# Patient Record
Sex: MALE | Race: WHITE | NOT HISPANIC OR LATINO | Employment: FULL TIME | RURAL
[De-identification: names, ages, dates, MRNs, and addresses within clinical notes are randomized per-mention and may not be internally consistent; named-entity substitution may affect disease eponyms.]

---

## 2018-07-06 ENCOUNTER — HISTORICAL (OUTPATIENT)
Dept: ADMINISTRATIVE | Facility: HOSPITAL | Age: 38
End: 2018-07-06

## 2018-07-09 LAB
LAB AP CLINICAL INFORMATION: NORMAL
LAB AP DIAGNOSIS - HISTORICAL: NORMAL
LAB AP GROSS PATHOLOGY - HISTORICAL: NORMAL
LAB AP SPECIMEN SUBMITTED - HISTORICAL: NORMAL

## 2020-03-05 ENCOUNTER — HISTORICAL (OUTPATIENT)
Dept: ADMINISTRATIVE | Facility: HOSPITAL | Age: 40
End: 2020-03-05

## 2020-05-20 ENCOUNTER — HISTORICAL (OUTPATIENT)
Dept: ADMINISTRATIVE | Facility: HOSPITAL | Age: 40
End: 2020-05-20

## 2020-05-20 LAB
GLUCOSE SERPL-MCNC: 361 MG/DL (ref 70–105)
GLUCOSE SERPL-MCNC: 391 MG/DL (ref 70–105)

## 2020-05-27 ENCOUNTER — HISTORICAL (OUTPATIENT)
Dept: ADMINISTRATIVE | Facility: HOSPITAL | Age: 40
End: 2020-05-27

## 2020-05-27 LAB
BASOPHILS # BLD AUTO: 0.05 X10E3/UL (ref 0–0.2)
BASOPHILS NFR BLD AUTO: 0.5 % (ref 0–1)
EOSINOPHIL # BLD AUTO: 0.07 X10E3/UL (ref 0–0.5)
EOSINOPHIL NFR BLD AUTO: 0.7 % (ref 1–4)
ERYTHROCYTE [DISTWIDTH] IN BLOOD BY AUTOMATED COUNT: 12.4 % (ref 11.5–14.5)
GLUCOSE SERPL-MCNC: 100 MG/DL (ref 70–105)
GLUCOSE SERPL-MCNC: 136 MG/DL (ref 70–105)
HCT VFR BLD AUTO: 45.1 % (ref 40–54)
HGB BLD-MCNC: 15.1 G/DL (ref 13.5–18)
IMM GRANULOCYTES # BLD AUTO: 0.03 X10E3/UL (ref 0–0.04)
IMM GRANULOCYTES NFR BLD: 0.3 % (ref 0–0.4)
LYMPHOCYTES # BLD AUTO: 3.02 X10E3/UL (ref 1–4.8)
LYMPHOCYTES NFR BLD AUTO: 30.5 % (ref 27–41)
MCH RBC QN AUTO: 27.7 PG (ref 27–31)
MCHC RBC AUTO-ENTMCNC: 33.5 G/DL (ref 32–36)
MCV RBC AUTO: 82.8 FL (ref 80–96)
MONOCYTES # BLD AUTO: 0.41 X10E3/UL (ref 0–0.8)
MONOCYTES NFR BLD AUTO: 4.1 % (ref 2–6)
MPC BLD CALC-MCNC: 9.4 FL (ref 9.4–12.4)
NEUTROPHILS # BLD AUTO: 6.31 X10E3/UL (ref 1.8–7.7)
NEUTROPHILS NFR BLD AUTO: 63.9 % (ref 53–65)
NRBC # BLD AUTO: 0 X10E3/UL (ref 0–0)
NRBC, AUTO (.00): 0 /100 (ref 0–0)
PLATELET # BLD AUTO: 224 X10E3/UL (ref 150–400)
RBC # BLD AUTO: 5.45 X10E6/UL (ref 4.6–6.2)
WBC # BLD AUTO: 9.89 X10E3/UL (ref 4.5–11)

## 2020-06-01 ENCOUNTER — HISTORICAL (OUTPATIENT)
Dept: ADMINISTRATIVE | Facility: HOSPITAL | Age: 40
End: 2020-06-01

## 2020-06-01 LAB — GLUCOSE SERPL-MCNC: 129 MG/DL (ref 70–105)

## 2020-08-19 ENCOUNTER — HISTORICAL (OUTPATIENT)
Dept: ADMINISTRATIVE | Facility: HOSPITAL | Age: 40
End: 2020-08-19

## 2020-08-19 LAB — GLUCOSE SERPL-MCNC: 219 MG/DL (ref 70–105)

## 2020-09-23 ENCOUNTER — HISTORICAL (OUTPATIENT)
Dept: ADMINISTRATIVE | Facility: HOSPITAL | Age: 40
End: 2020-09-23

## 2020-09-23 LAB
GLUCOSE SERPL-MCNC: 243 MG/DL (ref 70–105)
GLUCOSE SERPL-MCNC: 270 MG/DL (ref 70–105)

## 2021-03-24 RX ORDER — HYDROCODONE BITARTRATE AND ACETAMINOPHEN 10; 325 MG/1; MG/1
1 TABLET ORAL EVERY 12 HOURS
COMMUNITY
End: 2021-06-09 | Stop reason: SDUPTHER

## 2021-03-24 RX ORDER — BUDESONIDE AND FORMOTEROL FUMARATE DIHYDRATE 160; 4.5 UG/1; UG/1
2 AEROSOL RESPIRATORY (INHALATION) EVERY 12 HOURS
COMMUNITY
End: 2021-06-17 | Stop reason: SDUPTHER

## 2021-03-24 RX ORDER — LISINOPRIL 5 MG/1
5 TABLET ORAL DAILY
COMMUNITY
End: 2021-06-17 | Stop reason: SDUPTHER

## 2021-03-24 RX ORDER — DULOXETIN HYDROCHLORIDE 60 MG/1
60 CAPSULE, DELAYED RELEASE ORAL 2 TIMES DAILY
COMMUNITY
End: 2021-04-20 | Stop reason: SDUPTHER

## 2021-03-24 RX ORDER — TRAZODONE HYDROCHLORIDE 50 MG/1
50 TABLET ORAL NIGHTLY
COMMUNITY
End: 2021-04-20

## 2021-03-24 RX ORDER — ESOMEPRAZOLE MAGNESIUM 40 MG/1
40 CAPSULE, DELAYED RELEASE ORAL
COMMUNITY
End: 2021-06-17 | Stop reason: SDUPTHER

## 2021-03-24 RX ORDER — TIZANIDINE 4 MG/1
4 TABLET ORAL 3 TIMES DAILY
COMMUNITY
End: 2021-06-17 | Stop reason: SDUPTHER

## 2021-03-24 RX ORDER — GABAPENTIN 600 MG/1
600 TABLET ORAL 3 TIMES DAILY
COMMUNITY
End: 2021-06-17 | Stop reason: SDUPTHER

## 2021-03-24 RX ORDER — PRAVASTATIN SODIUM 80 MG/1
80 TABLET ORAL NIGHTLY
COMMUNITY
End: 2021-06-17 | Stop reason: SDUPTHER

## 2021-03-31 VITALS
BODY MASS INDEX: 42.66 KG/M2 | HEIGHT: 72 IN | BODY MASS INDEX: 42.66 KG/M2 | HEIGHT: 72 IN | WEIGHT: 315 LBS | WEIGHT: 315 LBS

## 2021-03-31 RX ORDER — ALBUTEROL SULFATE 90 UG/1
AEROSOL, METERED RESPIRATORY (INHALATION) EVERY 4 HOURS PRN
COMMUNITY
End: 2021-06-17 | Stop reason: SDUPTHER

## 2021-04-01 ENCOUNTER — OFFICE VISIT (OUTPATIENT)
Dept: DIABETES SERVICES | Facility: CLINIC | Age: 41
End: 2021-04-01
Payer: COMMERCIAL

## 2021-04-01 ENCOUNTER — OFFICE VISIT (OUTPATIENT)
Dept: PAIN MEDICINE | Facility: HOSPITAL | Age: 41
End: 2021-04-01
Payer: COMMERCIAL

## 2021-04-01 VITALS
HEART RATE: 92 BPM | OXYGEN SATURATION: 96 % | BODY MASS INDEX: 42.66 KG/M2 | RESPIRATION RATE: 16 BRPM | SYSTOLIC BLOOD PRESSURE: 126 MMHG | DIASTOLIC BLOOD PRESSURE: 90 MMHG | HEIGHT: 72 IN | WEIGHT: 315 LBS

## 2021-04-01 VITALS
SYSTOLIC BLOOD PRESSURE: 165 MMHG | WEIGHT: 315 LBS | DIASTOLIC BLOOD PRESSURE: 96 MMHG | BODY MASS INDEX: 42.66 KG/M2 | HEART RATE: 100 BPM | HEIGHT: 72 IN

## 2021-04-01 DIAGNOSIS — M47.817 LUMBOSACRAL SPONDYLOSIS WITHOUT MYELOPATHY: Chronic | ICD-10-CM

## 2021-04-01 DIAGNOSIS — I10 HYPERTENSION, UNSPECIFIED TYPE: ICD-10-CM

## 2021-04-01 DIAGNOSIS — E78.5 HYPERLIPIDEMIA, UNSPECIFIED HYPERLIPIDEMIA TYPE: ICD-10-CM

## 2021-04-01 DIAGNOSIS — G89.4 CHRONIC PAIN SYNDROME: Chronic | ICD-10-CM

## 2021-04-01 DIAGNOSIS — F32.A DEPRESSION, UNSPECIFIED DEPRESSION TYPE: ICD-10-CM

## 2021-04-01 DIAGNOSIS — Z79.899 ENCOUNTER FOR LONG-TERM (CURRENT) USE OF OTHER MEDICATIONS: ICD-10-CM

## 2021-04-01 DIAGNOSIS — G47.30 SLEEP APNEA, UNSPECIFIED TYPE: ICD-10-CM

## 2021-04-01 DIAGNOSIS — E11.9 TYPE 2 DIABETES MELLITUS WITHOUT COMPLICATION, WITHOUT LONG-TERM CURRENT USE OF INSULIN: ICD-10-CM

## 2021-04-01 DIAGNOSIS — E11.42 DIABETIC PERIPHERAL NEUROPATHY: ICD-10-CM

## 2021-04-01 DIAGNOSIS — Z79.4 TYPE 2 DIABETES MELLITUS WITH HYPERGLYCEMIA, WITH LONG-TERM CURRENT USE OF INSULIN: Primary | ICD-10-CM

## 2021-04-01 DIAGNOSIS — J44.9 CHRONIC OBSTRUCTIVE PULMONARY DISEASE, UNSPECIFIED COPD TYPE: ICD-10-CM

## 2021-04-01 DIAGNOSIS — E11.65 TYPE 2 DIABETES MELLITUS WITH HYPERGLYCEMIA, WITH LONG-TERM CURRENT USE OF INSULIN: Primary | ICD-10-CM

## 2021-04-01 DIAGNOSIS — G90.511 COMPLEX REGIONAL PAIN SYNDROME TYPE 1 OF RIGHT UPPER EXTREMITY: Primary | Chronic | ICD-10-CM

## 2021-04-01 LAB
CTP QC/QA: YES
GLUCOSE SERPL-MCNC: 403 MG/DL (ref 70–110)
HBA1C MFR BLD: 12.8 %
POC (AMP) AMPHETAMINE: NEGATIVE
POC (BAR) BARBITURATES: NEGATIVE
POC (BUP) BUPRENORPHINE: NEGATIVE
POC (BZO) BENZODIAZEPINES: NEGATIVE
POC (COC) COCAINE: NEGATIVE
POC (MDMA) METHYLENEDIOXYMETHAMPHETAMINE 3,4: NEGATIVE
POC (MET) METHAMPHETAMINE: NEGATIVE
POC (MOP) OPIATES: ABNORMAL
POC (MTD) METHADONE: NEGATIVE
POC (OXY) OXYCODONE: NEGATIVE
POC (PCP) PHENCYCLIDINE: NEGATIVE
POC (TCA) TRICYCLIC ANTIDEPRESSANTS: NEGATIVE
POC TEMPERATURE (URINE): 92
POC THC: NEGATIVE

## 2021-04-01 PROCEDURE — 99214 OFFICE O/P EST MOD 30 MIN: CPT | Mod: S$PBB,,, | Performed by: PHYSICIAN ASSISTANT

## 2021-04-01 PROCEDURE — 99214 PR OFFICE/OUTPT VISIT, EST, LEVL IV, 30-39 MIN: ICD-10-PCS | Mod: S$PBB,,, | Performed by: PHYSICIAN ASSISTANT

## 2021-04-01 PROCEDURE — 83036 HEMOGLOBIN GLYCOSYLATED A1C: CPT | Mod: PBBFAC | Performed by: NURSE PRACTITIONER

## 2021-04-01 PROCEDURE — 99999 PR PBB SHADOW E&M-EST. PATIENT-LVL IV: CPT | Mod: PBBFAC,,, | Performed by: PHYSICIAN ASSISTANT

## 2021-04-01 PROCEDURE — 82962 GLUCOSE BLOOD TEST: CPT | Mod: PBBFAC | Performed by: NURSE PRACTITIONER

## 2021-04-01 PROCEDURE — 99214 OFFICE O/P EST MOD 30 MIN: CPT | Mod: PBBFAC | Performed by: PHYSICIAN ASSISTANT

## 2021-04-01 PROCEDURE — 99215 OFFICE O/P EST HI 40 MIN: CPT | Mod: PBBFAC | Performed by: NURSE PRACTITIONER

## 2021-04-01 PROCEDURE — 99999 PR PBB SHADOW E&M-EST. PATIENT-LVL V: CPT | Mod: PBBFAC,,, | Performed by: NURSE PRACTITIONER

## 2021-04-01 PROCEDURE — 99999 PR PBB SHADOW E&M-EST. PATIENT-LVL IV: ICD-10-PCS | Mod: PBBFAC,,, | Performed by: PHYSICIAN ASSISTANT

## 2021-04-01 PROCEDURE — 99204 PR OFFICE/OUTPT VISIT, NEW, LEVL IV, 45-59 MIN: ICD-10-PCS | Mod: S$PBB,,, | Performed by: NURSE PRACTITIONER

## 2021-04-01 PROCEDURE — 99999 PR PBB SHADOW E&M-EST. PATIENT-LVL V: ICD-10-PCS | Mod: PBBFAC,,, | Performed by: NURSE PRACTITIONER

## 2021-04-01 PROCEDURE — 99204 OFFICE O/P NEW MOD 45 MIN: CPT | Mod: S$PBB,,, | Performed by: NURSE PRACTITIONER

## 2021-04-01 RX ORDER — SEMAGLUTIDE 1.34 MG/ML
0.75 INJECTION, SOLUTION SUBCUTANEOUS
Qty: 9 ML | Refills: 1 | Status: SHIPPED | OUTPATIENT
Start: 2021-04-01 | End: 2021-07-26 | Stop reason: SDUPTHER

## 2021-04-01 RX ORDER — EMPAGLIFLOZIN 25 MG/1
25 TABLET, FILM COATED ORAL DAILY
Qty: 90 TABLET | Refills: 1 | Status: SHIPPED | OUTPATIENT
Start: 2021-04-01 | End: 2021-07-26 | Stop reason: SDUPTHER

## 2021-04-01 RX ORDER — INSULIN GLARGINE 100 [IU]/ML
INJECTION, SOLUTION SUBCUTANEOUS
Qty: 45 ML | Refills: 1 | Status: SHIPPED | OUTPATIENT
Start: 2021-04-01 | End: 2021-07-26

## 2021-04-01 RX ORDER — HYDROCODONE BITARTRATE AND ACETAMINOPHEN 10; 325 MG/1; MG/1
1 TABLET ORAL EVERY 12 HOURS
Qty: 60 TABLET | Refills: 0 | Status: SHIPPED | OUTPATIENT
Start: 2021-04-06 | End: 2021-04-29 | Stop reason: SDUPTHER

## 2021-04-15 ENCOUNTER — ANESTHESIA (OUTPATIENT)
Dept: PAIN MEDICINE | Facility: HOSPITAL | Age: 41
End: 2021-04-15
Payer: COMMERCIAL

## 2021-04-15 ENCOUNTER — ANESTHESIA EVENT (OUTPATIENT)
Dept: PAIN MEDICINE | Facility: HOSPITAL | Age: 41
End: 2021-04-15
Payer: COMMERCIAL

## 2021-04-15 ENCOUNTER — HOSPITAL ENCOUNTER (OUTPATIENT)
Facility: HOSPITAL | Age: 41
Discharge: HOME | End: 2021-04-15
Attending: PAIN MEDICINE | Admitting: PAIN MEDICINE
Payer: COMMERCIAL

## 2021-04-15 VITALS
TEMPERATURE: 97 F | HEART RATE: 73 BPM | HEIGHT: 72 IN | DIASTOLIC BLOOD PRESSURE: 84 MMHG | RESPIRATION RATE: 15 BRPM | WEIGHT: 315 LBS | SYSTOLIC BLOOD PRESSURE: 133 MMHG | OXYGEN SATURATION: 98 % | BODY MASS INDEX: 42.66 KG/M2

## 2021-04-15 DIAGNOSIS — M47.817 LUMBOSACRAL SPONDYLOSIS WITHOUT MYELOPATHY: Primary | ICD-10-CM

## 2021-04-15 DIAGNOSIS — M47.817 SPONDYLOSIS OF LUMBOSACRAL REGION WITHOUT MYELOPATHY OR RADICULOPATHY: ICD-10-CM

## 2021-04-15 LAB
GLUCOSE SERPL-MCNC: 153 MG/DL (ref 70–105)
GLUCOSE SERPL-MCNC: 153 MG/DL (ref 70–110)

## 2021-04-15 PROCEDURE — 25000003 PHARM REV CODE 250: Performed by: PAIN MEDICINE

## 2021-04-15 PROCEDURE — 64494 INJ PARAVERT F JNT L/S 2 LEV: CPT | Mod: 50 | Performed by: PAIN MEDICINE

## 2021-04-15 PROCEDURE — 64493 INJ PARAVERT F JNT L/S 1 LEV: CPT | Mod: 50 | Performed by: PAIN MEDICINE

## 2021-04-15 PROCEDURE — 64494 INJ PARAVERT F JNT L/S 2 LEV: CPT | Mod: RT,,, | Performed by: PAIN MEDICINE

## 2021-04-15 PROCEDURE — 64493 PR INJ DX/THER AGNT PARAVERT FACET JOINT,IMG GUIDE,LUMBAR/SAC,1ST LVL: ICD-10-PCS | Mod: 50,,, | Performed by: PAIN MEDICINE

## 2021-04-15 PROCEDURE — 37000008 HC ANESTHESIA 1ST 15 MINUTES: Performed by: PAIN MEDICINE

## 2021-04-15 PROCEDURE — 27201423 OPTIME MED/SURG SUP & DEVICES STERILE SUPPLY: Performed by: PAIN MEDICINE

## 2021-04-15 PROCEDURE — 64495 INJ PARAVERT F JNT L/S 3 LEV: CPT | Mod: RT,,, | Performed by: PAIN MEDICINE

## 2021-04-15 PROCEDURE — D9220A PRA ANESTHESIA: ICD-10-PCS | Mod: ,,, | Performed by: NURSE ANESTHETIST, CERTIFIED REGISTERED

## 2021-04-15 PROCEDURE — 82962 GLUCOSE BLOOD TEST: CPT

## 2021-04-15 PROCEDURE — D9220A PRA ANESTHESIA: Mod: ,,, | Performed by: NURSE ANESTHETIST, CERTIFIED REGISTERED

## 2021-04-15 PROCEDURE — 64495 INJ PARAVERT F JNT L/S 3 LEV: CPT | Mod: 50 | Performed by: PAIN MEDICINE

## 2021-04-15 PROCEDURE — 64494 PR INJ DX/THER AGNT PARAVERT FACET JOINT,IMG GUIDE,LUMBAR/SAC, 2ND LEVEL: ICD-10-PCS | Mod: LT,,, | Performed by: PAIN MEDICINE

## 2021-04-15 PROCEDURE — 64493 INJ PARAVERT F JNT L/S 1 LEV: CPT | Mod: 50,,, | Performed by: PAIN MEDICINE

## 2021-04-15 PROCEDURE — 64495 PR INJ DX/THER AGNT PARAVERT FACET JOINT,IMG GUIDE,LUMBAR/SAC, ADD LEVEL: ICD-10-PCS | Mod: LT,,, | Performed by: PAIN MEDICINE

## 2021-04-15 PROCEDURE — 25000003 PHARM REV CODE 250: Performed by: NURSE ANESTHETIST, CERTIFIED REGISTERED

## 2021-04-15 PROCEDURE — 37000009 HC ANESTHESIA EA ADD 15 MINS: Performed by: PAIN MEDICINE

## 2021-04-15 RX ORDER — SODIUM CHLORIDE 9 MG/ML
INJECTION, SOLUTION INTRAVENOUS CONTINUOUS
Status: DISCONTINUED | OUTPATIENT
Start: 2021-04-15 | End: 2021-04-15 | Stop reason: HOSPADM

## 2021-04-15 RX ORDER — LIDOCAINE HYDROCHLORIDE 20 MG/ML
INJECTION INTRAVENOUS
Status: DISCONTINUED | OUTPATIENT
Start: 2021-04-15 | End: 2021-04-15

## 2021-04-15 RX ORDER — BUPIVACAINE HYDROCHLORIDE 2.5 MG/ML
INJECTION, SOLUTION INFILTRATION; PERINEURAL
Status: DISCONTINUED | OUTPATIENT
Start: 2021-04-15 | End: 2021-04-15 | Stop reason: HOSPADM

## 2021-04-15 RX ORDER — PROPOFOL 10 MG/ML
VIAL (ML) INTRAVENOUS
Status: DISCONTINUED | OUTPATIENT
Start: 2021-04-15 | End: 2021-04-15

## 2021-04-15 RX ORDER — SODIUM CHLORIDE 450 MG/100ML
INJECTION, SOLUTION INTRAVENOUS CONTINUOUS PRN
Status: COMPLETED | OUTPATIENT
Start: 2021-04-15 | End: 2021-04-15

## 2021-04-15 RX ADMIN — Medication 150 MG: at 09:04

## 2021-04-15 RX ADMIN — SODIUM CHLORIDE: 9 INJECTION, SOLUTION INTRAVENOUS at 08:04

## 2021-04-15 RX ADMIN — Medication 50 MG: at 08:04

## 2021-04-15 RX ADMIN — LIDOCAINE HYDROCHLORIDE 50 MG: 20 INJECTION, SOLUTION INTRAVENOUS at 08:04

## 2021-04-20 ENCOUNTER — OFFICE VISIT (OUTPATIENT)
Dept: NEUROLOGY | Facility: CLINIC | Age: 41
End: 2021-04-20
Payer: COMMERCIAL

## 2021-04-20 VITALS
BODY MASS INDEX: 42.66 KG/M2 | WEIGHT: 315 LBS | SYSTOLIC BLOOD PRESSURE: 132 MMHG | DIASTOLIC BLOOD PRESSURE: 80 MMHG | OXYGEN SATURATION: 96 % | HEIGHT: 72 IN | HEART RATE: 106 BPM

## 2021-04-20 DIAGNOSIS — E11.42 DIABETIC POLYNEUROPATHY ASSOCIATED WITH TYPE 2 DIABETES MELLITUS: Primary | ICD-10-CM

## 2021-04-20 DIAGNOSIS — G47.30 SLEEP APNEA, UNSPECIFIED TYPE: ICD-10-CM

## 2021-04-20 DIAGNOSIS — E66.9 OBESITY, UNSPECIFIED CLASSIFICATION, UNSPECIFIED OBESITY TYPE, UNSPECIFIED WHETHER SERIOUS COMORBIDITY PRESENT: ICD-10-CM

## 2021-04-20 DIAGNOSIS — F32.A DEPRESSIVE DISORDER: ICD-10-CM

## 2021-04-20 PROCEDURE — 99215 OFFICE O/P EST HI 40 MIN: CPT | Mod: PBBFAC | Performed by: NURSE PRACTITIONER

## 2021-04-20 PROCEDURE — 99214 PR OFFICE/OUTPT VISIT, EST, LEVL IV, 30-39 MIN: ICD-10-PCS | Mod: S$PBB,,, | Performed by: NURSE PRACTITIONER

## 2021-04-20 PROCEDURE — 99214 OFFICE O/P EST MOD 30 MIN: CPT | Mod: S$PBB,,, | Performed by: NURSE PRACTITIONER

## 2021-04-20 PROCEDURE — 99999 PR PBB SHADOW E&M-EST. PATIENT-LVL V: ICD-10-PCS | Mod: PBBFAC,,, | Performed by: NURSE PRACTITIONER

## 2021-04-20 PROCEDURE — 99999 PR PBB SHADOW E&M-EST. PATIENT-LVL V: CPT | Mod: PBBFAC,,, | Performed by: NURSE PRACTITIONER

## 2021-04-20 RX ORDER — BLOOD SUGAR DIAGNOSTIC
STRIP MISCELLANEOUS
COMMUNITY
Start: 2021-04-03

## 2021-04-20 RX ORDER — PEN NEEDLE, DIABETIC 32GX 5/32"
NEEDLE, DISPOSABLE MISCELLANEOUS
COMMUNITY
Start: 2021-04-03 | End: 2021-12-17 | Stop reason: SDUPTHER

## 2021-04-20 RX ORDER — DULOXETIN HYDROCHLORIDE 60 MG/1
60 CAPSULE, DELAYED RELEASE ORAL 2 TIMES DAILY
Qty: 60 CAPSULE | Refills: 6 | Status: SHIPPED | OUTPATIENT
Start: 2021-04-20 | End: 2021-07-19 | Stop reason: SDUPTHER

## 2021-04-20 RX ORDER — AMITRIPTYLINE HYDROCHLORIDE 10 MG/1
TABLET, FILM COATED ORAL
Qty: 30 TABLET | Refills: 3 | Status: SHIPPED | OUTPATIENT
Start: 2021-04-20 | End: 2021-07-19 | Stop reason: SDUPTHER

## 2021-04-20 RX ORDER — LANCETS
EACH MISCELLANEOUS
COMMUNITY
Start: 2021-04-03

## 2021-04-29 ENCOUNTER — OFFICE VISIT (OUTPATIENT)
Dept: PAIN MEDICINE | Facility: CLINIC | Age: 41
End: 2021-04-29
Payer: COMMERCIAL

## 2021-04-29 VITALS
HEIGHT: 72 IN | BODY MASS INDEX: 42.66 KG/M2 | RESPIRATION RATE: 20 BRPM | WEIGHT: 315 LBS | SYSTOLIC BLOOD PRESSURE: 157 MMHG | HEART RATE: 129 BPM | DIASTOLIC BLOOD PRESSURE: 107 MMHG

## 2021-04-29 DIAGNOSIS — M47.817 LUMBOSACRAL SPONDYLOSIS WITHOUT MYELOPATHY: Primary | Chronic | ICD-10-CM

## 2021-04-29 DIAGNOSIS — G89.4 CHRONIC PAIN SYNDROME: Chronic | ICD-10-CM

## 2021-04-29 DIAGNOSIS — G90.511 COMPLEX REGIONAL PAIN SYNDROME TYPE 1 OF RIGHT UPPER EXTREMITY: Chronic | ICD-10-CM

## 2021-04-29 DIAGNOSIS — G90.511 COMPLEX REGIONAL PAIN SYNDROME TYPE I OF RIGHT UPPER EXTREMITY: Chronic | ICD-10-CM

## 2021-04-29 DIAGNOSIS — M47.817 LUMBOSACRAL SPONDYLOSIS WITHOUT MYELOPATHY: Chronic | ICD-10-CM

## 2021-04-29 PROCEDURE — 99215 OFFICE O/P EST HI 40 MIN: CPT | Mod: PBBFAC | Performed by: PHYSICIAN ASSISTANT

## 2021-04-29 PROCEDURE — 99214 OFFICE O/P EST MOD 30 MIN: CPT | Mod: S$PBB,,, | Performed by: PHYSICIAN ASSISTANT

## 2021-04-29 PROCEDURE — 99999 PR PBB SHADOW E&M-EST. PATIENT-LVL V: ICD-10-PCS | Mod: PBBFAC,,, | Performed by: PHYSICIAN ASSISTANT

## 2021-04-29 PROCEDURE — 99214 PR OFFICE/OUTPT VISIT, EST, LEVL IV, 30-39 MIN: ICD-10-PCS | Mod: S$PBB,,, | Performed by: PHYSICIAN ASSISTANT

## 2021-04-29 PROCEDURE — 99999 PR PBB SHADOW E&M-EST. PATIENT-LVL V: CPT | Mod: PBBFAC,,, | Performed by: PHYSICIAN ASSISTANT

## 2021-04-29 RX ORDER — HYDROCODONE BITARTRATE AND ACETAMINOPHEN 10; 325 MG/1; MG/1
1 TABLET ORAL EVERY 12 HOURS
Qty: 60 TABLET | Refills: 0 | Status: SHIPPED | OUTPATIENT
Start: 2021-05-14 | End: 2021-06-13

## 2021-05-18 ENCOUNTER — TELEPHONE (OUTPATIENT)
Dept: PAIN MEDICINE | Facility: CLINIC | Age: 41
End: 2021-05-18

## 2021-06-09 ENCOUNTER — OFFICE VISIT (OUTPATIENT)
Dept: PAIN MEDICINE | Facility: CLINIC | Age: 41
End: 2021-06-09
Payer: COMMERCIAL

## 2021-06-09 VITALS
SYSTOLIC BLOOD PRESSURE: 161 MMHG | HEART RATE: 95 BPM | BODY MASS INDEX: 42.66 KG/M2 | RESPIRATION RATE: 17 BRPM | HEIGHT: 72 IN | WEIGHT: 315 LBS | DIASTOLIC BLOOD PRESSURE: 84 MMHG

## 2021-06-09 DIAGNOSIS — E11.42 DIABETIC PERIPHERAL NEUROPATHY: Primary | ICD-10-CM

## 2021-06-09 DIAGNOSIS — G90.511 COMPLEX REGIONAL PAIN SYNDROME TYPE 1 OF RIGHT UPPER EXTREMITY: Chronic | ICD-10-CM

## 2021-06-09 DIAGNOSIS — M47.817 LUMBOSACRAL SPONDYLOSIS WITHOUT MYELOPATHY: Chronic | ICD-10-CM

## 2021-06-09 PROCEDURE — 99214 OFFICE O/P EST MOD 30 MIN: CPT | Mod: S$PBB,,, | Performed by: PHYSICIAN ASSISTANT

## 2021-06-09 PROCEDURE — 99215 OFFICE O/P EST HI 40 MIN: CPT | Mod: PBBFAC | Performed by: PHYSICIAN ASSISTANT

## 2021-06-09 PROCEDURE — 99214 PR OFFICE/OUTPT VISIT, EST, LEVL IV, 30-39 MIN: ICD-10-PCS | Mod: S$PBB,,, | Performed by: PHYSICIAN ASSISTANT

## 2021-06-09 RX ORDER — HYDROCODONE BITARTRATE AND ACETAMINOPHEN 10; 325 MG/1; MG/1
1 TABLET ORAL EVERY 12 HOURS
Qty: 60 TABLET | Refills: 0 | Status: SHIPPED | OUTPATIENT
Start: 2021-06-14 | End: 2021-07-08 | Stop reason: SDUPTHER

## 2021-06-17 ENCOUNTER — OFFICE VISIT (OUTPATIENT)
Dept: FAMILY MEDICINE | Facility: CLINIC | Age: 41
End: 2021-06-17
Payer: COMMERCIAL

## 2021-06-17 VITALS
OXYGEN SATURATION: 97 % | TEMPERATURE: 98 F | WEIGHT: 315 LBS | DIASTOLIC BLOOD PRESSURE: 97 MMHG | HEART RATE: 109 BPM | RESPIRATION RATE: 16 BRPM | SYSTOLIC BLOOD PRESSURE: 152 MMHG | HEIGHT: 72 IN | BODY MASS INDEX: 42.66 KG/M2

## 2021-06-17 DIAGNOSIS — E78.5 HYPERLIPIDEMIA, UNSPECIFIED HYPERLIPIDEMIA TYPE: ICD-10-CM

## 2021-06-17 DIAGNOSIS — I10 HYPERTENSION, UNSPECIFIED TYPE: Primary | ICD-10-CM

## 2021-06-17 DIAGNOSIS — J44.9 CHRONIC OBSTRUCTIVE PULMONARY DISEASE, UNSPECIFIED COPD TYPE: ICD-10-CM

## 2021-06-17 DIAGNOSIS — F32.A DEPRESSIVE DISORDER: ICD-10-CM

## 2021-06-17 DIAGNOSIS — K21.9 GASTROESOPHAGEAL REFLUX DISEASE, UNSPECIFIED WHETHER ESOPHAGITIS PRESENT: ICD-10-CM

## 2021-06-17 LAB
ALBUMIN SERPL BCP-MCNC: 3.5 G/DL (ref 3.5–5)
ALBUMIN/GLOB SERPL: 0.9 {RATIO}
ALP SERPL-CCNC: 163 U/L (ref 45–115)
ALT SERPL W P-5'-P-CCNC: 21 U/L (ref 16–61)
ANION GAP SERPL CALCULATED.3IONS-SCNC: 12 MMOL/L (ref 7–16)
AST SERPL W P-5'-P-CCNC: 14 U/L (ref 15–37)
BASOPHILS # BLD AUTO: 0.05 K/UL (ref 0–0.2)
BASOPHILS NFR BLD AUTO: 0.4 % (ref 0–1)
BILIRUB SERPL-MCNC: 0.4 MG/DL (ref 0–1.2)
BUN SERPL-MCNC: 14 MG/DL (ref 7–18)
BUN/CREAT SERPL: 19 (ref 6–20)
CALCIUM SERPL-MCNC: 8.7 MG/DL (ref 8.5–10.1)
CHLORIDE SERPL-SCNC: 108 MMOL/L (ref 98–107)
CHOLEST SERPL-MCNC: 184 MG/DL (ref 0–200)
CHOLEST/HDLC SERPL: 5.6 {RATIO}
CO2 SERPL-SCNC: 24 MMOL/L (ref 21–32)
CREAT SERPL-MCNC: 0.73 MG/DL (ref 0.7–1.3)
DIFFERENTIAL METHOD BLD: ABNORMAL
EOSINOPHIL # BLD AUTO: 0.08 K/UL (ref 0–0.5)
EOSINOPHIL NFR BLD AUTO: 0.7 % (ref 1–4)
ERYTHROCYTE [DISTWIDTH] IN BLOOD BY AUTOMATED COUNT: 13.1 % (ref 11.5–14.5)
GLOBULIN SER-MCNC: 3.8 G/DL (ref 2–4)
GLUCOSE SERPL-MCNC: 256 MG/DL (ref 74–106)
HCT VFR BLD AUTO: 49.3 % (ref 40–54)
HDLC SERPL-MCNC: 33 MG/DL (ref 40–60)
HGB BLD-MCNC: 16.3 G/DL (ref 13.5–18)
IMM GRANULOCYTES # BLD AUTO: 0.06 K/UL (ref 0–0.04)
IMM GRANULOCYTES NFR BLD: 0.5 % (ref 0–0.4)
LDLC SERPL CALC-MCNC: 84 MG/DL
LDLC/HDLC SERPL: 2.5 {RATIO}
LYMPHOCYTES # BLD AUTO: 2.49 K/UL (ref 1–4.8)
LYMPHOCYTES NFR BLD AUTO: 21.1 % (ref 27–41)
MCH RBC QN AUTO: 28.4 PG (ref 27–31)
MCHC RBC AUTO-ENTMCNC: 33.1 G/DL (ref 32–36)
MCV RBC AUTO: 85.9 FL (ref 80–96)
MONOCYTES # BLD AUTO: 0.47 K/UL (ref 0–0.8)
MONOCYTES NFR BLD AUTO: 4 % (ref 2–6)
MPC BLD CALC-MCNC: 9.7 FL (ref 9.4–12.4)
NEUTROPHILS # BLD AUTO: 8.67 K/UL (ref 1.8–7.7)
NEUTROPHILS NFR BLD AUTO: 73.3 % (ref 53–65)
NONHDLC SERPL-MCNC: 151 MG/DL
NRBC # BLD AUTO: 0 X10E3/UL
NRBC, AUTO (.00): 0 %
PLATELET # BLD AUTO: 288 K/UL (ref 150–400)
POTASSIUM SERPL-SCNC: 4 MMOL/L (ref 3.5–5.1)
PROT SERPL-MCNC: 7.3 G/DL (ref 6.4–8.2)
RBC # BLD AUTO: 5.74 M/UL (ref 4.6–6.2)
SODIUM SERPL-SCNC: 140 MMOL/L (ref 136–145)
TRIGL SERPL-MCNC: 334 MG/DL (ref 35–150)
VLDLC SERPL-MCNC: 67 MG/DL
WBC # BLD AUTO: 11.82 K/UL (ref 4.5–11)

## 2021-06-17 PROCEDURE — 80061 LIPID PANEL: ICD-10-PCS | Mod: ,,, | Performed by: CLINICAL MEDICAL LABORATORY

## 2021-06-17 PROCEDURE — 85025 COMPLETE CBC W/AUTO DIFF WBC: CPT | Mod: ,,, | Performed by: CLINICAL MEDICAL LABORATORY

## 2021-06-17 PROCEDURE — 80061 LIPID PANEL: CPT | Mod: ,,, | Performed by: CLINICAL MEDICAL LABORATORY

## 2021-06-17 PROCEDURE — 80053 COMPREHENSIVE METABOLIC PANEL: ICD-10-PCS | Mod: ,,, | Performed by: CLINICAL MEDICAL LABORATORY

## 2021-06-17 PROCEDURE — 85025 CBC WITH DIFFERENTIAL: ICD-10-PCS | Mod: ,,, | Performed by: CLINICAL MEDICAL LABORATORY

## 2021-06-17 PROCEDURE — 99214 PR OFFICE/OUTPT VISIT, EST, LEVL IV, 30-39 MIN: ICD-10-PCS | Mod: ,,, | Performed by: FAMILY MEDICINE

## 2021-06-17 PROCEDURE — 99214 OFFICE O/P EST MOD 30 MIN: CPT | Mod: ,,, | Performed by: FAMILY MEDICINE

## 2021-06-17 PROCEDURE — 80053 COMPREHEN METABOLIC PANEL: CPT | Mod: ,,, | Performed by: CLINICAL MEDICAL LABORATORY

## 2021-06-17 RX ORDER — GABAPENTIN 600 MG/1
600 TABLET ORAL 3 TIMES DAILY
Qty: 270 TABLET | Refills: 1 | Status: SHIPPED | OUTPATIENT
Start: 2021-06-17 | End: 2021-12-17 | Stop reason: SDUPTHER

## 2021-06-17 RX ORDER — PRAVASTATIN SODIUM 80 MG/1
80 TABLET ORAL NIGHTLY
Qty: 180 TABLET | Refills: 1 | Status: ON HOLD | OUTPATIENT
Start: 2021-06-17 | End: 2021-08-25 | Stop reason: HOSPADM

## 2021-06-17 RX ORDER — LISINOPRIL 5 MG/1
5 TABLET ORAL DAILY
Qty: 90 TABLET | Refills: 1 | Status: ON HOLD | OUTPATIENT
Start: 2021-06-17 | End: 2021-08-25 | Stop reason: HOSPADM

## 2021-06-17 RX ORDER — ESOMEPRAZOLE MAGNESIUM 40 MG/1
40 CAPSULE, DELAYED RELEASE ORAL
Qty: 90 CAPSULE | Refills: 1 | Status: SHIPPED | OUTPATIENT
Start: 2021-06-17 | End: 2021-12-17 | Stop reason: SDUPTHER

## 2021-06-17 RX ORDER — TIZANIDINE 4 MG/1
4 TABLET ORAL 3 TIMES DAILY
Qty: 270 TABLET | Refills: 1 | Status: SHIPPED | OUTPATIENT
Start: 2021-06-17 | End: 2022-01-04 | Stop reason: SDUPTHER

## 2021-06-17 RX ORDER — ALBUTEROL SULFATE 90 UG/1
2 AEROSOL, METERED RESPIRATORY (INHALATION) EVERY 4 HOURS PRN
Qty: 18 G | Refills: 5 | Status: SHIPPED | OUTPATIENT
Start: 2021-06-17

## 2021-06-17 RX ORDER — BUDESONIDE AND FORMOTEROL FUMARATE DIHYDRATE 160; 4.5 UG/1; UG/1
2 AEROSOL RESPIRATORY (INHALATION) EVERY 12 HOURS
Qty: 10.2 G | Refills: 5 | Status: SHIPPED | OUTPATIENT
Start: 2021-06-17 | End: 2022-06-22

## 2021-07-08 ENCOUNTER — OFFICE VISIT (OUTPATIENT)
Dept: PAIN MEDICINE | Facility: CLINIC | Age: 41
End: 2021-07-08
Payer: COMMERCIAL

## 2021-07-08 VITALS
HEIGHT: 72 IN | RESPIRATION RATE: 18 BRPM | BODY MASS INDEX: 42.66 KG/M2 | HEART RATE: 108 BPM | SYSTOLIC BLOOD PRESSURE: 136 MMHG | DIASTOLIC BLOOD PRESSURE: 92 MMHG | WEIGHT: 315 LBS

## 2021-07-08 DIAGNOSIS — Z79.899 ENCOUNTER FOR LONG-TERM (CURRENT) USE OF OTHER MEDICATIONS: ICD-10-CM

## 2021-07-08 DIAGNOSIS — G90.511 COMPLEX REGIONAL PAIN SYNDROME TYPE 1 OF RIGHT UPPER EXTREMITY: Chronic | ICD-10-CM

## 2021-07-08 DIAGNOSIS — G90.511 COMPLEX REGIONAL PAIN SYNDROME TYPE I OF RIGHT UPPER EXTREMITY: Chronic | ICD-10-CM

## 2021-07-08 DIAGNOSIS — E11.42 DIABETIC PERIPHERAL NEUROPATHY: ICD-10-CM

## 2021-07-08 DIAGNOSIS — M47.817 LUMBOSACRAL SPONDYLOSIS WITHOUT MYELOPATHY: Primary | Chronic | ICD-10-CM

## 2021-07-08 LAB
CTP QC/QA: YES
POC (AMP) AMPHETAMINE: NEGATIVE
POC (BAR) BARBITURATES: NEGATIVE
POC (BUP) BUPRENORPHINE: NEGATIVE
POC (BZO) BENZODIAZEPINES: NEGATIVE
POC (COC) COCAINE: NEGATIVE
POC (MDMA) METHYLENEDIOXYMETHAMPHETAMINE 3,4: NEGATIVE
POC (MET) METHAMPHETAMINE: NEGATIVE
POC (MOP) OPIATES: ABNORMAL
POC (MTD) METHADONE: NEGATIVE
POC (OXY) OXYCODONE: NEGATIVE
POC (PCP) PHENCYCLIDINE: NEGATIVE
POC (TCA) TRICYCLIC ANTIDEPRESSANTS: NEGATIVE
POC TEMPERATURE (URINE): 94
POC THC: NEGATIVE

## 2021-07-08 PROCEDURE — 99214 PR OFFICE/OUTPT VISIT, EST, LEVL IV, 30-39 MIN: ICD-10-PCS | Mod: S$PBB,,, | Performed by: PHYSICIAN ASSISTANT

## 2021-07-08 PROCEDURE — 99215 OFFICE O/P EST HI 40 MIN: CPT | Mod: PBBFAC | Performed by: PHYSICIAN ASSISTANT

## 2021-07-08 PROCEDURE — 80305 DRUG TEST PRSMV DIR OPT OBS: CPT | Mod: PBBFAC | Performed by: PHYSICIAN ASSISTANT

## 2021-07-08 PROCEDURE — 99214 OFFICE O/P EST MOD 30 MIN: CPT | Mod: S$PBB,,, | Performed by: PHYSICIAN ASSISTANT

## 2021-07-08 RX ORDER — HYDROCODONE BITARTRATE AND ACETAMINOPHEN 10; 325 MG/1; MG/1
1 TABLET ORAL EVERY 12 HOURS
Qty: 60 TABLET | Refills: 0 | Status: SHIPPED | OUTPATIENT
Start: 2021-08-13 | End: 2021-09-03 | Stop reason: SDUPTHER

## 2021-07-08 RX ORDER — HYDROCODONE BITARTRATE AND ACETAMINOPHEN 10; 325 MG/1; MG/1
1 TABLET ORAL EVERY 12 HOURS
Qty: 60 TABLET | Refills: 0 | Status: ON HOLD | OUTPATIENT
Start: 2021-07-14 | End: 2021-07-22 | Stop reason: HOSPADM

## 2021-07-19 ENCOUNTER — HOSPITAL ENCOUNTER (OUTPATIENT)
Facility: HOSPITAL | Age: 41
Discharge: HOME | End: 2021-07-22
Attending: EMERGENCY MEDICINE | Admitting: FAMILY MEDICINE
Payer: COMMERCIAL

## 2021-07-19 ENCOUNTER — OFFICE VISIT (OUTPATIENT)
Dept: NEUROLOGY | Facility: CLINIC | Age: 41
End: 2021-07-19
Payer: COMMERCIAL

## 2021-07-19 VITALS
HEART RATE: 105 BPM | BODY MASS INDEX: 42.66 KG/M2 | DIASTOLIC BLOOD PRESSURE: 74 MMHG | OXYGEN SATURATION: 96 % | WEIGHT: 315 LBS | HEIGHT: 72 IN | SYSTOLIC BLOOD PRESSURE: 132 MMHG

## 2021-07-19 DIAGNOSIS — E78.5 HYPERLIPIDEMIA: ICD-10-CM

## 2021-07-19 DIAGNOSIS — E11.9 DIABETES MELLITUS, TYPE 2: ICD-10-CM

## 2021-07-19 DIAGNOSIS — I10 HYPERTENSION: ICD-10-CM

## 2021-07-19 DIAGNOSIS — R29.898 WEAKNESS OF EXTREMITY: ICD-10-CM

## 2021-07-19 DIAGNOSIS — F17.200 NICOTINE DEPENDENCE: ICD-10-CM

## 2021-07-19 DIAGNOSIS — G47.30 SLEEP APNEA, UNSPECIFIED TYPE: ICD-10-CM

## 2021-07-19 DIAGNOSIS — E11.9 TYPE 2 DIABETES MELLITUS WITHOUT COMPLICATION, WITHOUT LONG-TERM CURRENT USE OF INSULIN: Primary | ICD-10-CM

## 2021-07-19 DIAGNOSIS — R07.9 CHEST PAIN, UNSPECIFIED TYPE: ICD-10-CM

## 2021-07-19 DIAGNOSIS — G89.4 CHRONIC PAIN SYNDROME: Chronic | ICD-10-CM

## 2021-07-19 DIAGNOSIS — M47.817 SPONDYLOSIS OF LUMBOSACRAL REGION WITHOUT MYELOPATHY OR RADICULOPATHY: ICD-10-CM

## 2021-07-19 DIAGNOSIS — E66.9 OBESITY, UNSPECIFIED CLASSIFICATION, UNSPECIFIED OBESITY TYPE, UNSPECIFIED WHETHER SERIOUS COMORBIDITY PRESENT: ICD-10-CM

## 2021-07-19 DIAGNOSIS — E11.42 DIABETIC POLYNEUROPATHY ASSOCIATED WITH TYPE 2 DIABETES MELLITUS: Primary | ICD-10-CM

## 2021-07-19 DIAGNOSIS — F32.A DEPRESSIVE DISORDER: ICD-10-CM

## 2021-07-19 DIAGNOSIS — G45.9 TRANSIENT CEREBRAL ISCHEMIA, UNSPECIFIED TYPE: ICD-10-CM

## 2021-07-19 DIAGNOSIS — E11.42 DIABETIC PERIPHERAL NEUROPATHY: ICD-10-CM

## 2021-07-19 DIAGNOSIS — G47.30 SLEEP APNEA: ICD-10-CM

## 2021-07-19 DIAGNOSIS — R07.9 CHEST PAIN: ICD-10-CM

## 2021-07-19 DIAGNOSIS — G90.511 COMPLEX REGIONAL PAIN SYNDROME OF RIGHT UPPER EXTREMITY: Chronic | ICD-10-CM

## 2021-07-19 DIAGNOSIS — G90.511 COMPLEX REGIONAL PAIN SYNDROME TYPE I OF RIGHT UPPER EXTREMITY: Chronic | ICD-10-CM

## 2021-07-19 DIAGNOSIS — J44.9 COPD (CHRONIC OBSTRUCTIVE PULMONARY DISEASE): ICD-10-CM

## 2021-07-19 DIAGNOSIS — R19.7 DIARRHEA: ICD-10-CM

## 2021-07-19 DIAGNOSIS — M47.817 LUMBOSACRAL SPONDYLOSIS WITHOUT MYELOPATHY: Chronic | ICD-10-CM

## 2021-07-19 LAB
ALBUMIN SERPL BCP-MCNC: 2.9 G/DL (ref 3.5–5)
ALBUMIN/GLOB SERPL: 0.9 {RATIO}
ALP SERPL-CCNC: 133 U/L (ref 45–115)
ALT SERPL W P-5'-P-CCNC: 21 U/L (ref 16–61)
ANION GAP SERPL CALCULATED.3IONS-SCNC: 13 MMOL/L (ref 7–16)
AST SERPL W P-5'-P-CCNC: 13 U/L (ref 15–37)
BASOPHILS # BLD AUTO: 0.06 K/UL (ref 0–0.2)
BASOPHILS NFR BLD AUTO: 0.5 % (ref 0–1)
BILIRUB SERPL-MCNC: 0.3 MG/DL (ref 0–1.2)
BUN SERPL-MCNC: 9 MG/DL (ref 7–18)
BUN/CREAT SERPL: 11 (ref 6–20)
CALCIUM SERPL-MCNC: 8.3 MG/DL (ref 8.5–10.1)
CHLORIDE SERPL-SCNC: 104 MMOL/L (ref 98–107)
CHOLEST SERPL-MCNC: 158 MG/DL (ref 0–200)
CHOLEST/HDLC SERPL: 6.3 {RATIO}
CO2 SERPL-SCNC: 26 MMOL/L (ref 21–32)
CREAT SERPL-MCNC: 0.79 MG/DL (ref 0.7–1.3)
D DIMER PPP FEU-MCNC: <0.27 ΜG/ML (ref 0–0.47)
DIFFERENTIAL METHOD BLD: ABNORMAL
EOSINOPHIL # BLD AUTO: 0.24 K/UL (ref 0–0.5)
EOSINOPHIL NFR BLD AUTO: 2 % (ref 1–4)
ERYTHROCYTE [DISTWIDTH] IN BLOOD BY AUTOMATED COUNT: 12.2 % (ref 11.5–14.5)
EST. AVERAGE GLUCOSE BLD GHB EST-MCNC: 160 MG/DL
FLUAV AG UPPER RESP QL IA.RAPID: NEGATIVE
FLUBV AG UPPER RESP QL IA.RAPID: NEGATIVE
GLOBULIN SER-MCNC: 3.4 G/DL (ref 2–4)
GLUCOSE SERPL-MCNC: 168 MG/DL (ref 70–105)
GLUCOSE SERPL-MCNC: 222 MG/DL (ref 74–106)
HBA1C MFR BLD HPLC: 7.4 % (ref 4.5–6.6)
HCT VFR BLD AUTO: 46.4 % (ref 40–54)
HDLC SERPL-MCNC: 25 MG/DL (ref 40–60)
HGB BLD-MCNC: 16 G/DL (ref 13.5–18)
IMM GRANULOCYTES # BLD AUTO: 0.05 K/UL (ref 0–0.04)
IMM GRANULOCYTES NFR BLD: 0.4 % (ref 0–0.4)
INR BLD: 0.91 (ref 0.9–1.1)
LDLC SERPL CALC-MCNC: ABNORMAL MG/DL
LDLC/HDLC SERPL: ABNORMAL {RATIO}
LYMPHOCYTES # BLD AUTO: 3.44 K/UL (ref 1–4.8)
LYMPHOCYTES NFR BLD AUTO: 29.1 % (ref 27–41)
MCH RBC QN AUTO: 28.8 PG (ref 27–31)
MCHC RBC AUTO-ENTMCNC: 34.5 G/DL (ref 32–36)
MCV RBC AUTO: 83.5 FL (ref 80–96)
MONOCYTES # BLD AUTO: 0.45 K/UL (ref 0–0.8)
MONOCYTES NFR BLD AUTO: 3.8 % (ref 2–6)
MPC BLD CALC-MCNC: 9.2 FL (ref 9.4–12.4)
NEUTROPHILS # BLD AUTO: 7.58 K/UL (ref 1.8–7.7)
NEUTROPHILS NFR BLD AUTO: 64.2 % (ref 53–65)
NONHDLC SERPL-MCNC: 133 MG/DL
NRBC # BLD AUTO: 0 X10E3/UL
NRBC, AUTO (.00): 0 %
PLATELET # BLD AUTO: 247 K/UL (ref 150–400)
POTASSIUM SERPL-SCNC: 3.8 MMOL/L (ref 3.5–5.1)
PROT SERPL-MCNC: 6.3 G/DL (ref 6.4–8.2)
PROTHROMBIN TIME: 12.3 SECONDS (ref 11.7–14.7)
RBC # BLD AUTO: 5.56 M/UL (ref 4.6–6.2)
SARS-COV+SARS-COV-2 AG RESP QL IA.RAPID: NEGATIVE
SODIUM SERPL-SCNC: 139 MMOL/L (ref 136–145)
TRIGL SERPL-MCNC: 432 MG/DL (ref 35–150)
TROPONIN I SERPL-MCNC: <0.017 NG/ML
TROPONIN I SERPL-MCNC: <0.017 NG/ML
TSH SERPL DL<=0.005 MIU/L-ACNC: 1.25 UIU/ML (ref 0.36–3.74)
VLDLC SERPL-MCNC: ABNORMAL MG/DL
WBC # BLD AUTO: 11.82 K/UL (ref 4.5–11)

## 2021-07-19 PROCEDURE — 94761 N-INVAS EAR/PLS OXIMETRY MLT: CPT

## 2021-07-19 PROCEDURE — 93005 ELECTROCARDIOGRAM TRACING: CPT

## 2021-07-19 PROCEDURE — 84484 ASSAY OF TROPONIN QUANT: CPT | Mod: 91 | Performed by: EMERGENCY MEDICINE

## 2021-07-19 PROCEDURE — 99220 PR INITIAL OBSERVATION CARE,LEVL III: CPT | Mod: GC,,, | Performed by: FAMILY MEDICINE

## 2021-07-19 PROCEDURE — 85610 PROTHROMBIN TIME: CPT | Performed by: EMERGENCY MEDICINE

## 2021-07-19 PROCEDURE — 82962 GLUCOSE BLOOD TEST: CPT

## 2021-07-19 PROCEDURE — 36415 COLL VENOUS BLD VENIPUNCTURE: CPT | Performed by: EMERGENCY MEDICINE

## 2021-07-19 PROCEDURE — 99214 OFFICE O/P EST MOD 30 MIN: CPT | Mod: PBBFAC | Performed by: NURSE PRACTITIONER

## 2021-07-19 PROCEDURE — 99220 PR INITIAL OBSERVATION CARE,LEVL III: ICD-10-PCS | Mod: GC,,, | Performed by: FAMILY MEDICINE

## 2021-07-19 PROCEDURE — S4991 NICOTINE PATCH NONLEGEND: HCPCS | Performed by: EMERGENCY MEDICINE

## 2021-07-19 PROCEDURE — 85025 COMPLETE CBC W/AUTO DIFF WBC: CPT | Performed by: EMERGENCY MEDICINE

## 2021-07-19 PROCEDURE — 25000003 PHARM REV CODE 250: Performed by: EMERGENCY MEDICINE

## 2021-07-19 PROCEDURE — 99284 EMERGENCY DEPT VISIT MOD MDM: CPT | Mod: ,,, | Performed by: EMERGENCY MEDICINE

## 2021-07-19 PROCEDURE — G0378 HOSPITAL OBSERVATION PER HR: HCPCS

## 2021-07-19 PROCEDURE — 99284 PR EMERGENCY DEPT VISIT,LEVEL IV: ICD-10-PCS | Mod: ,,, | Performed by: EMERGENCY MEDICINE

## 2021-07-19 PROCEDURE — 87428 SARSCOV & INF VIR A&B AG IA: CPT | Performed by: EMERGENCY MEDICINE

## 2021-07-19 PROCEDURE — 27000221 HC OXYGEN, UP TO 24 HOURS

## 2021-07-19 PROCEDURE — 84460 ALANINE AMINO (ALT) (SGPT): CPT | Performed by: EMERGENCY MEDICINE

## 2021-07-19 PROCEDURE — 63600175 PHARM REV CODE 636 W HCPCS: Performed by: EMERGENCY MEDICINE

## 2021-07-19 PROCEDURE — 84443 ASSAY THYROID STIM HORMONE: CPT | Performed by: EMERGENCY MEDICINE

## 2021-07-19 PROCEDURE — 93010 ELECTROCARDIOGRAM REPORT: CPT | Mod: ,,, | Performed by: STUDENT IN AN ORGANIZED HEALTH CARE EDUCATION/TRAINING PROGRAM

## 2021-07-19 PROCEDURE — 84075 ASSAY ALKALINE PHOSPHATASE: CPT | Performed by: EMERGENCY MEDICINE

## 2021-07-19 PROCEDURE — 85378 FIBRIN DEGRADE SEMIQUANT: CPT | Performed by: EMERGENCY MEDICINE

## 2021-07-19 PROCEDURE — 99214 PR OFFICE/OUTPT VISIT, EST, LEVL IV, 30-39 MIN: ICD-10-PCS | Mod: S$PBB,,, | Performed by: NURSE PRACTITIONER

## 2021-07-19 PROCEDURE — 99214 OFFICE O/P EST MOD 30 MIN: CPT | Mod: S$PBB,,, | Performed by: NURSE PRACTITIONER

## 2021-07-19 PROCEDURE — 99285 EMERGENCY DEPT VISIT HI MDM: CPT | Performed by: EMERGENCY MEDICINE

## 2021-07-19 PROCEDURE — 83036 HEMOGLOBIN GLYCOSYLATED A1C: CPT | Performed by: EMERGENCY MEDICINE

## 2021-07-19 PROCEDURE — 96372 THER/PROPH/DIAG INJ SC/IM: CPT

## 2021-07-19 PROCEDURE — 84484 ASSAY OF TROPONIN QUANT: CPT | Performed by: EMERGENCY MEDICINE

## 2021-07-19 PROCEDURE — 93010 EKG 12-LEAD: ICD-10-PCS | Mod: ,,, | Performed by: STUDENT IN AN ORGANIZED HEALTH CARE EDUCATION/TRAINING PROGRAM

## 2021-07-19 PROCEDURE — 80061 LIPID PANEL: CPT | Performed by: EMERGENCY MEDICINE

## 2021-07-19 RX ORDER — IBUPROFEN 200 MG
16 TABLET ORAL
Status: DISCONTINUED | OUTPATIENT
Start: 2021-07-19 | End: 2021-07-22 | Stop reason: HOSPADM

## 2021-07-19 RX ORDER — INSULIN ASPART 100 [IU]/ML
1-10 INJECTION, SOLUTION INTRAVENOUS; SUBCUTANEOUS
Status: DISCONTINUED | OUTPATIENT
Start: 2021-07-19 | End: 2021-07-22 | Stop reason: HOSPADM

## 2021-07-19 RX ORDER — IBUPROFEN 200 MG
24 TABLET ORAL
Status: DISCONTINUED | OUTPATIENT
Start: 2021-07-19 | End: 2021-07-22 | Stop reason: HOSPADM

## 2021-07-19 RX ORDER — AMITRIPTYLINE HYDROCHLORIDE 10 MG/1
TABLET, FILM COATED ORAL
Qty: 60 TABLET | Refills: 3 | Status: SHIPPED | OUTPATIENT
Start: 2021-07-19 | End: 2021-10-19 | Stop reason: DRUGHIGH

## 2021-07-19 RX ORDER — IBUPROFEN 200 MG
1 TABLET ORAL DAILY
Status: DISCONTINUED | OUTPATIENT
Start: 2021-07-19 | End: 2021-07-22 | Stop reason: HOSPADM

## 2021-07-19 RX ORDER — PRAVASTATIN SODIUM 40 MG/1
80 TABLET ORAL NIGHTLY
Status: DISCONTINUED | OUTPATIENT
Start: 2021-07-19 | End: 2021-07-22 | Stop reason: HOSPADM

## 2021-07-19 RX ORDER — ASPIRIN 325 MG
325 TABLET ORAL
Status: COMPLETED | OUTPATIENT
Start: 2021-07-19 | End: 2021-07-19

## 2021-07-19 RX ORDER — DULOXETIN HYDROCHLORIDE 60 MG/1
60 CAPSULE, DELAYED RELEASE ORAL 2 TIMES DAILY
Qty: 60 CAPSULE | Refills: 6 | Status: SHIPPED | OUTPATIENT
Start: 2021-07-19 | End: 2021-10-19 | Stop reason: SDUPTHER

## 2021-07-19 RX ORDER — GLUCAGON 1 MG
1 KIT INJECTION
Status: DISCONTINUED | OUTPATIENT
Start: 2021-07-19 | End: 2021-07-22 | Stop reason: HOSPADM

## 2021-07-19 RX ORDER — GABAPENTIN 300 MG/1
600 CAPSULE ORAL 3 TIMES DAILY
Status: DISCONTINUED | OUTPATIENT
Start: 2021-07-19 | End: 2021-07-22 | Stop reason: HOSPADM

## 2021-07-19 RX ORDER — AMITRIPTYLINE HYDROCHLORIDE 10 MG/1
10 TABLET, FILM COATED ORAL NIGHTLY
Status: DISCONTINUED | OUTPATIENT
Start: 2021-07-19 | End: 2021-07-22 | Stop reason: HOSPADM

## 2021-07-19 RX ORDER — DULOXETIN HYDROCHLORIDE 30 MG/1
60 CAPSULE, DELAYED RELEASE ORAL 2 TIMES DAILY
Status: DISCONTINUED | OUTPATIENT
Start: 2021-07-19 | End: 2021-07-22 | Stop reason: HOSPADM

## 2021-07-19 RX ORDER — LISINOPRIL 5 MG/1
5 TABLET ORAL DAILY
Status: DISCONTINUED | OUTPATIENT
Start: 2021-07-20 | End: 2021-07-22 | Stop reason: HOSPADM

## 2021-07-19 RX ORDER — SODIUM CHLORIDE 0.9 % (FLUSH) 0.9 %
10 SYRINGE (ML) INJECTION
Status: DISCONTINUED | OUTPATIENT
Start: 2021-07-19 | End: 2021-07-22 | Stop reason: HOSPADM

## 2021-07-19 RX ORDER — MORPHINE SULFATE 2 MG/ML
2 INJECTION, SOLUTION INTRAMUSCULAR; INTRAVENOUS ONCE
Status: DISCONTINUED | OUTPATIENT
Start: 2021-07-19 | End: 2021-07-22 | Stop reason: HOSPADM

## 2021-07-19 RX ORDER — HEPARIN SODIUM 5000 [USP'U]/ML
5000 INJECTION, SOLUTION INTRAVENOUS; SUBCUTANEOUS EVERY 8 HOURS
Status: DISCONTINUED | OUTPATIENT
Start: 2021-07-19 | End: 2021-07-22 | Stop reason: HOSPADM

## 2021-07-19 RX ORDER — ASPIRIN 81 MG/1
81 TABLET ORAL DAILY
Status: DISCONTINUED | OUTPATIENT
Start: 2021-07-20 | End: 2021-07-22 | Stop reason: HOSPADM

## 2021-07-19 RX ADMIN — NICOTINE 1 PATCH: 14 PATCH, EXTENDED RELEASE TRANSDERMAL at 10:07

## 2021-07-19 RX ADMIN — PRAVASTATIN SODIUM 80 MG: 40 TABLET ORAL at 10:07

## 2021-07-19 RX ADMIN — ASPIRIN 325 MG ORAL TABLET 325 MG: 325 PILL ORAL at 05:07

## 2021-07-19 RX ADMIN — HEPARIN SODIUM 5000 UNITS: 5000 INJECTION INTRAVENOUS; SUBCUTANEOUS at 09:07

## 2021-07-19 RX ADMIN — DULOXETINE 60 MG: 30 CAPSULE, DELAYED RELEASE ORAL at 10:07

## 2021-07-19 RX ADMIN — GABAPENTIN 600 MG: 300 CAPSULE ORAL at 10:07

## 2021-07-19 RX ADMIN — NITROGLYCERIN 0.5 INCH: 20 OINTMENT TOPICAL at 05:07

## 2021-07-19 RX ADMIN — AMITRIPTYLINE HYDROCHLORIDE 10 MG: 10 TABLET, FILM COATED ORAL at 10:07

## 2021-07-20 LAB
ANION GAP SERPL CALCULATED.3IONS-SCNC: 13 MMOL/L (ref 7–16)
ANION GAP SERPL CALCULATED.3IONS-SCNC: 15 MMOL/L (ref 7–16)
AORTIC ROOT ANNULUS: 2.9 CM
AORTIC VALVE CUSP SEPERATION: 2.68 CM
AV INDEX (PROSTH): 0.79
AV MEAN GRADIENT: 1 MMHG
AV PEAK GRADIENT: 2 MMHG
AV VALVE AREA: 3.55 CM2
AV VELOCITY RATIO: 0.86
BASOPHILS # BLD AUTO: 0.06 K/UL (ref 0–0.2)
BASOPHILS NFR BLD AUTO: 0.6 % (ref 0–1)
BSA FOR ECHO PROCEDURE: 2.76 M2
BUN SERPL-MCNC: 10 MG/DL (ref 7–18)
BUN SERPL-MCNC: 11 MG/DL (ref 7–18)
BUN/CREAT SERPL: 14 (ref 6–20)
BUN/CREAT SERPL: 16 (ref 6–20)
CALCIUM SERPL-MCNC: 8.5 MG/DL (ref 8.5–10.1)
CALCIUM SERPL-MCNC: 8.7 MG/DL (ref 8.5–10.1)
CHLORIDE SERPL-SCNC: 104 MMOL/L (ref 98–107)
CHLORIDE SERPL-SCNC: 105 MMOL/L (ref 98–107)
CO2 SERPL-SCNC: 26 MMOL/L (ref 21–32)
CO2 SERPL-SCNC: 26 MMOL/L (ref 21–32)
CREAT SERPL-MCNC: 0.68 MG/DL (ref 0.7–1.3)
CREAT SERPL-MCNC: 0.69 MG/DL (ref 0.7–1.3)
CV ECHO LV RWT: 0.51 CM
DIFFERENTIAL METHOD BLD: NORMAL
DOP CALC AO PEAK VEL: 0.7 M/S
DOP CALC AO VTI: 14 CM
DOP CALC LVOT AREA: 4.5 CM2
DOP CALC LVOT DIAMETER: 2.4 CM
DOP CALC LVOT PEAK VEL: 0.6 M/S
DOP CALC LVOT STROKE VOLUME: 49.74 CM3
DOP CALCLVOT PEAK VEL VTI: 11 CM
E WAVE DECELERATION TIME: 142 MSEC
ECHO EF ESTIMATED: 60 %
ECHO LV POSTERIOR WALL: 1.29 CM (ref 0.6–1.1)
EJECTION FRACTION: 60 %
EOSINOPHIL # BLD AUTO: 0.27 K/UL (ref 0–0.5)
EOSINOPHIL NFR BLD AUTO: 2.8 % (ref 1–4)
ERYTHROCYTE [DISTWIDTH] IN BLOOD BY AUTOMATED COUNT: 12.3 % (ref 11.5–14.5)
FRACTIONAL SHORTENING: 42 % (ref 28–44)
GLUCOSE SERPL-MCNC: 149 MG/DL (ref 70–105)
GLUCOSE SERPL-MCNC: 157 MG/DL (ref 70–105)
GLUCOSE SERPL-MCNC: 161 MG/DL (ref 70–105)
GLUCOSE SERPL-MCNC: 167 MG/DL (ref 74–106)
GLUCOSE SERPL-MCNC: 185 MG/DL (ref 70–105)
GLUCOSE SERPL-MCNC: 217 MG/DL (ref 74–106)
HCT VFR BLD AUTO: 45.1 % (ref 40–54)
HGB BLD-MCNC: 14.9 G/DL (ref 13.5–18)
IMM GRANULOCYTES # BLD AUTO: 0.04 K/UL (ref 0–0.04)
IMM GRANULOCYTES NFR BLD: 0.4 % (ref 0–0.4)
INTERVENTRICULAR SEPTUM: 1.24 CM (ref 0.6–1.1)
IVC OSTIUM: 1.7 CM
LEFT ATRIUM SIZE: 3 CM
LEFT INTERNAL DIMENSION IN SYSTOLE: 2.91 CM (ref 2.1–4)
LEFT VENTRICLE MASS INDEX: 96 G/M2
LEFT VENTRICULAR INTERNAL DIMENSION IN DIASTOLE: 5.01 CM (ref 3.5–6)
LEFT VENTRICULAR MASS: 252.62 G
LVOT MG: 0.7 MMHG
LYMPHOCYTES # BLD AUTO: 3.59 K/UL (ref 1–4.8)
LYMPHOCYTES NFR BLD AUTO: 37.3 % (ref 27–41)
MAGNESIUM SERPL-MCNC: 1.9 MG/DL (ref 1.7–2.3)
MCH RBC QN AUTO: 28.2 PG (ref 27–31)
MCHC RBC AUTO-ENTMCNC: 33 G/DL (ref 32–36)
MCV RBC AUTO: 85.4 FL (ref 80–96)
MONOCYTES # BLD AUTO: 0.42 K/UL (ref 0–0.8)
MONOCYTES NFR BLD AUTO: 4.4 % (ref 2–6)
MPC BLD CALC-MCNC: 9.7 FL (ref 9.4–12.4)
MV PEAK E VEL: 0.71 M/S
NEUTROPHILS # BLD AUTO: 5.25 K/UL (ref 1.8–7.7)
NEUTROPHILS NFR BLD AUTO: 54.5 % (ref 53–65)
NRBC # BLD AUTO: 0 X10E3/UL
NRBC, AUTO (.00): 0 %
PLATELET # BLD AUTO: 222 K/UL (ref 150–400)
POTASSIUM SERPL-SCNC: 3.2 MMOL/L (ref 3.5–5.1)
POTASSIUM SERPL-SCNC: 3.6 MMOL/L (ref 3.5–5.1)
RA MAJOR: 4.3 CM
RA PRESSURE: 3 MMHG
RBC # BLD AUTO: 5.28 M/UL (ref 4.6–6.2)
RIGHT VENTRICULAR END-DIASTOLIC DIMENSION: 4 CM
SODIUM SERPL-SCNC: 140 MMOL/L (ref 136–145)
SODIUM SERPL-SCNC: 142 MMOL/L (ref 136–145)
TRICUSPID ANNULAR PLANE SYSTOLIC EXCURSION: 2.4 CM
TROPONIN I SERPL-MCNC: <0.017 NG/ML
WBC # BLD AUTO: 9.63 K/UL (ref 4.5–11)

## 2021-07-20 PROCEDURE — 25000003 PHARM REV CODE 250: Performed by: EMERGENCY MEDICINE

## 2021-07-20 PROCEDURE — 94761 N-INVAS EAR/PLS OXIMETRY MLT: CPT

## 2021-07-20 PROCEDURE — 80048 BASIC METABOLIC PNL TOTAL CA: CPT | Mod: 91

## 2021-07-20 PROCEDURE — G0378 HOSPITAL OBSERVATION PER HR: HCPCS

## 2021-07-20 PROCEDURE — 93010 EKG 12-LEAD: ICD-10-PCS | Mod: ,,, | Performed by: STUDENT IN AN ORGANIZED HEALTH CARE EDUCATION/TRAINING PROGRAM

## 2021-07-20 PROCEDURE — 63600175 PHARM REV CODE 636 W HCPCS

## 2021-07-20 PROCEDURE — 25000003 PHARM REV CODE 250: Performed by: FAMILY MEDICINE

## 2021-07-20 PROCEDURE — 93005 ELECTROCARDIOGRAM TRACING: CPT

## 2021-07-20 PROCEDURE — 96372 THER/PROPH/DIAG INJ SC/IM: CPT

## 2021-07-20 PROCEDURE — C9399 UNCLASSIFIED DRUGS OR BIOLOG: HCPCS | Performed by: EMERGENCY MEDICINE

## 2021-07-20 PROCEDURE — 85025 COMPLETE CBC W/AUTO DIFF WBC: CPT | Performed by: EMERGENCY MEDICINE

## 2021-07-20 PROCEDURE — 82962 GLUCOSE BLOOD TEST: CPT

## 2021-07-20 PROCEDURE — 93010 ELECTROCARDIOGRAM REPORT: CPT | Mod: ,,, | Performed by: STUDENT IN AN ORGANIZED HEALTH CARE EDUCATION/TRAINING PROGRAM

## 2021-07-20 PROCEDURE — 83735 ASSAY OF MAGNESIUM: CPT

## 2021-07-20 PROCEDURE — S4991 NICOTINE PATCH NONLEGEND: HCPCS | Performed by: EMERGENCY MEDICINE

## 2021-07-20 PROCEDURE — 36415 COLL VENOUS BLD VENIPUNCTURE: CPT

## 2021-07-20 PROCEDURE — 36415 COLL VENOUS BLD VENIPUNCTURE: CPT | Performed by: EMERGENCY MEDICINE

## 2021-07-20 PROCEDURE — 25000003 PHARM REV CODE 250

## 2021-07-20 PROCEDURE — 80048 BASIC METABOLIC PNL TOTAL CA: CPT | Performed by: EMERGENCY MEDICINE

## 2021-07-20 PROCEDURE — 84484 ASSAY OF TROPONIN QUANT: CPT | Performed by: EMERGENCY MEDICINE

## 2021-07-20 PROCEDURE — 63600175 PHARM REV CODE 636 W HCPCS: Performed by: EMERGENCY MEDICINE

## 2021-07-20 RX ORDER — INSULIN ASPART 100 [IU]/ML
5 INJECTION, SOLUTION INTRAVENOUS; SUBCUTANEOUS
Status: DISCONTINUED | OUTPATIENT
Start: 2021-07-20 | End: 2021-07-22 | Stop reason: HOSPADM

## 2021-07-20 RX ORDER — ACETAMINOPHEN 325 MG/1
650 TABLET ORAL EVERY 6 HOURS PRN
Status: DISCONTINUED | OUTPATIENT
Start: 2021-07-20 | End: 2021-07-22 | Stop reason: HOSPADM

## 2021-07-20 RX ORDER — EZETIMIBE 10 MG/1
10 TABLET ORAL NIGHTLY
Status: DISCONTINUED | OUTPATIENT
Start: 2021-07-20 | End: 2021-07-22 | Stop reason: HOSPADM

## 2021-07-20 RX ORDER — FAMOTIDINE 20 MG/1
20 TABLET, FILM COATED ORAL 2 TIMES DAILY PRN
Status: DISCONTINUED | OUTPATIENT
Start: 2021-07-20 | End: 2021-07-22 | Stop reason: HOSPADM

## 2021-07-20 RX ADMIN — ACETAMINOPHEN 650 MG: 325 TABLET ORAL at 02:07

## 2021-07-20 RX ADMIN — PRAVASTATIN SODIUM 80 MG: 40 TABLET ORAL at 09:07

## 2021-07-20 RX ADMIN — AMITRIPTYLINE HYDROCHLORIDE 10 MG: 10 TABLET, FILM COATED ORAL at 09:07

## 2021-07-20 RX ADMIN — POTASSIUM BICARBONATE 40 MEQ: 391 TABLET, EFFERVESCENT ORAL at 09:07

## 2021-07-20 RX ADMIN — HEPARIN SODIUM 5000 UNITS: 5000 INJECTION INTRAVENOUS; SUBCUTANEOUS at 05:07

## 2021-07-20 RX ADMIN — ASPIRIN 81 MG: 81 TABLET, COATED ORAL at 09:07

## 2021-07-20 RX ADMIN — FAMOTIDINE 20 MG: 20 TABLET ORAL at 02:07

## 2021-07-20 RX ADMIN — HEPARIN SODIUM 5000 UNITS: 5000 INJECTION INTRAVENOUS; SUBCUTANEOUS at 04:07

## 2021-07-20 RX ADMIN — NICOTINE 1 PATCH: 14 PATCH, EXTENDED RELEASE TRANSDERMAL at 09:07

## 2021-07-20 RX ADMIN — GABAPENTIN 600 MG: 300 CAPSULE ORAL at 04:07

## 2021-07-20 RX ADMIN — NITROGLYCERIN 0.5 INCH: 20 OINTMENT TOPICAL at 05:07

## 2021-07-20 RX ADMIN — GABAPENTIN 600 MG: 300 CAPSULE ORAL at 09:07

## 2021-07-20 RX ADMIN — ACETAMINOPHEN 650 MG: 325 TABLET ORAL at 10:07

## 2021-07-20 RX ADMIN — NITROGLYCERIN 0.5 INCH: 20 OINTMENT TOPICAL at 01:07

## 2021-07-20 RX ADMIN — ACETAMINOPHEN 650 MG: 325 TABLET ORAL at 09:07

## 2021-07-20 RX ADMIN — DULOXETINE 60 MG: 30 CAPSULE, DELAYED RELEASE ORAL at 09:07

## 2021-07-20 RX ADMIN — INSULIN DETEMIR 10 UNITS: 100 INJECTION, SOLUTION SUBCUTANEOUS at 09:07

## 2021-07-20 RX ADMIN — INSULIN ASPART 5 UNITS: 100 INJECTION, SOLUTION INTRAVENOUS; SUBCUTANEOUS at 04:07

## 2021-07-20 RX ADMIN — LISINOPRIL 5 MG: 5 TABLET ORAL at 09:07

## 2021-07-20 RX ADMIN — EZETIMIBE 10 MG: 10 TABLET ORAL at 09:07

## 2021-07-20 RX ADMIN — HEPARIN SODIUM 5000 UNITS: 5000 INJECTION INTRAVENOUS; SUBCUTANEOUS at 09:07

## 2021-07-21 LAB
ANION GAP SERPL CALCULATED.3IONS-SCNC: 13 MMOL/L (ref 7–16)
ATYPICAL LYMPHOCYTES: ABNORMAL
BASOPHILS # BLD AUTO: 0.06 K/UL (ref 0–0.2)
BASOPHILS NFR BLD AUTO: 0.7 % (ref 0–1)
BUN SERPL-MCNC: 12 MG/DL (ref 7–18)
BUN/CREAT SERPL: 24 (ref 6–20)
CALCIUM SERPL-MCNC: 9 MG/DL (ref 8.5–10.1)
CATH EF QUANTITATIVE: 65 %
CHLORIDE SERPL-SCNC: 106 MMOL/L (ref 98–107)
CO2 SERPL-SCNC: 25 MMOL/L (ref 21–32)
CREAT SERPL-MCNC: 0.49 MG/DL (ref 0.7–1.3)
DIFFERENTIAL METHOD BLD: ABNORMAL
EOSINOPHIL # BLD AUTO: 0.31 K/UL (ref 0–0.5)
EOSINOPHIL NFR BLD AUTO: 3.6 % (ref 1–4)
EOSINOPHIL NFR BLD MANUAL: 2 % (ref 1–4)
ERYTHROCYTE [DISTWIDTH] IN BLOOD BY AUTOMATED COUNT: 12.1 % (ref 11.5–14.5)
GLUCOSE SERPL-MCNC: 139 MG/DL (ref 70–105)
GLUCOSE SERPL-MCNC: 141 MG/DL (ref 70–105)
GLUCOSE SERPL-MCNC: 145 MG/DL (ref 70–105)
GLUCOSE SERPL-MCNC: 155 MG/DL (ref 74–106)
GLUCOSE SERPL-MCNC: 222 MG/DL (ref 70–105)
HCT VFR BLD AUTO: 47.8 % (ref 40–54)
HGB BLD-MCNC: 15.6 G/DL (ref 13.5–18)
IMM GRANULOCYTES # BLD AUTO: 0.03 K/UL (ref 0–0.04)
IMM GRANULOCYTES NFR BLD: 0.3 % (ref 0–0.4)
LYMPHOCYTES # BLD AUTO: 3.79 K/UL (ref 1–4.8)
LYMPHOCYTES NFR BLD AUTO: 43.6 % (ref 27–41)
LYMPHOCYTES NFR BLD MANUAL: 45 % (ref 27–41)
MCH RBC QN AUTO: 27.9 PG (ref 27–31)
MCHC RBC AUTO-ENTMCNC: 32.6 G/DL (ref 32–36)
MCV RBC AUTO: 85.5 FL (ref 80–96)
MONOCYTES # BLD AUTO: 0.36 K/UL (ref 0–0.8)
MONOCYTES NFR BLD AUTO: 4.1 % (ref 2–6)
MONOCYTES NFR BLD MANUAL: 3 % (ref 2–6)
MPC BLD CALC-MCNC: 9.3 FL (ref 9.4–12.4)
NEUTROPHILS # BLD AUTO: 4.14 K/UL (ref 1.8–7.7)
NEUTROPHILS NFR BLD AUTO: 47.7 % (ref 53–65)
NEUTS SEG NFR BLD MANUAL: 50 % (ref 50–62)
NRBC # BLD AUTO: 0 X10E3/UL
NRBC, AUTO (.00): 0 %
PLATELET # BLD AUTO: 230 K/UL (ref 150–400)
PLATELET MORPHOLOGY: ABNORMAL
POTASSIUM SERPL-SCNC: 3.9 MMOL/L (ref 3.5–5.1)
RBC # BLD AUTO: 5.59 M/UL (ref 4.6–6.2)
SODIUM SERPL-SCNC: 140 MMOL/L (ref 136–145)
WBC # BLD AUTO: 8.69 K/UL (ref 4.5–11)

## 2021-07-21 PROCEDURE — 99153 MOD SED SAME PHYS/QHP EA: CPT | Performed by: INTERNAL MEDICINE

## 2021-07-21 PROCEDURE — 99152 MOD SED SAME PHYS/QHP 5/>YRS: CPT | Performed by: INTERNAL MEDICINE

## 2021-07-21 PROCEDURE — 93458 L HRT ARTERY/VENTRICLE ANGIO: CPT | Performed by: INTERNAL MEDICINE

## 2021-07-21 PROCEDURE — 63600175 PHARM REV CODE 636 W HCPCS: Performed by: EMERGENCY MEDICINE

## 2021-07-21 PROCEDURE — C1887 CATHETER, GUIDING: HCPCS | Performed by: INTERNAL MEDICINE

## 2021-07-21 PROCEDURE — 96361 HYDRATE IV INFUSION ADD-ON: CPT

## 2021-07-21 PROCEDURE — 27100168 OPTIME MED/SURG SUP & DEVICES NON-STERILE SUPPLY: Performed by: INTERNAL MEDICINE

## 2021-07-21 PROCEDURE — 63600175 PHARM REV CODE 636 W HCPCS: Performed by: INTERNAL MEDICINE

## 2021-07-21 PROCEDURE — 27201423 OPTIME MED/SURG SUP & DEVICES STERILE SUPPLY: Performed by: INTERNAL MEDICINE

## 2021-07-21 PROCEDURE — 85025 COMPLETE CBC W/AUTO DIFF WBC: CPT

## 2021-07-21 PROCEDURE — 25500020 PHARM REV CODE 255: Performed by: INTERNAL MEDICINE

## 2021-07-21 PROCEDURE — 99225 PR SUBSEQUENT OBSERVATION CARE,LEVEL II: CPT | Mod: GC,,, | Performed by: FAMILY MEDICINE

## 2021-07-21 PROCEDURE — 36415 COLL VENOUS BLD VENIPUNCTURE: CPT

## 2021-07-21 PROCEDURE — 93571 IV DOP VEL&/PRESS C FLO 1ST: CPT | Mod: 26,52,, | Performed by: INTERNAL MEDICINE

## 2021-07-21 PROCEDURE — 63600175 PHARM REV CODE 636 W HCPCS

## 2021-07-21 PROCEDURE — S4991 NICOTINE PATCH NONLEGEND: HCPCS | Performed by: EMERGENCY MEDICINE

## 2021-07-21 PROCEDURE — 25000003 PHARM REV CODE 250: Performed by: INTERNAL MEDICINE

## 2021-07-21 PROCEDURE — 27800903 OPTIME MED/SURG SUP & DEVICES OTHER IMPLANTS: Performed by: INTERNAL MEDICINE

## 2021-07-21 PROCEDURE — 96360 HYDRATION IV INFUSION INIT: CPT

## 2021-07-21 PROCEDURE — 80048 BASIC METABOLIC PNL TOTAL CA: CPT

## 2021-07-21 PROCEDURE — 27000080 OPTIME MED/SURG SUP & DEVICES GENERAL CLASSIFICATION: Performed by: INTERNAL MEDICINE

## 2021-07-21 PROCEDURE — 99225 PR SUBSEQUENT OBSERVATION CARE,LEVEL II: ICD-10-PCS | Mod: GC,,, | Performed by: FAMILY MEDICINE

## 2021-07-21 PROCEDURE — 25000003 PHARM REV CODE 250

## 2021-07-21 PROCEDURE — 93571 IV DOP VEL&/PRESS C FLO 1ST: CPT | Mod: 52 | Performed by: INTERNAL MEDICINE

## 2021-07-21 PROCEDURE — C1760 CLOSURE DEV, VASC: HCPCS | Performed by: INTERNAL MEDICINE

## 2021-07-21 PROCEDURE — 25000003 PHARM REV CODE 250: Performed by: EMERGENCY MEDICINE

## 2021-07-21 PROCEDURE — C1769 GUIDE WIRE: HCPCS | Performed by: INTERNAL MEDICINE

## 2021-07-21 PROCEDURE — 96372 THER/PROPH/DIAG INJ SC/IM: CPT | Mod: 59

## 2021-07-21 PROCEDURE — 93458 PR CATH PLACE/CORON ANGIO, IMG SUPER/INTERP,W LEFT HEART VENTRICULOGRAPHY: ICD-10-PCS | Mod: 26,,, | Performed by: INTERNAL MEDICINE

## 2021-07-21 PROCEDURE — G0378 HOSPITAL OBSERVATION PER HR: HCPCS

## 2021-07-21 PROCEDURE — C1894 INTRO/SHEATH, NON-LASER: HCPCS | Performed by: INTERNAL MEDICINE

## 2021-07-21 PROCEDURE — 82962 GLUCOSE BLOOD TEST: CPT

## 2021-07-21 PROCEDURE — 93458 L HRT ARTERY/VENTRICLE ANGIO: CPT | Mod: 26,,, | Performed by: INTERNAL MEDICINE

## 2021-07-21 PROCEDURE — C9399 UNCLASSIFIED DRUGS OR BIOLOG: HCPCS | Performed by: EMERGENCY MEDICINE

## 2021-07-21 PROCEDURE — 93571 PR HEART FLOW RESERV MEASURE,INIT VESSL: ICD-10-PCS | Mod: 26,52,, | Performed by: INTERNAL MEDICINE

## 2021-07-21 PROCEDURE — 94761 N-INVAS EAR/PLS OXIMETRY MLT: CPT

## 2021-07-21 RX ORDER — MIDAZOLAM HYDROCHLORIDE 1 MG/ML
INJECTION INTRAMUSCULAR; INTRAVENOUS
Status: DISCONTINUED | OUTPATIENT
Start: 2021-07-21 | End: 2021-07-22 | Stop reason: HOSPADM

## 2021-07-21 RX ORDER — LIDOCAINE HYDROCHLORIDE 10 MG/ML
INJECTION INFILTRATION; PERINEURAL
Status: DISCONTINUED | OUTPATIENT
Start: 2021-07-21 | End: 2021-07-22 | Stop reason: HOSPADM

## 2021-07-21 RX ORDER — ACETAMINOPHEN 325 MG/1
650 TABLET ORAL EVERY 4 HOURS PRN
Status: DISCONTINUED | OUTPATIENT
Start: 2021-07-21 | End: 2021-07-22 | Stop reason: HOSPADM

## 2021-07-21 RX ORDER — FENTANYL CITRATE 50 UG/ML
INJECTION, SOLUTION INTRAMUSCULAR; INTRAVENOUS
Status: DISCONTINUED | OUTPATIENT
Start: 2021-07-21 | End: 2021-07-22 | Stop reason: HOSPADM

## 2021-07-21 RX ORDER — HEPARIN SODIUM 1000 [USP'U]/ML
INJECTION, SOLUTION INTRAVENOUS; SUBCUTANEOUS
Status: DISCONTINUED | OUTPATIENT
Start: 2021-07-21 | End: 2021-07-22 | Stop reason: HOSPADM

## 2021-07-21 RX ORDER — SODIUM CHLORIDE 450 MG/100ML
125 INJECTION, SOLUTION INTRAVENOUS CONTINUOUS
Status: DISPENSED | OUTPATIENT
Start: 2021-07-21 | End: 2021-07-21

## 2021-07-21 RX ORDER — HEPARIN SOD,PORCINE/0.9 % NACL 1000/500ML
INTRAVENOUS SOLUTION INTRAVENOUS
Status: DISCONTINUED | OUTPATIENT
Start: 2021-07-21 | End: 2021-07-22 | Stop reason: HOSPADM

## 2021-07-21 RX ORDER — SODIUM CHLORIDE 450 MG/100ML
INJECTION, SOLUTION INTRAVENOUS
Status: DISCONTINUED | OUTPATIENT
Start: 2021-07-21 | End: 2021-07-22 | Stop reason: HOSPADM

## 2021-07-21 RX ADMIN — DULOXETINE 60 MG: 30 CAPSULE, DELAYED RELEASE ORAL at 09:07

## 2021-07-21 RX ADMIN — INSULIN ASPART 5 UNITS: 100 INJECTION, SOLUTION INTRAVENOUS; SUBCUTANEOUS at 04:07

## 2021-07-21 RX ADMIN — AMITRIPTYLINE HYDROCHLORIDE 10 MG: 10 TABLET, FILM COATED ORAL at 09:07

## 2021-07-21 RX ADMIN — ASPIRIN 81 MG: 81 TABLET, COATED ORAL at 09:07

## 2021-07-21 RX ADMIN — SODIUM CHLORIDE 125 ML/HR: 4.5 INJECTION, SOLUTION INTRAVENOUS at 02:07

## 2021-07-21 RX ADMIN — GABAPENTIN 600 MG: 300 CAPSULE ORAL at 09:07

## 2021-07-21 RX ADMIN — EZETIMIBE 10 MG: 10 TABLET ORAL at 09:07

## 2021-07-21 RX ADMIN — INSULIN DETEMIR 10 UNITS: 100 INJECTION, SOLUTION SUBCUTANEOUS at 09:07

## 2021-07-21 RX ADMIN — HEPARIN SODIUM 5000 UNITS: 5000 INJECTION INTRAVENOUS; SUBCUTANEOUS at 09:07

## 2021-07-21 RX ADMIN — PRAVASTATIN SODIUM 80 MG: 40 TABLET ORAL at 09:07

## 2021-07-21 RX ADMIN — GABAPENTIN 600 MG: 300 CAPSULE ORAL at 02:07

## 2021-07-21 RX ADMIN — NICOTINE 1 PATCH: 14 PATCH, EXTENDED RELEASE TRANSDERMAL at 09:07

## 2021-07-21 RX ADMIN — INSULIN ASPART 4 UNITS: 100 INJECTION, SOLUTION INTRAVENOUS; SUBCUTANEOUS at 04:07

## 2021-07-21 RX ADMIN — LISINOPRIL 5 MG: 5 TABLET ORAL at 09:07

## 2021-07-22 VITALS
SYSTOLIC BLOOD PRESSURE: 114 MMHG | WEIGHT: 315 LBS | RESPIRATION RATE: 16 BRPM | HEART RATE: 78 BPM | BODY MASS INDEX: 42.66 KG/M2 | OXYGEN SATURATION: 99 % | TEMPERATURE: 97 F | DIASTOLIC BLOOD PRESSURE: 72 MMHG | HEIGHT: 72 IN

## 2021-07-22 PROBLEM — R29.898 WEAKNESS OF EXTREMITY: Status: RESOLVED | Noted: 2021-07-19 | Resolved: 2021-07-22

## 2021-07-22 PROBLEM — R07.9 CHEST PAIN: Status: RESOLVED | Noted: 2021-07-19 | Resolved: 2021-07-22

## 2021-07-22 PROBLEM — R19.7 DIARRHEA: Status: RESOLVED | Noted: 2021-07-19 | Resolved: 2021-07-22

## 2021-07-22 LAB
ANION GAP SERPL CALCULATED.3IONS-SCNC: 14 MMOL/L (ref 7–16)
BASOPHILS # BLD AUTO: 0.08 K/UL (ref 0–0.2)
BASOPHILS NFR BLD AUTO: 0.8 % (ref 0–1)
BUN SERPL-MCNC: 13 MG/DL (ref 7–18)
BUN/CREAT SERPL: 21 (ref 6–20)
CALCIUM SERPL-MCNC: 8.9 MG/DL (ref 8.5–10.1)
CHLORIDE SERPL-SCNC: 104 MMOL/L (ref 98–107)
CO2 SERPL-SCNC: 25 MMOL/L (ref 21–32)
CREAT SERPL-MCNC: 0.63 MG/DL (ref 0.7–1.3)
DIFFERENTIAL METHOD BLD: NORMAL
EOSINOPHIL # BLD AUTO: 0.23 K/UL (ref 0–0.5)
EOSINOPHIL NFR BLD AUTO: 2.4 % (ref 1–4)
ERYTHROCYTE [DISTWIDTH] IN BLOOD BY AUTOMATED COUNT: 12.1 % (ref 11.5–14.5)
GLUCOSE SERPL-MCNC: 141 MG/DL (ref 74–106)
GLUCOSE SERPL-MCNC: 157 MG/DL (ref 70–105)
GLUCOSE SERPL-MCNC: 208 MG/DL (ref 70–105)
HCT VFR BLD AUTO: 47.3 % (ref 40–54)
HGB BLD-MCNC: 15.8 G/DL (ref 13.5–18)
IMM GRANULOCYTES # BLD AUTO: 0.03 K/UL (ref 0–0.04)
IMM GRANULOCYTES NFR BLD: 0.3 % (ref 0–0.4)
LYMPHOCYTES # BLD AUTO: 3.33 K/UL (ref 1–4.8)
LYMPHOCYTES NFR BLD AUTO: 35.3 % (ref 27–41)
MCH RBC QN AUTO: 28.2 PG (ref 27–31)
MCHC RBC AUTO-ENTMCNC: 33.4 G/DL (ref 32–36)
MCV RBC AUTO: 84.5 FL (ref 80–96)
MONOCYTES # BLD AUTO: 0.44 K/UL (ref 0–0.8)
MONOCYTES NFR BLD AUTO: 4.7 % (ref 2–6)
MPC BLD CALC-MCNC: 9.7 FL (ref 9.4–12.4)
NEUTROPHILS # BLD AUTO: 5.33 K/UL (ref 1.8–7.7)
NEUTROPHILS NFR BLD AUTO: 56.5 % (ref 53–65)
NRBC # BLD AUTO: 0 X10E3/UL
NRBC, AUTO (.00): 0 %
PLATELET # BLD AUTO: 232 K/UL (ref 150–400)
POTASSIUM SERPL-SCNC: 3.5 MMOL/L (ref 3.5–5.1)
RBC # BLD AUTO: 5.6 M/UL (ref 4.6–6.2)
SODIUM SERPL-SCNC: 139 MMOL/L (ref 136–145)
WBC # BLD AUTO: 9.44 K/UL (ref 4.5–11)

## 2021-07-22 PROCEDURE — 96372 THER/PROPH/DIAG INJ SC/IM: CPT

## 2021-07-22 PROCEDURE — 99217 PR OBSERVATION CARE DISCHARGE: ICD-10-PCS | Mod: GC,,, | Performed by: FAMILY MEDICINE

## 2021-07-22 PROCEDURE — G0378 HOSPITAL OBSERVATION PER HR: HCPCS

## 2021-07-22 PROCEDURE — 63600175 PHARM REV CODE 636 W HCPCS

## 2021-07-22 PROCEDURE — 25000003 PHARM REV CODE 250: Performed by: EMERGENCY MEDICINE

## 2021-07-22 PROCEDURE — S4991 NICOTINE PATCH NONLEGEND: HCPCS | Performed by: EMERGENCY MEDICINE

## 2021-07-22 PROCEDURE — 82962 GLUCOSE BLOOD TEST: CPT

## 2021-07-22 PROCEDURE — 63600175 PHARM REV CODE 636 W HCPCS: Performed by: EMERGENCY MEDICINE

## 2021-07-22 PROCEDURE — 36415 COLL VENOUS BLD VENIPUNCTURE: CPT

## 2021-07-22 PROCEDURE — 80048 BASIC METABOLIC PNL TOTAL CA: CPT

## 2021-07-22 PROCEDURE — 99217 PR OBSERVATION CARE DISCHARGE: CPT | Mod: GC,,, | Performed by: FAMILY MEDICINE

## 2021-07-22 PROCEDURE — 85025 COMPLETE CBC W/AUTO DIFF WBC: CPT

## 2021-07-22 RX ORDER — ICOSAPENT ETHYL 1000 MG/1
2 CAPSULE ORAL 2 TIMES DAILY
Qty: 56 CAPSULE | Refills: 0 | Status: SHIPPED | OUTPATIENT
Start: 2021-07-22 | End: 2021-08-05

## 2021-07-22 RX ORDER — ASPIRIN 81 MG/1
81 TABLET ORAL DAILY
Qty: 14 TABLET | Refills: 0 | Status: ON HOLD | OUTPATIENT
Start: 2021-07-22 | End: 2021-08-25

## 2021-07-22 RX ORDER — NITROGLYCERIN 0.3 MG/1
0.3 TABLET SUBLINGUAL EVERY 5 MIN PRN
Qty: 30 TABLET | Refills: 0 | Status: SHIPPED | OUTPATIENT
Start: 2021-07-22 | End: 2023-10-17 | Stop reason: SDUPTHER

## 2021-07-22 RX ORDER — IBUPROFEN 200 MG
1 TABLET ORAL DAILY
Qty: 7 PATCH | Refills: 0 | Status: SHIPPED | OUTPATIENT
Start: 2021-07-23 | End: 2021-07-30

## 2021-07-22 RX ADMIN — LISINOPRIL 5 MG: 5 TABLET ORAL at 10:07

## 2021-07-22 RX ADMIN — NICOTINE 1 PATCH: 14 PATCH, EXTENDED RELEASE TRANSDERMAL at 10:07

## 2021-07-22 RX ADMIN — NITROGLYCERIN 0.5 INCH: 20 OINTMENT TOPICAL at 12:07

## 2021-07-22 RX ADMIN — DULOXETINE 60 MG: 30 CAPSULE, DELAYED RELEASE ORAL at 10:07

## 2021-07-22 RX ADMIN — INSULIN ASPART 5 UNITS: 100 INJECTION, SOLUTION INTRAVENOUS; SUBCUTANEOUS at 06:07

## 2021-07-22 RX ADMIN — INSULIN ASPART 2 UNITS: 100 INJECTION, SOLUTION INTRAVENOUS; SUBCUTANEOUS at 06:07

## 2021-07-22 RX ADMIN — HEPARIN SODIUM 5000 UNITS: 5000 INJECTION INTRAVENOUS; SUBCUTANEOUS at 06:07

## 2021-07-22 RX ADMIN — GABAPENTIN 600 MG: 300 CAPSULE ORAL at 10:07

## 2021-07-22 RX ADMIN — ASPIRIN 81 MG: 81 TABLET, COATED ORAL at 10:07

## 2021-07-26 ENCOUNTER — OFFICE VISIT (OUTPATIENT)
Dept: DIABETES SERVICES | Facility: CLINIC | Age: 41
End: 2021-07-26
Payer: COMMERCIAL

## 2021-07-26 VITALS
WEIGHT: 315 LBS | BODY MASS INDEX: 42.66 KG/M2 | HEIGHT: 72 IN | RESPIRATION RATE: 16 BRPM | HEART RATE: 72 BPM | SYSTOLIC BLOOD PRESSURE: 110 MMHG | OXYGEN SATURATION: 97 % | DIASTOLIC BLOOD PRESSURE: 82 MMHG

## 2021-07-26 DIAGNOSIS — E11.9 TYPE 2 DIABETES MELLITUS WITHOUT COMPLICATION, WITHOUT LONG-TERM CURRENT USE OF INSULIN: Primary | ICD-10-CM

## 2021-07-26 DIAGNOSIS — M47.817 LUMBOSACRAL SPONDYLOSIS WITHOUT MYELOPATHY: Chronic | ICD-10-CM

## 2021-07-26 DIAGNOSIS — E78.5 HYPERLIPIDEMIA, UNSPECIFIED HYPERLIPIDEMIA TYPE: ICD-10-CM

## 2021-07-26 DIAGNOSIS — J44.9 CHRONIC OBSTRUCTIVE PULMONARY DISEASE, UNSPECIFIED COPD TYPE: ICD-10-CM

## 2021-07-26 DIAGNOSIS — E11.65 TYPE 2 DIABETES MELLITUS WITH HYPERGLYCEMIA, WITH LONG-TERM CURRENT USE OF INSULIN: ICD-10-CM

## 2021-07-26 DIAGNOSIS — F17.210 CIGARETTE NICOTINE DEPENDENCE WITHOUT COMPLICATION: ICD-10-CM

## 2021-07-26 DIAGNOSIS — G89.4 CHRONIC PAIN SYNDROME: Chronic | ICD-10-CM

## 2021-07-26 DIAGNOSIS — Z79.4 TYPE 2 DIABETES MELLITUS WITH HYPERGLYCEMIA, WITH LONG-TERM CURRENT USE OF INSULIN: ICD-10-CM

## 2021-07-26 DIAGNOSIS — F32.A DEPRESSIVE DISORDER: ICD-10-CM

## 2021-07-26 DIAGNOSIS — E11.42 DIABETIC PERIPHERAL NEUROPATHY: ICD-10-CM

## 2021-07-26 DIAGNOSIS — G47.30 SLEEP APNEA, UNSPECIFIED TYPE: ICD-10-CM

## 2021-07-26 DIAGNOSIS — I10 HYPERTENSION, UNSPECIFIED TYPE: ICD-10-CM

## 2021-07-26 LAB — GLUCOSE SERPL-MCNC: 377 MG/DL (ref 70–110)

## 2021-07-26 PROCEDURE — 99214 PR OFFICE/OUTPT VISIT, EST, LEVL IV, 30-39 MIN: ICD-10-PCS | Mod: S$PBB,,, | Performed by: NURSE PRACTITIONER

## 2021-07-26 PROCEDURE — 99214 OFFICE O/P EST MOD 30 MIN: CPT | Mod: S$PBB,,, | Performed by: NURSE PRACTITIONER

## 2021-07-26 PROCEDURE — 99214 OFFICE O/P EST MOD 30 MIN: CPT | Mod: PBBFAC | Performed by: NURSE PRACTITIONER

## 2021-07-26 RX ORDER — EMPAGLIFLOZIN 25 MG/1
25 TABLET, FILM COATED ORAL DAILY
Qty: 90 TABLET | Refills: 1 | Status: SHIPPED | OUTPATIENT
Start: 2021-07-26 | End: 2022-06-22 | Stop reason: SDUPTHER

## 2021-07-26 RX ORDER — INSULIN GLARGINE 100 [IU]/ML
INJECTION, SOLUTION SUBCUTANEOUS
Qty: 45 ML | Refills: 1 | Status: SHIPPED | OUTPATIENT
Start: 2021-07-26 | End: 2022-06-22 | Stop reason: SDUPTHER

## 2021-07-26 RX ORDER — SEMAGLUTIDE 1.34 MG/ML
1 INJECTION, SOLUTION SUBCUTANEOUS
Qty: 6 PEN | Refills: 1 | Status: SHIPPED | OUTPATIENT
Start: 2021-07-26 | End: 2022-02-22

## 2021-07-30 ENCOUNTER — OFFICE VISIT (OUTPATIENT)
Dept: FAMILY MEDICINE | Facility: CLINIC | Age: 41
End: 2021-07-30
Payer: COMMERCIAL

## 2021-07-30 VITALS
BODY MASS INDEX: 42.66 KG/M2 | OXYGEN SATURATION: 95 % | HEART RATE: 103 BPM | SYSTOLIC BLOOD PRESSURE: 120 MMHG | TEMPERATURE: 98 F | DIASTOLIC BLOOD PRESSURE: 78 MMHG | RESPIRATION RATE: 20 BRPM | HEIGHT: 72 IN | WEIGHT: 315 LBS

## 2021-07-30 DIAGNOSIS — F17.210 CIGARETTE NICOTINE DEPENDENCE WITHOUT COMPLICATION: ICD-10-CM

## 2021-07-30 DIAGNOSIS — R07.9 CHEST PAIN, UNSPECIFIED TYPE: Primary | ICD-10-CM

## 2021-07-30 PROCEDURE — 99495 TCM SERVICES (MODERATE COMPLEXITY): ICD-10-PCS | Mod: ,,, | Performed by: FAMILY MEDICINE

## 2021-07-30 PROCEDURE — 99495 TRANSJ CARE MGMT MOD F2F 14D: CPT | Mod: ,,, | Performed by: FAMILY MEDICINE

## 2021-08-04 RX ORDER — METOPROLOL SUCCINATE 25 MG/1
25 TABLET, EXTENDED RELEASE ORAL DAILY
Qty: 30 TABLET | Refills: 11 | Status: SHIPPED | OUTPATIENT
Start: 2021-08-04 | End: 2021-08-23 | Stop reason: DRUGHIGH

## 2021-08-13 ENCOUNTER — HOSPITAL ENCOUNTER (OUTPATIENT)
Dept: RADIOLOGY | Facility: HOSPITAL | Age: 41
Discharge: HOME | End: 2021-08-13
Attending: FAMILY MEDICINE
Payer: COMMERCIAL

## 2021-08-13 ENCOUNTER — OFFICE VISIT (OUTPATIENT)
Dept: FAMILY MEDICINE | Facility: CLINIC | Age: 41
End: 2021-08-13
Payer: COMMERCIAL

## 2021-08-13 VITALS
OXYGEN SATURATION: 97 % | HEART RATE: 99 BPM | SYSTOLIC BLOOD PRESSURE: 130 MMHG | WEIGHT: 315 LBS | BODY MASS INDEX: 42.66 KG/M2 | TEMPERATURE: 98 F | RESPIRATION RATE: 18 BRPM | DIASTOLIC BLOOD PRESSURE: 82 MMHG | HEIGHT: 72 IN

## 2021-08-13 DIAGNOSIS — Z79.4 TYPE 2 DIABETES MELLITUS WITHOUT COMPLICATION, WITH LONG-TERM CURRENT USE OF INSULIN: ICD-10-CM

## 2021-08-13 DIAGNOSIS — R05.9 COUGH: ICD-10-CM

## 2021-08-13 DIAGNOSIS — R50.9 FEVER, UNSPECIFIED FEVER CAUSE: ICD-10-CM

## 2021-08-13 DIAGNOSIS — Z20.828 EXPOSURE TO SARS-ASSOCIATED CORONAVIRUS: Primary | ICD-10-CM

## 2021-08-13 DIAGNOSIS — R07.0 PAIN IN THROAT: ICD-10-CM

## 2021-08-13 DIAGNOSIS — E11.9 TYPE 2 DIABETES MELLITUS WITHOUT COMPLICATION, WITH LONG-TERM CURRENT USE OF INSULIN: ICD-10-CM

## 2021-08-13 DIAGNOSIS — I25.2 HISTORY OF HEART ATTACK: ICD-10-CM

## 2021-08-13 LAB
CTP QC/QA: YES
CTP QC/QA: YES
FLUAV AG NPH QL: NEGATIVE
FLUBV AG NPH QL: NEGATIVE
S PYO RRNA THROAT QL PROBE: NEGATIVE
SARS-COV-2 AG RESP QL IA.RAPID: NEGATIVE

## 2021-08-13 PROCEDURE — 99214 PR OFFICE/OUTPT VISIT, EST, LEVL IV, 30-39 MIN: ICD-10-PCS | Mod: ,,, | Performed by: FAMILY MEDICINE

## 2021-08-13 PROCEDURE — 87635 SARS-COV-2 (COVID-19) QUALITATIVE PCR: ICD-10-PCS | Mod: ,,, | Performed by: CLINICAL MEDICAL LABORATORY

## 2021-08-13 PROCEDURE — 87428 POCT SARS-COV2 (COVID) WITH FLU ANTIGEN: ICD-10-PCS | Mod: QW,,, | Performed by: FAMILY MEDICINE

## 2021-08-13 PROCEDURE — 87880 STREP A ASSAY W/OPTIC: CPT | Mod: QW,,, | Performed by: FAMILY MEDICINE

## 2021-08-13 PROCEDURE — 99214 OFFICE O/P EST MOD 30 MIN: CPT | Mod: ,,, | Performed by: FAMILY MEDICINE

## 2021-08-13 PROCEDURE — 87428 SARSCOV & INF VIR A&B AG IA: CPT | Mod: QW,,, | Performed by: FAMILY MEDICINE

## 2021-08-13 PROCEDURE — 87880 POCT RAPID STREP A: ICD-10-PCS | Mod: QW,,, | Performed by: FAMILY MEDICINE

## 2021-08-13 PROCEDURE — 87635 SARS-COV-2 COVID-19 AMP PRB: CPT | Mod: ,,, | Performed by: CLINICAL MEDICAL LABORATORY

## 2021-08-13 PROCEDURE — 71046 X-RAY EXAM CHEST 2 VIEWS: CPT | Mod: TC

## 2021-08-13 RX ORDER — CEFUROXIME AXETIL 500 MG/1
500 TABLET ORAL EVERY 12 HOURS
Qty: 20 TABLET | Refills: 0 | Status: ON HOLD | OUTPATIENT
Start: 2021-08-13 | End: 2021-08-25 | Stop reason: HOSPADM

## 2021-08-14 ENCOUNTER — INFUSION (OUTPATIENT)
Dept: INFECTIOUS DISEASES | Facility: HOSPITAL | Age: 41
End: 2021-08-14
Attending: FAMILY MEDICINE
Payer: COMMERCIAL

## 2021-08-14 VITALS
OXYGEN SATURATION: 97 % | SYSTOLIC BLOOD PRESSURE: 119 MMHG | RESPIRATION RATE: 17 BRPM | HEART RATE: 69 BPM | DIASTOLIC BLOOD PRESSURE: 74 MMHG

## 2021-08-14 DIAGNOSIS — Z79.4 TYPE 2 DIABETES MELLITUS WITHOUT COMPLICATION, WITH LONG-TERM CURRENT USE OF INSULIN: ICD-10-CM

## 2021-08-14 DIAGNOSIS — E11.9 TYPE 2 DIABETES MELLITUS WITHOUT COMPLICATION, WITH LONG-TERM CURRENT USE OF INSULIN: ICD-10-CM

## 2021-08-14 DIAGNOSIS — Z20.828 EXPOSURE TO SARS-ASSOCIATED CORONAVIRUS: ICD-10-CM

## 2021-08-14 DIAGNOSIS — I25.2 HISTORY OF HEART ATTACK: ICD-10-CM

## 2021-08-14 DIAGNOSIS — U07.1 COVID-19: Primary | ICD-10-CM

## 2021-08-14 PROCEDURE — 25000003 PHARM REV CODE 250: Performed by: FAMILY MEDICINE

## 2021-08-14 PROCEDURE — M0243 CASIRIVI AND IMDEVI INFUSION: HCPCS | Performed by: FAMILY MEDICINE

## 2021-08-14 PROCEDURE — 63600175 PHARM REV CODE 636 W HCPCS: Performed by: FAMILY MEDICINE

## 2021-08-14 RX ORDER — SODIUM CHLORIDE 0.9 % (FLUSH) 0.9 %
10 SYRINGE (ML) INJECTION
Status: DISCONTINUED | OUTPATIENT
Start: 2021-08-14 | End: 2022-06-22

## 2021-08-14 RX ORDER — ACETAMINOPHEN 325 MG/1
650 TABLET ORAL ONCE AS NEEDED
Status: DISCONTINUED | OUTPATIENT
Start: 2021-08-14 | End: 2022-06-22

## 2021-08-14 RX ORDER — EPINEPHRINE 0.3 MG/.3ML
0.3 INJECTION SUBCUTANEOUS
Status: DISCONTINUED | OUTPATIENT
Start: 2021-08-14 | End: 2023-02-27

## 2021-08-14 RX ORDER — ONDANSETRON 4 MG/1
4 TABLET, ORALLY DISINTEGRATING ORAL ONCE AS NEEDED
Status: DISCONTINUED | OUTPATIENT
Start: 2021-08-14 | End: 2022-06-22

## 2021-08-14 RX ORDER — DIPHENHYDRAMINE HYDROCHLORIDE 50 MG/ML
25 INJECTION INTRAMUSCULAR; INTRAVENOUS ONCE AS NEEDED
Status: DISCONTINUED | OUTPATIENT
Start: 2021-08-14 | End: 2022-06-22

## 2021-08-14 RX ORDER — ALBUTEROL SULFATE 90 UG/1
2 AEROSOL, METERED RESPIRATORY (INHALATION)
Status: DISCONTINUED | OUTPATIENT
Start: 2021-08-14 | End: 2022-06-22

## 2021-08-14 RX ADMIN — CASIRIVIMAB AND IMDEVIMAB 600 MG: 600; 600 INJECTION, SOLUTION, CONCENTRATE INTRAVENOUS at 10:08

## 2021-08-15 PROBLEM — I25.2 HISTORY OF HEART ATTACK: Status: ACTIVE | Noted: 2021-08-15

## 2021-08-16 LAB — SARS-COV-2 RNA RESP QL NAA+PROBE: NEGATIVE

## 2021-08-20 ENCOUNTER — TELEPHONE (OUTPATIENT)
Dept: FAMILY MEDICINE | Facility: CLINIC | Age: 41
End: 2021-08-20

## 2021-08-23 RX ORDER — METOPROLOL SUCCINATE 50 MG/1
50 TABLET, EXTENDED RELEASE ORAL DAILY
Qty: 30 TABLET | Refills: 5 | Status: SHIPPED | OUTPATIENT
Start: 2021-08-23 | End: 2021-12-17 | Stop reason: SDUPTHER

## 2021-08-24 ENCOUNTER — HOSPITAL ENCOUNTER (OUTPATIENT)
Facility: HOSPITAL | Age: 41
LOS: 1 days | Discharge: HOME | End: 2021-08-25
Attending: HOSPITALIST | Admitting: HOSPITALIST
Payer: COMMERCIAL

## 2021-08-24 DIAGNOSIS — R07.9 CHEST PAIN: ICD-10-CM

## 2021-08-24 DIAGNOSIS — I10 HYPERTENSION, UNSPECIFIED TYPE: ICD-10-CM

## 2021-08-24 DIAGNOSIS — E11.9 TYPE 2 DIABETES MELLITUS WITHOUT COMPLICATION, UNSPECIFIED WHETHER LONG TERM INSULIN USE: ICD-10-CM

## 2021-08-24 DIAGNOSIS — I20.0 UNSTABLE ANGINA: ICD-10-CM

## 2021-08-24 DIAGNOSIS — E78.5 HYPERLIPIDEMIA, UNSPECIFIED HYPERLIPIDEMIA TYPE: Primary | ICD-10-CM

## 2021-08-24 DIAGNOSIS — R07.9 CHEST PAIN, UNSPECIFIED TYPE: ICD-10-CM

## 2021-08-24 PROBLEM — E66.01 SEVERE OBESITY (BMI >= 40): Status: ACTIVE | Noted: 2021-08-24

## 2021-08-24 LAB
ALBUMIN SERPL BCP-MCNC: 3.3 G/DL (ref 3.5–5)
ALBUMIN/GLOB SERPL: 0.9 {RATIO}
ALP SERPL-CCNC: 155 U/L (ref 45–115)
ALT SERPL W P-5'-P-CCNC: 35 U/L (ref 16–61)
ANION GAP SERPL CALCULATED.3IONS-SCNC: 12 MMOL/L (ref 7–16)
APTT PPP: 157.7 SECONDS (ref 25.2–37.3)
AST SERPL W P-5'-P-CCNC: 15 U/L (ref 15–37)
BASOPHILS # BLD AUTO: 0.1 K/UL (ref 0–0.2)
BASOPHILS NFR BLD AUTO: 0.7 % (ref 0–1)
BILIRUB SERPL-MCNC: 0.6 MG/DL (ref 0–1.2)
BUN SERPL-MCNC: 14 MG/DL (ref 7–18)
BUN/CREAT SERPL: 22 (ref 6–20)
CALCIUM SERPL-MCNC: 8.9 MG/DL (ref 8.5–10.1)
CHLORIDE SERPL-SCNC: 105 MMOL/L (ref 98–107)
CO2 SERPL-SCNC: 27 MMOL/L (ref 21–32)
CREAT SERPL-MCNC: 0.65 MG/DL (ref 0.7–1.3)
DIFFERENTIAL METHOD BLD: ABNORMAL
EOSINOPHIL # BLD AUTO: 0.12 K/UL (ref 0–0.5)
EOSINOPHIL NFR BLD AUTO: 0.8 % (ref 1–4)
ERYTHROCYTE [DISTWIDTH] IN BLOOD BY AUTOMATED COUNT: 12.6 % (ref 11.5–14.5)
GLOBULIN SER-MCNC: 3.7 G/DL (ref 2–4)
GLUCOSE SERPL-MCNC: 137 MG/DL (ref 74–106)
GLUCOSE SERPL-MCNC: 163 MG/DL (ref 70–105)
HCT VFR BLD AUTO: 46.1 % (ref 40–54)
HGB BLD-MCNC: 15.7 G/DL (ref 13.5–18)
IMM GRANULOCYTES # BLD AUTO: 0.09 K/UL (ref 0–0.04)
IMM GRANULOCYTES NFR BLD: 0.6 % (ref 0–0.4)
INR BLD: 1.02 (ref 0.9–1.1)
LYMPHOCYTES # BLD AUTO: 4.54 K/UL (ref 1–4.8)
LYMPHOCYTES NFR BLD AUTO: 30.4 % (ref 27–41)
MCH RBC QN AUTO: 28.3 PG (ref 27–31)
MCHC RBC AUTO-ENTMCNC: 34.1 G/DL (ref 32–36)
MCV RBC AUTO: 83.1 FL (ref 80–96)
MONOCYTES # BLD AUTO: 0.6 K/UL (ref 0–0.8)
MONOCYTES NFR BLD AUTO: 4 % (ref 2–6)
MPC BLD CALC-MCNC: 9.5 FL (ref 9.4–12.4)
NEUTROPHILS # BLD AUTO: 9.48 K/UL (ref 1.8–7.7)
NEUTROPHILS NFR BLD AUTO: 63.5 % (ref 53–65)
NRBC # BLD AUTO: 0 X10E3/UL
NRBC, AUTO (.00): 0 %
PLATELET # BLD AUTO: 253 K/UL (ref 150–400)
POTASSIUM SERPL-SCNC: 3.7 MMOL/L (ref 3.5–5.1)
PROT SERPL-MCNC: 7 G/DL (ref 6.4–8.2)
PROTHROMBIN TIME: 13.4 SECONDS (ref 11.7–14.7)
RBC # BLD AUTO: 5.55 M/UL (ref 4.6–6.2)
SODIUM SERPL-SCNC: 140 MMOL/L (ref 136–145)
TROPONIN I SERPL-MCNC: <0.017 NG/ML
WBC # BLD AUTO: 14.93 K/UL (ref 4.5–11)

## 2021-08-24 PROCEDURE — 93458 L HRT ARTERY/VENTRICLE ANGIO: CPT | Performed by: INTERNAL MEDICINE

## 2021-08-24 PROCEDURE — 99215 PR OFFICE/OUTPT VISIT, EST, LEVL V, 40-54 MIN: ICD-10-PCS | Mod: 25,,, | Performed by: STUDENT IN AN ORGANIZED HEALTH CARE EDUCATION/TRAINING PROGRAM

## 2021-08-24 PROCEDURE — 25500020 PHARM REV CODE 255: Performed by: INTERNAL MEDICINE

## 2021-08-24 PROCEDURE — 36415 COLL VENOUS BLD VENIPUNCTURE: CPT | Performed by: REGISTERED NURSE

## 2021-08-24 PROCEDURE — 27201423 OPTIME MED/SURG SUP & DEVICES STERILE SUPPLY: Performed by: INTERNAL MEDICINE

## 2021-08-24 PROCEDURE — 93571 IV DOP VEL&/PRESS C FLO 1ST: CPT | Performed by: INTERNAL MEDICINE

## 2021-08-24 PROCEDURE — 99152 MOD SED SAME PHYS/QHP 5/>YRS: CPT | Performed by: INTERNAL MEDICINE

## 2021-08-24 PROCEDURE — 93010 EKG 12-LEAD: ICD-10-PCS | Mod: ,,, | Performed by: INTERNAL MEDICINE

## 2021-08-24 PROCEDURE — 25000003 PHARM REV CODE 250: Performed by: INTERNAL MEDICINE

## 2021-08-24 PROCEDURE — 85730 THROMBOPLASTIN TIME PARTIAL: CPT | Performed by: REGISTERED NURSE

## 2021-08-24 PROCEDURE — 84484 ASSAY OF TROPONIN QUANT: CPT | Performed by: REGISTERED NURSE

## 2021-08-24 PROCEDURE — 27100168 OPTIME MED/SURG SUP & DEVICES NON-STERILE SUPPLY: Performed by: INTERNAL MEDICINE

## 2021-08-24 PROCEDURE — C1769 GUIDE WIRE: HCPCS | Performed by: INTERNAL MEDICINE

## 2021-08-24 PROCEDURE — G0378 HOSPITAL OBSERVATION PER HR: HCPCS

## 2021-08-24 PROCEDURE — 94761 N-INVAS EAR/PLS OXIMETRY MLT: CPT

## 2021-08-24 PROCEDURE — 93458 PR CATH PLACE/CORON ANGIO, IMG SUPER/INTERP,W LEFT HEART VENTRICULOGRAPHY: ICD-10-PCS | Mod: 26,ICN,, | Performed by: INTERNAL MEDICINE

## 2021-08-24 PROCEDURE — C1887 CATHETER, GUIDING: HCPCS | Performed by: INTERNAL MEDICINE

## 2021-08-24 PROCEDURE — 63600175 PHARM REV CODE 636 W HCPCS: Performed by: INTERNAL MEDICINE

## 2021-08-24 PROCEDURE — 94640 AIRWAY INHALATION TREATMENT: CPT

## 2021-08-24 PROCEDURE — 93005 ELECTROCARDIOGRAM TRACING: CPT

## 2021-08-24 PROCEDURE — 99153 MOD SED SAME PHYS/QHP EA: CPT | Performed by: INTERNAL MEDICINE

## 2021-08-24 PROCEDURE — 93571 PR HEART FLOW RESERV MEASURE,INIT VESSL: ICD-10-PCS | Mod: 26,RC,, | Performed by: INTERNAL MEDICINE

## 2021-08-24 PROCEDURE — C1894 INTRO/SHEATH, NON-LASER: HCPCS | Performed by: INTERNAL MEDICINE

## 2021-08-24 PROCEDURE — 99220 PR INITIAL OBSERVATION CARE,LEVL III: CPT | Mod: ,,, | Performed by: HOSPITALIST

## 2021-08-24 PROCEDURE — 25000003 PHARM REV CODE 250: Performed by: REGISTERED NURSE

## 2021-08-24 PROCEDURE — G0379 DIRECT REFER HOSPITAL OBSERV: HCPCS

## 2021-08-24 PROCEDURE — 27000080 OPTIME MED/SURG SUP & DEVICES GENERAL CLASSIFICATION: Performed by: INTERNAL MEDICINE

## 2021-08-24 PROCEDURE — 99215 OFFICE O/P EST HI 40 MIN: CPT | Mod: 25,,, | Performed by: STUDENT IN AN ORGANIZED HEALTH CARE EDUCATION/TRAINING PROGRAM

## 2021-08-24 PROCEDURE — 85610 PROTHROMBIN TIME: CPT | Performed by: REGISTERED NURSE

## 2021-08-24 PROCEDURE — 99220 PR INITIAL OBSERVATION CARE,LEVL III: ICD-10-PCS | Mod: ,,, | Performed by: HOSPITALIST

## 2021-08-24 PROCEDURE — 93010 ELECTROCARDIOGRAM REPORT: CPT | Mod: ,,, | Performed by: INTERNAL MEDICINE

## 2021-08-24 PROCEDURE — 80053 COMPREHEN METABOLIC PANEL: CPT | Performed by: REGISTERED NURSE

## 2021-08-24 PROCEDURE — 85025 COMPLETE CBC W/AUTO DIFF WBC: CPT | Performed by: REGISTERED NURSE

## 2021-08-24 PROCEDURE — 93571 IV DOP VEL&/PRESS C FLO 1ST: CPT | Mod: 26,RC,, | Performed by: INTERNAL MEDICINE

## 2021-08-24 PROCEDURE — 25000242 PHARM REV CODE 250 ALT 637 W/ HCPCS: Performed by: HOSPITALIST

## 2021-08-24 PROCEDURE — 93458 L HRT ARTERY/VENTRICLE ANGIO: CPT | Mod: 26,ICN,, | Performed by: INTERNAL MEDICINE

## 2021-08-24 PROCEDURE — 82962 GLUCOSE BLOOD TEST: CPT

## 2021-08-24 RX ORDER — ALBUTEROL SULFATE 0.83 MG/ML
2.5 SOLUTION RESPIRATORY (INHALATION) EVERY 6 HOURS
Status: DISCONTINUED | OUTPATIENT
Start: 2021-08-25 | End: 2021-08-25 | Stop reason: HOSPADM

## 2021-08-24 RX ORDER — MIDAZOLAM HYDROCHLORIDE 1 MG/ML
INJECTION INTRAMUSCULAR; INTRAVENOUS
Status: DISCONTINUED | OUTPATIENT
Start: 2021-08-24 | End: 2021-08-24 | Stop reason: HOSPADM

## 2021-08-24 RX ORDER — LIDOCAINE HYDROCHLORIDE 10 MG/ML
INJECTION, SOLUTION EPIDURAL; INFILTRATION; INTRACAUDAL; PERINEURAL
Status: DISCONTINUED | OUTPATIENT
Start: 2021-08-24 | End: 2021-08-24 | Stop reason: HOSPADM

## 2021-08-24 RX ORDER — FLUTICASONE FUROATE AND VILANTEROL 100; 25 UG/1; UG/1
1 POWDER RESPIRATORY (INHALATION) EVERY 12 HOURS
Status: DISCONTINUED | OUTPATIENT
Start: 2021-08-24 | End: 2021-08-24 | Stop reason: CLARIF

## 2021-08-24 RX ORDER — ACETAMINOPHEN 325 MG/1
650 TABLET ORAL EVERY 4 HOURS PRN
Status: DISCONTINUED | OUTPATIENT
Start: 2021-08-24 | End: 2021-08-25 | Stop reason: HOSPADM

## 2021-08-24 RX ORDER — FENTANYL CITRATE 50 UG/ML
INJECTION, SOLUTION INTRAMUSCULAR; INTRAVENOUS
Status: DISCONTINUED | OUTPATIENT
Start: 2021-08-24 | End: 2021-08-24 | Stop reason: HOSPADM

## 2021-08-24 RX ORDER — NITROGLYCERIN 0.4 MG/1
0.4 TABLET SUBLINGUAL EVERY 5 MIN PRN
Status: DISCONTINUED | OUTPATIENT
Start: 2021-08-24 | End: 2021-08-25 | Stop reason: HOSPADM

## 2021-08-24 RX ORDER — DULOXETIN HYDROCHLORIDE 30 MG/1
60 CAPSULE, DELAYED RELEASE ORAL 2 TIMES DAILY
Status: DISCONTINUED | OUTPATIENT
Start: 2021-08-24 | End: 2021-08-25 | Stop reason: HOSPADM

## 2021-08-24 RX ORDER — BUDESONIDE 0.5 MG/2ML
0.5 INHALANT ORAL EVERY 12 HOURS
Status: DISCONTINUED | OUTPATIENT
Start: 2021-08-24 | End: 2021-08-25 | Stop reason: HOSPADM

## 2021-08-24 RX ORDER — PRAVASTATIN SODIUM 40 MG/1
80 TABLET ORAL NIGHTLY
Status: DISCONTINUED | OUTPATIENT
Start: 2021-08-24 | End: 2021-08-25

## 2021-08-24 RX ORDER — ADENOSINE 3 MG/ML
140 INJECTION, SOLUTION INTRAVENOUS ONCE
Status: DISCONTINUED | OUTPATIENT
Start: 2021-08-24 | End: 2021-08-25 | Stop reason: HOSPADM

## 2021-08-24 RX ORDER — ONDANSETRON 2 MG/ML
4 INJECTION INTRAMUSCULAR; INTRAVENOUS EVERY 8 HOURS PRN
Status: DISCONTINUED | OUTPATIENT
Start: 2021-08-24 | End: 2021-08-25 | Stop reason: HOSPADM

## 2021-08-24 RX ORDER — ONDANSETRON 4 MG/1
8 TABLET, ORALLY DISINTEGRATING ORAL EVERY 8 HOURS PRN
Status: DISCONTINUED | OUTPATIENT
Start: 2021-08-24 | End: 2021-08-25 | Stop reason: HOSPADM

## 2021-08-24 RX ORDER — GLUCAGON 1 MG
1 KIT INJECTION
Status: DISCONTINUED | OUTPATIENT
Start: 2021-08-24 | End: 2021-08-25 | Stop reason: HOSPADM

## 2021-08-24 RX ORDER — ALBUTEROL SULFATE 2.5 MG/.5ML
2.5 SOLUTION RESPIRATORY (INHALATION) EVERY 6 HOURS
Status: DISCONTINUED | OUTPATIENT
Start: 2021-08-24 | End: 2021-08-24

## 2021-08-24 RX ORDER — ASPIRIN 325 MG
TABLET ORAL
Status: DISCONTINUED | OUTPATIENT
Start: 2021-08-24 | End: 2021-08-24 | Stop reason: HOSPADM

## 2021-08-24 RX ORDER — HEPARIN SOD,PORCINE/0.9 % NACL 1000/500ML
INTRAVENOUS SOLUTION INTRAVENOUS
Status: DISCONTINUED | OUTPATIENT
Start: 2021-08-24 | End: 2021-08-24 | Stop reason: HOSPADM

## 2021-08-24 RX ORDER — AMITRIPTYLINE HYDROCHLORIDE 10 MG/1
10 TABLET, FILM COATED ORAL NIGHTLY
Status: DISCONTINUED | OUTPATIENT
Start: 2021-08-24 | End: 2021-08-25 | Stop reason: HOSPADM

## 2021-08-24 RX ORDER — ADENOSINE 3 MG/ML
INJECTION, SOLUTION INTRAVENOUS
Status: DISCONTINUED | OUTPATIENT
Start: 2021-08-24 | End: 2021-08-25 | Stop reason: HOSPADM

## 2021-08-24 RX ORDER — METOPROLOL SUCCINATE 50 MG/1
50 TABLET, EXTENDED RELEASE ORAL DAILY
Status: DISCONTINUED | OUTPATIENT
Start: 2021-08-25 | End: 2021-08-25 | Stop reason: HOSPADM

## 2021-08-24 RX ORDER — MORPHINE SULFATE 10 MG/ML
4 INJECTION INTRAMUSCULAR; INTRAVENOUS; SUBCUTANEOUS EVERY 4 HOURS PRN
Status: DISCONTINUED | OUTPATIENT
Start: 2021-08-24 | End: 2021-08-25 | Stop reason: HOSPADM

## 2021-08-24 RX ORDER — INSULIN ASPART 100 [IU]/ML
1-10 INJECTION, SOLUTION INTRAVENOUS; SUBCUTANEOUS
Status: DISCONTINUED | OUTPATIENT
Start: 2021-08-24 | End: 2021-08-25 | Stop reason: HOSPADM

## 2021-08-24 RX ORDER — LISINOPRIL 5 MG/1
5 TABLET ORAL DAILY
Status: DISCONTINUED | OUTPATIENT
Start: 2021-08-25 | End: 2021-08-25

## 2021-08-24 RX ORDER — GABAPENTIN 300 MG/1
600 CAPSULE ORAL 2 TIMES DAILY
Status: DISCONTINUED | OUTPATIENT
Start: 2021-08-24 | End: 2021-08-25 | Stop reason: HOSPADM

## 2021-08-24 RX ORDER — SODIUM CHLORIDE 9 MG/ML
INJECTION, SOLUTION INTRAVENOUS
Status: DISCONTINUED | OUTPATIENT
Start: 2021-08-24 | End: 2021-08-25 | Stop reason: HOSPADM

## 2021-08-24 RX ORDER — HEPARIN SODIUM 1000 [USP'U]/ML
INJECTION, SOLUTION INTRAVENOUS; SUBCUTANEOUS
Status: DISCONTINUED | OUTPATIENT
Start: 2021-08-24 | End: 2021-08-24 | Stop reason: HOSPADM

## 2021-08-24 RX ORDER — SODIUM CHLORIDE 0.9 % (FLUSH) 0.9 %
10 SYRINGE (ML) INJECTION
Status: DISCONTINUED | OUTPATIENT
Start: 2021-08-24 | End: 2021-08-25 | Stop reason: HOSPADM

## 2021-08-24 RX ADMIN — ALBUTEROL SULFATE 2.5 MG: 2.5 SOLUTION RESPIRATORY (INHALATION) at 07:08

## 2021-08-24 RX ADMIN — GABAPENTIN 600 MG: 300 CAPSULE ORAL at 09:08

## 2021-08-24 RX ADMIN — PRAVASTATIN SODIUM 80 MG: 40 TABLET ORAL at 09:08

## 2021-08-24 RX ADMIN — DULOXETINE HYDROCHLORIDE 60 MG: 30 CAPSULE, DELAYED RELEASE ORAL at 09:08

## 2021-08-24 RX ADMIN — BUDESONIDE 0.5 MG: 0.5 INHALANT RESPIRATORY (INHALATION) at 07:08

## 2021-08-24 RX ADMIN — AMITRIPTYLINE HYDROCHLORIDE 10 MG: 10 TABLET, FILM COATED ORAL at 09:08

## 2021-08-25 VITALS
SYSTOLIC BLOOD PRESSURE: 125 MMHG | OXYGEN SATURATION: 98 % | BODY MASS INDEX: 42.66 KG/M2 | RESPIRATION RATE: 20 BRPM | DIASTOLIC BLOOD PRESSURE: 76 MMHG | WEIGHT: 315 LBS | TEMPERATURE: 98 F | HEART RATE: 98 BPM | HEIGHT: 72 IN

## 2021-08-25 PROBLEM — R07.9 CHEST PAIN: Status: RESOLVED | Noted: 2021-07-19 | Resolved: 2021-08-25

## 2021-08-25 LAB
ANION GAP SERPL CALCULATED.3IONS-SCNC: 12 MMOL/L (ref 7–16)
BASOPHILS # BLD AUTO: 0.04 K/UL (ref 0–0.2)
BASOPHILS NFR BLD AUTO: 0.5 % (ref 0–1)
BUN SERPL-MCNC: 13 MG/DL (ref 7–18)
BUN/CREAT SERPL: 24 (ref 6–20)
CALCIUM SERPL-MCNC: 9.2 MG/DL (ref 8.5–10.1)
CHLORIDE SERPL-SCNC: 106 MMOL/L (ref 98–107)
CO2 SERPL-SCNC: 27 MMOL/L (ref 21–32)
CREAT SERPL-MCNC: 0.54 MG/DL (ref 0.7–1.3)
DIFFERENTIAL METHOD BLD: NORMAL
EOSINOPHIL # BLD AUTO: 0.09 K/UL (ref 0–0.5)
EOSINOPHIL NFR BLD AUTO: 1.1 % (ref 1–4)
ERYTHROCYTE [DISTWIDTH] IN BLOOD BY AUTOMATED COUNT: 12.4 % (ref 11.5–14.5)
GLUCOSE SERPL-MCNC: 137 MG/DL (ref 70–105)
GLUCOSE SERPL-MCNC: 155 MG/DL (ref 74–106)
GLUCOSE SERPL-MCNC: 265 MG/DL (ref 70–105)
HCT VFR BLD AUTO: 44.4 % (ref 40–54)
HGB BLD-MCNC: 14.6 G/DL (ref 13.5–18)
IMM GRANULOCYTES # BLD AUTO: 0.02 K/UL (ref 0–0.04)
IMM GRANULOCYTES NFR BLD: 0.2 % (ref 0–0.4)
LYMPHOCYTES # BLD AUTO: 2.98 K/UL (ref 1–4.8)
LYMPHOCYTES NFR BLD AUTO: 36.9 % (ref 27–41)
MCH RBC QN AUTO: 28.4 PG (ref 27–31)
MCHC RBC AUTO-ENTMCNC: 32.9 G/DL (ref 32–36)
MCV RBC AUTO: 86.4 FL (ref 80–96)
MONOCYTES # BLD AUTO: 0.37 K/UL (ref 0–0.8)
MONOCYTES NFR BLD AUTO: 4.6 % (ref 2–6)
MPC BLD CALC-MCNC: 9.6 FL (ref 9.4–12.4)
NEUTROPHILS # BLD AUTO: 4.58 K/UL (ref 1.8–7.7)
NEUTROPHILS NFR BLD AUTO: 56.7 % (ref 53–65)
NRBC # BLD AUTO: 0 X10E3/UL
NRBC, AUTO (.00): 0 %
PLATELET # BLD AUTO: 221 K/UL (ref 150–400)
POTASSIUM SERPL-SCNC: 3.7 MMOL/L (ref 3.5–5.1)
RBC # BLD AUTO: 5.14 M/UL (ref 4.6–6.2)
SODIUM SERPL-SCNC: 141 MMOL/L (ref 136–145)
WBC # BLD AUTO: 8.08 K/UL (ref 4.5–11)

## 2021-08-25 PROCEDURE — 99217 PR OBSERVATION CARE DISCHARGE: ICD-10-PCS | Mod: ,,, | Performed by: HOSPITALIST

## 2021-08-25 PROCEDURE — 99213 OFFICE O/P EST LOW 20 MIN: CPT | Mod: ,,, | Performed by: STUDENT IN AN ORGANIZED HEALTH CARE EDUCATION/TRAINING PROGRAM

## 2021-08-25 PROCEDURE — 96372 THER/PROPH/DIAG INJ SC/IM: CPT

## 2021-08-25 PROCEDURE — 25000242 PHARM REV CODE 250 ALT 637 W/ HCPCS: Performed by: HOSPITALIST

## 2021-08-25 PROCEDURE — 94761 N-INVAS EAR/PLS OXIMETRY MLT: CPT

## 2021-08-25 PROCEDURE — 25000003 PHARM REV CODE 250: Performed by: STUDENT IN AN ORGANIZED HEALTH CARE EDUCATION/TRAINING PROGRAM

## 2021-08-25 PROCEDURE — 36415 COLL VENOUS BLD VENIPUNCTURE: CPT | Performed by: REGISTERED NURSE

## 2021-08-25 PROCEDURE — 99213 PR OFFICE/OUTPT VISIT, EST, LEVL III, 20-29 MIN: ICD-10-PCS | Mod: ,,, | Performed by: STUDENT IN AN ORGANIZED HEALTH CARE EDUCATION/TRAINING PROGRAM

## 2021-08-25 PROCEDURE — 80048 BASIC METABOLIC PNL TOTAL CA: CPT | Performed by: REGISTERED NURSE

## 2021-08-25 PROCEDURE — 25000003 PHARM REV CODE 250: Performed by: NURSE PRACTITIONER

## 2021-08-25 PROCEDURE — 99217 PR OBSERVATION CARE DISCHARGE: CPT | Mod: ,,, | Performed by: HOSPITALIST

## 2021-08-25 PROCEDURE — 85025 COMPLETE CBC W/AUTO DIFF WBC: CPT | Performed by: REGISTERED NURSE

## 2021-08-25 PROCEDURE — 63600175 PHARM REV CODE 636 W HCPCS: Performed by: REGISTERED NURSE

## 2021-08-25 PROCEDURE — 94640 AIRWAY INHALATION TREATMENT: CPT

## 2021-08-25 PROCEDURE — 25000003 PHARM REV CODE 250: Performed by: REGISTERED NURSE

## 2021-08-25 PROCEDURE — 82962 GLUCOSE BLOOD TEST: CPT

## 2021-08-25 PROCEDURE — G0378 HOSPITAL OBSERVATION PER HR: HCPCS

## 2021-08-25 RX ORDER — ATORVASTATIN CALCIUM 80 MG/1
80 TABLET, FILM COATED ORAL NIGHTLY
Status: DISCONTINUED | OUTPATIENT
Start: 2021-08-25 | End: 2021-08-25 | Stop reason: HOSPADM

## 2021-08-25 RX ORDER — FENOFIBRATE 48 MG/1
48 TABLET, FILM COATED ORAL DAILY
Status: DISCONTINUED | OUTPATIENT
Start: 2021-08-25 | End: 2021-08-25 | Stop reason: HOSPADM

## 2021-08-25 RX ORDER — ASPIRIN 81 MG/1
81 TABLET ORAL DAILY
Qty: 30 TABLET | Refills: 2 | Status: SHIPPED | OUTPATIENT
Start: 2021-08-25 | End: 2023-10-05

## 2021-08-25 RX ORDER — ISOSORBIDE MONONITRATE 30 MG/1
30 TABLET, EXTENDED RELEASE ORAL DAILY
Qty: 30 TABLET | Refills: 2 | Status: SHIPPED | OUTPATIENT
Start: 2021-08-26 | End: 2021-12-17 | Stop reason: SDUPTHER

## 2021-08-25 RX ORDER — FENOFIBRATE 48 MG/1
48 TABLET, FILM COATED ORAL DAILY
Qty: 30 TABLET | Refills: 2 | Status: SHIPPED | OUTPATIENT
Start: 2021-08-25 | End: 2021-12-17 | Stop reason: SDUPTHER

## 2021-08-25 RX ORDER — NAPROXEN SODIUM 220 MG/1
81 TABLET, FILM COATED ORAL DAILY
Status: DISCONTINUED | OUTPATIENT
Start: 2021-08-25 | End: 2021-08-25 | Stop reason: HOSPADM

## 2021-08-25 RX ORDER — ISOSORBIDE MONONITRATE 30 MG/1
30 TABLET, EXTENDED RELEASE ORAL DAILY
Status: DISCONTINUED | OUTPATIENT
Start: 2021-08-25 | End: 2021-08-25 | Stop reason: HOSPADM

## 2021-08-25 RX ORDER — ATORVASTATIN CALCIUM 80 MG/1
80 TABLET, FILM COATED ORAL NIGHTLY
Qty: 30 TABLET | Refills: 2 | Status: SHIPPED | OUTPATIENT
Start: 2021-08-25 | End: 2021-12-17 | Stop reason: SDUPTHER

## 2021-08-25 RX ADMIN — DULOXETINE HYDROCHLORIDE 60 MG: 30 CAPSULE, DELAYED RELEASE ORAL at 09:08

## 2021-08-25 RX ADMIN — ALBUTEROL SULFATE 2.5 MG: 2.5 SOLUTION RESPIRATORY (INHALATION) at 12:08

## 2021-08-25 RX ADMIN — BUDESONIDE 0.5 MG: 0.5 INHALANT RESPIRATORY (INHALATION) at 07:08

## 2021-08-25 RX ADMIN — INSULIN ASPART 6 UNITS: 100 INJECTION, SOLUTION INTRAVENOUS; SUBCUTANEOUS at 12:08

## 2021-08-25 RX ADMIN — ASPIRIN 81 MG 81 MG: 81 TABLET ORAL at 09:08

## 2021-08-25 RX ADMIN — ALBUTEROL SULFATE 2.5 MG: 2.5 SOLUTION RESPIRATORY (INHALATION) at 07:08

## 2021-08-25 RX ADMIN — GABAPENTIN 600 MG: 300 CAPSULE ORAL at 09:08

## 2021-08-25 RX ADMIN — FENOFIBRATE 48 MG: 48 TABLET ORAL at 12:08

## 2021-08-25 RX ADMIN — METOPROLOL SUCCINATE 50 MG: 50 TABLET, FILM COATED, EXTENDED RELEASE ORAL at 09:08

## 2021-08-25 RX ADMIN — ISOSORBIDE MONONITRATE 30 MG: 30 TABLET, EXTENDED RELEASE ORAL at 09:08

## 2021-09-01 ENCOUNTER — OFFICE VISIT (OUTPATIENT)
Dept: CARDIOLOGY | Facility: CLINIC | Age: 41
End: 2021-09-01
Payer: COMMERCIAL

## 2021-09-01 VITALS
SYSTOLIC BLOOD PRESSURE: 106 MMHG | DIASTOLIC BLOOD PRESSURE: 82 MMHG | RESPIRATION RATE: 14 BRPM | HEIGHT: 72 IN | HEART RATE: 102 BPM | WEIGHT: 315 LBS | BODY MASS INDEX: 42.66 KG/M2

## 2021-09-01 DIAGNOSIS — G47.33 OBSTRUCTIVE SLEEP APNEA SYNDROME: ICD-10-CM

## 2021-09-01 DIAGNOSIS — E66.01 MORBID OBESITY: ICD-10-CM

## 2021-09-01 DIAGNOSIS — I10 ESSENTIAL HYPERTENSION: ICD-10-CM

## 2021-09-01 DIAGNOSIS — E78.5 HYPERLIPIDEMIA, UNSPECIFIED HYPERLIPIDEMIA TYPE: ICD-10-CM

## 2021-09-01 DIAGNOSIS — E11.9 TYPE 2 DIABETES MELLITUS WITHOUT COMPLICATION, WITHOUT LONG-TERM CURRENT USE OF INSULIN: Primary | ICD-10-CM

## 2021-09-01 DIAGNOSIS — I25.10 CORONARY ARTERY DISEASE INVOLVING NATIVE HEART, ANGINA PRESENCE UNSPECIFIED, UNSPECIFIED VESSEL OR LESION TYPE: ICD-10-CM

## 2021-09-01 DIAGNOSIS — R00.2 PALPITATIONS: ICD-10-CM

## 2021-09-01 PROCEDURE — 99214 OFFICE O/P EST MOD 30 MIN: CPT | Mod: S$PBB,,, | Performed by: INTERNAL MEDICINE

## 2021-09-01 PROCEDURE — 99214 PR OFFICE/OUTPT VISIT, EST, LEVL IV, 30-39 MIN: ICD-10-PCS | Mod: S$PBB,,, | Performed by: INTERNAL MEDICINE

## 2021-09-01 PROCEDURE — 99213 OFFICE O/P EST LOW 20 MIN: CPT | Mod: PBBFAC | Performed by: INTERNAL MEDICINE

## 2021-09-07 ENCOUNTER — HOSPITAL ENCOUNTER (OUTPATIENT)
Dept: RADIOLOGY | Facility: HOSPITAL | Age: 41
Discharge: HOME | End: 2021-09-07
Attending: NURSE PRACTITIONER
Payer: COMMERCIAL

## 2021-09-07 ENCOUNTER — OFFICE VISIT (OUTPATIENT)
Dept: FAMILY MEDICINE | Facility: CLINIC | Age: 41
End: 2021-09-07
Payer: COMMERCIAL

## 2021-09-07 VITALS
OXYGEN SATURATION: 96 % | HEIGHT: 72 IN | DIASTOLIC BLOOD PRESSURE: 83 MMHG | SYSTOLIC BLOOD PRESSURE: 124 MMHG | WEIGHT: 315 LBS | HEART RATE: 86 BPM | RESPIRATION RATE: 18 BRPM | BODY MASS INDEX: 42.66 KG/M2 | TEMPERATURE: 98 F

## 2021-09-07 DIAGNOSIS — M79.671 RIGHT FOOT PAIN: ICD-10-CM

## 2021-09-07 DIAGNOSIS — M79.671 RIGHT FOOT PAIN: Primary | ICD-10-CM

## 2021-09-07 PROCEDURE — 99213 OFFICE O/P EST LOW 20 MIN: CPT | Mod: ,,, | Performed by: NURSE PRACTITIONER

## 2021-09-07 PROCEDURE — 73630 X-RAY EXAM OF FOOT: CPT | Mod: TC,RT

## 2021-09-07 PROCEDURE — 99213 PR OFFICE/OUTPT VISIT, EST, LEVL III, 20-29 MIN: ICD-10-PCS | Mod: ,,, | Performed by: NURSE PRACTITIONER

## 2021-09-07 RX ORDER — MUPIROCIN 20 MG/G
OINTMENT TOPICAL 3 TIMES DAILY
Qty: 15 G | Refills: 0 | Status: SHIPPED | OUTPATIENT
Start: 2021-09-07 | End: 2021-12-17 | Stop reason: SDUPTHER

## 2021-09-08 ENCOUNTER — OFFICE VISIT (OUTPATIENT)
Dept: PAIN MEDICINE | Facility: CLINIC | Age: 41
End: 2021-09-08
Payer: COMMERCIAL

## 2021-09-08 VITALS
SYSTOLIC BLOOD PRESSURE: 112 MMHG | DIASTOLIC BLOOD PRESSURE: 67 MMHG | BODY MASS INDEX: 42.66 KG/M2 | WEIGHT: 315 LBS | HEIGHT: 72 IN | HEART RATE: 89 BPM | RESPIRATION RATE: 18 BRPM

## 2021-09-08 DIAGNOSIS — G90.511 COMPLEX REGIONAL PAIN SYNDROME TYPE 1 OF RIGHT UPPER EXTREMITY: Primary | Chronic | ICD-10-CM

## 2021-09-08 DIAGNOSIS — E11.42 DIABETIC PERIPHERAL NEUROPATHY: ICD-10-CM

## 2021-09-08 DIAGNOSIS — M47.817 LUMBOSACRAL SPONDYLOSIS WITHOUT MYELOPATHY: Chronic | ICD-10-CM

## 2021-09-08 PROCEDURE — 99214 OFFICE O/P EST MOD 30 MIN: CPT | Mod: S$PBB,,, | Performed by: PHYSICIAN ASSISTANT

## 2021-09-08 PROCEDURE — 99214 PR OFFICE/OUTPT VISIT, EST, LEVL IV, 30-39 MIN: ICD-10-PCS | Mod: S$PBB,,, | Performed by: PHYSICIAN ASSISTANT

## 2021-09-08 PROCEDURE — 99213 OFFICE O/P EST LOW 20 MIN: CPT | Mod: PBBFAC | Performed by: PHYSICIAN ASSISTANT

## 2021-09-08 RX ORDER — HYDROCODONE BITARTRATE AND ACETAMINOPHEN 10; 325 MG/1; MG/1
1 TABLET ORAL EVERY 12 HOURS
Qty: 60 TABLET | Refills: 0 | Status: SHIPPED | OUTPATIENT
Start: 2021-09-11 | End: 2021-10-05 | Stop reason: SDUPTHER

## 2021-09-10 PROBLEM — I25.10 CORONARY ARTERY DISEASE INVOLVING NATIVE HEART: Status: ACTIVE | Noted: 2021-09-10

## 2021-09-10 PROBLEM — R00.2 PALPITATIONS: Status: ACTIVE | Noted: 2021-09-10

## 2021-09-13 ENCOUNTER — OFFICE VISIT (OUTPATIENT)
Dept: FAMILY MEDICINE | Facility: CLINIC | Age: 41
End: 2021-09-13
Payer: COMMERCIAL

## 2021-09-13 VITALS
HEART RATE: 92 BPM | TEMPERATURE: 96 F | OXYGEN SATURATION: 98 % | WEIGHT: 315 LBS | SYSTOLIC BLOOD PRESSURE: 117 MMHG | HEIGHT: 72 IN | DIASTOLIC BLOOD PRESSURE: 67 MMHG | BODY MASS INDEX: 42.66 KG/M2 | RESPIRATION RATE: 16 BRPM

## 2021-09-13 DIAGNOSIS — Z20.822 CONTACT WITH AND (SUSPECTED) EXPOSURE TO COVID-19: Primary | ICD-10-CM

## 2021-09-13 DIAGNOSIS — R05.9 COUGH: ICD-10-CM

## 2021-09-13 LAB
CTP QC/QA: YES
FLUAV AG NPH QL: NEGATIVE
FLUBV AG NPH QL: NEGATIVE
SARS-COV-2 AG RESP QL IA.RAPID: NEGATIVE

## 2021-09-13 PROCEDURE — 99212 OFFICE O/P EST SF 10 MIN: CPT | Mod: ,,, | Performed by: FAMILY MEDICINE

## 2021-09-13 PROCEDURE — 87428 SARSCOV & INF VIR A&B AG IA: CPT | Mod: QW,,, | Performed by: FAMILY MEDICINE

## 2021-09-13 PROCEDURE — 99212 PR OFFICE/OUTPT VISIT, EST, LEVL II, 10-19 MIN: ICD-10-PCS | Mod: ,,, | Performed by: FAMILY MEDICINE

## 2021-09-13 PROCEDURE — 87428 POCT SARS-COV2 (COVID) WITH FLU ANTIGEN: ICD-10-PCS | Mod: QW,,, | Performed by: FAMILY MEDICINE

## 2021-09-29 ENCOUNTER — OFFICE VISIT (OUTPATIENT)
Dept: FAMILY MEDICINE | Facility: CLINIC | Age: 41
End: 2021-09-29
Payer: COMMERCIAL

## 2021-09-29 VITALS
TEMPERATURE: 98 F | DIASTOLIC BLOOD PRESSURE: 85 MMHG | WEIGHT: 315 LBS | HEART RATE: 88 BPM | OXYGEN SATURATION: 98 % | RESPIRATION RATE: 20 BRPM | HEIGHT: 72 IN | SYSTOLIC BLOOD PRESSURE: 128 MMHG | BODY MASS INDEX: 42.66 KG/M2
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
## 2021-09-29 DIAGNOSIS — M79.671 RIGHT FOOT PAIN: ICD-10-CM

## 2021-09-29 PROCEDURE — 99212 OFFICE O/P EST SF 10 MIN: CPT | Mod: ,,, | Performed by: NURSE PRACTITIONER

## 2021-09-29 PROCEDURE — 99212 PR OFFICE/OUTPT VISIT, EST, LEVL II, 10-19 MIN: ICD-10-PCS | Mod: ,,, | Performed by: NURSE PRACTITIONER

## 2021-10-05 ENCOUNTER — HOSPITAL ENCOUNTER (OUTPATIENT)
Dept: RADIOLOGY | Facility: HOSPITAL | Age: 41
Discharge: HOME | End: 2021-10-05
Attending: NURSE PRACTITIONER
Payer: COMMERCIAL
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
## 2021-10-05 DIAGNOSIS — M79.671 RIGHT FOOT PAIN: ICD-10-CM

## 2021-10-05 PROCEDURE — 73700 CT LOWER EXTREMITY W/O DYE: CPT | Mod: TC,RT

## 2021-10-06 ENCOUNTER — OFFICE VISIT (OUTPATIENT)
Dept: FAMILY MEDICINE | Facility: CLINIC | Age: 41
End: 2021-10-06
Payer: COMMERCIAL

## 2021-10-06 ENCOUNTER — OFFICE VISIT (OUTPATIENT)
Dept: PAIN MEDICINE | Facility: CLINIC | Age: 41
End: 2021-10-06
Payer: COMMERCIAL

## 2021-10-06 VITALS
RESPIRATION RATE: 18 BRPM | HEART RATE: 99 BPM | HEIGHT: 72 IN | DIASTOLIC BLOOD PRESSURE: 89 MMHG | WEIGHT: 315 LBS | BODY MASS INDEX: 42.66 KG/M2 | SYSTOLIC BLOOD PRESSURE: 121 MMHG

## 2021-10-06 VITALS
OXYGEN SATURATION: 97 % | RESPIRATION RATE: 18 BRPM | HEART RATE: 92 BPM | WEIGHT: 315 LBS | DIASTOLIC BLOOD PRESSURE: 80 MMHG | BODY MASS INDEX: 44.1 KG/M2 | TEMPERATURE: 99 F | SYSTOLIC BLOOD PRESSURE: 121 MMHG | HEIGHT: 71 IN

## 2021-10-06 DIAGNOSIS — E11.42 DIABETIC PERIPHERAL NEUROPATHY: ICD-10-CM

## 2021-10-06 DIAGNOSIS — E08.621 DIABETIC ULCER OF RIGHT FOOT ASSOCIATED WITH DIABETES MELLITUS DUE TO UNDERLYING CONDITION, LIMITED TO BREAKDOWN OF SKIN, UNSPECIFIED PART OF FOOT: Primary | ICD-10-CM

## 2021-10-06 DIAGNOSIS — Z79.899 ENCOUNTER FOR LONG-TERM (CURRENT) USE OF OTHER MEDICATIONS: ICD-10-CM

## 2021-10-06 DIAGNOSIS — G90.511 COMPLEX REGIONAL PAIN SYNDROME TYPE 1 OF RIGHT UPPER EXTREMITY: Primary | Chronic | ICD-10-CM

## 2021-10-06 DIAGNOSIS — M47.817 LUMBOSACRAL SPONDYLOSIS WITHOUT MYELOPATHY: Chronic | ICD-10-CM

## 2021-10-06 DIAGNOSIS — L97.511 DIABETIC ULCER OF RIGHT FOOT ASSOCIATED WITH DIABETES MELLITUS DUE TO UNDERLYING CONDITION, LIMITED TO BREAKDOWN OF SKIN, UNSPECIFIED PART OF FOOT: Primary | ICD-10-CM

## 2021-10-06 DIAGNOSIS — E11.9 TYPE 2 DIABETES MELLITUS WITHOUT COMPLICATION, UNSPECIFIED WHETHER LONG TERM INSULIN USE: ICD-10-CM

## 2021-10-06 PROCEDURE — 99213 OFFICE O/P EST LOW 20 MIN: CPT | Mod: PBBFAC | Performed by: PHYSICIAN ASSISTANT

## 2021-10-06 PROCEDURE — 99213 OFFICE O/P EST LOW 20 MIN: CPT | Mod: ,,, | Performed by: FAMILY MEDICINE

## 2021-10-06 PROCEDURE — 99214 OFFICE O/P EST MOD 30 MIN: CPT | Mod: S$PBB,,, | Performed by: PHYSICIAN ASSISTANT

## 2021-10-06 PROCEDURE — 80305 DRUG TEST PRSMV DIR OPT OBS: CPT | Mod: PBBFAC | Performed by: PHYSICIAN ASSISTANT

## 2021-10-06 PROCEDURE — 99213 PR OFFICE/OUTPT VISIT, EST, LEVL III, 20-29 MIN: ICD-10-PCS | Mod: ,,, | Performed by: FAMILY MEDICINE

## 2021-10-06 PROCEDURE — 99214 PR OFFICE/OUTPT VISIT, EST, LEVL IV, 30-39 MIN: ICD-10-PCS | Mod: S$PBB,,, | Performed by: PHYSICIAN ASSISTANT

## 2021-10-06 RX ORDER — HYDROCODONE BITARTRATE AND ACETAMINOPHEN 10; 325 MG/1; MG/1
1 TABLET ORAL EVERY 12 HOURS
Qty: 60 TABLET | Refills: 0 | Status: SHIPPED | OUTPATIENT
Start: 2021-10-13 | End: 2021-10-14

## 2021-10-06 RX ORDER — CLINDAMYCIN HYDROCHLORIDE 300 MG/1
300 CAPSULE ORAL EVERY 6 HOURS
Qty: 40 CAPSULE | Refills: 0 | Status: SHIPPED | OUTPATIENT
Start: 2021-10-06 | End: 2022-01-05

## 2021-10-06 RX ORDER — HYDROCODONE BITARTRATE AND ACETAMINOPHEN 10; 325 MG/1; MG/1
1 TABLET ORAL EVERY 12 HOURS
Qty: 60 TABLET | Refills: 0 | Status: SHIPPED | OUTPATIENT
Start: 2021-11-12 | End: 2021-11-15

## 2021-10-13 ENCOUNTER — OFFICE VISIT (OUTPATIENT)
Dept: CARDIOLOGY | Facility: CLINIC | Age: 41
End: 2021-10-13
Payer: COMMERCIAL

## 2021-10-13 VITALS
SYSTOLIC BLOOD PRESSURE: 108 MMHG | HEART RATE: 106 BPM | DIASTOLIC BLOOD PRESSURE: 62 MMHG | RESPIRATION RATE: 16 BRPM | BODY MASS INDEX: 42.66 KG/M2 | WEIGHT: 315 LBS | HEIGHT: 72 IN

## 2021-10-13 DIAGNOSIS — E78.5 HYPERLIPIDEMIA, UNSPECIFIED HYPERLIPIDEMIA TYPE: Primary | ICD-10-CM

## 2021-10-13 DIAGNOSIS — K21.9 GASTROESOPHAGEAL REFLUX DISEASE WITHOUT ESOPHAGITIS: ICD-10-CM

## 2021-10-13 DIAGNOSIS — Z79.899 ENCOUNTER FOR LONG-TERM (CURRENT) USE OF OTHER MEDICATIONS: ICD-10-CM

## 2021-10-13 DIAGNOSIS — G47.33 OBSTRUCTIVE SLEEP APNEA SYNDROME: ICD-10-CM

## 2021-10-13 DIAGNOSIS — R00.2 PALPITATIONS: ICD-10-CM

## 2021-10-13 DIAGNOSIS — I10 HYPERTENSION, ESSENTIAL: ICD-10-CM

## 2021-10-13 DIAGNOSIS — E11.9 TYPE 2 DIABETES MELLITUS WITHOUT COMPLICATION, WITHOUT LONG-TERM CURRENT USE OF INSULIN: ICD-10-CM

## 2021-10-13 DIAGNOSIS — J44.9 CHRONIC OBSTRUCTIVE PULMONARY DISEASE, UNSPECIFIED COPD TYPE: ICD-10-CM

## 2021-10-13 PROCEDURE — 99214 OFFICE O/P EST MOD 30 MIN: CPT | Mod: S$PBB,,, | Performed by: INTERNAL MEDICINE

## 2021-10-13 PROCEDURE — 99215 OFFICE O/P EST HI 40 MIN: CPT | Mod: PBBFAC | Performed by: INTERNAL MEDICINE

## 2021-10-13 PROCEDURE — 99214 PR OFFICE/OUTPT VISIT, EST, LEVL IV, 30-39 MIN: ICD-10-PCS | Mod: S$PBB,,, | Performed by: INTERNAL MEDICINE

## 2021-10-14 ENCOUNTER — OFFICE VISIT (OUTPATIENT)
Dept: FAMILY MEDICINE | Facility: CLINIC | Age: 41
End: 2021-10-14
Payer: COMMERCIAL

## 2021-10-14 VITALS
WEIGHT: 315 LBS | TEMPERATURE: 98 F | HEART RATE: 105 BPM | RESPIRATION RATE: 20 BRPM | BODY MASS INDEX: 42.66 KG/M2 | HEIGHT: 72 IN | SYSTOLIC BLOOD PRESSURE: 124 MMHG | OXYGEN SATURATION: 98 % | DIASTOLIC BLOOD PRESSURE: 81 MMHG

## 2021-10-14 DIAGNOSIS — E08.621 DIABETIC ULCER OF RIGHT MIDFOOT ASSOCIATED WITH DIABETES MELLITUS DUE TO UNDERLYING CONDITION, LIMITED TO BREAKDOWN OF SKIN: Primary | ICD-10-CM

## 2021-10-14 DIAGNOSIS — E11.42 DIABETIC PERIPHERAL NEUROPATHY: ICD-10-CM

## 2021-10-14 DIAGNOSIS — L97.411 DIABETIC ULCER OF RIGHT MIDFOOT ASSOCIATED WITH DIABETES MELLITUS DUE TO UNDERLYING CONDITION, LIMITED TO BREAKDOWN OF SKIN: Primary | ICD-10-CM

## 2021-10-14 PROCEDURE — 96372 PR INJECTION,THERAP/PROPH/DIAG2ST, IM OR SUBCUT: ICD-10-PCS | Mod: ,,, | Performed by: FAMILY MEDICINE

## 2021-10-14 PROCEDURE — 99213 PR OFFICE/OUTPT VISIT, EST, LEVL III, 20-29 MIN: ICD-10-PCS | Mod: 25,,, | Performed by: FAMILY MEDICINE

## 2021-10-14 PROCEDURE — 99213 OFFICE O/P EST LOW 20 MIN: CPT | Mod: 25,,, | Performed by: FAMILY MEDICINE

## 2021-10-14 PROCEDURE — 96372 THER/PROPH/DIAG INJ SC/IM: CPT | Mod: ,,, | Performed by: FAMILY MEDICINE

## 2021-10-14 RX ORDER — HYDROCODONE BITARTRATE AND ACETAMINOPHEN 10; 325 MG/1; MG/1
1 TABLET ORAL EVERY 12 HOURS
Qty: 60 TABLET | Refills: 0 | Status: SHIPPED | OUTPATIENT
Start: 2021-10-14 | End: 2021-11-17

## 2021-10-14 RX ORDER — KETOROLAC TROMETHAMINE 30 MG/ML
30 INJECTION, SOLUTION INTRAMUSCULAR; INTRAVENOUS
Status: COMPLETED | OUTPATIENT
Start: 2021-10-14 | End: 2021-10-14

## 2021-10-14 RX ADMIN — KETOROLAC TROMETHAMINE 30 MG: 30 INJECTION, SOLUTION INTRAMUSCULAR; INTRAVENOUS at 01:10

## 2021-10-18 PROBLEM — K21.9 GASTROESOPHAGEAL REFLUX DISEASE WITHOUT ESOPHAGITIS: Status: ACTIVE | Noted: 2021-10-18

## 2021-10-19 ENCOUNTER — OFFICE VISIT (OUTPATIENT)
Dept: NEUROLOGY | Facility: CLINIC | Age: 41
End: 2021-10-19
Payer: COMMERCIAL

## 2021-10-19 VITALS
SYSTOLIC BLOOD PRESSURE: 122 MMHG | WEIGHT: 315 LBS | OXYGEN SATURATION: 97 % | HEART RATE: 98 BPM | HEIGHT: 72 IN | DIASTOLIC BLOOD PRESSURE: 80 MMHG | BODY MASS INDEX: 42.66 KG/M2

## 2021-10-19 DIAGNOSIS — E66.01 MORBID OBESITY: Chronic | ICD-10-CM

## 2021-10-19 DIAGNOSIS — I73.9 CLAUDICATION: ICD-10-CM

## 2021-10-19 DIAGNOSIS — E11.42 DIABETIC POLYNEUROPATHY ASSOCIATED WITH TYPE 2 DIABETES MELLITUS: Primary | ICD-10-CM

## 2021-10-19 DIAGNOSIS — M79.605 PAIN IN BOTH LOWER EXTREMITIES: ICD-10-CM

## 2021-10-19 DIAGNOSIS — G47.33 OBSTRUCTIVE SLEEP APNEA SYNDROME: ICD-10-CM

## 2021-10-19 DIAGNOSIS — M79.604 PAIN IN BOTH LOWER EXTREMITIES: ICD-10-CM

## 2021-10-19 DIAGNOSIS — F32.A DEPRESSIVE DISORDER: ICD-10-CM

## 2021-10-19 PROCEDURE — 99214 PR OFFICE/OUTPT VISIT, EST, LEVL IV, 30-39 MIN: ICD-10-PCS | Mod: S$PBB,,, | Performed by: NURSE PRACTITIONER

## 2021-10-19 PROCEDURE — 99214 OFFICE O/P EST MOD 30 MIN: CPT | Mod: S$PBB,,, | Performed by: NURSE PRACTITIONER

## 2021-10-19 PROCEDURE — 99214 OFFICE O/P EST MOD 30 MIN: CPT | Mod: PBBFAC | Performed by: NURSE PRACTITIONER

## 2021-10-19 RX ORDER — AMITRIPTYLINE HYDROCHLORIDE 25 MG/1
TABLET, FILM COATED ORAL
Qty: 30 TABLET | Refills: 6 | Status: SHIPPED | OUTPATIENT
Start: 2021-10-19 | End: 2022-02-16 | Stop reason: SDUPTHER

## 2021-10-19 RX ORDER — DULOXETIN HYDROCHLORIDE 60 MG/1
60 CAPSULE, DELAYED RELEASE ORAL 2 TIMES DAILY
Qty: 60 CAPSULE | Refills: 6 | Status: SHIPPED | OUTPATIENT
Start: 2021-10-19 | End: 2022-02-16 | Stop reason: ALTCHOICE

## 2021-10-25 ENCOUNTER — CLINICAL SUPPORT (OUTPATIENT)
Dept: WOUND CARE | Facility: CLINIC | Age: 41
End: 2021-10-25
Attending: FAMILY MEDICINE
Payer: COMMERCIAL

## 2021-10-25 VITALS
SYSTOLIC BLOOD PRESSURE: 133 MMHG | RESPIRATION RATE: 20 BRPM | TEMPERATURE: 98 F | DIASTOLIC BLOOD PRESSURE: 83 MMHG | HEART RATE: 102 BPM

## 2021-10-25 DIAGNOSIS — L97.511 DIABETIC ULCER OF RIGHT FOOT ASSOCIATED WITH DIABETES MELLITUS DUE TO UNDERLYING CONDITION, LIMITED TO BREAKDOWN OF SKIN, UNSPECIFIED PART OF FOOT: ICD-10-CM

## 2021-10-25 DIAGNOSIS — L97.509 TYPE 2 DIABETES MELLITUS WITH FOOT ULCER, WITH LONG-TERM CURRENT USE OF INSULIN: Primary | ICD-10-CM

## 2021-10-25 DIAGNOSIS — Z79.4 TYPE 2 DIABETES MELLITUS WITH FOOT ULCER, WITH LONG-TERM CURRENT USE OF INSULIN: Primary | ICD-10-CM

## 2021-10-25 DIAGNOSIS — E08.621 DIABETIC ULCER OF RIGHT FOOT ASSOCIATED WITH DIABETES MELLITUS DUE TO UNDERLYING CONDITION, LIMITED TO BREAKDOWN OF SKIN, UNSPECIFIED PART OF FOOT: ICD-10-CM

## 2021-10-25 DIAGNOSIS — E11.621 TYPE 2 DIABETES MELLITUS WITH FOOT ULCER, WITH LONG-TERM CURRENT USE OF INSULIN: Primary | ICD-10-CM

## 2021-10-25 PROCEDURE — 99215 OFFICE O/P EST HI 40 MIN: CPT | Mod: PBBFAC | Performed by: FAMILY MEDICINE

## 2021-10-25 PROCEDURE — 99203 OFFICE O/P NEW LOW 30 MIN: CPT | Mod: S$PBB,,, | Performed by: FAMILY MEDICINE

## 2021-10-25 PROCEDURE — 99203 PR OFFICE/OUTPT VISIT, NEW, LEVL III, 30-44 MIN: ICD-10-PCS | Mod: S$PBB,,, | Performed by: FAMILY MEDICINE

## 2021-11-15 RX ORDER — HYDROCODONE BITARTRATE AND ACETAMINOPHEN 10; 325 MG/1; MG/1
1 TABLET ORAL EVERY 12 HOURS
Qty: 60 TABLET | Refills: 0 | Status: SHIPPED | OUTPATIENT
Start: 2021-11-15 | End: 2021-11-17

## 2021-11-17 RX ORDER — HYDROCODONE BITARTRATE AND ACETAMINOPHEN 10; 325 MG/1; MG/1
1 TABLET ORAL EVERY 12 HOURS
Qty: 60 TABLET | Refills: 0 | Status: SHIPPED | OUTPATIENT
Start: 2021-11-17 | End: 2021-12-07 | Stop reason: SDUPTHER

## 2021-11-17 RX ORDER — HYDROCODONE BITARTRATE AND ACETAMINOPHEN 10; 325 MG/1; MG/1
1 TABLET ORAL EVERY 12 HOURS
Qty: 60 TABLET | Refills: 0 | Status: SHIPPED | OUTPATIENT
Start: 2021-12-17 | End: 2021-12-07 | Stop reason: SDUPTHER

## 2021-12-08 ENCOUNTER — OFFICE VISIT (OUTPATIENT)
Dept: PAIN MEDICINE | Facility: CLINIC | Age: 41
End: 2021-12-08
Payer: COMMERCIAL

## 2021-12-08 VITALS
HEART RATE: 130 BPM | WEIGHT: 315 LBS | SYSTOLIC BLOOD PRESSURE: 186 MMHG | HEIGHT: 72 IN | BODY MASS INDEX: 42.66 KG/M2 | DIASTOLIC BLOOD PRESSURE: 75 MMHG | RESPIRATION RATE: 17 BRPM

## 2021-12-08 DIAGNOSIS — M47.817 LUMBOSACRAL SPONDYLOSIS WITHOUT MYELOPATHY: Primary | Chronic | ICD-10-CM

## 2021-12-08 DIAGNOSIS — G90.511 COMPLEX REGIONAL PAIN SYNDROME TYPE 1 OF RIGHT UPPER EXTREMITY: Chronic | ICD-10-CM

## 2021-12-08 DIAGNOSIS — E11.42 DIABETIC PERIPHERAL NEUROPATHY: ICD-10-CM

## 2021-12-08 PROCEDURE — 99215 OFFICE O/P EST HI 40 MIN: CPT | Mod: PBBFAC | Performed by: PHYSICIAN ASSISTANT

## 2021-12-08 PROCEDURE — 99214 OFFICE O/P EST MOD 30 MIN: CPT | Mod: S$PBB,,, | Performed by: PHYSICIAN ASSISTANT

## 2021-12-08 PROCEDURE — 99214 PR OFFICE/OUTPT VISIT, EST, LEVL IV, 30-39 MIN: ICD-10-PCS | Mod: S$PBB,,, | Performed by: PHYSICIAN ASSISTANT

## 2021-12-08 PROCEDURE — 4010F ACE/ARB THERAPY RXD/TAKEN: CPT | Mod: CPTII,,, | Performed by: PHYSICIAN ASSISTANT

## 2021-12-08 PROCEDURE — 4010F PR ACE/ARB THEARPY RXD/TAKEN: ICD-10-PCS | Mod: CPTII,,, | Performed by: PHYSICIAN ASSISTANT

## 2021-12-08 RX ORDER — HYDROCODONE BITARTRATE AND ACETAMINOPHEN 10; 325 MG/1; MG/1
1 TABLET ORAL EVERY 12 HOURS
Qty: 60 TABLET | Refills: 0 | Status: SHIPPED | OUTPATIENT
Start: 2022-01-14 | End: 2022-01-13 | Stop reason: SDUPTHER

## 2021-12-08 RX ORDER — HYDROCODONE BITARTRATE AND ACETAMINOPHEN 10; 325 MG/1; MG/1
1 TABLET ORAL EVERY 12 HOURS
Qty: 60 TABLET | Refills: 0 | Status: SHIPPED | OUTPATIENT
Start: 2021-12-15 | End: 2021-12-16

## 2021-12-16 RX ORDER — HYDROCODONE BITARTRATE AND ACETAMINOPHEN 10; 325 MG/1; MG/1
1 TABLET ORAL EVERY 12 HOURS
Qty: 60 TABLET | Refills: 0 | Status: SHIPPED | OUTPATIENT
Start: 2021-12-16 | End: 2022-01-05

## 2021-12-17 ENCOUNTER — OFFICE VISIT (OUTPATIENT)
Dept: FAMILY MEDICINE | Facility: CLINIC | Age: 41
End: 2021-12-17
Payer: COMMERCIAL

## 2021-12-17 VITALS
BODY MASS INDEX: 42.66 KG/M2 | HEIGHT: 72 IN | DIASTOLIC BLOOD PRESSURE: 85 MMHG | WEIGHT: 315 LBS | HEART RATE: 87 BPM | OXYGEN SATURATION: 97 % | TEMPERATURE: 99 F | RESPIRATION RATE: 18 BRPM | SYSTOLIC BLOOD PRESSURE: 124 MMHG

## 2021-12-17 DIAGNOSIS — E78.5 HYPERLIPIDEMIA, UNSPECIFIED HYPERLIPIDEMIA TYPE: ICD-10-CM

## 2021-12-17 DIAGNOSIS — I10 HYPERTENSION, ESSENTIAL: Primary | ICD-10-CM
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
## 2021-12-17 DIAGNOSIS — K21.9 GASTROESOPHAGEAL REFLUX DISEASE WITHOUT ESOPHAGITIS: ICD-10-CM

## 2021-12-17 DIAGNOSIS — K21.9 GASTROESOPHAGEAL REFLUX DISEASE, UNSPECIFIED WHETHER ESOPHAGITIS PRESENT: ICD-10-CM

## 2021-12-17 DIAGNOSIS — E11.9 TYPE 2 DIABETES MELLITUS WITHOUT COMPLICATION, WITHOUT LONG-TERM CURRENT USE OF INSULIN: ICD-10-CM

## 2021-12-17 LAB
CREAT UR-MCNC: 32 MG/DL (ref 39–259)
MICROALBUMIN UR-MCNC: 2.6 MG/DL (ref 0–2.8)
MICROALBUMIN/CREAT RATIO PNL UR: 81.3 MG/G (ref 0–30)

## 2021-12-17 PROCEDURE — 4010F ACE/ARB THERAPY RXD/TAKEN: CPT | Mod: CPTII,,, | Performed by: FAMILY MEDICINE

## 2021-12-17 PROCEDURE — 99214 PR OFFICE/OUTPT VISIT, EST, LEVL IV, 30-39 MIN: ICD-10-PCS | Mod: ,,, | Performed by: FAMILY MEDICINE

## 2021-12-17 PROCEDURE — 82043 MICROALBUMIN / CREATININE RATIO URINE: ICD-10-PCS | Mod: ,,, | Performed by: CLINICAL MEDICAL LABORATORY

## 2021-12-17 PROCEDURE — 82570 MICROALBUMIN / CREATININE RATIO URINE: ICD-10-PCS | Mod: ,,, | Performed by: CLINICAL MEDICAL LABORATORY

## 2021-12-17 PROCEDURE — 82570 ASSAY OF URINE CREATININE: CPT | Mod: ,,, | Performed by: CLINICAL MEDICAL LABORATORY

## 2021-12-17 PROCEDURE — 82043 UR ALBUMIN QUANTITATIVE: CPT | Mod: ,,, | Performed by: CLINICAL MEDICAL LABORATORY

## 2021-12-17 PROCEDURE — 4010F PR ACE/ARB THEARPY RXD/TAKEN: ICD-10-PCS | Mod: CPTII,,, | Performed by: FAMILY MEDICINE

## 2021-12-17 PROCEDURE — 99214 OFFICE O/P EST MOD 30 MIN: CPT | Mod: ,,, | Performed by: FAMILY MEDICINE

## 2021-12-17 RX ORDER — ATORVASTATIN CALCIUM 80 MG/1
80 TABLET, FILM COATED ORAL NIGHTLY
Qty: 90 TABLET | Refills: 1 | Status: SHIPPED | OUTPATIENT
Start: 2021-12-17 | End: 2022-06-04

## 2021-12-17 RX ORDER — FENOFIBRATE 48 MG/1
48 TABLET, FILM COATED ORAL DAILY
Qty: 90 TABLET | Refills: 1 | Status: SHIPPED | OUTPATIENT
Start: 2021-12-17 | End: 2022-06-04

## 2021-12-17 RX ORDER — MUPIROCIN 20 MG/G
OINTMENT TOPICAL 3 TIMES DAILY
Qty: 15 G | Refills: 0 | Status: SHIPPED | OUTPATIENT
Start: 2021-12-17 | End: 2022-06-22 | Stop reason: SDUPTHER

## 2021-12-17 RX ORDER — ESOMEPRAZOLE MAGNESIUM 40 MG/1
40 CAPSULE, DELAYED RELEASE ORAL
Qty: 90 CAPSULE | Refills: 1 | Status: SHIPPED | OUTPATIENT
Start: 2021-12-17 | End: 2022-05-20

## 2021-12-17 RX ORDER — PEN NEEDLE, DIABETIC 32GX 5/32"
NEEDLE, DISPOSABLE MISCELLANEOUS
Qty: 100 EACH | Refills: 11 | Status: SHIPPED | OUTPATIENT
Start: 2021-12-17 | End: 2023-10-05 | Stop reason: SDUPTHER

## 2021-12-17 RX ORDER — METOPROLOL SUCCINATE 50 MG/1
50 TABLET, EXTENDED RELEASE ORAL DAILY
Qty: 90 TABLET | Refills: 1 | Status: SHIPPED | OUTPATIENT
Start: 2021-12-17 | End: 2022-06-22 | Stop reason: SDUPTHER

## 2021-12-17 RX ORDER — GABAPENTIN 600 MG/1
600 TABLET ORAL 3 TIMES DAILY
Qty: 270 TABLET | Refills: 1 | Status: SHIPPED | OUTPATIENT
Start: 2021-12-17 | End: 2022-05-18 | Stop reason: SDUPTHER

## 2021-12-17 RX ORDER — ISOSORBIDE MONONITRATE 30 MG/1
30 TABLET, EXTENDED RELEASE ORAL DAILY
Qty: 90 TABLET | Refills: 1 | Status: SHIPPED | OUTPATIENT
Start: 2021-12-17 | End: 2022-06-04

## 2021-12-23 ENCOUNTER — ANESTHESIA (OUTPATIENT)
Dept: PAIN MEDICINE | Facility: HOSPITAL | Age: 41
End: 2021-12-23
Payer: COMMERCIAL

## 2021-12-23 ENCOUNTER — ANESTHESIA EVENT (OUTPATIENT)
Dept: PAIN MEDICINE | Facility: HOSPITAL | Age: 41
End: 2021-12-23
Payer: COMMERCIAL

## 2021-12-23 ENCOUNTER — HOSPITAL ENCOUNTER (OUTPATIENT)
Facility: HOSPITAL | Age: 41
Discharge: HOME | End: 2021-12-23
Attending: PAIN MEDICINE | Admitting: PAIN MEDICINE
Payer: COMMERCIAL

## 2021-12-23 VITALS
WEIGHT: 315 LBS | BODY MASS INDEX: 42.66 KG/M2 | RESPIRATION RATE: 18 BRPM | HEIGHT: 72 IN | TEMPERATURE: 98 F | DIASTOLIC BLOOD PRESSURE: 70 MMHG | SYSTOLIC BLOOD PRESSURE: 115 MMHG | OXYGEN SATURATION: 96 % | HEART RATE: 88 BPM

## 2021-12-23 DIAGNOSIS — M47.817 SPONDYLOSIS OF LUMBOSACRAL REGION WITHOUT MYELOPATHY OR RADICULOPATHY: ICD-10-CM

## 2021-12-23 LAB
GLUCOSE SERPL-MCNC: 157 MG/DL (ref 70–105)
GLUCOSE SERPL-MCNC: 157 MG/DL (ref 70–110)

## 2021-12-23 PROCEDURE — 64494 INJ PARAVERT F JNT L/S 2 LEV: CPT | Mod: 50 | Performed by: PAIN MEDICINE

## 2021-12-23 PROCEDURE — 64493 INJ PARAVERT F JNT L/S 1 LEV: CPT | Mod: 50 | Performed by: PAIN MEDICINE

## 2021-12-23 PROCEDURE — 27201423 OPTIME MED/SURG SUP & DEVICES STERILE SUPPLY: Performed by: PAIN MEDICINE

## 2021-12-23 PROCEDURE — 25000003 PHARM REV CODE 250: Performed by: PAIN MEDICINE

## 2021-12-23 PROCEDURE — 25000003 PHARM REV CODE 250: Performed by: NURSE ANESTHETIST, CERTIFIED REGISTERED

## 2021-12-23 PROCEDURE — 37000008 HC ANESTHESIA 1ST 15 MINUTES: Performed by: PAIN MEDICINE

## 2021-12-23 PROCEDURE — 37000009 HC ANESTHESIA EA ADD 15 MINS: Performed by: PAIN MEDICINE

## 2021-12-23 PROCEDURE — 82962 GLUCOSE BLOOD TEST: CPT

## 2021-12-23 PROCEDURE — 63600175 PHARM REV CODE 636 W HCPCS: Performed by: PAIN MEDICINE

## 2021-12-23 PROCEDURE — 64493 INJ PARAVERT F JNT L/S 1 LEV: CPT | Mod: 50,,, | Performed by: PAIN MEDICINE

## 2021-12-23 PROCEDURE — 64493 PR INJ DX/THER AGNT PARAVERT FACET JOINT,IMG GUIDE,LUMBAR/SAC,1ST LVL: ICD-10-PCS | Mod: 50,,, | Performed by: PAIN MEDICINE

## 2021-12-23 PROCEDURE — 64494 INJ PARAVERT F JNT L/S 2 LEV: CPT | Mod: LT,,, | Performed by: PAIN MEDICINE

## 2021-12-23 PROCEDURE — D9220A PRA ANESTHESIA: ICD-10-PCS | Mod: ,,, | Performed by: NURSE ANESTHETIST, CERTIFIED REGISTERED

## 2021-12-23 PROCEDURE — 63600175 PHARM REV CODE 636 W HCPCS: Performed by: NURSE ANESTHETIST, CERTIFIED REGISTERED

## 2021-12-23 PROCEDURE — 64494 PR INJ DX/THER AGNT PARAVERT FACET JOINT,IMG GUIDE,LUMBAR/SAC, 2ND LEVEL: ICD-10-PCS | Mod: RT,,, | Performed by: PAIN MEDICINE

## 2021-12-23 PROCEDURE — D9220A PRA ANESTHESIA: Mod: ,,, | Performed by: NURSE ANESTHETIST, CERTIFIED REGISTERED

## 2021-12-23 PROCEDURE — 27000284 HC CANNULA NASAL: Performed by: NURSE ANESTHETIST, CERTIFIED REGISTERED

## 2021-12-23 RX ORDER — PROPOFOL 10 MG/ML
VIAL (ML) INTRAVENOUS
Status: DISCONTINUED | OUTPATIENT
Start: 2021-12-23 | End: 2021-12-23

## 2021-12-23 RX ORDER — BUPIVACAINE HYDROCHLORIDE 2.5 MG/ML
INJECTION, SOLUTION INFILTRATION; PERINEURAL
Status: DISCONTINUED | OUTPATIENT
Start: 2021-12-23 | End: 2021-12-23 | Stop reason: HOSPADM

## 2021-12-23 RX ORDER — TRIAMCINOLONE ACETONIDE 40 MG/ML
INJECTION, SUSPENSION INTRA-ARTICULAR; INTRAMUSCULAR
Status: DISCONTINUED | OUTPATIENT
Start: 2021-12-23 | End: 2021-12-23 | Stop reason: HOSPADM

## 2021-12-23 RX ORDER — LIDOCAINE HYDROCHLORIDE 20 MG/ML
INJECTION, SOLUTION EPIDURAL; INFILTRATION; INTRACAUDAL; PERINEURAL
Status: DISCONTINUED | OUTPATIENT
Start: 2021-12-23 | End: 2021-12-23

## 2021-12-23 RX ORDER — FENTANYL CITRATE 50 UG/ML
INJECTION, SOLUTION INTRAMUSCULAR; INTRAVENOUS
Status: DISCONTINUED | OUTPATIENT
Start: 2021-12-23 | End: 2021-12-23

## 2021-12-23 RX ORDER — SODIUM CHLORIDE 9 MG/ML
INJECTION, SOLUTION INTRAVENOUS CONTINUOUS
Status: DISCONTINUED | OUTPATIENT
Start: 2021-12-23 | End: 2021-12-23 | Stop reason: HOSPADM

## 2021-12-23 RX ADMIN — PROPOFOL 50 MG: 10 INJECTION, EMULSION INTRAVENOUS at 11:12

## 2021-12-23 RX ADMIN — LIDOCAINE HYDROCHLORIDE 50 MG: 20 INJECTION, SOLUTION EPIDURAL; INFILTRATION; INTRACAUDAL; PERINEURAL at 11:12

## 2021-12-23 RX ADMIN — SODIUM CHLORIDE: 9 INJECTION, SOLUTION INTRAVENOUS at 11:12

## 2021-12-23 RX ADMIN — FENTANYL CITRATE 100 MCG: 50 INJECTION INTRAMUSCULAR; INTRAVENOUS at 11:12

## 2022-01-05 ENCOUNTER — HOSPITAL ENCOUNTER (OUTPATIENT)
Dept: RADIOLOGY | Facility: HOSPITAL | Age: 42
Discharge: HOME | End: 2022-01-05
Attending: NURSE PRACTITIONER
Payer: COMMERCIAL

## 2022-01-05 ENCOUNTER — OFFICE VISIT (OUTPATIENT)
Dept: DIABETES SERVICES | Facility: CLINIC | Age: 42
End: 2022-01-05
Payer: COMMERCIAL

## 2022-01-05 VITALS
RESPIRATION RATE: 20 BRPM | WEIGHT: 315 LBS | SYSTOLIC BLOOD PRESSURE: 120 MMHG | DIASTOLIC BLOOD PRESSURE: 100 MMHG | HEART RATE: 103 BPM | BODY MASS INDEX: 42.66 KG/M2 | HEIGHT: 72 IN | OXYGEN SATURATION: 96 %

## 2022-01-05 DIAGNOSIS — Z79.4 TYPE 2 DIABETES MELLITUS WITH FOOT ULCER, WITH LONG-TERM CURRENT USE OF INSULIN: Primary | ICD-10-CM

## 2022-01-05 DIAGNOSIS — M79.671 FOOT PAIN, RIGHT: ICD-10-CM

## 2022-01-05 DIAGNOSIS — K21.9 GASTROESOPHAGEAL REFLUX DISEASE WITHOUT ESOPHAGITIS: ICD-10-CM

## 2022-01-05 DIAGNOSIS — E11.42 DIABETIC POLYNEUROPATHY ASSOCIATED WITH TYPE 2 DIABETES MELLITUS: Chronic | ICD-10-CM

## 2022-01-05 DIAGNOSIS — L97.511 NON-PRESSURE CHRONIC ULCER OF OTHER PART OF RIGHT FOOT LIMITED TO BREAKDOWN OF SKIN: ICD-10-CM

## 2022-01-05 DIAGNOSIS — L97.509 TYPE 2 DIABETES MELLITUS WITH FOOT ULCER, WITH LONG-TERM CURRENT USE OF INSULIN: Primary | ICD-10-CM

## 2022-01-05 DIAGNOSIS — G89.4 CHRONIC PAIN SYNDROME: Chronic | ICD-10-CM

## 2022-01-05 DIAGNOSIS — E11.621 TYPE 2 DIABETES MELLITUS WITH FOOT ULCER, WITH LONG-TERM CURRENT USE OF INSULIN: Primary | ICD-10-CM

## 2022-01-05 DIAGNOSIS — F17.210 CIGARETTE NICOTINE DEPENDENCE WITHOUT COMPLICATION: ICD-10-CM

## 2022-01-05 DIAGNOSIS — I10 HYPERTENSION, ESSENTIAL: ICD-10-CM

## 2022-01-05 DIAGNOSIS — E78.5 HYPERLIPIDEMIA, UNSPECIFIED HYPERLIPIDEMIA TYPE: ICD-10-CM

## 2022-01-05 LAB
GLUCOSE SERPL-MCNC: 415 MG/DL (ref 70–110)
HBA1C MFR BLD: 10.6 % (ref 4.5–6.6)

## 2022-01-05 PROCEDURE — 1160F PR REVIEW ALL MEDS BY PRESCRIBER/CLIN PHARMACIST DOCUMENTED: ICD-10-PCS | Mod: CPTII,,, | Performed by: NURSE PRACTITIONER

## 2022-01-05 PROCEDURE — 3008F PR BODY MASS INDEX (BMI) DOCUMENTED: ICD-10-PCS | Mod: CPTII,,, | Performed by: NURSE PRACTITIONER

## 2022-01-05 PROCEDURE — 3074F SYST BP LT 130 MM HG: CPT | Mod: CPTII,,, | Performed by: NURSE PRACTITIONER

## 2022-01-05 PROCEDURE — 1159F MED LIST DOCD IN RCRD: CPT | Mod: CPTII,,, | Performed by: NURSE PRACTITIONER

## 2022-01-05 PROCEDURE — 83036 HEMOGLOBIN GLYCOSYLATED A1C: CPT | Mod: PBBFAC | Performed by: NURSE PRACTITIONER

## 2022-01-05 PROCEDURE — 73620 X-RAY EXAM OF FOOT: CPT | Mod: TC,RT

## 2022-01-05 PROCEDURE — 99214 OFFICE O/P EST MOD 30 MIN: CPT | Mod: PBBFAC | Performed by: NURSE PRACTITIONER

## 2022-01-05 PROCEDURE — 3008F BODY MASS INDEX DOCD: CPT | Mod: CPTII,,, | Performed by: NURSE PRACTITIONER

## 2022-01-05 PROCEDURE — 99214 PR OFFICE/OUTPT VISIT, EST, LEVL IV, 30-39 MIN: ICD-10-PCS | Mod: S$PBB,,, | Performed by: NURSE PRACTITIONER

## 2022-01-05 PROCEDURE — 82962 GLUCOSE BLOOD TEST: CPT | Mod: PBBFAC | Performed by: NURSE PRACTITIONER

## 2022-01-05 PROCEDURE — 3074F PR MOST RECENT SYSTOLIC BLOOD PRESSURE < 130 MM HG: ICD-10-PCS | Mod: CPTII,,, | Performed by: NURSE PRACTITIONER

## 2022-01-05 PROCEDURE — 1160F RVW MEDS BY RX/DR IN RCRD: CPT | Mod: CPTII,,, | Performed by: NURSE PRACTITIONER

## 2022-01-05 PROCEDURE — 99214 OFFICE O/P EST MOD 30 MIN: CPT | Mod: S$PBB,,, | Performed by: NURSE PRACTITIONER

## 2022-01-05 PROCEDURE — 1159F PR MEDICATION LIST DOCUMENTED IN MEDICAL RECORD: ICD-10-PCS | Mod: CPTII,,, | Performed by: NURSE PRACTITIONER

## 2022-01-05 PROCEDURE — 3080F PR MOST RECENT DIASTOLIC BLOOD PRESSURE >= 90 MM HG: ICD-10-PCS | Mod: CPTII,,, | Performed by: NURSE PRACTITIONER

## 2022-01-05 PROCEDURE — 3080F DIAST BP >= 90 MM HG: CPT | Mod: CPTII,,, | Performed by: NURSE PRACTITIONER

## 2022-01-05 RX ORDER — SULFAMETHOXAZOLE AND TRIMETHOPRIM 800; 160 MG/1; MG/1
1 TABLET ORAL 2 TIMES DAILY
Qty: 20 TABLET | Refills: 0 | Status: SHIPPED | OUTPATIENT
Start: 2022-01-05 | End: 2022-02-16

## 2022-01-05 RX ORDER — TIZANIDINE 4 MG/1
4 TABLET ORAL 3 TIMES DAILY
Qty: 270 TABLET | Refills: 1 | Status: SHIPPED | OUTPATIENT
Start: 2022-01-05 | End: 2022-06-22 | Stop reason: SDUPTHER

## 2022-01-05 NOTE — PATIENT INSTRUCTIONS
Sliding scale to be taken 5-10 min before each meal. Take fiasp   100-150=2 units  151-200=4 units  201-250=6 units  251-300=8 units  301-350=10 units

## 2022-01-05 NOTE — PROGRESS NOTES
Subjective:       Patient ID: Kennedy West is a 41 y.o. male.    Chief Complaint: Diabetes Mellitus (Pt here for follow up visit and A1c, reports he has would on bottom of R foot, notable pain with ambulating.  Pain described as burning and extreme pain, rated at 9.  Also reports he ran out of insulin approx 2 wk ago, and his meter quit working about the same time.  Checks sugar BID when meter works.)    Pt was scheduled today for follow up visit, he was seen last in July of last year and advised to follow up in 3 months.  A1c is not well controlled.     Has appt in 1 week with podiatrist.  Complains of wound to right foot, dorsal aspect 5th distal MTP aspect.  Has seen pcp and was on abx, had xray and CT and was referred to wound care.  They saw him in October and referred to podiatry for callus, no wound.  Pt is complaining with erythema to foot, swelling, drainage and severe pain.  He has drainage distal to callus in linear formation.  3 cm callus with probable wound beneath.     Review of Systems   Constitutional: Positive for activity change. Negative for appetite change, diaphoresis and fatigue.        Chronic pain pt   HENT: Negative for nasal congestion, facial swelling and sinus pressure/congestion.    Eyes: Negative for visual disturbance.   Respiratory: Negative for shortness of breath and wheezing.    Cardiovascular: Negative for chest pain and leg swelling.   Gastrointestinal: Negative for constipation, diarrhea, nausea and vomiting.   Endocrine: Negative for polydipsia, polyphagia and polyuria.   Genitourinary: Negative for dysuria, frequency and urgency.   Musculoskeletal: Positive for gait problem. Negative for myalgias.        Right foot pain   Integumentary:  Negative for color change, rash and wound.   Neurological: Positive for numbness. Negative for dizziness, syncope, weakness, headaches, disturbances in coordination and coordination difficulties.   Hematological: Does not  bruise/bleed easily.   Psychiatric/Behavioral: Negative for self-injury, sleep disturbance and suicidal ideas. The patient is not nervous/anxious.          Objective:      Physical Exam  Vitals and nursing note reviewed.   Constitutional:       Appearance: Normal appearance.   HENT:      Head: Normocephalic.   Cardiovascular:      Rate and Rhythm: Normal rate.      Pulses:           Dorsalis pedis pulses are 1+ on the right side and 2+ on the left side.        Posterior tibial pulses are 1+ on the right side and 2+ on the left side.   Pulmonary:      Effort: Pulmonary effort is normal.   Musculoskeletal:         General: Normal range of motion.        Feet:    Feet:      Right foot:      Protective Sensation: 5 sites tested. 2 sites sensed.      Skin integrity: Ulcer, erythema, warmth, callus and dry skin present.      Left foot:      Protective Sensation: 5 sites tested. 3 sites sensed.      Skin integrity: Callus and dry skin present.      Comments:  wound to right foot, dorsal aspect 5th distal MTP aspect. Approximately 3 cm callus with linear opening distally with drainage.  Erythema to foot and edema  Skin:     General: Skin is warm and dry.   Neurological:      General: No focal deficit present.      Mental Status: He is alert and oriented to person, place, and time.   Psychiatric:         Mood and Affect: Mood normal.         Behavior: Behavior normal.         Thought Content: Thought content normal.         Judgment: Judgment normal.         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  Start fiasp, use samples as provided and follow 2 unit sliding scale before all meals.   Do better with diet as discussed. 15-20 carbs per meal, no more.

## 2022-01-06 PROBLEM — M79.671 FOOT PAIN, RIGHT: Status: ACTIVE | Noted: 2022-01-06

## 2022-01-13 NOTE — PROGRESS NOTES
Disclaimer:  This note has been generated using voice recognition software.  There may be type of graft focal areas that have been missed during a proof reading      Subjective:      Patient ID: Kennedy West is a 41 y.o. male.    Chief Complaint: Back Pain, Neck Pain, and Arm Pain      Pain  This is a chronic problem. The current episode started more than 1 year ago. The problem occurs daily. The problem has been unchanged. Associated symptoms include neck pain. Pertinent negatives include no change in bowel habit, chest pain, chills, coughing, diaphoresis, fatigue, rash, sore throat, vertigo or vomiting.     Review of Systems   Constitutional: Negative for activity change, appetite change, chills, diaphoresis, fatigue and unexpected weight change.   HENT: Negative for drooling, ear discharge, ear pain, facial swelling, nosebleeds, sore throat, trouble swallowing, voice change and goiter.    Eyes: Negative for photophobia, pain, discharge, redness and visual disturbance.   Respiratory: Negative for apnea, cough, choking, chest tightness, shortness of breath, wheezing and stridor.    Cardiovascular: Negative for chest pain, palpitations and leg swelling.   Gastrointestinal: Negative for abdominal distention, change in bowel habit, diarrhea, rectal pain, vomiting, fecal incontinence and change in bowel habit.   Endocrine: Negative for cold intolerance, heat intolerance, polydipsia, polyphagia and polyuria.   Genitourinary: Negative for bladder incontinence, dysuria, flank pain and frequency.   Musculoskeletal: Positive for back pain, leg pain, neck pain and neck stiffness.   Integumentary:  Negative for color change, pallor and rash.   Neurological: Negative for dizziness, vertigo, seizures, syncope, facial asymmetry, speech difficulty, light-headedness, disturbances in coordination, memory loss and coordination difficulties.   Hematological: Negative for adenopathy. Does not bruise/bleed easily.    Psychiatric/Behavioral: Negative for agitation, behavioral problems, confusion, decreased concentration, dysphoric mood, hallucinations, self-injury and suicidal ideas. The patient is not nervous/anxious and is not hyperactive.             Objective:  Vitals:    01/18/22 0803 01/18/22 0804   BP: 120/86    Pulse: 109    Resp: 18    SpO2: 98%    Weight: (!) 158.3 kg (349 lb)    Height: 6' (1.829 m)    PainSc:   4   4         Physical Exam  Vitals and nursing note reviewed. Exam conducted with a chaperone present.   Constitutional:       General: He is awake.      Appearance: Normal appearance. He is not ill-appearing.   HENT:      Head: Normocephalic and atraumatic.      Nose: Nose normal.      Mouth/Throat:      Mouth: Mucous membranes are moist.      Pharynx: Oropharynx is clear.   Eyes:      Conjunctiva/sclera: Conjunctivae normal.      Pupils: Pupils are equal, round, and reactive to light.   Cardiovascular:      Rate and Rhythm: Normal rate.   Pulmonary:      Effort: Pulmonary effort is normal. No respiratory distress.   Abdominal:      Palpations: Abdomen is soft.      Tenderness: There is no guarding.   Musculoskeletal:         General: No signs of injury. Normal range of motion.      Cervical back: Normal range of motion and neck supple. No rigidity.   Skin:     General: Skin is warm and dry.      Coloration: Skin is not jaundiced or pale.   Neurological:      General: No focal deficit present.      Mental Status: He is alert and oriented to person, place, and time. Mental status is at baseline.      Cranial Nerves: Cranial nerves are intact. No cranial nerve deficit (II-XII).   Psychiatric:         Mood and Affect: Mood normal.         Behavior: Behavior normal. Behavior is cooperative.         Thought Content: Thought content normal.           Orders Placed This Encounter   Procedures    POCT Urine Drug Screen Presump     Interpretive Information:     Negative:  No drug detected at the cut off level.    Positive:  This result represents presumptive positive for the   tested drug, other substances may yield a positive response other   than the analyte of interest. This result should be utilized for   diagnostic purpose only. Confirmation testing will be performed upon physician request only.           X-Ray Foot 2 View Right  Narrative: EXAMINATION:  XR FOOT 2 VIEW RIGHT    CLINICAL HISTORY:  rule out osteomyelitis, painful near distal 5th MTP dorsal aspect.; Pain in right foot    FINDINGS:  Visualization limited by lack of the 3rd view.  No acute fracture or aggressive destructive bony changes are seen.  Impression: No acute pathology seen    Electronically signed by: Elsie Boone  Date:    01/05/2022  Time:    17:03       Office Visit on 01/05/2022   Component Date Value Ref Range Status    Hemoglobin A1C 01/05/2022 10.6* 4.5 - 6.6 % Final    POC Glucose 01/05/2022 415* 70 - 110 MG/DL Final   Admission on 12/23/2021, Discharged on 12/23/2021   Component Date Value Ref Range Status    POC Glucose 12/23/2021 157* 70 - 110 MG/DL Final    POC Glucose 12/23/2021 157* 70 - 105 mg/dL Final   Office Visit on 12/17/2021   Component Date Value Ref Range Status    Creatinine, Urine 12/17/2021 32* 39 - 259 mg/dL Final    Microalbumin 12/17/2021 2.6  0.0 - 2.8 mg/dL Final    Microalbumin/Creatinine Ratio 12/17/2021 81.3* 0.0 - 30.0 mg/g Final   Office Visit on 10/06/2021   Component Date Value Ref Range Status    POC Amphetamines 10/06/2021 Negative  Negative, Inconclusive Final    POC Barbiturates 10/06/2021 Negative  Negative, Inconclusive Final    POC Benzodiazepines 10/06/2021 Negative  Negative, Inconclusive Final    POC Cocaine 10/06/2021 Negative  Negative, Inconclusive Final    POC THC 10/06/2021 Negative  Negative, Inconclusive Final    POC Methadone 10/06/2021 Negative  Negative, Inconclusive Final    POC Methamphetamine 10/06/2021 Negative  Negative, Inconclusive Final    POC Opiates 10/06/2021  Presumptive Positive* Negative, Inconclusive Final    POC Oxycodone 10/06/2021 Negative  Negative, Inconclusive Final    POC Phencyclidine 10/06/2021 Negative  Negative, Inconclusive Final    POC Methylenedioxymethamphetamine * 10/06/2021 Negative  Negative, Inconclusive Final    POC Tricyclic Antidepressants 10/06/2021 Negative  Negative, Inconclusive Final    POC Buprenorphine 10/06/2021 Negative   Final     Acceptable 10/06/2021 Yes   Final    POC Temperature (Urine) 10/06/2021 94   Final   Office Visit on 09/13/2021   Component Date Value Ref Range Status    SARS Coronavirus 2 Antigen 09/13/2021 Negative  Negative Final    Rapid Influenza A Ag 09/13/2021 Negative  Negative Final    Rapid Influenza B Ag 09/13/2021 Negative  Negative Final     Acceptable 09/13/2021 Yes   Final   Admission on 08/24/2021, Discharged on 08/25/2021   Component Date Value Ref Range Status    POC Glucose 08/24/2021 163* 70 - 105 mg/dL Final    Troponin-I 08/24/2021 <0.017  <=0.056 ng/mL Final    Sodium 08/24/2021 140  136 - 145 mmol/L Final    Potassium 08/24/2021 3.7  3.5 - 5.1 mmol/L Final    Chloride 08/24/2021 105  98 - 107 mmol/L Final    CO2 08/24/2021 27  21 - 32 mmol/L Final    Anion Gap 08/24/2021 12  7 - 16 mmol/L Final    Glucose 08/24/2021 137* 74 - 106 mg/dL Final    BUN 08/24/2021 14  7 - 18 mg/dL Final    Creatinine 08/24/2021 0.65* 0.70 - 1.30 mg/dL Final    BUN/Creatinine Ratio 08/24/2021 22* 6 - 20 Final    Calcium 08/24/2021 8.9  8.5 - 10.1 mg/dL Final    Total Protein 08/24/2021 7.0  6.4 - 8.2 g/dL Final    Albumin 08/24/2021 3.3* 3.5 - 5.0 g/dL Final    Globulin 08/24/2021 3.7  2.0 - 4.0 g/dL Final    A/G Ratio 08/24/2021 0.9   Final    Bilirubin, Total 08/24/2021 0.6  >0.0 - 1.2 mg/dL Final    Alk Phos 08/24/2021 155* 45 - 115 U/L Final    ALT 08/24/2021 35  16 - 61 U/L Final    AST 08/24/2021 15  15 - 37 U/L Final    eGFR 08/24/2021 145  >=60  mL/min/1.73m² Final    PT 08/24/2021 13.4  11.7 - 14.7 seconds Final    INR 08/24/2021 1.02  0.90 - 1.10 Final    PTT 08/24/2021 157.7* 25.2 - 37.3 seconds Final    WBC 08/24/2021 14.93* 4.50 - 11.00 K/uL Final    RBC 08/24/2021 5.55  4.60 - 6.20 M/uL Final    Hemoglobin 08/24/2021 15.7  13.5 - 18.0 g/dL Final    Hematocrit 08/24/2021 46.1  40.0 - 54.0 % Final    MCV 08/24/2021 83.1  80.0 - 96.0 fL Final    MCH 08/24/2021 28.3  27.0 - 31.0 pg Final    MCHC 08/24/2021 34.1  32.0 - 36.0 g/dL Final    RDW 08/24/2021 12.6  11.5 - 14.5 % Final    Platelet Count 08/24/2021 253  150 - 400 K/uL Final    MPV 08/24/2021 9.5  9.4 - 12.4 fL Final    Neutrophils % 08/24/2021 63.5  53.0 - 65.0 % Final    Lymphocytes % 08/24/2021 30.4  27.0 - 41.0 % Final    Monocytes % 08/24/2021 4.0  2.0 - 6.0 % Final    Eosinophils % 08/24/2021 0.8* 1.0 - 4.0 % Final    Basophils % 08/24/2021 0.7  0.0 - 1.0 % Final    Immature Granulocytes % 08/24/2021 0.6* 0.0 - 0.4 % Final    nRBC, Auto 08/24/2021 0.0  <=0.0 % Final    Neutrophils, Abs 08/24/2021 9.48* 1.80 - 7.70 K/uL Final    Lymphocytes, Absolute 08/24/2021 4.54  1.00 - 4.80 K/uL Final    Monocytes, Absolute 08/24/2021 0.60  0.00 - 0.80 K/uL Final    Eosinophils, Absolute 08/24/2021 0.12  0.00 - 0.50 K/uL Final    Basophils, Absolute 08/24/2021 0.10  0.00 - 0.20 K/uL Final    Immature Granulocytes, Absolute 08/24/2021 0.09* 0.00 - 0.04 K/uL Final    nRBC, Absolute 08/24/2021 0.00  <=0.00 x10e3/uL Final    Diff Type 08/24/2021 Auto   Final    Sodium 08/25/2021 141  136 - 145 mmol/L Final    Potassium 08/25/2021 3.7  3.5 - 5.1 mmol/L Final    Chloride 08/25/2021 106  98 - 107 mmol/L Final    CO2 08/25/2021 27  21 - 32 mmol/L Final    Anion Gap 08/25/2021 12  7 - 16 mmol/L Final    Glucose 08/25/2021 155* 74 - 106 mg/dL Final    BUN 08/25/2021 13  7 - 18 mg/dL Final    Creatinine 08/25/2021 0.54* 0.70 - 1.30 mg/dL Final    BUN/Creatinine Ratio 08/25/2021  24* 6 - 20 Final    Calcium 08/25/2021 9.2  8.5 - 10.1 mg/dL Final    eGFR 08/25/2021 180  >=60 mL/min/1.73m² Final    WBC 08/25/2021 8.08  4.50 - 11.00 K/uL Final    RBC 08/25/2021 5.14  4.60 - 6.20 M/uL Final    Hemoglobin 08/25/2021 14.6  13.5 - 18.0 g/dL Final    Hematocrit 08/25/2021 44.4  40.0 - 54.0 % Final    MCV 08/25/2021 86.4  80.0 - 96.0 fL Final    MCH 08/25/2021 28.4  27.0 - 31.0 pg Final    MCHC 08/25/2021 32.9  32.0 - 36.0 g/dL Final    RDW 08/25/2021 12.4  11.5 - 14.5 % Final    Platelet Count 08/25/2021 221  150 - 400 K/uL Final    MPV 08/25/2021 9.6  9.4 - 12.4 fL Final    Neutrophils % 08/25/2021 56.7  53.0 - 65.0 % Final    Lymphocytes % 08/25/2021 36.9  27.0 - 41.0 % Final    Monocytes % 08/25/2021 4.6  2.0 - 6.0 % Final    Eosinophils % 08/25/2021 1.1  1.0 - 4.0 % Final    Basophils % 08/25/2021 0.5  0.0 - 1.0 % Final    Immature Granulocytes % 08/25/2021 0.2  0.0 - 0.4 % Final    nRBC, Auto 08/25/2021 0.0  <=0.0 % Final    Neutrophils, Abs 08/25/2021 4.58  1.80 - 7.70 K/uL Final    Lymphocytes, Absolute 08/25/2021 2.98  1.00 - 4.80 K/uL Final    Monocytes, Absolute 08/25/2021 0.37  0.00 - 0.80 K/uL Final    Eosinophils, Absolute 08/25/2021 0.09  0.00 - 0.50 K/uL Final    Basophils, Absolute 08/25/2021 0.04  0.00 - 0.20 K/uL Final    Immature Granulocytes, Absolute 08/25/2021 0.02  0.00 - 0.04 K/uL Final    nRBC, Absolute 08/25/2021 0.00  <=0.00 x10e3/uL Final    Diff Type 08/25/2021 Auto   Final    POC Glucose 08/25/2021 137* 70 - 105 mg/dL Final    POC Glucose 08/25/2021 265* 70 - 105 mg/dL Final   Office Visit on 08/13/2021   Component Date Value Ref Range Status    SARS Coronavirus 2 Antigen 08/13/2021 Negative  Negative Final    Rapid Influenza A Ag 08/13/2021 Negative  Negative Final    Rapid Influenza B Ag 08/13/2021 Negative  Negative Final     Acceptable 08/13/2021 Yes   Final    Rapid Strep A Screen 08/13/2021 Negative  Negative Final      Acceptable 08/13/2021 Yes   Final    SARS CoV-2 PCR 08/13/2021 Negative  Negative, Invalid Final   Office Visit on 07/26/2021   Component Date Value Ref Range Status    POC Glucose 07/26/2021 377* 70 - 110 MG/DL Final   No results displayed because visit has over 200 results.              Assessment:      1. Lumbosacral spondylosis without myelopathy    2. Diabetic peripheral neuropathy    3. Complex regional pain syndrome type 1 of right upper extremity    4. Encounter for long-term (current) use of other medications            A's of Opioid Risk Assessment  Activity:Patient can perform ADL.   Analgesia:Patients pain is partially controlled by current medication. Patient has tried OTC medications such as Tylenol and Ibuprofen with out relief.   Adverse Effects: Patient denies constipation or sedation.  Aberrant Behavior:  reviewed with no aberrant drug seeking/taking behavior.  Overdose reversal drug naloxone discussed    Drug screen reviewed      Requested Prescriptions     Pending Prescriptions Disp Refills    HYDROcodone-acetaminophen (NORCO)  mg per tablet 60 tablet 0     Sig: Take 1 tablet by mouth every 12 (twelve) hours.    HYDROcodone-acetaminophen (NORCO)  mg per tablet 60 tablet 0     Sig: Take 1 tablet by mouth every 12 (twelve) hours.         Plan:    He follows diabetic educator    Follow-up after lumbar L4 through S1 bilateral medial branch block # 2, he states he had 80% relief initially maintain 70% relief with time    Follows Neurology [facility] right hand arm    He is recovering after surgery on his right foot for diabetic ulcer currently on antibiotics    He is considering lumbar RFTC    He understands need to complete his antibiotics    Continue following wound management    Continue current medication    Continue exercise program as directed    Follow-up 1 month    Dr. Johnson December 2022    Bring original prescription medication bottles/container/box with  labels to each visit

## 2022-01-18 ENCOUNTER — OFFICE VISIT (OUTPATIENT)
Dept: PAIN MEDICINE | Facility: CLINIC | Age: 42
End: 2022-01-18
Payer: COMMERCIAL

## 2022-01-18 VITALS
DIASTOLIC BLOOD PRESSURE: 86 MMHG | RESPIRATION RATE: 18 BRPM | HEART RATE: 109 BPM | WEIGHT: 315 LBS | HEIGHT: 72 IN | SYSTOLIC BLOOD PRESSURE: 120 MMHG | OXYGEN SATURATION: 98 % | BODY MASS INDEX: 42.66 KG/M2

## 2022-01-18 DIAGNOSIS — G90.511 COMPLEX REGIONAL PAIN SYNDROME TYPE 1 OF RIGHT UPPER EXTREMITY: Chronic | ICD-10-CM

## 2022-01-18 DIAGNOSIS — M47.817 LUMBOSACRAL SPONDYLOSIS WITHOUT MYELOPATHY: Primary | Chronic | ICD-10-CM

## 2022-01-18 DIAGNOSIS — E11.42 DIABETIC PERIPHERAL NEUROPATHY: ICD-10-CM

## 2022-01-18 DIAGNOSIS — Z79.899 ENCOUNTER FOR LONG-TERM (CURRENT) USE OF OTHER MEDICATIONS: ICD-10-CM

## 2022-01-18 PROCEDURE — 3008F PR BODY MASS INDEX (BMI) DOCUMENTED: ICD-10-PCS | Mod: CPTII,,, | Performed by: PHYSICIAN ASSISTANT

## 2022-01-18 PROCEDURE — 3074F PR MOST RECENT SYSTOLIC BLOOD PRESSURE < 130 MM HG: ICD-10-PCS | Mod: CPTII,,, | Performed by: PHYSICIAN ASSISTANT

## 2022-01-18 PROCEDURE — 99214 OFFICE O/P EST MOD 30 MIN: CPT | Mod: PBBFAC | Performed by: PHYSICIAN ASSISTANT

## 2022-01-18 PROCEDURE — 3079F PR MOST RECENT DIASTOLIC BLOOD PRESSURE 80-89 MM HG: ICD-10-PCS | Mod: CPTII,,, | Performed by: PHYSICIAN ASSISTANT

## 2022-01-18 PROCEDURE — 3079F DIAST BP 80-89 MM HG: CPT | Mod: CPTII,,, | Performed by: PHYSICIAN ASSISTANT

## 2022-01-18 PROCEDURE — 3008F BODY MASS INDEX DOCD: CPT | Mod: CPTII,,, | Performed by: PHYSICIAN ASSISTANT

## 2022-01-18 PROCEDURE — 3074F SYST BP LT 130 MM HG: CPT | Mod: CPTII,,, | Performed by: PHYSICIAN ASSISTANT

## 2022-01-18 PROCEDURE — 99214 OFFICE O/P EST MOD 30 MIN: CPT | Mod: S$PBB,,, | Performed by: PHYSICIAN ASSISTANT

## 2022-01-18 PROCEDURE — 99214 PR OFFICE/OUTPT VISIT, EST, LEVL IV, 30-39 MIN: ICD-10-PCS | Mod: S$PBB,,, | Performed by: PHYSICIAN ASSISTANT

## 2022-01-18 PROCEDURE — 80305 DRUG TEST PRSMV DIR OPT OBS: CPT | Mod: PBBFAC | Performed by: PHYSICIAN ASSISTANT

## 2022-01-18 RX ORDER — HYDROCODONE BITARTRATE AND ACETAMINOPHEN 10; 325 MG/1; MG/1
1 TABLET ORAL EVERY 12 HOURS
Qty: 60 TABLET | Refills: 0 | Status: SHIPPED | OUTPATIENT
Start: 2022-02-14 | End: 2022-01-18

## 2022-01-18 RX ORDER — HYDROCODONE BITARTRATE AND ACETAMINOPHEN 10; 325 MG/1; MG/1
1 TABLET ORAL EVERY 12 HOURS
Qty: 60 TABLET | Refills: 0 | Status: SHIPPED | OUTPATIENT
Start: 2022-02-14 | End: 2022-02-11 | Stop reason: SDUPTHER

## 2022-01-18 RX ORDER — HYDROCODONE BITARTRATE AND ACETAMINOPHEN 10; 325 MG/1; MG/1
1 TABLET ORAL EVERY 12 HOURS
Qty: 60 TABLET | Refills: 0 | Status: CANCELLED | OUTPATIENT
Start: 2022-01-18 | End: 2022-02-17

## 2022-01-18 NOTE — PATIENT INSTRUCTIONS
Patient Education       Chest Pain, Adult ED   General Information   You came to the Emergency Department (ED) for chest pain. The doctor feels that the risk of a serious cause for your chest pain is low. Many things can cause chest pain. Some are serious things like heart disease or lung disease. Less serious things like stomach problems can also cause chest pain.  The doctors may not be able to find all serious causes of chest pain the first time they see you. It is important that you follow up with your doctor. You may be waiting on some test results. The staff will notify you if there are concerning results.  What care is needed at home?   · Call your regular doctor to let them know you were in the ED. Make a follow-up appointment if you were told to.  · Follow your regular doctors orders to keep your blood pressure, cholesterol, and high blood sugar (diabetes) under control.  · If you smoke, try to quit. Your doctor or nurse can help.  · Keep a healthy weight. If you are too heavy, lose weight.  · Eat a healthy diet, as discussed with your doctor or nurse.  When do I need to get emergency help?   · Call for an ambulance if:   ? You have signs of a heart attack, which may include:  § Severe chest pain, pressure, or discomfort with:  § Breathing trouble; sweating; upset stomach; or cold, clammy skin.  § Pain in your arms, back, or jaw.  § Worse pain with activity like walking up stairs.  § Fast or irregular heartbeat.  § Feeling dizzy, faint, or weak.  When do I need to call the doctor?   · You have a fever of 100.4°F (38°C) or higher or chills.  · You cough up yellow or green mucus.  · You are more tired than normal or more trouble breathing with activity.  · You have new or worsening symptoms.  Last Reviewed Date   2020-06-28  Consumer Information Use and Disclaimer   This information is not specific medical advice and does not replace information you receive from your health care provider. This is only a  brief summary of general information. It does NOT include all information about conditions, illnesses, injuries, tests, procedures, treatments, therapies, discharge instructions or life-style choices that may apply to you. You must talk with your health care provider for complete information about your health and treatment options. This information should not be used to decide whether or not to accept your health care providers advice, instructions or recommendations. Only your health care provider has the knowledge and training to provide advice that is right for you.  Copyright   Copyright © 2021 [employer] and its affiliates and/or licensors. All rights reserved.

## 2022-02-09 LAB
LEFT EYE DM RETINOPATHY: NORMAL
RIGHT EYE DM RETINOPATHY: NORMAL

## 2022-02-11 NOTE — PROGRESS NOTES
Disclaimer:  This note has been generated using voice recognition software.  There may be type of graft focal areas that have been missed during a proof reading      Subjective:      Patient ID: Kennedy West is a 41 y.o. male.    Chief Complaint: Low-back Pain and Foot Pain      Pain  This is a chronic problem. The current episode started more than 1 year ago. The problem occurs daily. The problem has been waxing and waning. Associated symptoms include arthralgias. Pertinent negatives include no change in bowel habit, chest pain, chills, coughing, fatigue, rash, sore throat, vertigo or vomiting.     Review of Systems   Constitutional: Negative for activity change, appetite change, chills, fatigue and unexpected weight change.   HENT: Negative for drooling, ear discharge, ear pain, facial swelling, nosebleeds, sore throat, trouble swallowing, voice change and goiter.    Eyes: Negative for photophobia, pain, discharge, redness and visual disturbance.   Respiratory: Negative for apnea, cough, choking, chest tightness, shortness of breath, wheezing and stridor.    Cardiovascular: Negative for chest pain, palpitations and leg swelling.   Gastrointestinal: Negative for abdominal distention, change in bowel habit, diarrhea, rectal pain, vomiting, fecal incontinence and change in bowel habit.   Endocrine: Negative for cold intolerance, heat intolerance, polydipsia, polyphagia and polyuria.   Genitourinary: Negative for bladder incontinence, dysuria, flank pain and frequency.   Musculoskeletal: Positive for arthralgias, back pain, leg pain and neck stiffness.   Integumentary:  Negative for color change, pallor and rash.   Neurological: Negative for dizziness, vertigo, seizures, syncope, facial asymmetry, speech difficulty, light-headedness, disturbances in coordination, memory loss and coordination difficulties.   Hematological: Negative for adenopathy. Does not bruise/bleed easily.   Psychiatric/Behavioral:  Negative for agitation, behavioral problems, confusion, decreased concentration, dysphoric mood, hallucinations, self-injury and suicidal ideas. The patient is not nervous/anxious and is not hyperactive.             Objective:  Vitals:    02/16/22 0816   BP: 116/82   Pulse: (!) 114   Resp: 19   Weight: (!) 164.7 kg (363 lb)   Height: 6' (1.829 m)   PainSc:   7         Physical Exam  Vitals and nursing note reviewed. Exam conducted with a chaperone present.   Constitutional:       General: He is awake.      Appearance: Normal appearance. He is not toxic-appearing.   HENT:      Head: Normocephalic and atraumatic.      Nose: Nose normal.      Mouth/Throat:      Mouth: Mucous membranes are moist.      Pharynx: Oropharynx is clear.   Eyes:      Conjunctiva/sclera: Conjunctivae normal.      Pupils: Pupils are equal, round, and reactive to light.   Cardiovascular:      Rate and Rhythm: Normal rate.   Pulmonary:      Effort: Pulmonary effort is normal. No respiratory distress.   Abdominal:      Palpations: Abdomen is soft.      Tenderness: There is no guarding.   Musculoskeletal:         General: Normal range of motion.      Cervical back: Normal range of motion and neck supple. No rigidity.   Skin:     General: Skin is warm and dry.      Coloration: Skin is not jaundiced or pale.   Neurological:      General: No focal deficit present.      Mental Status: He is alert and oriented to person, place, and time. Mental status is at baseline.      Cranial Nerves: Cranial nerves are intact. No cranial nerve deficit (II-XII).   Psychiatric:         Mood and Affect: Mood normal.         Behavior: Behavior normal. Behavior is cooperative.         Thought Content: Thought content normal.           No orders of the defined types were placed in this encounter.       X-Ray Foot 2 View Right  Narrative: EXAMINATION:  XR FOOT 2 VIEW RIGHT    CLINICAL HISTORY:  rule out osteomyelitis, painful near distal 5th MTP dorsal aspect.; Pain in  right foot    FINDINGS:  Visualization limited by lack of the 3rd view.  No acute fracture or aggressive destructive bony changes are seen.  Impression: No acute pathology seen    Electronically signed by: Elsie Boone  Date:    01/05/2022  Time:    17:03       Office Visit on 01/18/2022   Component Date Value Ref Range Status    POC Amphetamines 01/18/2022 Negative  Negative, Inconclusive Final    POC Barbiturates 01/18/2022 Negative  Negative, Inconclusive Final    POC Benzodiazepines 01/18/2022 Negative  Negative, Inconclusive Final    POC Cocaine 01/18/2022 Negative  Negative, Inconclusive Final    POC THC 01/18/2022 Negative  Negative, Inconclusive Final    POC Methadone 01/18/2022 Negative  Negative, Inconclusive Final    POC Methamphetamine 01/18/2022 Negative  Negative, Inconclusive Final    POC Opiates 01/18/2022 Presumptive Positive* Negative, Inconclusive Final    POC Oxycodone 01/18/2022 Negative  Negative, Inconclusive Final    POC Phencyclidine 01/18/2022 Negative  Negative, Inconclusive Final    POC Methylenedioxymethamphetamine * 01/18/2022 Negative  Negative, Inconclusive Final    POC Tricyclic Antidepressants 01/18/2022 Negative  Negative, Inconclusive Final    POC Buprenorphine 01/18/2022 Negative   Final     Acceptable 01/18/2022 Yes   Final    POC Temperature (Urine) 01/18/2022 94   Final   Office Visit on 01/05/2022   Component Date Value Ref Range Status    Hemoglobin A1C 01/05/2022 10.6* 4.5 - 6.6 % Final    POC Glucose 01/05/2022 415* 70 - 110 MG/DL Final   Admission on 12/23/2021, Discharged on 12/23/2021   Component Date Value Ref Range Status    POC Glucose 12/23/2021 157* 70 - 110 MG/DL Final    POC Glucose 12/23/2021 157* 70 - 105 mg/dL Final   Office Visit on 12/17/2021   Component Date Value Ref Range Status    Creatinine, Urine 12/17/2021 32* 39 - 259 mg/dL Final    Microalbumin 12/17/2021 2.6  0.0 - 2.8 mg/dL Final    Microalbumin/Creatinine Ratio  12/17/2021 81.3* 0.0 - 30.0 mg/g Final   Office Visit on 10/06/2021   Component Date Value Ref Range Status    POC Amphetamines 10/06/2021 Negative  Negative, Inconclusive Final    POC Barbiturates 10/06/2021 Negative  Negative, Inconclusive Final    POC Benzodiazepines 10/06/2021 Negative  Negative, Inconclusive Final    POC Cocaine 10/06/2021 Negative  Negative, Inconclusive Final    POC THC 10/06/2021 Negative  Negative, Inconclusive Final    POC Methadone 10/06/2021 Negative  Negative, Inconclusive Final    POC Methamphetamine 10/06/2021 Negative  Negative, Inconclusive Final    POC Opiates 10/06/2021 Presumptive Positive* Negative, Inconclusive Final    POC Oxycodone 10/06/2021 Negative  Negative, Inconclusive Final    POC Phencyclidine 10/06/2021 Negative  Negative, Inconclusive Final    POC Methylenedioxymethamphetamine * 10/06/2021 Negative  Negative, Inconclusive Final    POC Tricyclic Antidepressants 10/06/2021 Negative  Negative, Inconclusive Final    POC Buprenorphine 10/06/2021 Negative   Final     Acceptable 10/06/2021 Yes   Final    POC Temperature (Urine) 10/06/2021 94   Final   Office Visit on 09/13/2021   Component Date Value Ref Range Status    SARS Coronavirus 2 Antigen 09/13/2021 Negative  Negative Final    Rapid Influenza A Ag 09/13/2021 Negative  Negative Final    Rapid Influenza B Ag 09/13/2021 Negative  Negative Final     Acceptable 09/13/2021 Yes   Final   Admission on 08/24/2021, Discharged on 08/25/2021   Component Date Value Ref Range Status    POC Glucose 08/24/2021 163* 70 - 105 mg/dL Final    Troponin-I 08/24/2021 <0.017  <=0.056 ng/mL Final    Sodium 08/24/2021 140  136 - 145 mmol/L Final    Potassium 08/24/2021 3.7  3.5 - 5.1 mmol/L Final    Chloride 08/24/2021 105  98 - 107 mmol/L Final    CO2 08/24/2021 27  21 - 32 mmol/L Final    Anion Gap 08/24/2021 12  7 - 16 mmol/L Final    Glucose 08/24/2021 137* 74 - 106 mg/dL Final     BUN 08/24/2021 14  7 - 18 mg/dL Final    Creatinine 08/24/2021 0.65* 0.70 - 1.30 mg/dL Final    BUN/Creatinine Ratio 08/24/2021 22* 6 - 20 Final    Calcium 08/24/2021 8.9  8.5 - 10.1 mg/dL Final    Total Protein 08/24/2021 7.0  6.4 - 8.2 g/dL Final    Albumin 08/24/2021 3.3* 3.5 - 5.0 g/dL Final    Globulin 08/24/2021 3.7  2.0 - 4.0 g/dL Final    A/G Ratio 08/24/2021 0.9   Final    Bilirubin, Total 08/24/2021 0.6  >0.0 - 1.2 mg/dL Final    Alk Phos 08/24/2021 155* 45 - 115 U/L Final    ALT 08/24/2021 35  16 - 61 U/L Final    AST 08/24/2021 15  15 - 37 U/L Final    eGFR 08/24/2021 145  >=60 mL/min/1.73m² Final    PT 08/24/2021 13.4  11.7 - 14.7 seconds Final    INR 08/24/2021 1.02  0.90 - 1.10 Final    PTT 08/24/2021 157.7* 25.2 - 37.3 seconds Final    WBC 08/24/2021 14.93* 4.50 - 11.00 K/uL Final    RBC 08/24/2021 5.55  4.60 - 6.20 M/uL Final    Hemoglobin 08/24/2021 15.7  13.5 - 18.0 g/dL Final    Hematocrit 08/24/2021 46.1  40.0 - 54.0 % Final    MCV 08/24/2021 83.1  80.0 - 96.0 fL Final    MCH 08/24/2021 28.3  27.0 - 31.0 pg Final    MCHC 08/24/2021 34.1  32.0 - 36.0 g/dL Final    RDW 08/24/2021 12.6  11.5 - 14.5 % Final    Platelet Count 08/24/2021 253  150 - 400 K/uL Final    MPV 08/24/2021 9.5  9.4 - 12.4 fL Final    Neutrophils % 08/24/2021 63.5  53.0 - 65.0 % Final    Lymphocytes % 08/24/2021 30.4  27.0 - 41.0 % Final    Monocytes % 08/24/2021 4.0  2.0 - 6.0 % Final    Eosinophils % 08/24/2021 0.8* 1.0 - 4.0 % Final    Basophils % 08/24/2021 0.7  0.0 - 1.0 % Final    Immature Granulocytes % 08/24/2021 0.6* 0.0 - 0.4 % Final    nRBC, Auto 08/24/2021 0.0  <=0.0 % Final    Neutrophils, Abs 08/24/2021 9.48* 1.80 - 7.70 K/uL Final    Lymphocytes, Absolute 08/24/2021 4.54  1.00 - 4.80 K/uL Final    Monocytes, Absolute 08/24/2021 0.60  0.00 - 0.80 K/uL Final    Eosinophils, Absolute 08/24/2021 0.12  0.00 - 0.50 K/uL Final    Basophils, Absolute 08/24/2021 0.10  0.00 - 0.20  K/uL Final    Immature Granulocytes, Absolute 08/24/2021 0.09* 0.00 - 0.04 K/uL Final    nRBC, Absolute 08/24/2021 0.00  <=0.00 x10e3/uL Final    Diff Type 08/24/2021 Auto   Final    Sodium 08/25/2021 141  136 - 145 mmol/L Final    Potassium 08/25/2021 3.7  3.5 - 5.1 mmol/L Final    Chloride 08/25/2021 106  98 - 107 mmol/L Final    CO2 08/25/2021 27  21 - 32 mmol/L Final    Anion Gap 08/25/2021 12  7 - 16 mmol/L Final    Glucose 08/25/2021 155* 74 - 106 mg/dL Final    BUN 08/25/2021 13  7 - 18 mg/dL Final    Creatinine 08/25/2021 0.54* 0.70 - 1.30 mg/dL Final    BUN/Creatinine Ratio 08/25/2021 24* 6 - 20 Final    Calcium 08/25/2021 9.2  8.5 - 10.1 mg/dL Final    eGFR 08/25/2021 180  >=60 mL/min/1.73m² Final    WBC 08/25/2021 8.08  4.50 - 11.00 K/uL Final    RBC 08/25/2021 5.14  4.60 - 6.20 M/uL Final    Hemoglobin 08/25/2021 14.6  13.5 - 18.0 g/dL Final    Hematocrit 08/25/2021 44.4  40.0 - 54.0 % Final    MCV 08/25/2021 86.4  80.0 - 96.0 fL Final    MCH 08/25/2021 28.4  27.0 - 31.0 pg Final    MCHC 08/25/2021 32.9  32.0 - 36.0 g/dL Final    RDW 08/25/2021 12.4  11.5 - 14.5 % Final    Platelet Count 08/25/2021 221  150 - 400 K/uL Final    MPV 08/25/2021 9.6  9.4 - 12.4 fL Final    Neutrophils % 08/25/2021 56.7  53.0 - 65.0 % Final    Lymphocytes % 08/25/2021 36.9  27.0 - 41.0 % Final    Monocytes % 08/25/2021 4.6  2.0 - 6.0 % Final    Eosinophils % 08/25/2021 1.1  1.0 - 4.0 % Final    Basophils % 08/25/2021 0.5  0.0 - 1.0 % Final    Immature Granulocytes % 08/25/2021 0.2  0.0 - 0.4 % Final    nRBC, Auto 08/25/2021 0.0  <=0.0 % Final    Neutrophils, Abs 08/25/2021 4.58  1.80 - 7.70 K/uL Final    Lymphocytes, Absolute 08/25/2021 2.98  1.00 - 4.80 K/uL Final    Monocytes, Absolute 08/25/2021 0.37  0.00 - 0.80 K/uL Final    Eosinophils, Absolute 08/25/2021 0.09  0.00 - 0.50 K/uL Final    Basophils, Absolute 08/25/2021 0.04  0.00 - 0.20 K/uL Final    Immature Granulocytes, Absolute  08/25/2021 0.02  0.00 - 0.04 K/uL Final    nRBC, Absolute 08/25/2021 0.00  <=0.00 x10e3/uL Final    Diff Type 08/25/2021 Auto   Final    POC Glucose 08/25/2021 137* 70 - 105 mg/dL Final    POC Glucose 08/25/2021 265* 70 - 105 mg/dL Final           Assessment:      1. Lumbosacral spondylosis without myelopathy    2. Diabetic peripheral neuropathy    3. Complex regional pain syndrome type 1 of right upper extremity    4. Diabetic ulcer of right midfoot associated with diabetes mellitus due to underlying condition, limited to breakdown of skin            A's of Opioid Risk Assessment  Activity:Patient can perform ADL.   Analgesia:Patients pain is partially controlled by current medication. Patient has tried OTC medications such as Tylenol and Ibuprofen with out relief.   Adverse Effects: Patient denies constipation or sedation.  Aberrant Behavior:  reviewed with no aberrant drug seeking/taking behavior.  Overdose reversal drug naloxone discussed    Drug screen reviewed      Requested Prescriptions     Signed Prescriptions Disp Refills    HYDROcodone-acetaminophen (NORCO)  mg per tablet 60 tablet 0     Sig: Take 1 tablet by mouth every 12 (twelve) hours.    HYDROcodone-acetaminophen (NORCO)  mg per tablet 60 tablet 0     Sig: Take 1 tablet by mouth every 12 (twelve) hours.         Plan:    He follows diabetic educator    Had lumbar L4 through S1 bilateral medial branch block # 2, he states he had 80% relief December 2021 pain is slowly returning    Follows Neurology CRPS right hand arm    Has had further surgery on his right foot for diabetic ulcer , continues antibiotics    He is considering lumbar RFTC    He understands cannot have lumbar procedure while on antibiotics    Continue following wound management Bhanu    Continue current medication    Continue exercise program as directed    Follow-up 2 months    Dr. Johnson December 2022    Bring original prescription medication bottles/container/box  with labels to each visit

## 2022-02-16 ENCOUNTER — OFFICE VISIT (OUTPATIENT)
Dept: NEUROLOGY | Facility: CLINIC | Age: 42
End: 2022-02-16
Payer: COMMERCIAL

## 2022-02-16 ENCOUNTER — OFFICE VISIT (OUTPATIENT)
Dept: PAIN MEDICINE | Facility: CLINIC | Age: 42
End: 2022-02-16
Payer: COMMERCIAL

## 2022-02-16 VITALS
HEART RATE: 107 BPM | WEIGHT: 315 LBS | BODY MASS INDEX: 42.66 KG/M2 | HEIGHT: 72 IN | DIASTOLIC BLOOD PRESSURE: 74 MMHG | SYSTOLIC BLOOD PRESSURE: 128 MMHG | OXYGEN SATURATION: 98 %

## 2022-02-16 VITALS
SYSTOLIC BLOOD PRESSURE: 116 MMHG | WEIGHT: 315 LBS | RESPIRATION RATE: 19 BRPM | HEART RATE: 114 BPM | HEIGHT: 72 IN | DIASTOLIC BLOOD PRESSURE: 82 MMHG | BODY MASS INDEX: 42.66 KG/M2

## 2022-02-16 DIAGNOSIS — L97.411 DIABETIC ULCER OF RIGHT MIDFOOT ASSOCIATED WITH DIABETES MELLITUS DUE TO UNDERLYING CONDITION, LIMITED TO BREAKDOWN OF SKIN: Chronic | ICD-10-CM

## 2022-02-16 DIAGNOSIS — E08.621 DIABETIC ULCER OF RIGHT MIDFOOT ASSOCIATED WITH DIABETES MELLITUS DUE TO UNDERLYING CONDITION, LIMITED TO BREAKDOWN OF SKIN: Chronic | ICD-10-CM

## 2022-02-16 DIAGNOSIS — G90.511 COMPLEX REGIONAL PAIN SYNDROME TYPE 1 OF RIGHT UPPER EXTREMITY: Chronic | ICD-10-CM

## 2022-02-16 DIAGNOSIS — I73.9 CLAUDICATION: ICD-10-CM

## 2022-02-16 DIAGNOSIS — F32.A DEPRESSIVE DISORDER: ICD-10-CM

## 2022-02-16 DIAGNOSIS — E66.01 MORBID OBESITY: Chronic | ICD-10-CM

## 2022-02-16 DIAGNOSIS — E11.42 DIABETIC POLYNEUROPATHY ASSOCIATED WITH TYPE 2 DIABETES MELLITUS: Primary | Chronic | ICD-10-CM

## 2022-02-16 DIAGNOSIS — E11.42 DIABETIC PERIPHERAL NEUROPATHY: ICD-10-CM

## 2022-02-16 DIAGNOSIS — G47.33 OBSTRUCTIVE SLEEP APNEA SYNDROME: ICD-10-CM

## 2022-02-16 DIAGNOSIS — M47.817 LUMBOSACRAL SPONDYLOSIS WITHOUT MYELOPATHY: Primary | Chronic | ICD-10-CM

## 2022-02-16 PROCEDURE — 3074F PR MOST RECENT SYSTOLIC BLOOD PRESSURE < 130 MM HG: ICD-10-PCS | Mod: CPTII,,, | Performed by: NURSE PRACTITIONER

## 2022-02-16 PROCEDURE — 3074F PR MOST RECENT SYSTOLIC BLOOD PRESSURE < 130 MM HG: ICD-10-PCS | Mod: CPTII,,, | Performed by: PHYSICIAN ASSISTANT

## 2022-02-16 PROCEDURE — 99213 OFFICE O/P EST LOW 20 MIN: CPT | Mod: PBBFAC | Performed by: PHYSICIAN ASSISTANT

## 2022-02-16 PROCEDURE — 3008F PR BODY MASS INDEX (BMI) DOCUMENTED: ICD-10-PCS | Mod: CPTII,,, | Performed by: PHYSICIAN ASSISTANT

## 2022-02-16 PROCEDURE — 3079F DIAST BP 80-89 MM HG: CPT | Mod: CPTII,,, | Performed by: PHYSICIAN ASSISTANT

## 2022-02-16 PROCEDURE — 1160F PR REVIEW ALL MEDS BY PRESCRIBER/CLIN PHARMACIST DOCUMENTED: ICD-10-PCS | Mod: CPTII,,, | Performed by: NURSE PRACTITIONER

## 2022-02-16 PROCEDURE — 3079F PR MOST RECENT DIASTOLIC BLOOD PRESSURE 80-89 MM HG: ICD-10-PCS | Mod: CPTII,,, | Performed by: PHYSICIAN ASSISTANT

## 2022-02-16 PROCEDURE — 99214 OFFICE O/P EST MOD 30 MIN: CPT | Mod: PBBFAC | Performed by: NURSE PRACTITIONER

## 2022-02-16 PROCEDURE — 99214 PR OFFICE/OUTPT VISIT, EST, LEVL IV, 30-39 MIN: ICD-10-PCS | Mod: S$PBB,,, | Performed by: NURSE PRACTITIONER

## 2022-02-16 PROCEDURE — 3008F PR BODY MASS INDEX (BMI) DOCUMENTED: ICD-10-PCS | Mod: CPTII,,, | Performed by: NURSE PRACTITIONER

## 2022-02-16 PROCEDURE — 99214 PR OFFICE/OUTPT VISIT, EST, LEVL IV, 30-39 MIN: ICD-10-PCS | Mod: S$PBB,,, | Performed by: PHYSICIAN ASSISTANT

## 2022-02-16 PROCEDURE — 1160F RVW MEDS BY RX/DR IN RCRD: CPT | Mod: CPTII,,, | Performed by: NURSE PRACTITIONER

## 2022-02-16 PROCEDURE — 3074F SYST BP LT 130 MM HG: CPT | Mod: CPTII,,, | Performed by: PHYSICIAN ASSISTANT

## 2022-02-16 PROCEDURE — 3078F PR MOST RECENT DIASTOLIC BLOOD PRESSURE < 80 MM HG: ICD-10-PCS | Mod: CPTII,,, | Performed by: NURSE PRACTITIONER

## 2022-02-16 PROCEDURE — 99214 OFFICE O/P EST MOD 30 MIN: CPT | Mod: S$PBB,,, | Performed by: NURSE PRACTITIONER

## 2022-02-16 PROCEDURE — 3008F BODY MASS INDEX DOCD: CPT | Mod: CPTII,,, | Performed by: PHYSICIAN ASSISTANT

## 2022-02-16 PROCEDURE — 1159F MED LIST DOCD IN RCRD: CPT | Mod: CPTII,,, | Performed by: PHYSICIAN ASSISTANT

## 2022-02-16 PROCEDURE — 3008F BODY MASS INDEX DOCD: CPT | Mod: CPTII,,, | Performed by: NURSE PRACTITIONER

## 2022-02-16 PROCEDURE — 1159F MED LIST DOCD IN RCRD: CPT | Mod: CPTII,,, | Performed by: NURSE PRACTITIONER

## 2022-02-16 PROCEDURE — 1159F PR MEDICATION LIST DOCUMENTED IN MEDICAL RECORD: ICD-10-PCS | Mod: CPTII,,, | Performed by: NURSE PRACTITIONER

## 2022-02-16 PROCEDURE — 3074F SYST BP LT 130 MM HG: CPT | Mod: CPTII,,, | Performed by: NURSE PRACTITIONER

## 2022-02-16 PROCEDURE — 3078F DIAST BP <80 MM HG: CPT | Mod: CPTII,,, | Performed by: NURSE PRACTITIONER

## 2022-02-16 PROCEDURE — 99214 OFFICE O/P EST MOD 30 MIN: CPT | Mod: S$PBB,,, | Performed by: PHYSICIAN ASSISTANT

## 2022-02-16 PROCEDURE — 1159F PR MEDICATION LIST DOCUMENTED IN MEDICAL RECORD: ICD-10-PCS | Mod: CPTII,,, | Performed by: PHYSICIAN ASSISTANT

## 2022-02-16 RX ORDER — HYDROCODONE BITARTRATE AND ACETAMINOPHEN 10; 325 MG/1; MG/1
1 TABLET ORAL EVERY 12 HOURS
Qty: 60 TABLET | Refills: 0 | Status: SHIPPED | OUTPATIENT
Start: 2022-03-18 | End: 2022-04-13 | Stop reason: SDUPTHER

## 2022-02-16 RX ORDER — HYDROCODONE BITARTRATE AND ACETAMINOPHEN 10; 325 MG/1; MG/1
1 TABLET ORAL EVERY 12 HOURS
Qty: 60 TABLET | Refills: 0 | Status: SHIPPED | OUTPATIENT
Start: 2022-02-16 | End: 2022-03-18

## 2022-02-16 RX ORDER — CIPROFLOXACIN 500 MG/1
TABLET ORAL
COMMUNITY
Start: 2022-02-15 | End: 2022-06-22

## 2022-02-16 RX ORDER — AMITRIPTYLINE HYDROCHLORIDE 25 MG/1
TABLET, FILM COATED ORAL
Qty: 30 TABLET | Refills: 6 | Status: SHIPPED | OUTPATIENT
Start: 2022-02-16 | End: 2022-05-18 | Stop reason: DRUGHIGH

## 2022-02-16 RX ORDER — GABAPENTIN 100 MG/1
CAPSULE ORAL
Qty: 60 CAPSULE | Refills: 3 | Status: SHIPPED | OUTPATIENT
Start: 2022-02-16 | End: 2022-05-18 | Stop reason: SDUPTHER

## 2022-02-16 NOTE — PATIENT INSTRUCTIONS
Patient Education       Complex Regional Pain Syndrome   The Basics   Written by the doctors and editors at [facility]   What is complex regional pain syndrome? -- Complex regional pain syndrome (CRPS) is a condition that causes pain, swelling, and other symptoms. It usually happens in just one part of the body, such as an arm or leg.  Both adults and children can get CRPS. In adults, it often starts after a bone fracture, an injury (such as a sprain), surgery, or a stroke. Doctors are not sure how or why it starts. People who have CRPS have worse pain than doctors would expect from the injury, surgery, or other medical problem. In some cases, especially in children, CRPS can start without any injury or surgery happening first.  What are the symptoms of complex regional pain syndrome? -- In adults, CRPS usually affects the arms. In children, CRPS most often affects the legs.  Symptoms of CRPS can include:  · Pain - The pain can be burning, tingling, throbbing, or aching. It can also be severe.  · Being sensitive to touch or cold, or not feeling touch or pain normally.  · Swelling in the body part with CRPS  · Trouble moving the body part with CRPS due to pain, swelling, or stiffness  · Tremor (shaking) or muscle spasm in the body part with CRPS  · Skin changes - These might include changes in skin thickness, temperature, or color, or sweating more or less than usual.  · Changes in the way the hair and nails grow  In most cases, the first symptoms of CRPS include pain, redness, and swelling of the affected body part. Usually the part with CRPS feels warm at first, but might later change from warm to cold. In other people, it feels cold at first.  Is there a test for complex regional pain syndrome? -- No. There is no one test that is used to find CRPS.  A doctor will do an exam and ask questions. They can usually tell if a person has CRPS from the symptoms, medical history, and exam.  If a doctor is not sure if a person  has CRPS, they might order imaging tests such as a bone scan, X-ray, or MRI. These tests create pictures of the inside of the body. They can show changes to the bones, joints, or skin that might be caused by CRPS.  Doctors can also do tests that measure skin temperature, sweating, and nerve sensitivity. These tests are not usually needed. The doctor might do them if they need extra information.  For children with CRPS, doctors might do blood tests and imaging tests to make sure a different condition is not causing the symptoms. Once they knows a child has CRPS, other tests are not usually needed.  How is complex regional pain syndrome treated? -- Treatment for CRPS is different for each person. Doctors can try different treatments to see what works best.  For adults, treatments for CRPS include:  · Learning about CRPS - For example, it is important to know that even though CRPS hurts, it does not damage the body part. Joining a support group can help a person with CRPS deal with symptoms.  · Talking with a counselor - This can help reduce stress. Stress at work or home can be part of CRPS or make it worse.  · Physical therapy to learn exercises and stretches, and keep the body part with CRPS working.  · Taking medicines to relieve pain - These can be prescription or over-the-counter medicines, depending on what the doctor or nurse thinks might work best.  If these treatments do not help, people with severe CRPS should see a doctor who specializes in treating pain. These specialists might try other treatments, such as:  · Injections (shots) of numbing or pain-relieving medicines  · Pain-relieving medicine given in the spine  · Devices to help stop nerve signals of pain  The main treatments for children with CRPS include:  · Physical and occupational therapy - This involves learning exercises, movements, and ways of doing everyday tasks.  · Counseling to reduce stress and anxiety - Working with a counselor is also  important to treat mental or emotional problems that could be part of CRPS.  Can complex regional pain syndrome be prevented? -- Sometimes. Taking vitamin C after breaking a bone or having surgery might help prevent CRPS.  In adults who have a bone fracture, injury, or stroke, keeping a body part moving can help prevent CRPS. For example, if someone with a stroke is partly paralyzed, a therapist can gently move the paralyzed parts of their body. This can be done even if the person cannot get out of bed.  All topics are updated as new evidence becomes available and our peer review process is complete.  This topic retrieved from [employer] on: Sep 21, 2021.  Topic 81308 Version 10.0  Release: 29.4.2 - C29.263  © 2021 UpToDate, Inc. and/or its affiliates. All rights reserved.  Consumer Information Use and Disclaimer   This information is not specific medical advice and does not replace information you receive from your health care provider. This is only a brief summary of general information. It does NOT include all information about conditions, illnesses, injuries, tests, procedures, treatments, therapies, discharge instructions or life-style choices that may apply to you. You must talk with your health care provider for complete information about your health and treatment options. This information should not be used to decide whether or not to accept your health care provider's advice, instructions or recommendations. Only your health care provider has the knowledge and training to provide advice that is right for you. The use of this information is governed by the [employer] End User License Agreement, available at https://www.[employer].Myagi/en/solutions/[employer]/about/zurod.The use of [employer] content is governed by the [employer] Terms of Use. ©2021 Up[employer]Date, Inc. All rights reserved.  Copyright   © 2021 Up[employer]Date, Inc. and/or its affiliates. All rights reserved.

## 2022-02-16 NOTE — PATIENT INSTRUCTIONS
Patient Education       Diabetic Neuropathy Discharge Instructions   About this topic   Diabetes is an illness that makes your blood sugar too high. If your blood sugar is not controlled, you may have problems with how well your nerves work. High blood sugars can damage the small blood vessels that carry food and oxygen to these nerves. Nerve damage in patients with diabetes is called diabetic neuropathy.  Your nerves carry information to the brain. This gives your body information about sensation, movement, and your environment. Damage to these nerves can cause problems with your legs, feet, arms, and hands. Your nerves in your hands and feet carry information to the brain about pain and the sense of touch, such as something being too hot or too cold. Specific nerves in the face or eyes may also be affected.  There is no cure for this illness. Diabetic neuropathy can be prevented by managing your blood sugar levels. Doctors treat this illness by managing the signs.  What care is needed at home?   · Ask your doctor what you need to do when you go home. Make sure you ask questions if you do not understand what the doctor says. This way you will know what you need to do.  · Take care of your blood sugar.  ? Check your blood sugar. Keep a record of your results.  ? Note how you feel when your blood sugar is high versus when your blood sugar is within normal limits.  ? Control your blood sugar by following your diet. Work with a nurse or dietician if needed.  ? Take your antidiabetic drugs each day as ordered.  · Take care of your skin.  ? Bathe each day. Pat yourself dry. Dry skin between toes and any skin folds.  ? Check your skin daily for any signs of sores, wounds, or infection.  ? Use lotion or moisturizer to avoid skin dryness.  · Take care of your feet.  ? Keep feet moisturized. Do not put lotion in between toes.  ? Do not walk bare foot. Do not wear shoes without socks.  ? Wear shoes that fit the right way.  Wear socks made of cotton.  ? Regular walking helps blood flow. You can wear a support stocking to treat blood pressure problems. Make sure the stocking fits properly to avoid pressure on one area of the foot or leg.  ? Check your feet using a mirror if you cannot see the bottoms.  · Use lubricating creams to prevent vaginal dryness.  · Your doctor may suggest you use drugs or devices to help erections.     What follow-up care is needed?   Your doctor may ask you to make visits to the office to check on your progress. Be sure to keep these visits.  What drugs may be needed?   The doctor may order drugs to:  · Control blood sugar  · Help with pain  · Fight an infection  · Help lower blood pressure and cholesterol levels  Will physical activity be limited?   · Your activity may be limited due to signs from diabetic neuropathy.  · You may need a cane or walker to help with balance.  · Ask your doctor when you can return to your normal activities.  · Ask your doctor about the right amount and type of exercise for you.  What changes to diet are needed?   · Limit your intake of beer, wine, and mixed drinks (alcohol).  · Stop smoking.  What problems could happen?   · Damage to the feet because of loss of feeling  · Muscle injury  · Skin and soft tissue injury or infection that may lead to amputation  When do I need to call the doctor?   · Signs of infection. These include a fever of 100.4°F (38°C) or higher, chills, or a wound that will not heal.  · New sores or signs of wound infection. These include swelling, redness, warmth around the wound; too much pain when touched; yellowish, greenish, or bloody discharge; foul smell coming from the wound.  · Numbness on the foot or legs  · Blood sugar is lower or higher than normal  · Chest pain  · Loose or hard stools  · Upset stomach and throwing up  · Sexual dysfunction  · You are not feeling better in 2 to 3 days or you are feeling worse  Teach Back: Helping You Understand    The Teach Back Method helps you understand the information we are giving you. After you talk with the staff, tell them in your own words what you learned. This helps to make sure the staff has described each thing clearly. It also helps to explain things that may have been confusing. Before going home, make sure you can do these:  · I can tell you about my condition.  · I can tell you how I will take care of my feet and skin.  · I can tell you what I will do if I have a new sore or signs of a wound infection.  Where can I learn more?   American Academy of Family Physicians  https://[email or web address]/condition/diabetic-neuropathy/   National [place] of Diabetes and Digestive and Kidney Diseases  https://www.niddk.nih.gov/health-information/diabetes/overview/preventing-problems/nerve-damage-diabetic-neuropathies/what-is-diabetic-neuropathy   Last Reviewed Date   2020-10-30  Consumer Information Use and Disclaimer   This information is not specific medical advice and does not replace information you receive from your health care provider. This is only a brief summary of general information. It does NOT include all information about conditions, illnesses, injuries, tests, procedures, treatments, therapies, discharge instructions or life-style choices that may apply to you. You must talk with your health care provider for complete information about your health and treatment options. This information should not be used to decide whether or not to accept your health care providers advice, instructions or recommendations. Only your health care provider has the knowledge and training to provide advice that is right for you.  Copyright   Copyright © 2021 UpToDate, Inc. and its affiliates and/or licensors. All rights reserved.  Patient Education       Diet and Health   The Basics   Written by the doctors and editors at [employer]   Why is it important to eat a healthy diet? -- It's important to eat a healthy diet because  "eating the right foods can keep you healthy now and later on in life.  Which foods are especially healthy? -- Foods that are especially healthy include:  · Fruits and vegetables - Eating a diet with lots of fruits and vegetables can help prevent heart disease and strokes. It might also help prevent certain types of cancers. Try to eat fruits and vegetables at each meal and also for snacks. If you don't have fresh fruits and vegetables available, you can eat frozen or canned ones instead. Doctors recommend eating at least 2 1/2 servings of vegetables and 2 servings of fruits each day.  · Foods with fiber - Eating foods with a lot of fiber can help prevent heart disease and strokes. If you have type 2 diabetes, it can also help control your blood sugar. Foods that have a lot of fiber include vegetables, fruits, beans, nuts, oatmeal, and whole grain breads and cereals. You can tell how much fiber is in a food by reading the nutrition label (figure 1). Doctors recommend eating 25 to 36 grams of fiber each day.  · Foods with folate (also called folic acid) - Folate is a vitamin that is important for pregnant people, since it helps prevent certain birth defects. Anyone who could get pregnant should get at least 400 micrograms of folic acid daily, whether or not they are actively trying to get pregnant. Folate is found in many breakfast cereals, oranges, orange juice, and green leafy vegetables.  · Foods with calcium and vitamin D - Babies, children, and adults need calcium and vitamin D to help keep their bones strong. Adults also need calcium and vitamin D to help prevent osteoporosis. Osteoporosis is a condition that causes bones to get "thin" and break more easily than usual. Different foods and drinks have calcium and vitamin D in them (figure 2). People who don't get enough calcium and vitamin D in their diet might need to take a supplement.  · Foods with protein - Protein helps your muscles stay strong. Healthy " "foods with a lot of protein include chicken, fish, eggs, beans, nuts, and soy products. Red meat also has a lot of protein, but it also contains fats, which can be unhealthy.  Some experts recommend a "Mediterranean diet." This involves eating a lot of fruits, vegetables, nuts, whole grains, and olive oil. It also includes some fish, poultry, and dairy products, but not a lot of red meat. Eating this way can help your overall health, and might even lower your risk of having a stroke.  What foods should I avoid or limit? -- To eat a healthy diet, there are some things you should avoid or limit. They include:   · Fats - There are different types of fats. Some types of fats are better for your body than others.  Trans fats are especially unhealthy. They are found in margarines, many fast foods, and some store-bought baked goods. Trans fats can raise your cholesterol level and your increase your chance of getting heart disease. You should avoid eating foods with these types of fats.  The type of "polyunsaturated" fats found in fish seems to be healthy and can reduce your chance of getting heart disease. Other polyunsaturated fats might also be good for your health. When you cook, it's best to use oils with healthier fats, such as olive oil and canola oil.  · Sugar - To have a healthy diet, it's important to limit or avoid sugar, sweets, and refined grains. Refined grains are found in white bread, white rice, most forms of pasta, and most packaged "snack" foods. Whole grains, such as whole-wheat bread and brown rice, have more fiber and are better for your health.  Avoiding sugar-sweetened beverages, like soda and sports drinks, can also help improve your health.  · Red meat - Studies have shown that eating a lot of red meat can increase your risk of certain health problems, including heart disease and cancer. You should limit the amount of red meat that you eat.  Can I drink alcohol as part of a healthy diet? -- People " "who drink a small amount of alcohol each day might have a lower chance of getting heart disease. But drinking alcohol can lead to problems. For example, it can raise a person's chances of getting liver disease and certain types of cancers. In women, even 1 drink a day can increase the risk of getting breast cancer.  Most doctors recommend that adult women not have more than 1 drink a day and that adult men not have more than 2 drinks a day. The limits are different because most women's bodies take longer to break down alcohol.  How many calories do I need each day? -- The number of calories you need each day depends on your weight, height, age, sex, and how active you are.  Your doctor or nurse can tell you how many calories you should eat each day. If you are trying to lose weight, you should eat fewer calories each day.  What if I have questions? -- If you have questions about which foods you should or should not eat, ask your doctor or nurse. The right diet for you will depend, in part, on your health and any medical conditions you have.  All topics are updated as new evidence becomes available and our peer review process is complete.  This topic retrieved from [employer] on: Sep 21, 2021.  Topic 10076 Version 20.0  Release: 29.4.2 - C29.263  © 2021 UpToDate, Inc. and/or its affiliates. All rights reserved.  figure 1: Nutrition label - fiber     This is an example of a nutrition label. To figure out how much fiber is in a food, look for the line that says "Dietary Fiber." It's also important to look at the serving size. This food has 7 grams of fiber in each serving, and each serving is 1 cup.  Graphic 70141 Version 7.0    figure 2: Foods and drinks with calcium and vitamin D     Foods rich in calcium include ice cream, soy milk, breads, kale, broccoli, milk, cheese, cottage cheese, almonds, yogurt, ready-to-eat cereals, beans, and tofu. Foods rich in vitamin D include milk, fortified plant-based "milks" (soy, " almond), canned tuna fish, cod liver oil, yogurt, ready-to-eat-cereals, cooked salmon, canned sardines, mackerel, and eggs. Some of these foods are rich in both.  Graphic 19492 Version 4.0    Consumer Information Use and Disclaimer   This information is not specific medical advice and does not replace information you receive from your health care provider. This is only a brief summary of general information. It does NOT include all information about conditions, illnesses, injuries, tests, procedures, treatments, therapies, discharge instructions or life-style choices that may apply to you. You must talk with your health care provider for complete information about your health and treatment options. This information should not be used to decide whether or not to accept your health care provider's advice, instructions or recommendations. Only your health care provider has the knowledge and training to provide advice that is right for you. The use of this information is governed by the [employer] End User License Agreement, available at [email or web address]://[email or web address].[employer]/en/solutions/[employer]/about/zurdo.The use of [employer] content is governed by the [employer] Terms of Use. ©2021 UpToDate, Inc. All rights reserved.  Copyright   © 2021 UpToDate, Inc. and/or its affiliates. All rights reserved.  Patient Education       Depression   The Basics   Written by the doctors and editors at [facility]   What is depression? -- Depression is a disorder that makes you sad, but it is different than normal sadness (figure 1). Depression can make it hard for you to work, study, or do everyday tasks.  How do I know if I am depressed? -- Depressed people feel down most of the time for at least 2 weeks. They also have at least 1 of these 2 symptoms:  · They no longer enjoy or care about doing the things they used to like to do.  · They feel sad, down, hopeless, or cranky most of the day, almost every day.  Depression can also make you:  · Lose or  "gain weight  · Sleep too much or too little  · Feel tired or like you have no energy  · Feel guilty or like you are worth nothing  · Forget things or feel confused  · Move and speak more slowly than usual  · Act restless or have trouble staying still  · Think about death or suicide  If you think you might be depressed, see your doctor or nurse. Only someone trained in mental health can tell for sure if you are depressed.  See someone right away if you want to hurt or kill yourself! -- If you ever feel like you might hurt yourself or someone else, do one of these things:  · Call your doctor or nurse and tell them it is urgent  · Call for an ambulance (in the [place] and [place], dial 9-1-1)  · Go to the emergency room at your local hospital  · Call the National Suicide Prevention Lifeline:  ? 1-494.718.1287  ? www.suicidepreventionlifeline.org  What are the treatments for depression? -- People who have depression can get 1 or more of the following treatments:  · Medicines that relieve depression  · Counseling (with a psychiatrist, psychologist, nurse, or )  · A device that passes magnetic waves or electricity into the brain  People with depression that is not too severe can get better by taking medicines or talking with a counselor. People with severe depression usually need medicines to get better, and might also need to see a counselor.  Another treatment involves placing a device against the scalp to pass magnetic waves into the brain. This is called "transcranial magnetic stimulation" or "TMS." Doctors might suggest TMS if medicines and counseling have not helped.   Some people whose depression is severe might need a treatment called "electroconvulsive therapy" or "ECT." During ECT, doctors pass an electric current through a person's brain in a safe way.  When will I feel better? -- Both treatment options take a little while to start working.  · Many people who take medicines start to feel better within 2 " "weeks, but it might be 4 to 8 weeks before the medicine has its full effect.  · Many people who see a counselor start to feel better within a few weeks, but it might take 8 to 10 weeks to get the greatest benefit.  If the first treatment you try does not help you, tell your doctor or nurse, but do not give up. Some people need to try different treatments or combinations of treatments before they find an approach that works. Your doctor, nurse, or counselor can work with you to find the treatment that is right for you. They can also help you figure out how to cope while you search for the right treatment or are waiting for your treatment to start working.  How do I decide which treatment to have? -- You and your doctor or nurse will need to work together to choose a treatment for you. Medicines might work a little faster than counseling. But medicines can also cause side effects. Plus, some people do not like the idea of taking medicine.  On the other hand, seeing a counselor involves talking about your feelings with a stranger. That is hard for some people.  Is depression the same for teenagers? -- No. The symptoms of depression are a little different for teenagers than they are for adults. Some teenagers are davis or sad a lot of the time. That makes it hard to tell when they are really depressed. Teenagers who are depressed often seem cranky. They get easily "annoyed" or "bothered." They might even pick fights with people. Also, when treating a teenager, doctors and nurses usually suggest trying counseling first, before trying medicine. That's because there is a small chance that depression medicines can cause problems for some teenagers. Even so, some depressed teenagers need medicine. And most experts agree that depression medicine is safe and appropriate to use in teenagers who really need it.  What if I take medicine for depression and I want to have a baby? -- Some depression medicines can cause problems for " an unborn baby. But having untreated depression during pregnancy can also cause problems. If you want to get pregnant, tell your doctor but do not stop taking your medicines. The two of you can plan the safest way for you to have your baby.  It's also important to talk with your doctor if you want to breastfeed after your baby is born. Breastfeeding has lots of benefits for both mother and baby. Some depression medicines are safer than others to use while breastfeeding. But having untreated depression after giving birth can also cause problems, so do not stop taking your medicines. Your doctor can work with you to plan the safest way for you to feed your baby.  All topics are updated as new evidence becomes available and our peer review process is complete.  This topic retrieved from [employer] on: Sep 21, 2021.  Topic 60023 Version 16.0  Release: 29.4.2 - C29.263  © 2021 UpToDate, Inc. and/or its affiliates. All rights reserved.  figure 1: Mood disorders caused by problems in the brain     Mood disorders, such as depression and bipolar disorder, are caused by chemical imbalances in the brain. Treatments for these conditions work by changing the chemistry of the brain.  Graphic 91517 Version 3.0    Consumer Information Use and Disclaimer   This information is not specific medical advice and does not replace information you receive from your health care provider. This is only a brief summary of general information. It does NOT include all information about conditions, illnesses, injuries, tests, procedures, treatments, therapies, discharge instructions or life-style choices that may apply to you. You must talk with your health care provider for complete information about your health and treatment options. This information should not be used to decide whether or not to accept your health care provider's advice, instructions or recommendations. Only your health care provider has the knowledge and training to provide  "advice that is right for you. The use of this information is governed by the [employer] End User License Agreement, available at [email or web address]/en/solutions/[employer]/about/zurdo.The use of [employer] content is governed by the [employer] Terms of Use. ©2021 UpToDate, Inc. All rights reserved.  Copyright   © 2021 UpToDate, Inc. and/or its affiliates. All rights reserved.  Patient Education       Sleep Apnea in Adults   The Basics   Written by the doctors and editors at [employer]   What is sleep apnea? -- Sleep apnea is a condition that makes you stop breathing for short periods while you are asleep. There are 2 types of sleep apnea. One is called "obstructive sleep apnea," and the other is called "central sleep apnea."  In obstructive sleep apnea, you stop breathing because your throat narrows or closes (figure 1). In central sleep apnea, you stop breathing because your brain does not send the right signals to your muscles to make you breathe. When people talk about sleep apnea, they are usually referring to obstructive sleep apnea, which is what this article is about.  People with sleep apnea do not know that they stop breathing when they are asleep. But they do sometimes wake up startled or gasping for breath. They also often hear from loved ones that they snore.  What are the symptoms of sleep apnea? -- The main symptoms of sleep apnea are loud snoring, tiredness, and daytime sleepiness. Other symptoms can include:  · Restless sleep  · Waking up choking or gasping  · Morning headaches, dry mouth, or sore throat  · Waking up often to urinate  · Waking up feeling unrested or groggy  · Trouble thinking clearly or remembering things  Some people with sleep apnea don't have symptoms, or they don't know they have them. They might figure that it's normal to be tired or to snore a lot.  Should I see a doctor or nurse? -- Yes. If you think you might have sleep apnea, see your doctor.  Is there a test for sleep " "apnea? -- Yes. If your doctor or nurse suspects you have sleep apnea, they might send you for a "sleep study." Sleep studies can sometimes be done at home, but they are usually done in a sleep lab. For the study, you spend the night in the lab, and you are hooked up to different machines that monitor your heart rate, breathing, and other body functions. The results of the test will tell your doctor or nurse if you have the disorder.  Is there anything I can do on my own to help my sleep apnea? -- Yes. Here are some things that might help:  · Stay off your back when sleeping. (This is not always practical, because people cannot control their position while asleep. Plus, it only helps some people.)  · Lose weight, if you are overweight  · Avoid alcohol, because it can make sleep apnea worse  How is sleep apnea treated? -- As mentioned above, weight loss can help if you are overweight or obese. But losing weight can be challenging, and it takes time to lose enough weight to help with your sleep apnea. Most people need other treatment while they work on losing weight.  The most effective treatment for sleep apnea is a device that keeps your airway open while you sleep. Treatment with this device is called "continuous positive airway pressure," or CPAP. People getting CPAP wear a face mask at night that keeps them breathing (figure 2).  If your doctor or nurse recommends a CPAP machine, try to be patient about using it. The mask might seem uncomfortable to wear at first, and the machine might seem noisy, but using the machine can really pay off. People with sleep apnea who use a CPAP machine feel more rested and generally feel better.  There is also another device that you wear in your mouth called an "oral appliance" or "mandibular advancement device." It also helps keep your airway open while you sleep. But devices do not work as well as CPAP for treating sleep apnea.  In rare cases, when nothing else helps, doctors " recommend surgery to keep the airway open. Surgery to do this is not always effective, and even when it is, the problem can come back.  Is sleep apnea dangerous? -- It can be. People with sleep apnea do not get good-quality sleep, so they are often tired and not alert. This puts them at risk for car accidents and other types of accidents. Plus, studies show that people with sleep apnea are more likely than others to have high blood pressure, heart attacks, and other serious heart problems. In people with severe sleep apnea, getting treated (for example, with a CPAP machine) can help prevent some of these problems.  All topics are updated as new evidence becomes available and our peer review process is complete.  This topic retrieved from [facility] on: Sep 21, 2021.  Topic 60871 Version 12.0  Release: 29.4.2 - C29.263  © 2021 UpToDate, Inc. and/or its affiliates. All rights reserved.  figure 1: Airway in a person with sleep apnea     Normally when a person sleeps, the airway remains open, and air can pass from the nose and mouth to the lungs. In a person with sleep apnea, parts of the throat and mouth drop into the airway and block off the flow of air. This can cause loud snoring and interrupt breathing for short periods.  Graphic 82946 Version 5.0    figure 2: Continuous positive airway pressure (CPAP) for sleep apnea     The CPAP mask gently blows air into your nose while you sleep. It puts just enough pressure on your airway to keep it from closing. The mask in this picture fits over just the nose. Other CPAP devices have masks that fit over the nose and mouth.  Graphic 62467 Version 5.0    Consumer Information Use and Disclaimer   This information is not specific medical advice and does not replace information you receive from your health care provider. This is only a brief summary of general information. It does NOT include all information about conditions, illnesses, injuries, tests, procedures, treatments,  therapies, discharge instructions or life-style choices that may apply to you. You must talk with your health care provider for complete information about your health and treatment options. This information should not be used to decide whether or not to accept your health care provider's advice, instructions or recommendations. Only your health care provider has the knowledge and training to provide advice that is right for you. The use of this information is governed by the [employer] End User License Agreement, available at https://www.SimpleRelevance.Easel/en/solutions/TriggerMail/about/zurdo.The use of [employer] content is governed by the [employer] Terms of Use. ©2021 UpToDate, Inc. All rights reserved.  Copyright   © 2021 UpToDate, Inc. and/or its affiliates. All rights reserved.     Patient Education       Preventing Falls   The Basics   Written by the doctors and editors at [facility]   Am I at risk of falling? -- Your risk of falling increases as you grow older. That's because getting older can make it harder to walk steadily and keep your balance. Also, the effects of falls are more serious in older people.  Overall, 3 to 4 out of every 10 people over the age of 65 fall each year. Up to 75 percent of people who fracture a hip never recover to the point they were before they had their fracture. If you have fallen in the past, you are at higher risk of falling again.  Several things can increase your risk of a fall, including:  · Illness  · A change in the medicines you take  · An unsafe or unfamiliar setting (for example, a room with rugs or furniture that might trip you, or an area you don't know well)  How can my doctor help me to avoid falling? -- Your doctor can talk to you about the following things:  · Past falls - It is important to tell your doctor about any times you have fallen or almost fallen. He or she can then suggest ways to prevent another fall.  · Your health conditions - Some health problems can put you  at risk of falling. These include conditions that affect eyesight, hearing, muscle strength, or balance.  · The medicines you take - Certain medicines can increase the risk of falling. These include some medicines that are used for sleeping problems, anxiety, high blood pressure, or depression. Adding new medicines, or changing doses of some medicines, can also affect your risk of falling.  The more your doctor knows about your situation, the better he or she will be able to help you. For example, if you fell because you have a condition that causes pain, your doctor might suggest treatments to deal with the pain. Or if one of your medicines is making you dizzy and more likely to fall, your doctor might switch you to a different medicine.  Is there anything I can do on my own? -- Yes. To help keep from falling, you can:  · Make your home safer - To avoid falling at home, get rid of things that might make you trip or slip. This might include furniture, electrical cords, clutter, and loose rugs (figure 1). Keep your home well-lit so that you can easily see where you are going. Avoid storing things in high places so you don't have to reach or climb.  · Wear sturdy shoes that fit well - Wearing shoes with high heels or slippery soles, or shoes that are too loose, can lead to falls. Walking around in bare feet, or only socks, can also increase your risk of falling.  · Take vitamin D pills - Taking vitamin D might lower the risk of falls in older people. This is because vitamin D helps make bones and muscles stronger. Your doctor can talk to you about whether you should take extra vitamin D, and how much.  · Stay active - Exercising on a regular basis can help lower your risk of falling. It might also help prevent you from getting hurt if you do fall. It is best to do a few different activities that help with both strength and balance. There are many kinds of exercise that can be safe for older people. These include  walking, swimming, and Lavon Chi (a Chinese martial art that involves slow, gentle movements).  · Use a cane, walker, and other safety devices - If your doctor recommends that you use a cane or walker, be sure that it's the right size and you know how to use it. There are other devices that might help you avoid falling, too. These include grab bars or a sturdy seat for the shower, non-slip bath mats, and hand rails or treads for the stairs (to prevent slipping).  If you worry that you could fall, there are also alarm buttons that let you call for help if you fall and can't get up.  What should I do if I fall? -- If you fall, see your doctor right away, even if you aren't hurt. Your doctor can try to figure out what caused you to fall, and how likely you are to fall again. He or she will do an exam and talk to you about your health problems, medicines, and activities. Then he or she can suggest things you can do to avoid falling again.  Many older people have a hard time recovering after a fall. Doing things to prevent falling can help you to protect your health and independence.  All topics are updated as new evidence becomes available and our peer review process is complete.  This topic retrieved from [employer] on: Sep 21, 2021.  Topic 82787 Version 18.0  Release: 29.4.2 - C29.263  © 2021 UpToDate, Inc. and/or its affiliates. All rights reserved.  figure 1: How to avoid falling at home     This picture shows some of the things that can cause a fall in your home. Look around and remove any loose rugs, electrical cords, clutter, or furniture that could trip you.  Graphic 70467 Version 1.0    Consumer Information Use and Disclaimer   This information is not specific medical advice and does not replace information you receive from your health care provider. This is only a brief summary of general information. It does NOT include all information about conditions, illnesses, injuries, tests, procedures, treatments,  therapies, discharge instructions or life-style choices that may apply to you. You must talk with your health care provider for complete information about your health and treatment options. This information should not be used to decide whether or not to accept your health care provider's advice, instructions or recommendations. Only your health care provider has the knowledge and training to provide advice that is right for you. The use of this information is governed by the [employer] End User License Agreement, available at https://www.Tripeese/en/solutions/Twice/about/zurdo.The use of [employer] content is governed by the [employer] Terms of Use. ©2021 UpToDate, Inc. All rights reserved.  Copyright   © 2021 UpToDate, Inc. and/or its affiliates. All rights reserved.    1. Go to ER for any chest pain and SOB with exertion  2. Fall precautions  3. Renew and continue elavil to 25 mg at bedtime  4. Smoking cessation  5. Consider Lyrica in the future  6. Keep blood sugars under control  7. Recommend weight loss  8. Venous and arterial doppler us BLE  9. Keep follow up with PCP for medical management  10. Keep follow up with SAVAGE Maravilla for diabetes management  11. Keep follow up with Cardiologist Dr. Raygoza  12. Keep follow up with Pain treatment for back pain  13. Patient declined referral for physical therapy for strength training  14. Patient declined referral to psychiatry to treat depression  15. Labs: cbc, cmp, b12, folate, vit d  16. Can try Capsaicin cream over the counter, follow directions on package  17. Rx Gabapentin 100 mg two capsules at bedtime with your regular dose of 600 mg at bedtime  18. Follow up with Neurology in 3 months or sooner if needed

## 2022-02-16 NOTE — PROGRESS NOTES
Subjective:       Patient ID: Kennedy West is a 41 y.o. male.    Chief Complaint:  No chief complaint on file.      History of Present Illness  This pleasant right-handed 41-year-old  male presents to the clinic for follow-up of neuropathy, back pain and bilateral lower extremity weakness.  Patient states he is doing OK today. Patient states neuropathy is about the same as last visit. Patient reports neuropathy started 6-7 years ago with burning, numbness, and tingling that he rates as a 3.5 on a scale of 0-5 in the bilateral legs, feet and hands. Neuropathy is constant and pain is rated as a 7 on a scale of 0-10 with prolonged standing and walking as the precipitating factor. Patient is diabetic and denies neck or back injury. He still reports weakness in the bilateral lower extremities. Discussed with patient that his diabetic neuropathy will not improve until he gets his blood sugars under control. His last Hgb A1c in the computer was done on January 5, 2022 and reported as 10.6. His prior Hgb A1c was on April 1, 2021 and reported as 12.8. He is followed by SAVAGE Maravilla who is working with the patient to bring his blood sugars under better control. He is currently on gabapentin 600 mg one three times a day and elavil 25 mg at bedtime. He is tolerating these medications without side effects. He was on Cymbalta 60 mg twice a day and states pain treatment discontinued this medication due to reported suicidal thoughts. He states he has not had any suicidal thoughts since discontinuation of the Cymbalta. He did not report any suicidal thoughts to neurology and denies any suicidal thoughts at today's visit. He reports chronic leg weakness and leg pain with ambulation. We ordered arterial and venous doppler US at the last visit but the patient did not get these done.  Discussed this with the patient and we will get the arterial and venous dopplers done to evaluate his leg pain and claudication. He is  followed by cardiology for history of chest pains. He denies any chest pain or SOB at today's visit. He reports having foot surgery since his last visit and is currently followed by wound care.       Patient states back pain started 5-6 years ago and is improved some with back injections by pain treatment. He will continue to follow pain treatment for management of his back pain. Pain is located in the thoracic and lumbar spine that radiates to the bilateral legs and feet that he rates as a 7 on a scale of 0-10. Pain is described as constant and sharp. He denies any back surgery. He states he completed physical therapy in October 2020 and is followed by TONYA Garcia at pain treatment. He does have obstructive sleep apnea and was prescribed a CPAP but is not using the CPAP. Discussed RAKESH in detail with the patient and encouraged him to get restarted on the Cpap. BDI-II depression questionnaire score today was 10 that showed mild depression. He declined again at today's visit for referral to psychiatry to further evaluate the depression and desires not to start any antidepressants. He was recommended to loses weight. He reports 6 to7 falls since his last visit and denies any head injuries. Discussed fall precautions in detail and written education given. He declined again for referral to physical therapy for gait, balance and strength training. Discussed the plan in detail with the patient and he is in agreement with the plan. All his questions were answered at today's visit.     CT of the head without contrast done on July 19, 2021 showed no acute intracranial process.     MRI of the cervical spine without contrast done on November 30, 2020 showed tiny central posterior disc protrusions at C5-C6 and C6-C7. Moderate right posterior lateral disc protrusion causing right lateral recess and neuroforaminal narrowing mild in severity at T1-T2. Findings are also suggested at T2-T3.     MRI of the lumbar spine without  contrast done on February 21, 2019 showed a desiccation with minimal facet arthrosis at L3-L4 and L4-L5.     MRI of the thoracic spine without contrast done on January 28,2021 showed no evidence of abnormality demonstrated     EMG nerve conduction study of the bilateral lower extremities done on March 14, 2019 showed a normal study. It was no electrophysiological evidence of peripheral polyneuropathy or LS radiculopathy in the muscles tested in both lower extremities.     EMG nerve conduction study of the bilateral upper extremities done on December 15, 2020 showed electrophysiologic findings suggest bilateral ulnar neuropathies that best localize to the forearm segments bilaterally without a convincing conduction block or marked slowing of conduction velocity. There is no evidence of acute or ongoing cervical radiculopathy affecting the muscles tested in the symptomatic right upper limb. Normal or borderline normal conduction values in multiple nerve segments may reflect underlying mild diabetic polyneuropathy.    EMG NCS of the BLE done on December 10, 2021 by Dr. 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 In conjunction with the clinical history of chronic diabetes, the findings suggest diabetic polyneuropathy. There is no convincing evidence of coexisting diabetic radiculoplexus neuropathy, plexopathy, femoral neuropathy, or other mononeuropathies affecting the lower limbs. IF symptoms progress, particularly in the thighs, repeat EMG NCS should be repeated to look for denervation in proximal muscles.         Review of Systems  Review of Systems   Constitutional: Negative for activity change, diaphoresis, fever and unexpected weight change.   HENT: Negative for congestion, ear pain, facial swelling, hearing loss, tinnitus, trouble swallowing and voice change.    Eyes: Negative for photophobia, pain and visual  disturbance.   Respiratory: Negative for chest tightness, shortness of breath and wheezing.    Cardiovascular: Negative for chest pain, palpitations and leg swelling.   Gastrointestinal: Negative for constipation, diarrhea, nausea and vomiting.   Genitourinary: Negative for difficulty urinating.   Musculoskeletal: Positive for back pain. Negative for gait problem, neck pain and neck stiffness.   Skin: Negative for color change, pallor, rash and wound.   Neurological: Positive for weakness and numbness. Negative for dizziness, tremors, seizures, syncope, facial asymmetry, speech difficulty, light-headedness and headaches.        Leg pain/claudication   Psychiatric/Behavioral: Negative for agitation, behavioral problems, confusion and hallucinations. The patient is not nervous/anxious and is not hyperactive.        Objective:      Neurologic Exam     Mental Status   Oriented to person, place, and time.   Oriented to person.   Oriented to place.   Oriented to time.   Attention: normal. Concentration: normal.   Speech: speech is normal   Level of consciousness: alert  Knowledge: good.     Cranial Nerves     CN II   Visual fields full to confrontation.     CN III, IV, VI   Pupils are equal, round, and reactive to light.  Extraocular motions are normal.   Right pupil: Size: 3 mm. Shape: regular. Reactivity: brisk.   Left pupil: Size: 3 mm. Shape: regular. Reactivity: brisk.     CN V   Facial sensation intact.     CN VII   Facial expression full, symmetric.     CN VIII   CN VIII normal.   Hearing: intact    CN IX, X   CN IX normal.   CN X normal.     CN XI   CN XI normal.     CN XII   CN XII normal.     Motor Exam   Muscle bulk: normal  Overall muscle tone: normal    Strength   Right deltoid: 4/5  Left deltoid: 5/5  Right biceps: 4/5  Left biceps: 5/5  Right triceps: 4/5  Left triceps: 5/5  Right quadriceps: 4/5  Left quadriceps: 4/5  Right hamstrin/5  Left hamstrin/5    Sensory Exam   Light touch normal.     Gait,  Coordination, and Reflexes     Coordination   Finger to nose coordination: normal    Tremor   Resting tremor: absent  Intention tremor: absent  Action tremor: absent    Reflexes   Right brachioradialis: 2+  Left brachioradialis: 2+  Right biceps: 2+  Left biceps: 2+  Right triceps: 2+  Left triceps: 2+  Right patellar: 2+  Left patellar: 2+  Right achilles: 2+  Left achilles: 2+  Right : 4+  Left : 4+  Ambulates with a cane, has a slow waddling gait        Physical Exam  Constitutional:       General: He is not in acute distress.  HENT:      Head: Normocephalic.      Mouth/Throat:      Mouth: Mucous membranes are moist.   Eyes:      Extraocular Movements: Extraocular movements intact and EOM normal.      Pupils: Pupils are equal, round, and reactive to light.   Cardiovascular:      Rate and Rhythm: Normal rate and regular rhythm.      Heart sounds: Normal heart sounds. No murmur heard.      Pulmonary:      Effort: Pulmonary effort is normal. No respiratory distress.      Breath sounds: Normal breath sounds. No wheezing, rhonchi or rales.   Musculoskeletal:         General: No swelling, tenderness, deformity or signs of injury. Normal range of motion.      Cervical back: Normal range of motion. No rigidity or tenderness.      Right lower leg: No edema.      Left lower leg: No edema.   Skin:     General: Skin is warm and dry.      Capillary Refill: Capillary refill takes less than 2 seconds.      Coloration: Skin is not jaundiced or pale.      Findings: No bruising, erythema, lesion or rash.   Neurological:      Mental Status: He is alert and oriented to person, place, and time.      Coordination: Finger-Nose-Finger Test normal.      Deep Tendon Reflexes:      Reflex Scores:       Tricep reflexes are 2+ on the right side and 2+ on the left side.       Bicep reflexes are 2+ on the right side and 2+ on the left side.       Brachioradialis reflexes are 2+ on the right side and 2+ on the left side.       Patellar  reflexes are 2+ on the right side and 2+ on the left side.       Achilles reflexes are 2+ on the right side and 2+ on the left side.  Psychiatric:         Mood and Affect: Mood normal.         Speech: Speech normal.         Behavior: Behavior normal.           Assessment:     Problem List Items Addressed This Visit        Neuro    Diabetic polyneuropathy associated with type 2 diabetes mellitus - Primary (Chronic)    Relevant Medications    amitriptyline (ELAVIL) 25 MG tablet    gabapentin (NEURONTIN) 100 MG capsule    Other Relevant Orders    CBC Auto Differential    Comprehensive Metabolic Panel    Vitamin B12 & Folate    Vitamin D       Psychiatric    Depressive disorder (Chronic)    Relevant Medications    amitriptyline (ELAVIL) 25 MG tablet       Endocrine    Morbid obesity (Chronic)    Relevant Orders    CBC Auto Differential    Comprehensive Metabolic Panel    Vitamin B12 & Folate    Vitamin D       Other    Sleep apnea      Other Visit Diagnoses     Claudication        Relevant Orders    US Lower Extrem Arteries Bilat with ROGELIO (xpd)    US Lower Extremity Veins Bilateral            Plan:       1. Go to ER for any chest pain and SOB with exertion  2. Fall precautions  3. Renew and continue elavil to 25 mg at bedtime  4. Smoking cessation  5. Consider Lyrica in the future  6. Keep blood sugars under control  7. Recommend weight loss  8. Venous and arterial doppler us BLE  9. Keep follow up with PCP for medical management  10. Keep follow up with SAVAGE Maravilla for diabetes management  11. Keep follow up with Cardiologist Dr. Raygoza  12. Keep follow up with Pain treatment for back pain  13. Patient declined referral for physical therapy for strength training  14. Patient declined referral to psychiatry to treat depression  15. Labs: cbc, cmp, b12, folate, vit d  16. Can try Capsaicin cream over the counter, follow directions on package  17. Rx Gabapentin 100 mg two capsules at bedtime with your regular dose of  600 mg at bedtime  18. Follow up with Neurology in 3 months or sooner if needed

## 2022-02-22 ENCOUNTER — HOSPITAL ENCOUNTER (OUTPATIENT)
Dept: RADIOLOGY | Facility: HOSPITAL | Age: 42
Discharge: HOME | End: 2022-02-22
Attending: NURSE PRACTITIONER
Payer: COMMERCIAL

## 2022-02-22 DIAGNOSIS — E55.9 VITAMIN D DEFICIENCY: Primary | ICD-10-CM

## 2022-02-22 DIAGNOSIS — I73.9 CLAUDICATION: ICD-10-CM

## 2022-02-22 DIAGNOSIS — M79.606 LEG PAIN: ICD-10-CM

## 2022-02-22 PROCEDURE — 93970 US LOWER EXTREMITY VEINS BILATERAL: ICD-10-PCS | Mod: 26,,,

## 2022-02-22 PROCEDURE — 93970 EXTREMITY STUDY: CPT | Mod: TC

## 2022-02-22 PROCEDURE — 93925 US ARTERIAL LOWER EXTREMITY BILAT WITH ABI (XPD): ICD-10-PCS | Mod: 26,,,

## 2022-02-22 PROCEDURE — 93925 LOWER EXTREMITY STUDY: CPT | Mod: 26,,,

## 2022-02-22 PROCEDURE — 93922 UPR/L XTREMITY ART 2 LEVELS: CPT | Mod: 26,,,

## 2022-02-22 PROCEDURE — 93970 EXTREMITY STUDY: CPT | Mod: 26,,,

## 2022-02-22 PROCEDURE — 93925 LOWER EXTREMITY STUDY: CPT | Mod: TC

## 2022-02-22 PROCEDURE — 93922 US ARTERIAL LOWER EXTREMITY BILAT WITH ABI (XPD): ICD-10-PCS | Mod: 26,,,

## 2022-02-22 RX ORDER — ERGOCALCIFEROL 1.25 MG/1
CAPSULE ORAL
Qty: 12 CAPSULE | Refills: 1 | Status: SHIPPED | OUTPATIENT
Start: 2022-02-22 | End: 2022-12-15 | Stop reason: SDUPTHER

## 2022-02-23 ENCOUNTER — TELEPHONE (OUTPATIENT)
Dept: NEUROLOGY | Facility: CLINIC | Age: 42
End: 2022-02-23
Payer: COMMERCIAL

## 2022-02-23 DIAGNOSIS — M79.604 PAIN IN BOTH LOWER EXTREMITIES: Primary | ICD-10-CM

## 2022-02-23 DIAGNOSIS — I73.9 CLAUDICATION: ICD-10-CM

## 2022-02-23 DIAGNOSIS — M79.605 PAIN IN BOTH LOWER EXTREMITIES: Primary | ICD-10-CM

## 2022-02-23 NOTE — TELEPHONE ENCOUNTER
Pt voiced understanding  ----- Message from Mahin Negrete NP sent at 2/22/2022  3:39 PM CST -----  Please let patient know his vit d is real low and I sent in some vit d to his pharmacy with instructions on the bottle, blood sugar was elevated, alk phos elevated, wbc is elevated, have him follow up with pcp for the liver and wbc elevation, thanks

## 2022-02-23 NOTE — PROGRESS NOTES
Please let patient know that his arterial doppler us showed Mild to moderate right calf vessel disease and Mild left calf vessel disease. The venous doppler did not show any DVT and was normal. Dr. Winters wants to send him to Dr. Luna or vascular surgeon of his choice. Let me know and I will put the referral in , thanks

## 2022-02-23 NOTE — TELEPHONE ENCOUNTER
Called and informed patient that Arterial Doppler showed mild to moderate right calf vessel disease and mild left calf vessel disease. Dr Winters recommends that he be referred to a vascular surgeon of his choice. Pt voiced understanding and states he will see Dr Luna. Told pt he will be notified of appointment.      ----- Message from Mahin Negrete NP sent at 2/22/2022  6:02 PM CST -----  Please let patient know that his arterial doppler us showed Mild to moderate right calf vessel disease and Mild left calf vessel disease. The venous doppler did not show any DVT and was normal. Dr. Winters wants to send him to Dr. Luna or vascular surgeon of his choice. Let me know and I will put the referral in , thanks

## 2022-02-23 NOTE — TELEPHONE ENCOUNTER
Called and informed patient that Venous Doppler did not show evidence of a DVT in either lower extremity. Pt voiced understanding by verbal return        ----- Message from Mahin Negrete NP sent at 2/22/2022  6:03 PM CST -----  Please let patient know venous doppler showed No evidence of DVT seen in either lower extremity

## 2022-03-18 ENCOUNTER — TELEPHONE (OUTPATIENT)
Dept: FAMILY MEDICINE | Facility: CLINIC | Age: 42
End: 2022-03-18
Payer: COMMERCIAL

## 2022-03-21 NOTE — TELEPHONE ENCOUNTER
I received a call from Debora Farris at [facility]. Patient had surgery for a BKA. [facility] Debora miranda wants to see if Dr. Liu is ok with them adding a nurse to help with stump care and dressing changes and also to do some diabetic teaching. I spoke wit Dr Liu and she is ok with adding nurse visits. Debora Farris was informe d of this.

## 2022-04-13 ENCOUNTER — OFFICE VISIT (OUTPATIENT)
Dept: PAIN MEDICINE | Facility: CLINIC | Age: 42
End: 2022-04-13
Payer: COMMERCIAL

## 2022-04-13 VITALS
BODY MASS INDEX: 49.1 KG/M2 | DIASTOLIC BLOOD PRESSURE: 93 MMHG | WEIGHT: 315 LBS | SYSTOLIC BLOOD PRESSURE: 130 MMHG | HEART RATE: 111 BPM

## 2022-04-13 DIAGNOSIS — G54.6 PHANTOM PAIN FOLLOWING AMPUTATION OF LOWER LIMB: ICD-10-CM

## 2022-04-13 DIAGNOSIS — G90.511 COMPLEX REGIONAL PAIN SYNDROME TYPE 1 OF RIGHT UPPER EXTREMITY: Chronic | ICD-10-CM

## 2022-04-13 DIAGNOSIS — M47.817 LUMBOSACRAL SPONDYLOSIS WITHOUT MYELOPATHY: Primary | Chronic | ICD-10-CM

## 2022-04-13 DIAGNOSIS — Z79.899 ENCOUNTER FOR LONG-TERM (CURRENT) USE OF OTHER MEDICATIONS: ICD-10-CM

## 2022-04-13 DIAGNOSIS — E11.42 DIABETIC PERIPHERAL NEUROPATHY: ICD-10-CM

## 2022-04-13 LAB
CTP QC/QA: YES
POC (AMP) AMPHETAMINE: NEGATIVE
POC (BAR) BARBITURATES: NEGATIVE
POC (BUP) BUPRENORPHINE: NEGATIVE
POC (BZO) BENZODIAZEPINES: NEGATIVE
POC (COC) COCAINE: NEGATIVE
POC (MDMA) METHYLENEDIOXYMETHAMPHETAMINE 3,4: NEGATIVE
POC (MET) METHAMPHETAMINE: NEGATIVE
POC (MOP) OPIATES: ABNORMAL
POC (MTD) METHADONE: NEGATIVE
POC (OXY) OXYCODONE: NEGATIVE
POC (PCP) PHENCYCLIDINE: NEGATIVE
POC (TCA) TRICYCLIC ANTIDEPRESSANTS: NEGATIVE
POC TEMPERATURE (URINE): 90
POC THC: NEGATIVE

## 2022-04-13 PROCEDURE — 3008F PR BODY MASS INDEX (BMI) DOCUMENTED: ICD-10-PCS | Mod: CPTII,,, | Performed by: PHYSICIAN ASSISTANT

## 2022-04-13 PROCEDURE — 99214 PR OFFICE/OUTPT VISIT, EST, LEVL IV, 30-39 MIN: ICD-10-PCS | Mod: S$PBB,,, | Performed by: PHYSICIAN ASSISTANT

## 2022-04-13 PROCEDURE — 99214 OFFICE O/P EST MOD 30 MIN: CPT | Mod: S$PBB,,, | Performed by: PHYSICIAN ASSISTANT

## 2022-04-13 PROCEDURE — 1159F MED LIST DOCD IN RCRD: CPT | Mod: CPTII,,, | Performed by: PHYSICIAN ASSISTANT

## 2022-04-13 PROCEDURE — 3080F DIAST BP >= 90 MM HG: CPT | Mod: CPTII,,, | Performed by: PHYSICIAN ASSISTANT

## 2022-04-13 PROCEDURE — 1159F PR MEDICATION LIST DOCUMENTED IN MEDICAL RECORD: ICD-10-PCS | Mod: CPTII,,, | Performed by: PHYSICIAN ASSISTANT

## 2022-04-13 PROCEDURE — 3080F PR MOST RECENT DIASTOLIC BLOOD PRESSURE >= 90 MM HG: ICD-10-PCS | Mod: CPTII,,, | Performed by: PHYSICIAN ASSISTANT

## 2022-04-13 PROCEDURE — 3075F SYST BP GE 130 - 139MM HG: CPT | Mod: CPTII,,, | Performed by: PHYSICIAN ASSISTANT

## 2022-04-13 PROCEDURE — 3075F PR MOST RECENT SYSTOLIC BLOOD PRESS GE 130-139MM HG: ICD-10-PCS | Mod: CPTII,,, | Performed by: PHYSICIAN ASSISTANT

## 2022-04-13 PROCEDURE — 99215 OFFICE O/P EST HI 40 MIN: CPT | Mod: PBBFAC | Performed by: PHYSICIAN ASSISTANT

## 2022-04-13 PROCEDURE — 3008F BODY MASS INDEX DOCD: CPT | Mod: CPTII,,, | Performed by: PHYSICIAN ASSISTANT

## 2022-04-13 PROCEDURE — 80305 DRUG TEST PRSMV DIR OPT OBS: CPT | Mod: PBBFAC | Performed by: PHYSICIAN ASSISTANT

## 2022-04-13 RX ORDER — HYDROCODONE BITARTRATE AND ACETAMINOPHEN 10; 325 MG/1; MG/1
1 TABLET ORAL EVERY 12 HOURS
Qty: 60 TABLET | Refills: 0 | Status: SHIPPED | OUTPATIENT
Start: 2022-05-14 | End: 2022-06-08 | Stop reason: SDUPTHER

## 2022-04-13 RX ORDER — HYDROCODONE BITARTRATE AND ACETAMINOPHEN 10; 325 MG/1; MG/1
1 TABLET ORAL EVERY 12 HOURS
Qty: 60 TABLET | Refills: 0 | Status: SHIPPED | OUTPATIENT
Start: 2022-04-14 | End: 2022-04-13

## 2022-04-13 RX ORDER — HYDROCODONE BITARTRATE AND ACETAMINOPHEN 10; 325 MG/1; MG/1
1 TABLET ORAL EVERY 12 HOURS
Qty: 60 TABLET | Refills: 0 | Status: SHIPPED | OUTPATIENT
Start: 2022-04-14 | End: 2022-05-14

## 2022-04-13 RX ORDER — HYDROCODONE BITARTRATE AND ACETAMINOPHEN 10; 325 MG/1; MG/1
1 TABLET ORAL EVERY 12 HOURS
Qty: 60 TABLET | Refills: 0 | Status: SHIPPED | OUTPATIENT
Start: 2022-05-14 | End: 2022-04-13

## 2022-04-13 NOTE — PROGRESS NOTES
Disclaimer:  This note has been generated using voice recognition software.  There may be type of graft focal areas that have been missed during a proof reading      Subjective:      Patient ID: Kennedy West is a 41 y.o. male.    Chief Complaint: Foot Pain and Leg Pain      Pain  This is a chronic problem. The current episode started more than 1 year ago. The problem occurs daily. The problem has been unchanged. Associated symptoms include arthralgias. Pertinent negatives include no change in bowel habit, chest pain, chills, coughing, fatigue, fever, rash, sore throat, vertigo or vomiting.     Review of Systems   Constitutional: Negative for activity change, appetite change, chills, fatigue and fever.   HENT: Negative for drooling, ear discharge, ear pain, facial swelling, nosebleeds, sore throat, trouble swallowing, voice change and goiter.    Eyes: Negative for photophobia, pain, discharge, redness and visual disturbance.   Respiratory: Negative for apnea, cough, choking, chest tightness, shortness of breath, wheezing and stridor.    Cardiovascular: Negative for chest pain, palpitations and leg swelling.   Gastrointestinal: Negative for abdominal distention, change in bowel habit, diarrhea, rectal pain, vomiting, fecal incontinence and change in bowel habit.   Endocrine: Negative for cold intolerance, heat intolerance, polydipsia, polyphagia and polyuria.   Genitourinary: Negative for bladder incontinence, dysuria, flank pain and frequency.   Musculoskeletal: Positive for arthralgias, back pain, leg pain and neck stiffness.   Integumentary:  Negative for color change, pallor and rash.   Neurological: Negative for dizziness, vertigo, seizures, syncope, facial asymmetry, speech difficulty, light-headedness, disturbances in coordination, memory loss and coordination difficulties.   Hematological: Negative for adenopathy. Does not bruise/bleed easily.   Psychiatric/Behavioral: Negative for agitation,  behavioral problems, confusion, decreased concentration, dysphoric mood, hallucinations, self-injury and suicidal ideas. The patient is not nervous/anxious and is not hyperactive.             Objective:  Vitals:    04/13/22 1514   BP: (!) 130/93   Pulse: (!) 111   Weight: (!) 164.2 kg (362 lb)   PainSc:   7         Physical Exam  Vitals and nursing note reviewed. Exam conducted with a chaperone present.   Constitutional:       General: He is awake. He is not in acute distress.     Appearance: Normal appearance. He is not toxic-appearing.   HENT:      Head: Normocephalic and atraumatic.      Nose: Nose normal.      Mouth/Throat:      Mouth: Mucous membranes are moist.      Pharynx: Oropharynx is clear.   Eyes:      Conjunctiva/sclera: Conjunctivae normal.      Pupils: Pupils are equal, round, and reactive to light.   Cardiovascular:      Rate and Rhythm: Normal rate.   Pulmonary:      Effort: Pulmonary effort is normal. No respiratory distress.   Abdominal:      Palpations: Abdomen is soft.      Tenderness: There is no guarding.   Musculoskeletal:         General: Normal range of motion.      Cervical back: Normal range of motion and neck supple. No rigidity.      Thoracic back: Tenderness present.      Lumbar back: Tenderness present.   Skin:     General: Skin is warm and dry.      Coloration: Skin is not jaundiced or pale.   Neurological:      General: No focal deficit present.      Mental Status: He is alert and oriented to person, place, and time. Mental status is at baseline.      Cranial Nerves: Cranial nerves are intact. No cranial nerve deficit (II-XII).   Psychiatric:         Mood and Affect: Mood normal.         Behavior: Behavior normal. Behavior is cooperative.         Thought Content: Thought content normal.           Orders Placed This Encounter   Procedures    POCT Urine Drug Screen Presump     Interpretive Information:     Negative:  No drug detected at the cut off level.   Positive:  This result  represents presumptive positive for the   tested drug, other substances may yield a positive response other   than the analyte of interest. This result should be utilized for   diagnostic purpose only. Confirmation testing will be performed upon physician request only.           US Lower Extrem Arteries Bilat with ROGELIO (xpd)  Narrative: EXAMINATION:  US ARTERIAL LOWER EXTREMITY BILAT WITH ROGELIO (XPD)    CLINICAL HISTORY:  Peripheral vascular disease, unspecified    TECHNIQUE:  Grayscale, spectral Doppler, and color flow analysis performed and interpreted    FINDINGS:  3 cuff method performed    There are atherosclerotic changes present on the 2 dimensional images    Ankle brachial indices 1.27 on the right and 1.34 on the left    There is a triphasic waveform in the right common femoral, superficial femoral, popliteal, and posterior tibial arteries    There is a biphasic waveform with moderate spectral broadening in the right dorsalis pedis    There is a triphasic waveform in the left common femoral, superficial femoral, popliteal, posterior tibial arteries and dorsalis pedis with mild spectral broadening distally    No focal areas of doubling velocities seen bilaterally  Impression: 1. Mild to moderate right calf vessel disease  2. Mild left calf vessel disease  Ultrasound images captured and stored.    Electronically signed by: Elsie Boone  Date:    02/22/2022  Time:    12:24  US Lower Extremity Veins Bilateral  Narrative: EXAMINATION:  US LOWER EXTREMITY VEINS BILATERAL    CLINICAL HISTORY:  US LOWER EXTREMITY VEINS BILATERAL Pain in leg, unspecified    TECHNIQUE:  Grayscale, spectral Doppler, and color flow analysis performed and interpreted.    COMPARISON:  None.    FINDINGS:  No evidence of echogenic, non-compressible thrombus seen in either common femoral, superficial femoral, popliteal, or saphenous veins.  Impression: No evidence of DVT seen in either lower extremity.    Electronically signed by: Elsie  Chard  Date:    02/22/2022  Time:    11:51       Lab Visit on 02/22/2022   Component Date Value Ref Range Status    Sodium 02/22/2022 138  136 - 145 mmol/L Final    Potassium 02/22/2022 3.8  3.5 - 5.1 mmol/L Final    Chloride 02/22/2022 104  98 - 107 mmol/L Final    CO2 02/22/2022 28  21 - 32 mmol/L Final    Anion Gap 02/22/2022 10  7 - 16 mmol/L Final    Glucose 02/22/2022 187 (A) 74 - 106 mg/dL Final    BUN 02/22/2022 14  7 - 18 mg/dL Final    Creatinine 02/22/2022 0.99  0.70 - 1.30 mg/dL Final    BUN/Creatinine Ratio 02/22/2022 14  6 - 20 Final    Calcium 02/22/2022 9.3  8.5 - 10.1 mg/dL Final    Total Protein 02/22/2022 7.3  6.4 - 8.2 g/dL Final    Albumin 02/22/2022 3.7  3.5 - 5.0 g/dL Final    Globulin 02/22/2022 3.6  2.0 - 4.0 g/dL Final    A/G Ratio 02/22/2022 1.0   Final    Bilirubin, Total 02/22/2022 0.6  0.0 - 1.2 mg/dL Final    Alk Phos 02/22/2022 154 (A) 45 - 115 U/L Final    ALT 02/22/2022 30  16 - 61 U/L Final    AST 02/22/2022 12 (A) 15 - 37 U/L Final    eGFR 02/22/2022 89  >=60 mL/min/1.73m² Final    Vitamin B12 02/22/2022 324  193 - 986 pg/mL Final    Folate 02/22/2022 5.6  3.1 - 17.5 ng/mL Final    Vitamin D 25-Hydroxy, Blood 02/22/2022 8.2  ng/mL Final    WBC 02/22/2022 15.53 (A) 4.50 - 11.00 K/uL Final    RBC 02/22/2022 5.87  4.60 - 6.20 M/uL Final    Hemoglobin 02/22/2022 17.0  13.5 - 18.0 g/dL Final    Hematocrit 02/22/2022 50.6  40.0 - 54.0 % Final    MCV 02/22/2022 86.2  80.0 - 96.0 fL Final    MCH 02/22/2022 29.0  27.0 - 31.0 pg Final    MCHC 02/22/2022 33.6  32.0 - 36.0 g/dL Final    RDW 02/22/2022 13.3  11.5 - 14.5 % Final    Platelet Count 02/22/2022 279  150 - 400 K/uL Final    MPV 02/22/2022 9.9  9.4 - 12.4 fL Final    Neutrophils % 02/22/2022 67.1 (A) 53.0 - 65.0 % Final    Lymphocytes % 02/22/2022 27.2  27.0 - 41.0 % Final    Monocytes % 02/22/2022 4.4  2.0 - 6.0 % Final    Eosinophils % 02/22/2022 0.5 (A) 1.0 - 4.0 % Final    Basophils %  02/22/2022 0.5  0.0 - 1.0 % Final    Immature Granulocytes % 02/22/2022 0.3  0.0 - 0.4 % Final    nRBC, Auto 02/22/2022 0.0  <=0.0 % Final    Neutrophils, Abs 02/22/2022 10.43 (A) 1.80 - 7.70 K/uL Final    Lymphocytes, Absolute 02/22/2022 4.23  1.00 - 4.80 K/uL Final    Monocytes, Absolute 02/22/2022 0.68  0.00 - 0.80 K/uL Final    Eosinophils, Absolute 02/22/2022 0.07  0.00 - 0.50 K/uL Final    Basophils, Absolute 02/22/2022 0.07  0.00 - 0.20 K/uL Final    Immature Granulocytes, Absolute 02/22/2022 0.05 (A) 0.00 - 0.04 K/uL Final    nRBC, Absolute 02/22/2022 0.00  <=0.00 x10e3/uL Final    Diff Type 02/22/2022 Auto   Final   Office Visit on 01/18/2022   Component Date Value Ref Range Status    POC Amphetamines 01/18/2022 Negative  Negative, Inconclusive Final    POC Barbiturates 01/18/2022 Negative  Negative, Inconclusive Final    POC Benzodiazepines 01/18/2022 Negative  Negative, Inconclusive Final    POC Cocaine 01/18/2022 Negative  Negative, Inconclusive Final    POC THC 01/18/2022 Negative  Negative, Inconclusive Final    POC Methadone 01/18/2022 Negative  Negative, Inconclusive Final    POC Methamphetamine 01/18/2022 Negative  Negative, Inconclusive Final    POC Opiates 01/18/2022 Presumptive Positive (A) Negative, Inconclusive Final    POC Oxycodone 01/18/2022 Negative  Negative, Inconclusive Final    POC Phencyclidine 01/18/2022 Negative  Negative, Inconclusive Final    POC Methylenedioxymethamphetamine * 01/18/2022 Negative  Negative, Inconclusive Final    POC Tricyclic Antidepressants 01/18/2022 Negative  Negative, Inconclusive Final    POC Buprenorphine 01/18/2022 Negative   Final     Acceptable 01/18/2022 Yes   Final    POC Temperature (Urine) 01/18/2022 94   Final   Office Visit on 01/05/2022   Component Date Value Ref Range Status    Hemoglobin A1C 01/05/2022 10.6 (A) 4.5 - 6.6 % Final    POC Glucose 01/05/2022 415 (A) 70 - 110 MG/DL Final   Admission on  12/23/2021, Discharged on 12/23/2021   Component Date Value Ref Range Status    POC Glucose 12/23/2021 157 (A) 70 - 110 MG/DL Final    POC Glucose 12/23/2021 157 (A) 70 - 105 mg/dL Final   Office Visit on 12/17/2021   Component Date Value Ref Range Status    Creatinine, Urine 12/17/2021 32 (A) 39 - 259 mg/dL Final    Microalbumin 12/17/2021 2.6  0.0 - 2.8 mg/dL Final    Microalbumin/Creatinine Ratio 12/17/2021 81.3 (A) 0.0 - 30.0 mg/g Final           Assessment:      1. Lumbosacral spondylosis without myelopathy    2. Diabetic peripheral neuropathy    3. Complex regional pain syndrome type 1 of right upper extremity    4. Encounter for long-term (current) use of other medications    5. 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   Analgesia:Patients pain is partially controlled by current medication. Patient has tried OTC medications such as Tylenol and Ibuprofen with out relief.   Adverse Effects: Patient denies constipation or sedation.  Aberrant Behavior:  reviewed with no aberrant drug seeking/taking behavior.  Overdose reversal drug naloxone discussed    Drug screen reviewed      Requested Prescriptions     Signed Prescriptions Disp Refills    HYDROcodone-acetaminophen (NORCO)  mg per tablet 60 tablet 0     Sig: Take 1 tablet by mouth every 12 (twelve) hours.    HYDROcodone-acetaminophen (NORCO)  mg per tablet 60 tablet 0     Sig: Take 1 tablet by mouth every 12 (twelve) hours.         Plan:    He follows diabetic educator    Follows Neurology [facility] right hand arm    Recovering after amputation below the knee right lower extremity due to gangrene from diabetic ulcer right foot    He states current medications helping control his discomfort he did receive some pain medication after his amputation    Continue current medication    Continue exercise program as directed    Follow-up 2 months    Dr. Johnson December  2022    Bring original prescription medication bottles/container/box with labels to each visit

## 2022-04-22 ENCOUNTER — OFFICE VISIT (OUTPATIENT)
Dept: FAMILY MEDICINE | Facility: CLINIC | Age: 42
End: 2022-04-22
Payer: COMMERCIAL

## 2022-04-22 VITALS
HEART RATE: 117 BPM | OXYGEN SATURATION: 98 % | BODY MASS INDEX: 44.1 KG/M2 | HEIGHT: 71 IN | TEMPERATURE: 100 F | SYSTOLIC BLOOD PRESSURE: 131 MMHG | RESPIRATION RATE: 18 BRPM | DIASTOLIC BLOOD PRESSURE: 88 MMHG | WEIGHT: 315 LBS

## 2022-04-22 DIAGNOSIS — E66.01 MORBID OBESITY: Chronic | ICD-10-CM

## 2022-04-22 DIAGNOSIS — E11.69 TYPE 2 DIABETES MELLITUS WITH OTHER SPECIFIED COMPLICATION, WITHOUT LONG-TERM CURRENT USE OF INSULIN: Chronic | ICD-10-CM

## 2022-04-22 DIAGNOSIS — Z89.511 HX OF RIGHT BKA: Primary | ICD-10-CM

## 2022-04-22 PROCEDURE — 3075F PR MOST RECENT SYSTOLIC BLOOD PRESS GE 130-139MM HG: ICD-10-PCS | Mod: CPTII,,, | Performed by: FAMILY MEDICINE

## 2022-04-22 PROCEDURE — 3008F PR BODY MASS INDEX (BMI) DOCUMENTED: ICD-10-PCS | Mod: CPTII,,, | Performed by: FAMILY MEDICINE

## 2022-04-22 PROCEDURE — 3079F PR MOST RECENT DIASTOLIC BLOOD PRESSURE 80-89 MM HG: ICD-10-PCS | Mod: CPTII,,, | Performed by: FAMILY MEDICINE

## 2022-04-22 PROCEDURE — 3008F BODY MASS INDEX DOCD: CPT | Mod: CPTII,,, | Performed by: FAMILY MEDICINE

## 2022-04-22 PROCEDURE — 99214 OFFICE O/P EST MOD 30 MIN: CPT | Mod: ,,, | Performed by: FAMILY MEDICINE

## 2022-04-22 PROCEDURE — 1160F RVW MEDS BY RX/DR IN RCRD: CPT | Mod: CPTII,,, | Performed by: FAMILY MEDICINE

## 2022-04-22 PROCEDURE — 99214 PR OFFICE/OUTPT VISIT, EST, LEVL IV, 30-39 MIN: ICD-10-PCS | Mod: ,,, | Performed by: FAMILY MEDICINE

## 2022-04-22 PROCEDURE — 1160F PR REVIEW ALL MEDS BY PRESCRIBER/CLIN PHARMACIST DOCUMENTED: ICD-10-PCS | Mod: CPTII,,, | Performed by: FAMILY MEDICINE

## 2022-04-22 PROCEDURE — 3079F DIAST BP 80-89 MM HG: CPT | Mod: CPTII,,, | Performed by: FAMILY MEDICINE

## 2022-04-22 PROCEDURE — 3075F SYST BP GE 130 - 139MM HG: CPT | Mod: CPTII,,, | Performed by: FAMILY MEDICINE

## 2022-04-22 PROCEDURE — 1159F PR MEDICATION LIST DOCUMENTED IN MEDICAL RECORD: ICD-10-PCS | Mod: CPTII,,, | Performed by: FAMILY MEDICINE

## 2022-04-22 PROCEDURE — 1159F MED LIST DOCD IN RCRD: CPT | Mod: CPTII,,, | Performed by: FAMILY MEDICINE

## 2022-04-22 NOTE — PROGRESS NOTES
New Clinic Note    Kennedy West is a 41 y.o. male     CC:   Chief Complaint   Patient presents with    Diabetes     Patient stated he is here for face to face with his PCP for evaluation for power chair. Stated he is unable to be mobile due to loss of right foot BKA since March 08,2022. Stated he has [employer] and he has a regular wheelchair and unable to physically go outside due to small wheels. Patient is alert and able to safely transfer to and from a wheelchair and mentally capable of operating a powered wheelchair. Using a powered wheelchair will significantly improve his ability to participate in ADL.    Hypertension     Patient unable to ambulate with walker or cane in home. No one able to assist him.Had to turn his parents living room into his bedroom because they cannot push him and his wheelchair through door frame.  He has elderly disabled parents that both have multiple health issues and he was actually  taking care of both parents prior to his right foot amputation. Needs to be able to participate in their care and his own care at home and his manual wheelchair does not meet this patients mobility needs at home.         Subjective  Patient is here for an evaluation for a power wheelchair. Patient had a right BKA on the March 8th. He is having difficulty completing ADLs due to lack of mobility. He is using a regular wheelchair. However due to his size and rough terrain in his yard he is unable to operate the wheelchair outside of his house. Due to his size he is unsteady on his regular walker. Patient needs power wheelchair due to BKA and morbid obesity. Patient can not safely transfer on or off of a scooter due to balance issues from BKA.     Presents to clinic for follow up on chronic health problems    Hypertension:    Takes medications as directed: yes  Checks blood pressure outside of clinic: yes  On a statin: yes  Cardiovascular exercise: no  Heart healthy diet:  "no    Diabetes, type 2:    Checks glucose outside of clinic:yes  Keeps log of glucoses: no  Eats a diabetic diet: no  Regular cardiovascular exercise: no  Monitors feet: yes  Most recent HbA1c values:   Hemoglobin A1C   Date Value Ref Range Status   01/05/2022 10.6 (A) 4.5 - 6.6 % Final   07/19/2021 7.4 (H) 4.5 - 6.6 % Final     Comment:       Normal:               <5.7%  Pre-Diabetic:       5.7% to 6.4%  Diabetic:             >6.4%  Diabetic Goal:     <7%   04/01/2021 12.8 % Final      Takes an aspirin: yes  On a statin: yes         Current Outpatient Medications:     ACCU-CHEK GUIDE TEST STRIPS Strp, USE STRIP TO CHECK GLUCOSE TWICE DAILY, Disp: , Rfl:     ACCU-CHEK SOFTCLIX LANCETS Misc, USE TO CHECK GLUCOSE TWICE DAILY, Disp: , Rfl:     albuterol (PROAIR HFA) 90 mcg/actuation inhaler, Inhale 2 puffs into the lungs every 4 (four) hours as needed for Wheezing. Rescue, Disp: 18 g, Rfl: 5    amitriptyline (ELAVIL) 25 MG tablet, Take one tablet at bedtime, Disp: 30 tablet, Rfl: 6    BD CANDIDA 2ND GEN PEN NEEDLE 32 gauge x 5/32" Ndle, USE 1 ONCE DAILY, Disp: 100 each, Rfl: 11    budesonide-formoterol 160-4.5 mcg (SYMBICORT) 160-4.5 mcg/actuation HFAA, Inhale 2 puffs into the lungs every 12 (twelve) hours. Controller, Disp: 10.2 g, Rfl: 5    ergocalciferol (ERGOCALCIFEROL) 50,000 unit Cap, Take one capsule weekly for 12 weeks then reduce to one capsule monthly, Disp: 12 capsule, Rfl: 1    esomeprazole (NEXIUM) 40 MG capsule, Take 1 capsule (40 mg total) by mouth before breakfast., Disp: 90 capsule, Rfl: 1    gabapentin (NEURONTIN) 100 MG capsule, Take two capsules at bedtime, Disp: 60 capsule, Rfl: 3    gabapentin (NEURONTIN) 600 MG tablet, Take 1 tablet (600 mg total) by mouth 3 (three) times daily., Disp: 270 tablet, Rfl: 1    HYDROcodone-acetaminophen (NORCO)  mg per tablet, Take 1 tablet by mouth every 12 (twelve) hours., Disp: 60 tablet, Rfl: 0    [START ON 5/14/2022] HYDROcodone-acetaminophen " (NORCO)  mg per tablet, Take 1 tablet by mouth every 12 (twelve) hours., Disp: 60 tablet, Rfl: 0    insulin (BASAGLAR KWIKPEN U-100 INSULIN) glargine 100 units/mL (3mL) SubQ pen, Inject 36 units sq and increase as directed.  Not to exceed 60 units a day., Disp: 45 mL, Rfl: 1    metoprolol succinate (TOPROL-XL) 50 MG 24 hr tablet, Take 1 tablet (50 mg total) by mouth once daily., Disp: 90 tablet, Rfl: 1    mupirocin (BACTROBAN) 2 % ointment, Apply topically 3 (three) times daily., Disp: 15 g, Rfl: 0    nitroGLYCERIN (NITROSTAT) 0.3 MG SL tablet, Place 1 tablet (0.3 mg total) under the tongue every 5 (five) minutes as needed for Chest pain. Up to 3 times., Disp: 30 tablet, Rfl: 0    OZEMPIC 1 mg/dose (4 mg/3 mL), INJECT 1 MG SUBCUTANEOUSLY EVERY 7 DAYS, Disp: 3 pen, Rfl: 1    tiZANidine (ZANAFLEX) 4 MG tablet, Take 1 tablet (4 mg total) by mouth 3 (three) times daily., Disp: 270 tablet, Rfl: 1    aspirin (ECOTRIN) 81 MG EC tablet, Take 1 tablet (81 mg total) by mouth once daily., Disp: 30 tablet, Rfl: 2    atorvastatin (LIPITOR) 80 MG tablet, Take 1 tablet (80 mg total) by mouth every evening., Disp: 90 tablet, Rfl: 1    ciprofloxacin HCl (CIPRO) 500 MG tablet, , Disp: , Rfl:     empagliflozin (JARDIANCE) 25 mg tablet, Take 1 tablet (25 mg total) by mouth once daily. (Patient not taking: Reported on 4/22/2022), Disp: 90 tablet, Rfl: 1    fenofibrate (TRICOR) 48 MG tablet, Take 1 tablet (48 mg total) by mouth once daily., Disp: 90 tablet, Rfl: 1    isosorbide mononitrate (IMDUR) 30 MG 24 hr tablet, Take 1 tablet (30 mg total) by mouth once daily., Disp: 90 tablet, Rfl: 1    Current Facility-Administered Medications:     acetaminophen tablet 650 mg, 650 mg, Oral, Once PRN, Karlee Liu MD    albuterol inhaler 2 puff, 2 puff, Inhalation, Q20 Min PRN, Karlee Liu MD    diphenhydrAMINE injection 25 mg, 25 mg, Intravenous, Once PRN, Karlee Liu MD    EPINEPHrine (EPIPEN) 0.3 mg/0.3  mL pen injection 0.3 mg, 0.3 mg, Intramuscular, PRN, Karlee Liu MD    ondansetron disintegrating tablet 4 mg, 4 mg, Oral, Once PRN, Karlee Liu MD    sodium chloride 0.9% 500 mL flush bag, , Intravenous, PRN, Karlee Liu MD    sodium chloride 0.9% flush 10 mL, 10 mL, Intravenous, PRN, Karlee Liu MD     Past Medical History:   Diagnosis Date    Allergy     Anxiety     Asthma     Chronic pain     Complex regional pain syndrome type I of right upper extremity     COPD (chronic obstructive pulmonary disease)     Coronary artery disease     Depression     Diabetes mellitus, type 2     Diabetic peripheral neuropathy     Gastritis     GERD (gastroesophageal reflux disease)     Hyperlipidemia     Hypertension     Lumbar radiculopathy     Lumbosacral spondylosis     Non-pressure chronic ulcer of other part of right foot limited to breakdown of skin     Obesity     Obstructive sleep apnea     Osteoarthritis, multiple sites     Polyneuropathy     Tobacco dependence         Family History   Problem Relation Age of Onset    Diabetes Mother     Hypertension Mother     Cancer Mother     Arthritis Mother     Diabetes Father     Heart disease Father     Hyperlipidemia Father     Hypertension Father     Arthritis Father     COPD Father     Rheum arthritis Sister     Arthritis Sister     Stroke Maternal Uncle     Cancer Paternal Aunt     Stroke Maternal Grandmother     Stroke Maternal Grandfather     Cancer Paternal Grandmother     Heart disease Paternal Grandfather         Past Surgical History:   Procedure Laterality Date    ANGIOGRAM, CORONARY, WITH LEFT HEART CATHETERIZATION N/A 8/24/2021    Procedure: Left heart cath w/ coronary angiograms;  Surgeon: Zaheer Real MD;  Location: [facility] CATH LAB;  Service: Cardiology;  Laterality: N/A;    CARPAL TUNNEL RELEASE      right    INJECTION OF ANESTHETIC AGENT AROUND MEDIAL BRANCH NERVES INNERVATING LUMBAR  "FACET JOINT Bilateral 4/15/2021    Procedure: BLOCK, NERVE, FACET JOINT, LUMBAR, MEDIAL BRANCH;  Surgeon: Sandra Johnson MD;  Location: [facility];  Service: Pain Management;  Laterality: Bilateral;  Bilateral L3-S1 MBB    INJECTION OF ANESTHETIC AGENT AROUND MEDIAL BRANCH NERVES INNERVATING LUMBAR FACET JOINT Bilateral 12/23/2021    Procedure: Block-nerve-medial branch-lumbar, bilateral L4 through S1;  Surgeon: Sandra Johnson MD;  Location: [facility];  Service: Pain Management;  Laterality: Bilateral;  pt will bring vaccine verification card- notified in office    INJECTION OF FACET JOINT Bilateral 06/01/2020    Bilateral L3-S1 MBB - Dr Johnson    LEFT HEART CATHETERIZATION N/A 7/21/2021    Procedure: Left heart cath with possible intervention;  Surgeon: Brock Burkett DO;  Location: [facility] CATH LAB;  Service: Cardiology;  Laterality: N/A;    thumb surgery      right    TONSILLECTOMY  1990        Review of Systems   Constitutional: Positive for activity change. Negative for unexpected weight change.   HENT: Negative for hearing loss, rhinorrhea and trouble swallowing.    Eyes: Negative for discharge and visual disturbance.   Respiratory: Negative for chest tightness and wheezing.    Cardiovascular: Negative for chest pain and palpitations.   Gastrointestinal: Negative for blood in stool, constipation, diarrhea and vomiting.   Endocrine: Negative for polydipsia and polyuria.   Genitourinary: Negative for difficulty urinating, hematuria and urgency.   Musculoskeletal: Negative for arthralgias, joint swelling and neck pain.   Neurological: Negative for weakness and headaches.   Psychiatric/Behavioral: Negative for confusion and dysphoric mood.        /88 (BP Location: Left arm, Patient Position: Sitting, BP Method: Large (Automatic))   Pulse (!) 117   Temp 99.9 °F (37.7 °C) (Oral)   Resp 18   Ht 5' 11" (1.803 m)   Wt (!) 160.6 kg (354 lb)   SpO2 98%   BMI 49.37 kg/m²  "     Physical Exam  HENT:      Head: Normocephalic and atraumatic.      Mouth/Throat:      Pharynx: Oropharynx is clear.   Cardiovascular:      Rate and Rhythm: Normal rate and regular rhythm.   Pulmonary:      Effort: Pulmonary effort is normal.      Breath sounds: Normal breath sounds.   Musculoskeletal:      Right Lower Extremity: Right leg is amputated below knee.   Neurological:      Mental Status: He is alert and oriented to person, place, and time.      Gait: Gait abnormal.      Comments: Patient is in a regular wheelchair. He has difficulty transferring due to balance issues.    Psychiatric:         Mood and Affect: Mood normal.         Behavior: Behavior normal.          Assessment and Plan      ICD-10-CM ICD-9-CM   1. Hx of right BKA  Z89.511 V49.75   2. Type 2 diabetes mellitus with other specified complication, without long-term current use of insulin  E11.69 250.80   3. Morbid obesity  E66.01 278.01        Problem List Items Addressed This Visit        Endocrine    Diabetes mellitus, type 2 (Chronic)    Morbid obesity (Chronic)       Orthopedic    Hx of right BKA - Primary (Chronic)         Continue current meds.  Will write for power wheelchair.     Follow up in about 3 months (around 7/22/2022).

## 2022-05-11 ENCOUNTER — OFFICE VISIT (OUTPATIENT)
Dept: SURGERY | Facility: CLINIC | Age: 42
End: 2022-05-11
Payer: COMMERCIAL

## 2022-05-11 VITALS — HEIGHT: 71 IN | BODY MASS INDEX: 44.1 KG/M2 | WEIGHT: 315 LBS

## 2022-05-11 DIAGNOSIS — M79.605 PAIN IN BOTH LOWER EXTREMITIES: ICD-10-CM

## 2022-05-11 DIAGNOSIS — I73.9 CLAUDICATION: ICD-10-CM

## 2022-05-11 DIAGNOSIS — M79.604 PAIN IN BOTH LOWER EXTREMITIES: ICD-10-CM

## 2022-05-11 PROBLEM — M47.817 SPONDYLOSIS OF LUMBOSACRAL REGION WITHOUT MYELOPATHY OR RADICULOPATHY: Chronic | Status: ACTIVE | Noted: 2021-04-15

## 2022-05-11 PROBLEM — I25.2 HISTORY OF HEART ATTACK: Chronic | Status: ACTIVE | Noted: 2021-08-15

## 2022-05-11 PROBLEM — I25.10 CORONARY ARTERY DISEASE INVOLVING NATIVE HEART: Chronic | Status: ACTIVE | Noted: 2021-09-10

## 2022-05-11 PROBLEM — K21.9 GASTROESOPHAGEAL REFLUX DISEASE WITHOUT ESOPHAGITIS: Chronic | Status: ACTIVE | Noted: 2021-10-18

## 2022-05-11 PROBLEM — Z89.511 HX OF RIGHT BKA: Status: ACTIVE | Noted: 2022-05-11

## 2022-05-11 PROBLEM — F17.200 NICOTINE DEPENDENCE: Chronic | Status: ACTIVE | Noted: 2021-07-19

## 2022-05-11 PROBLEM — Z89.511 HX OF RIGHT BKA: Chronic | Status: ACTIVE | Noted: 2022-05-11

## 2022-05-11 PROBLEM — G54.6 PHANTOM PAIN FOLLOWING AMPUTATION OF LOWER LIMB: Chronic | Status: ACTIVE | Noted: 2022-04-13

## 2022-05-11 PROCEDURE — 1160F RVW MEDS BY RX/DR IN RCRD: CPT | Mod: CPTII,,, | Performed by: SURGERY

## 2022-05-11 PROCEDURE — 1159F MED LIST DOCD IN RCRD: CPT | Mod: CPTII,,, | Performed by: SURGERY

## 2022-05-11 PROCEDURE — 99213 OFFICE O/P EST LOW 20 MIN: CPT | Mod: S$PBB,,, | Performed by: SURGERY

## 2022-05-11 PROCEDURE — 1159F PR MEDICATION LIST DOCUMENTED IN MEDICAL RECORD: ICD-10-PCS | Mod: CPTII,,, | Performed by: SURGERY

## 2022-05-11 PROCEDURE — 3008F BODY MASS INDEX DOCD: CPT | Mod: CPTII,,, | Performed by: SURGERY

## 2022-05-11 PROCEDURE — 1160F PR REVIEW ALL MEDS BY PRESCRIBER/CLIN PHARMACIST DOCUMENTED: ICD-10-PCS | Mod: CPTII,,, | Performed by: SURGERY

## 2022-05-11 PROCEDURE — 3008F PR BODY MASS INDEX (BMI) DOCUMENTED: ICD-10-PCS | Mod: CPTII,,, | Performed by: SURGERY

## 2022-05-11 PROCEDURE — 99213 PR OFFICE/OUTPT VISIT, EST, LEVL III, 20-29 MIN: ICD-10-PCS | Mod: S$PBB,,, | Performed by: SURGERY

## 2022-05-11 PROCEDURE — 99215 OFFICE O/P EST HI 40 MIN: CPT | Mod: PBBFAC | Performed by: SURGERY

## 2022-05-11 NOTE — PROGRESS NOTES
Subjective:       Patient ID: Kennedy West is a 41 y.o. male.    Chief Complaint: Consult (PVD)  New patient.  He is a diabetic.  He has had a right below-knee amputation due to infection.  Still some decreased sensation left foot states when he stands his leg was just give out denies significant hip pain does admit to some knee discomfort he has had no previous vascular interventions he does smoke a    Is normal triphasic waveforms throughout the left leg.  He has palpable dorsalis pedis pulse.  Has no wounds or tissue loss of his foot currently  family history includes Arthritis in his father, mother, and sister; COPD in his father; Cancer in his mother, paternal aunt, and paternal grandmother; Diabetes in his father and mother; Heart disease in his father and paternal grandfather; Hyperlipidemia in his father; Hypertension in his father and mother; Rheum arthritis in his sister; Stroke in his maternal grandfather, maternal grandmother, and maternal uncle.  Past Medical History:   Diagnosis Date    Allergy     Anxiety     Asthma     Chronic pain     Complex regional pain syndrome type I of right upper extremity     COPD (chronic obstructive pulmonary disease)     Coronary artery disease     Depression     Diabetes mellitus, type 2     Diabetic peripheral neuropathy     Gastritis     GERD (gastroesophageal reflux disease)     Hyperlipidemia     Hypertension     Lumbar radiculopathy     Lumbosacral spondylosis     Non-pressure chronic ulcer of other part of right foot limited to breakdown of skin     Obesity     Obstructive sleep apnea     Osteoarthritis, multiple sites     Polyneuropathy     Tobacco dependence       Past Surgical History:   Procedure Laterality Date    ANGIOGRAM, CORONARY, WITH LEFT HEART CATHETERIZATION N/A 8/24/2021    Procedure: Left heart cath w/ coronary angiograms;  Surgeon: Zaheer Real MD;  Location: [facility] CATH LAB;  Service: Cardiology;   "Laterality: N/A;    CARPAL TUNNEL RELEASE      right    INJECTION OF ANESTHETIC AGENT AROUND MEDIAL BRANCH NERVES INNERVATING LUMBAR FACET JOINT Bilateral 4/15/2021    Procedure: BLOCK, NERVE, FACET JOINT, LUMBAR, MEDIAL BRANCH;  Surgeon: Sandra Johnson MD;  Location: [facility];  Service: Pain Management;  Laterality: Bilateral;  Bilateral L3-S1 MBB    INJECTION OF ANESTHETIC AGENT AROUND MEDIAL BRANCH NERVES INNERVATING LUMBAR FACET JOINT Bilateral 12/23/2021    Procedure: Block-nerve-medial branch-lumbar, bilateral L4 through S1;  Surgeon: Sandra Johnson MD;  Location: [facility];  Service: Pain Management;  Laterality: Bilateral;  pt will bring vaccine verification card- notified in office    INJECTION OF FACET JOINT Bilateral 06/01/2020    Bilateral L3-S1 MBB - Dr Johnson    LEFT HEART CATHETERIZATION N/A 7/21/2021    Procedure: Left heart cath with possible intervention;  Surgeon: Brock Burkett DO;  Location: [facility] CATH LAB;  Service: Cardiology;  Laterality: N/A;    thumb surgery      right    TONSILLECTOMY  1990       reports that he has been smoking cigarettes. He started smoking about 21 years ago. He has a 5.25 pack-year smoking history. He has never used smokeless tobacco. He reports previous alcohol use. He reports that he does not use drugs.   HPI  Review of Systems      Objective:      Ht 5' 11" (1.803 m)   Wt (!) 160.6 kg (354 lb)   BMI 49.37 kg/m²    Physical Exam  Constitutional:       Appearance: Normal appearance.   Neurological:      Mental Status: He is alert.           Assessment:       1. Pain in both lower extremities    2. Claudication        Plan:       Patient has no significant vascular disease       "

## 2022-05-13 NOTE — PROGRESS NOTES
Subjective:       Patient ID: Kennedy West is a 41 y.o. male.    Chief Complaint:  No chief complaint on file.      History of Present Illness  This pleasant right-handed 41-year-old  male presents to the clinic for follow-up of neuropathy, back pain and bilateral lower extremity weakness.  Patient states he is doing OK today. He reports having a right below the knee amputation due to complications from diabetes. Patient states neuropathy is about the same as last visit. Patient reports neuropathy started 7 years ago with burning, numbness, and tingling that he rates as a 3.5 on a scale of 0-5 in the bilateral legs, foot and hands. Neuropathy is constant and pain is rated as a 7 on a scale of 0-10 with prolonged standing and walking as the precipitating factor. Patient is diabetic and denies neck or back injury. He still reports weakness in the bilateral lower extremities. Discussed with patient that his diabetic neuropathy will not improve until he gets his blood sugars under control. He reports his last Hgb A1c as 8.6 in March 2022 at [facility]. However, His last Hgb A1c in the computer was done on January 5, 2022 and reported as 10.6. His prior Hgb A1c was on April 1, 2021 and reported as 12.8. He is followed by SAVAGE Maravilla who is working with the patient to bring his blood sugars under better control. He is currently on gabapentin 600 mg one three times a day with an additional 200 mg at night and elavil 25 mg at bedtime. He is tolerating these medications without side effects and states it helps some. Discussed increasing the Elavil to 50 mg at bedtime and he is agreeable to this. He was on Cymbalta 60 mg twice a day and states pain treatment discontinued this medication due to reported suicidal thoughts. He states he has not had any suicidal thoughts since discontinuation of the Cymbalta. He did not report any suicidal thoughts to neurology and denies any suicidal thoughts at today's visit. He  previously reported chronic leg weakness and leg pain with ambulation prior to the amputation. We ordered arterial and venous doppler US at the last visit and referred him to vascular surgeon Dr. Luna. His note indicated that there was no significant vascular disease. He is followed by cardiology for history of chest pains. He denies any chest pain or SOB at today's visit. He reports having foot surgery since his last visit and is currently followed by wound care.       Patient states back pain started about 7 years ago and is improved some with back injections by pain treatment. He will continue to follow pain treatment for management of his back pain. Pain is located in the thoracic and lumbar spine that radiates to the bilateral legs and left foot that he rates as a 7 on a scale of 0-10. Pain is described as constant and sharp. He denies any back surgery. He states he completed physical therapy in October 2020 and is followed by TONYA Garcia at pain treatment. He does have obstructive sleep apnea and was prescribed a CPAP but is not using the CPAP. Discussed RAKESH in detail with the patient and encouraged him to get restarted on the Cpap. BDI-II depression questionnaire score in February 2022 was 10 that showed mild depression. He declined again at today's visit for referral to psychiatry to further evaluate the depression and desires not to start any antidepressants. He was recommended to loses weight. He reports 3 falls since his last visit and denies any head injuries. Discussed fall precautions in detail and written education given. He is currently doing home health physical therapy s/p right BKA in March 2022.  Discussed the plan in detail with Neurologist Dr. VALDEZ Winters and  the patient and he is in agreement with the plan. All his questions were answered at today's visit.     CT of the head without contrast done on July 19, 2021 showed no acute intracranial process.     MRI of the cervical spine without  contrast done on November 30, 2020 showed tiny central posterior disc protrusions at C5-C6 and C6-C7. Moderate right posterior lateral disc protrusion causing right lateral recess and neuroforaminal narrowing mild in severity at T1-T2. Findings are also suggested at T2-T3.     MRI of the lumbar spine without contrast done on February 21, 2019 showed a desiccation with minimal facet arthrosis at L3-L4 and L4-L5.     MRI of the thoracic spine without contrast done on January 28,2021 showed no evidence of abnormality demonstrated     EMG nerve conduction study of the bilateral lower extremities done on March 14, 2019 showed a normal study. It was no electrophysiological evidence of peripheral polyneuropathy or LS radiculopathy in the muscles tested in both lower extremities.     EMG nerve conduction study of the bilateral upper extremities done on December 15, 2020 showed electrophysiologic findings suggest bilateral ulnar neuropathies that best localize to the forearm segments bilaterally without a convincing conduction block or marked slowing of conduction velocity. There is no evidence of acute or ongoing cervical radiculopathy affecting the muscles tested in the symptomatic right upper limb. Normal or borderline normal conduction values in multiple nerve segments may reflect underlying mild diabetic polyneuropathy.     EMG NCS of the BLE done on December 10, 2021 by Dr. 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 In conjunction with the clinical history of chronic diabetes, the findings suggest diabetic polyneuropathy. There is no convincing evidence of coexisting diabetic radiculoplexus neuropathy, plexopathy, femoral neuropathy, or other mononeuropathies affecting the lower limbs. IF symptoms progress, particularly in the thighs, repeat EMG NCS should be repeated to look for denervation in proximal muscles.      Arterial doppler US bilaterally done on February 22, 2022 showed Mild to moderate right calf vessel disease and mild left calf vessel disease.     Venous doppler US bilaterally done on February 22, 2022 showed No evidence of DVT seen in either lower extremity.     Review of Systems  Review of Systems   Constitutional: Negative for activity change, diaphoresis, fever and unexpected weight change.   HENT: Negative for congestion, ear pain, facial swelling, hearing loss, tinnitus, trouble swallowing and voice change.    Eyes: Negative for photophobia, pain and visual disturbance.   Respiratory: Negative for chest tightness, shortness of breath and wheezing.    Cardiovascular: Negative for chest pain, palpitations and leg swelling.   Gastrointestinal: Negative for constipation, diarrhea, nausea and vomiting.   Genitourinary: Negative for difficulty urinating.   Musculoskeletal: Positive for back pain. Negative for gait problem, neck pain and neck stiffness.   Skin: Negative for color change, pallor, rash and wound.   Neurological: Positive for weakness and numbness. Negative for dizziness, tremors, seizures, syncope, facial asymmetry, speech difficulty, light-headedness and headaches.   Psychiatric/Behavioral: Negative for agitation, behavioral problems, confusion and hallucinations. The patient is not nervous/anxious and is not hyperactive.        Objective:      Neurologic Exam     Mental Status   Oriented to person, place, and time.   Oriented to person.   Oriented to place.   Oriented to time.   Attention: normal. Concentration: normal.   Speech: speech is normal   Level of consciousness: alert  Knowledge: good.     Cranial Nerves     CN II   Visual fields full to confrontation.     CN III, IV, VI   Pupils are equal, round, and reactive to light.  Extraocular motions are normal.   Right pupil: Size: 3 mm. Shape: regular. Reactivity: brisk.   Left pupil: Size: 3 mm. Shape: regular. Reactivity: brisk.     CN V    Facial sensation intact.     CN VII   Facial expression full, symmetric.     CN VIII   CN VIII normal.   Hearing: intact    CN IX, X   CN IX normal.   CN X normal.     CN XI   CN XI normal.     CN XII   CN XII normal.     Motor Exam   Muscle bulk: normal  Overall muscle tone: normal    Strength   Right deltoid: 5/5  Left deltoid: 5/5  Right biceps: 5/5  Left biceps: 5/5  Right triceps: 5/5  Left triceps: 5/5  Right quadriceps: 3/5  Left quadriceps: 5/5  Right hamstring: 3/5  Left hamstrin/5  Right below the knee amputation 2022     Sensory Exam   Light touch normal.     Gait, Coordination, and Reflexes     Coordination   Finger to nose coordination: normal    Tremor   Resting tremor: absent  Intention tremor: absent  Action tremor: absent    Reflexes   Right brachioradialis: 2+  Left brachioradialis: 2+  Right biceps: 2+  Left biceps: 2+  Right triceps: 2+  Left triceps: 2+  Left patellar: 2+  Left achilles: 2+  Right : 4+  Left : 4+  Patient in wheelchair with right BKA       Physical Exam  Constitutional:       General: He is not in acute distress.  HENT:      Head: Normocephalic.      Mouth/Throat:      Mouth: Mucous membranes are moist.   Eyes:      Extraocular Movements: Extraocular movements intact and EOM normal.      Pupils: Pupils are equal, round, and reactive to light.   Cardiovascular:      Rate and Rhythm: Normal rate and regular rhythm.      Heart sounds: Normal heart sounds. No murmur heard.  Pulmonary:      Effort: Pulmonary effort is normal. No respiratory distress.      Breath sounds: Normal breath sounds. No wheezing, rhonchi or rales.   Musculoskeletal:         General: No swelling, tenderness, deformity or signs of injury. Normal range of motion.      Cervical back: Normal range of motion. No rigidity or tenderness.      Right lower leg: No edema.      Left lower leg: No edema.   Skin:     General: Skin is warm and dry.      Capillary Refill: Capillary refill takes less than  2 seconds.      Coloration: Skin is not jaundiced or pale.      Findings: No bruising, erythema, lesion or rash.   Neurological:      Mental Status: He is alert and oriented to person, place, and time.      Coordination: Finger-Nose-Finger Test normal.      Deep Tendon Reflexes:      Reflex Scores:       Tricep reflexes are 2+ on the right side and 2+ on the left side.       Bicep reflexes are 2+ on the right side and 2+ on the left side.       Brachioradialis reflexes are 2+ on the right side and 2+ on the left side.       Patellar reflexes are 2+ on the left side.       Achilles reflexes are 2+ on the left side.  Psychiatric:         Mood and Affect: Mood normal.         Speech: Speech normal.         Behavior: Behavior normal.           Assessment:     Problem List Items Addressed This Visit        Neuro    Diabetic polyneuropathy associated with type 2 diabetes mellitus - Primary (Chronic)    Relevant Medications    HUMALOG KWIKPEN INSULIN 100 unit/mL pen    gabapentin (NEURONTIN) 600 MG tablet    gabapentin (NEURONTIN) 100 MG capsule    amitriptyline (ELAVIL) 50 MG tablet       Psychiatric    Depressive disorder (Chronic)    Relevant Medications    amitriptyline (ELAVIL) 50 MG tablet       Endocrine    Morbid obesity (Chronic)    Vitamin D deficiency       Other    Sleep apnea (Chronic)            Plan:     1. Go to ER for any chest pain and SOB with exertion  2. Fall precautions  3. Renew and increase elavil to 50 mg at bedtime  4. Smoking cessation  5. Consider Lyrica in the future  6. Keep blood sugars under control  7. Recommend weight loss  8. Continue vitamin D 50,000 IU one capsule monthly  9. Keep follow up with PCP for medical management  10. Keep follow up with SAVAGE Maravilla for diabetes management  11. Keep follow up with Cardiologist Dr. Raygoza  12. Keep follow up with Pain treatment for back pain  13. Continue home health physical therapy  14. Patient declined referral to psychiatry to treat  depression  15. Renew and continue Gabapentin 600 mg three times a day  16. Can try Capsaicin cream over the counter, follow directions on package  17. Renew and continue Gabapentin 100 mg two capsules at bedtime with your regular dose of 600 mg at bedtime  18. Follow up with Neurology in 3 months or sooner if needed

## 2022-05-18 ENCOUNTER — OFFICE VISIT (OUTPATIENT)
Dept: NEUROLOGY | Facility: CLINIC | Age: 42
End: 2022-05-18
Payer: COMMERCIAL

## 2022-05-18 VITALS
WEIGHT: 315 LBS | OXYGEN SATURATION: 97 % | DIASTOLIC BLOOD PRESSURE: 84 MMHG | BODY MASS INDEX: 44.1 KG/M2 | HEART RATE: 109 BPM | HEIGHT: 71 IN | SYSTOLIC BLOOD PRESSURE: 122 MMHG

## 2022-05-18 DIAGNOSIS — E55.9 VITAMIN D DEFICIENCY: ICD-10-CM

## 2022-05-18 DIAGNOSIS — E11.42 DIABETIC POLYNEUROPATHY ASSOCIATED WITH TYPE 2 DIABETES MELLITUS: Primary | ICD-10-CM

## 2022-05-18 DIAGNOSIS — E66.01 MORBID OBESITY: Chronic | ICD-10-CM

## 2022-05-18 DIAGNOSIS — F32.A DEPRESSIVE DISORDER: Chronic | ICD-10-CM

## 2022-05-18 DIAGNOSIS — G47.33 OBSTRUCTIVE SLEEP APNEA SYNDROME: Chronic | ICD-10-CM

## 2022-05-18 PROCEDURE — 1159F MED LIST DOCD IN RCRD: CPT | Mod: CPTII,,, | Performed by: NURSE PRACTITIONER

## 2022-05-18 PROCEDURE — 99213 OFFICE O/P EST LOW 20 MIN: CPT | Mod: PBBFAC | Performed by: NURSE PRACTITIONER

## 2022-05-18 PROCEDURE — 1159F PR MEDICATION LIST DOCUMENTED IN MEDICAL RECORD: ICD-10-PCS | Mod: CPTII,,, | Performed by: NURSE PRACTITIONER

## 2022-05-18 PROCEDURE — 3079F PR MOST RECENT DIASTOLIC BLOOD PRESSURE 80-89 MM HG: ICD-10-PCS | Mod: CPTII,,, | Performed by: NURSE PRACTITIONER

## 2022-05-18 PROCEDURE — 99214 PR OFFICE/OUTPT VISIT, EST, LEVL IV, 30-39 MIN: ICD-10-PCS | Mod: S$PBB,,, | Performed by: NURSE PRACTITIONER

## 2022-05-18 PROCEDURE — 3008F PR BODY MASS INDEX (BMI) DOCUMENTED: ICD-10-PCS | Mod: CPTII,,, | Performed by: NURSE PRACTITIONER

## 2022-05-18 PROCEDURE — 3074F SYST BP LT 130 MM HG: CPT | Mod: CPTII,,, | Performed by: NURSE PRACTITIONER

## 2022-05-18 PROCEDURE — 3074F PR MOST RECENT SYSTOLIC BLOOD PRESSURE < 130 MM HG: ICD-10-PCS | Mod: CPTII,,, | Performed by: NURSE PRACTITIONER

## 2022-05-18 PROCEDURE — 1160F RVW MEDS BY RX/DR IN RCRD: CPT | Mod: CPTII,,, | Performed by: NURSE PRACTITIONER

## 2022-05-18 PROCEDURE — 1160F PR REVIEW ALL MEDS BY PRESCRIBER/CLIN PHARMACIST DOCUMENTED: ICD-10-PCS | Mod: CPTII,,, | Performed by: NURSE PRACTITIONER

## 2022-05-18 PROCEDURE — 99214 OFFICE O/P EST MOD 30 MIN: CPT | Mod: S$PBB,,, | Performed by: NURSE PRACTITIONER

## 2022-05-18 PROCEDURE — 3008F BODY MASS INDEX DOCD: CPT | Mod: CPTII,,, | Performed by: NURSE PRACTITIONER

## 2022-05-18 PROCEDURE — 3079F DIAST BP 80-89 MM HG: CPT | Mod: CPTII,,, | Performed by: NURSE PRACTITIONER

## 2022-05-18 RX ORDER — AMITRIPTYLINE HYDROCHLORIDE 50 MG/1
TABLET, FILM COATED ORAL
Qty: 90 TABLET | Refills: 1 | Status: SHIPPED | OUTPATIENT
Start: 2022-05-18 | End: 2022-06-09 | Stop reason: SDUPTHER

## 2022-05-18 RX ORDER — GABAPENTIN 600 MG/1
600 TABLET ORAL 3 TIMES DAILY
Qty: 270 TABLET | Refills: 1 | Status: SHIPPED | OUTPATIENT
Start: 2022-05-18 | End: 2022-06-09

## 2022-05-18 RX ORDER — INSULIN LISPRO 100 [IU]/ML
INJECTION, SOLUTION INTRAVENOUS; SUBCUTANEOUS
COMMUNITY
Start: 2022-04-15 | End: 2022-06-22 | Stop reason: SDUPTHER

## 2022-05-18 RX ORDER — GABAPENTIN 100 MG/1
CAPSULE ORAL
Qty: 180 CAPSULE | Refills: 1 | Status: SHIPPED | OUTPATIENT
Start: 2022-05-18 | End: 2022-06-09

## 2022-05-18 RX ORDER — CYCLOBENZAPRINE HCL 10 MG
10 TABLET ORAL 3 TIMES DAILY PRN
COMMUNITY
Start: 2022-05-09 | End: 2022-06-22

## 2022-05-18 NOTE — PATIENT INSTRUCTIONS
1. Go to ER for any chest pain and SOB with exertion  2. Fall precautions  3. Renew and increase elavil to 50 mg at bedtime  4. Smoking cessation  5. Consider Lyrica in the future  6. Keep blood sugars under control  7. Recommend weight loss  8. Continue vitamin D 50,000 IU one capsule monthly  9. Keep follow up with PCP for medical management  10. Keep follow up with SAVAGE Maravilla for diabetes management  11. Keep follow up with Cardiologist Dr. Raygoza  12. Keep follow up with Pain treatment for back pain  13. Continue home health physical therapy  14. Patient declined referral to psychiatry to treat depression  15. Renew and continue Gabapentin 600 mg three times a day  16. Can try Capsaicin cream over the counter, follow directions on package  17. Renew and continue Gabapentin 100 mg two capsules at bedtime with your regular dose of 600 mg at bedtime  18. Follow up with Neurology in 3 months or sooner if needed

## 2022-06-08 NOTE — PROGRESS NOTES
Subjective:      Patient ID: Kennedy West is a 41 y.o. male.    Chief Complaint: Back Pain, Shoulder Pain, and Injections      Pain  This is a chronic problem. The current episode started more than 1 year ago. The problem occurs daily. The problem has been waxing and waning. Associated symptoms include arthralgias. Pertinent negatives include no change in bowel habit, chest pain, chills, coughing, fatigue, fever, rash, sore throat, vertigo or vomiting.     Review of Systems   Constitutional: Negative for activity change, appetite change, chills, fatigue and fever.   HENT: Negative for drooling, ear discharge, ear pain, facial swelling, nosebleeds, sore throat, trouble swallowing, voice change and goiter.    Eyes: Negative for photophobia, pain, discharge, redness and visual disturbance.   Respiratory: Negative for apnea, cough, choking, chest tightness, shortness of breath, wheezing and stridor.    Cardiovascular: Negative for chest pain, palpitations and leg swelling.   Gastrointestinal: Negative for abdominal distention, change in bowel habit, diarrhea, rectal pain, vomiting, fecal incontinence and change in bowel habit.   Endocrine: Negative for cold intolerance, heat intolerance, polydipsia, polyphagia and polyuria.   Genitourinary: Negative for bladder incontinence, dysuria, flank pain and frequency.   Musculoskeletal: Positive for arthralgias, back pain, leg pain and neck stiffness.   Integumentary:  Negative for color change, pallor and rash.   Neurological: Negative for dizziness, vertigo, seizures, syncope, facial asymmetry, speech difficulty, light-headedness, disturbances in coordination, memory loss and coordination difficulties.   Hematological: Negative for adenopathy. Does not bruise/bleed easily.   Psychiatric/Behavioral: Negative for agitation, behavioral problems, confusion, decreased concentration, dysphoric mood, hallucinations, self-injury and suicidal ideas. The patient is not  nervous/anxious and is not hyperactive.             Objective:  Vitals:    06/09/22 1405   BP: 118/79   Pulse: (!) 118   Resp: 18   SpO2: 98%   Weight: (!) 156.5 kg (345 lb)   Height: 6' (1.829 m)   PainSc:   6         Physical Exam  Vitals and nursing note reviewed. Exam conducted with a chaperone present.   Constitutional:       General: He is awake. He is not in acute distress.     Appearance: Normal appearance. He is not toxic-appearing.   HENT:      Head: Normocephalic and atraumatic.      Nose: Nose normal.      Mouth/Throat:      Mouth: Mucous membranes are moist.      Pharynx: Oropharynx is clear.   Eyes:      Conjunctiva/sclera: Conjunctivae normal.      Pupils: Pupils are equal, round, and reactive to light.   Cardiovascular:      Rate and Rhythm: Normal rate.   Pulmonary:      Effort: Pulmonary effort is normal. No respiratory distress.   Abdominal:      Palpations: Abdomen is soft.      Tenderness: There is no guarding.   Musculoskeletal:         General: Normal range of motion.      Cervical back: Normal range of motion and neck supple. No rigidity.      Thoracic back: Tenderness present.      Lumbar back: Tenderness present.   Skin:     General: Skin is warm and dry.      Coloration: Skin is not jaundiced or pale.   Neurological:      General: No focal deficit present.      Mental Status: He is alert and oriented to person, place, and time. Mental status is at baseline.      Cranial Nerves: Cranial nerves are intact. No cranial nerve deficit (II-XII).   Psychiatric:         Mood and Affect: Mood normal.         Behavior: Behavior normal. Behavior is cooperative.         Thought Content: Thought content normal.           No orders of the defined types were placed in this encounter.       US Lower Extrem Arteries Bilat with ROGELIO (xpd)  Narrative: EXAMINATION:  US ARTERIAL LOWER EXTREMITY BILAT WITH ROGELIO (XPD)    CLINICAL HISTORY:  Peripheral vascular disease, unspecified    TECHNIQUE:  Grayscale, spectral  Doppler, and color flow analysis performed and interpreted    FINDINGS:  3 cuff method performed    There are atherosclerotic changes present on the 2 dimensional images    Ankle brachial indices 1.27 on the right and 1.34 on the left    There is a triphasic waveform in the right common femoral, superficial femoral, popliteal, and posterior tibial arteries    There is a biphasic waveform with moderate spectral broadening in the right dorsalis pedis    There is a triphasic waveform in the left common femoral, superficial femoral, popliteal, posterior tibial arteries and dorsalis pedis with mild spectral broadening distally    No focal areas of doubling velocities seen bilaterally  Impression: 1. Mild to moderate right calf vessel disease  2. Mild left calf vessel disease  Ultrasound images captured and stored.    Electronically signed by: Elsie Boone  Date:    02/22/2022  Time:    12:24  US Lower Extremity Veins Bilateral  Narrative: EXAMINATION:  US LOWER EXTREMITY VEINS BILATERAL    CLINICAL HISTORY:  US LOWER EXTREMITY VEINS BILATERAL Pain in leg, unspecified    TECHNIQUE:  Grayscale, spectral Doppler, and color flow analysis performed and interpreted.    COMPARISON:  None.    FINDINGS:  No evidence of echogenic, non-compressible thrombus seen in either common femoral, superficial femoral, popliteal, or saphenous veins.  Impression: No evidence of DVT seen in either lower extremity.    Electronically signed by: Elsie Boone  Date:    02/22/2022  Time:    11:51       Office Visit on 04/13/2022   Component Date Value Ref Range Status    POC Amphetamines 04/13/2022 Negative  Negative, Inconclusive Final    POC Barbiturates 04/13/2022 Negative  Negative, Inconclusive Final    POC Benzodiazepines 04/13/2022 Negative  Negative, Inconclusive Final    POC Cocaine 04/13/2022 Negative  Negative, Inconclusive Final    POC THC 04/13/2022 Negative  Negative, Inconclusive Final    POC Methadone 04/13/2022 Negative  Negative,  Inconclusive Final    POC Methamphetamine 04/13/2022 Negative  Negative, Inconclusive Final    POC Opiates 04/13/2022 Presumptive Positive (A) Negative, Inconclusive Final    POC Oxycodone 04/13/2022 Negative  Negative, Inconclusive Final    POC Phencyclidine 04/13/2022 Negative  Negative, Inconclusive Final    POC Methylenedioxymethamphetamine * 04/13/2022 Negative  Negative, Inconclusive Final    POC Tricyclic Antidepressants 04/13/2022 Negative  Negative, Inconclusive Final    POC Buprenorphine 04/13/2022 Negative   Final     Acceptable 04/13/2022 Yes   Final    POC Temperature (Urine) 04/13/2022 90   Final   Lab Visit on 02/22/2022   Component Date Value Ref Range Status    Sodium 02/22/2022 138  136 - 145 mmol/L Final    Potassium 02/22/2022 3.8  3.5 - 5.1 mmol/L Final    Chloride 02/22/2022 104  98 - 107 mmol/L Final    CO2 02/22/2022 28  21 - 32 mmol/L Final    Anion Gap 02/22/2022 10  7 - 16 mmol/L Final    Glucose 02/22/2022 187 (A) 74 - 106 mg/dL Final    BUN 02/22/2022 14  7 - 18 mg/dL Final    Creatinine 02/22/2022 0.99  0.70 - 1.30 mg/dL Final    BUN/Creatinine Ratio 02/22/2022 14  6 - 20 Final    Calcium 02/22/2022 9.3  8.5 - 10.1 mg/dL Final    Total Protein 02/22/2022 7.3  6.4 - 8.2 g/dL Final    Albumin 02/22/2022 3.7  3.5 - 5.0 g/dL Final    Globulin 02/22/2022 3.6  2.0 - 4.0 g/dL Final    A/G Ratio 02/22/2022 1.0   Final    Bilirubin, Total 02/22/2022 0.6  0.0 - 1.2 mg/dL Final    Alk Phos 02/22/2022 154 (A) 45 - 115 U/L Final    ALT 02/22/2022 30  16 - 61 U/L Final    AST 02/22/2022 12 (A) 15 - 37 U/L Final    eGFR 02/22/2022 89  >=60 mL/min/1.73m² Final    Vitamin B12 02/22/2022 324  193 - 986 pg/mL Final    Folate 02/22/2022 5.6  3.1 - 17.5 ng/mL Final    Vitamin D 25-Hydroxy, Blood 02/22/2022 8.2  ng/mL Final    WBC 02/22/2022 15.53 (A) 4.50 - 11.00 K/uL Final    RBC 02/22/2022 5.87  4.60 - 6.20 M/uL Final    Hemoglobin 02/22/2022 17.0  13.5 -  18.0 g/dL Final    Hematocrit 02/22/2022 50.6  40.0 - 54.0 % Final    MCV 02/22/2022 86.2  80.0 - 96.0 fL Final    MCH 02/22/2022 29.0  27.0 - 31.0 pg Final    MCHC 02/22/2022 33.6  32.0 - 36.0 g/dL Final    RDW 02/22/2022 13.3  11.5 - 14.5 % Final    Platelet Count 02/22/2022 279  150 - 400 K/uL Final    MPV 02/22/2022 9.9  9.4 - 12.4 fL Final    Neutrophils % 02/22/2022 67.1 (A) 53.0 - 65.0 % Final    Lymphocytes % 02/22/2022 27.2  27.0 - 41.0 % Final    Monocytes % 02/22/2022 4.4  2.0 - 6.0 % Final    Eosinophils % 02/22/2022 0.5 (A) 1.0 - 4.0 % Final    Basophils % 02/22/2022 0.5  0.0 - 1.0 % Final    Immature Granulocytes % 02/22/2022 0.3  0.0 - 0.4 % Final    nRBC, Auto 02/22/2022 0.0  <=0.0 % Final    Neutrophils, Abs 02/22/2022 10.43 (A) 1.80 - 7.70 K/uL Final    Lymphocytes, Absolute 02/22/2022 4.23  1.00 - 4.80 K/uL Final    Monocytes, Absolute 02/22/2022 0.68  0.00 - 0.80 K/uL Final    Eosinophils, Absolute 02/22/2022 0.07  0.00 - 0.50 K/uL Final    Basophils, Absolute 02/22/2022 0.07  0.00 - 0.20 K/uL Final    Immature Granulocytes, Absolute 02/22/2022 0.05 (A) 0.00 - 0.04 K/uL Final    nRBC, Absolute 02/22/2022 0.00  <=0.00 x10e3/uL Final    Diff Type 02/22/2022 Auto   Final   Office Visit on 01/18/2022   Component Date Value Ref Range Status    POC Amphetamines 01/18/2022 Negative  Negative, Inconclusive Final    POC Barbiturates 01/18/2022 Negative  Negative, Inconclusive Final    POC Benzodiazepines 01/18/2022 Negative  Negative, Inconclusive Final    POC Cocaine 01/18/2022 Negative  Negative, Inconclusive Final    POC THC 01/18/2022 Negative  Negative, Inconclusive Final    POC Methadone 01/18/2022 Negative  Negative, Inconclusive Final    POC Methamphetamine 01/18/2022 Negative  Negative, Inconclusive Final    POC Opiates 01/18/2022 Presumptive Positive (A) Negative, Inconclusive Final    POC Oxycodone 01/18/2022 Negative  Negative, Inconclusive Final    POC  Phencyclidine 01/18/2022 Negative  Negative, Inconclusive Final    POC Methylenedioxymethamphetamine * 01/18/2022 Negative  Negative, Inconclusive Final    POC Tricyclic Antidepressants 01/18/2022 Negative  Negative, Inconclusive Final    POC Buprenorphine 01/18/2022 Negative   Final     Acceptable 01/18/2022 Yes   Final    POC Temperature (Urine) 01/18/2022 94   Final   Office Visit on 01/05/2022   Component Date Value Ref Range Status    Hemoglobin A1C 01/05/2022 10.6 (A) 4.5 - 6.6 % Final    POC Glucose 01/05/2022 415 (A) 70 - 110 MG/DL Final   Admission on 12/23/2021, Discharged on 12/23/2021   Component Date Value Ref Range Status    POC Glucose 12/23/2021 157 (A) 70 - 110 MG/DL Final    POC Glucose 12/23/2021 157 (A) 70 - 105 mg/dL Final   Office Visit on 12/17/2021   Component Date Value Ref Range Status    Creatinine, Urine 12/17/2021 32 (A) 39 - 259 mg/dL Final    Microalbumin 12/17/2021 2.6  0.0 - 2.8 mg/dL Final    Microalbumin/Creatinine Ratio 12/17/2021 81.3 (A) 0.0 - 30.0 mg/g Final           Assessment:      1. Complex regional pain syndrome type 1 of right upper extremity    2. Lumbosacral spondylosis without myelopathy    3. Diabetic peripheral neuropathy    4. 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 Diabetic polyneuropathy associated with type 2 diabetes mellitus    6. Depressive disorder            A's of Opioid Risk Assessment  Activity:Patient can perform ADL.   Analgesia:Patients pain is partially controlled by current medication. Patient has tried OTC medications such as Tylenol and Ibuprofen with out relief.   Adverse Effects: Patient denies constipation or sedation.  Aberrant Behavior:  reviewed with no aberrant drug seeking/taking behavior.  Overdose reversal drug naloxone discussed    Drug screen reviewed      Requested Prescriptions     Signed Prescriptions Disp Refills    HYDROcodone-acetaminophen (NORCO)  mg per tablet 60 tablet 0      Sig: Take 1 tablet by mouth every 12 (twelve) hours.    pregabalin (LYRICA) 75 MG capsule 90 capsule 1     Sig: Take 1 capsule (75 mg total) by mouth 3 (three) times daily.    HYDROcodone-acetaminophen (NORCO)  mg per tablet 60 tablet 0     Sig: Take 1 tablet by mouth every 12 (twelve) hours.    amitriptyline (ELAVIL) 75 MG tablet 30 tablet 1     Sig: Take one tablet at bedtime         Plan:    He follows diabetic educator    Follows Neurology [facility] right hand arm    Complaint increasing right lower extremity phantom limb pain jerking severe pain requesting assistance with medication    Will increase Elavil 75 mg    He will wean and discontinue Neurontin it is not helping    Lyrica 75 mg 1 p.o. Q 8 hours    Continue other medication as directed    Continue exercise program as directed    Follow-up 2 months    Dr. Johnson December 2022    Bring original prescription medication bottles/container/box with labels to each visit

## 2022-06-09 ENCOUNTER — OFFICE VISIT (OUTPATIENT)
Dept: PAIN MEDICINE | Facility: CLINIC | Age: 42
End: 2022-06-09
Payer: COMMERCIAL

## 2022-06-09 VITALS
WEIGHT: 315 LBS | DIASTOLIC BLOOD PRESSURE: 79 MMHG | OXYGEN SATURATION: 98 % | BODY MASS INDEX: 42.66 KG/M2 | HEIGHT: 72 IN | RESPIRATION RATE: 18 BRPM | HEART RATE: 118 BPM | SYSTOLIC BLOOD PRESSURE: 118 MMHG

## 2022-06-09 DIAGNOSIS — E11.42 DIABETIC POLYNEUROPATHY ASSOCIATED WITH TYPE 2 DIABETES MELLITUS: ICD-10-CM

## 2022-06-09 DIAGNOSIS — E11.42 DIABETIC PERIPHERAL NEUROPATHY: ICD-10-CM

## 2022-06-09 DIAGNOSIS — G90.511 COMPLEX REGIONAL PAIN SYNDROME TYPE 1 OF RIGHT UPPER EXTREMITY: Primary | Chronic | ICD-10-CM

## 2022-06-09 DIAGNOSIS — F32.A DEPRESSIVE DISORDER: Chronic | ICD-10-CM

## 2022-06-09 DIAGNOSIS — G54.6 PHANTOM PAIN FOLLOWING AMPUTATION OF LOWER LIMB: ICD-10-CM

## 2022-06-09 DIAGNOSIS — M47.817 LUMBOSACRAL SPONDYLOSIS WITHOUT MYELOPATHY: Chronic | ICD-10-CM

## 2022-06-09 PROCEDURE — 99213 OFFICE O/P EST LOW 20 MIN: CPT | Mod: PBBFAC | Performed by: PHYSICIAN ASSISTANT

## 2022-06-09 PROCEDURE — 99214 OFFICE O/P EST MOD 30 MIN: CPT | Mod: S$PBB,,, | Performed by: PHYSICIAN ASSISTANT

## 2022-06-09 PROCEDURE — 3008F BODY MASS INDEX DOCD: CPT | Mod: CPTII,,, | Performed by: PHYSICIAN ASSISTANT

## 2022-06-09 PROCEDURE — 1159F MED LIST DOCD IN RCRD: CPT | Mod: CPTII,,, | Performed by: PHYSICIAN ASSISTANT

## 2022-06-09 PROCEDURE — 3074F PR MOST RECENT SYSTOLIC BLOOD PRESSURE < 130 MM HG: ICD-10-PCS | Mod: CPTII,,, | Performed by: PHYSICIAN ASSISTANT

## 2022-06-09 PROCEDURE — 1159F PR MEDICATION LIST DOCUMENTED IN MEDICAL RECORD: ICD-10-PCS | Mod: CPTII,,, | Performed by: PHYSICIAN ASSISTANT

## 2022-06-09 PROCEDURE — 3074F SYST BP LT 130 MM HG: CPT | Mod: CPTII,,, | Performed by: PHYSICIAN ASSISTANT

## 2022-06-09 PROCEDURE — 3078F DIAST BP <80 MM HG: CPT | Mod: CPTII,,, | Performed by: PHYSICIAN ASSISTANT

## 2022-06-09 PROCEDURE — 3008F PR BODY MASS INDEX (BMI) DOCUMENTED: ICD-10-PCS | Mod: CPTII,,, | Performed by: PHYSICIAN ASSISTANT

## 2022-06-09 PROCEDURE — 3078F PR MOST RECENT DIASTOLIC BLOOD PRESSURE < 80 MM HG: ICD-10-PCS | Mod: CPTII,,, | Performed by: PHYSICIAN ASSISTANT

## 2022-06-09 PROCEDURE — 99214 PR OFFICE/OUTPT VISIT, EST, LEVL IV, 30-39 MIN: ICD-10-PCS | Mod: S$PBB,,, | Performed by: PHYSICIAN ASSISTANT

## 2022-06-09 RX ORDER — HYDROCODONE BITARTRATE AND ACETAMINOPHEN 10; 325 MG/1; MG/1
1 TABLET ORAL EVERY 12 HOURS
Qty: 60 TABLET | Refills: 0 | Status: SHIPPED | OUTPATIENT
Start: 2022-06-13 | End: 2022-07-13

## 2022-06-09 RX ORDER — PREGABALIN 75 MG/1
75 CAPSULE ORAL 3 TIMES DAILY
Qty: 90 CAPSULE | Refills: 1 | Status: SHIPPED | OUTPATIENT
Start: 2022-06-09 | End: 2022-08-09

## 2022-06-09 RX ORDER — HYDROCODONE BITARTRATE AND ACETAMINOPHEN 10; 325 MG/1; MG/1
1 TABLET ORAL EVERY 12 HOURS
Qty: 60 TABLET | Refills: 0 | Status: SHIPPED | OUTPATIENT
Start: 2022-07-13 | End: 2022-08-08 | Stop reason: SDUPTHER

## 2022-06-09 RX ORDER — AMITRIPTYLINE HYDROCHLORIDE 75 MG/1
TABLET ORAL
Qty: 30 TABLET | Refills: 1 | Status: SHIPPED | OUTPATIENT
Start: 2022-06-09 | End: 2022-06-22 | Stop reason: SDUPTHER

## 2022-06-22 ENCOUNTER — OFFICE VISIT (OUTPATIENT)
Dept: FAMILY MEDICINE | Facility: CLINIC | Age: 42
End: 2022-06-22
Payer: COMMERCIAL

## 2022-06-22 VITALS
TEMPERATURE: 99 F | HEIGHT: 72 IN | SYSTOLIC BLOOD PRESSURE: 125 MMHG | WEIGHT: 315 LBS | HEART RATE: 100 BPM | RESPIRATION RATE: 18 BRPM | BODY MASS INDEX: 42.66 KG/M2 | OXYGEN SATURATION: 98 % | DIASTOLIC BLOOD PRESSURE: 85 MMHG

## 2022-06-22 DIAGNOSIS — Z79.4 TYPE 2 DIABETES MELLITUS WITH HYPERGLYCEMIA, WITH LONG-TERM CURRENT USE OF INSULIN: ICD-10-CM

## 2022-06-22 DIAGNOSIS — E11.65 TYPE 2 DIABETES MELLITUS WITH HYPERGLYCEMIA, WITH LONG-TERM CURRENT USE OF INSULIN: ICD-10-CM

## 2022-06-22 DIAGNOSIS — Z13.1 SCREENING FOR DIABETES MELLITUS (DM): ICD-10-CM

## 2022-06-22 DIAGNOSIS — Z00.01 ANNUAL VISIT FOR GENERAL ADULT MEDICAL EXAMINATION WITH ABNORMAL FINDINGS: Primary | ICD-10-CM

## 2022-06-22 DIAGNOSIS — Z13.220 SCREENING FOR LIPID DISORDERS: ICD-10-CM

## 2022-06-22 LAB
CHOLEST SERPL-MCNC: 120 MG/DL (ref 0–200)
CHOLEST/HDLC SERPL: 3.2 {RATIO}
EST. AVERAGE GLUCOSE BLD GHB EST-MCNC: 207 MG/DL
GLUCOSE SERPL-MCNC: 205 MG/DL (ref 74–106)
HBA1C MFR BLD HPLC: 8.8 % (ref 4.5–6.6)
HDLC SERPL-MCNC: 38 MG/DL (ref 40–60)
LDLC SERPL CALC-MCNC: 41 MG/DL
LDLC/HDLC SERPL: 1.1 {RATIO}
NONHDLC SERPL-MCNC: 82 MG/DL
TRIGL SERPL-MCNC: 204 MG/DL (ref 35–150)
VLDLC SERPL-MCNC: 41 MG/DL

## 2022-06-22 PROCEDURE — 82947 GLUCOSE, FASTING: ICD-10-PCS | Mod: ,,, | Performed by: CLINICAL MEDICAL LABORATORY

## 2022-06-22 PROCEDURE — 83036 HEMOGLOBIN A1C: ICD-10-PCS | Mod: ,,, | Performed by: CLINICAL MEDICAL LABORATORY

## 2022-06-22 PROCEDURE — 3008F PR BODY MASS INDEX (BMI) DOCUMENTED: ICD-10-PCS | Mod: CPTII,,, | Performed by: FAMILY MEDICINE

## 2022-06-22 PROCEDURE — 3060F PR POS MICROALBUMINURIA RESULT DOCUMENTED/REVIEW: ICD-10-PCS | Mod: CPTII,,, | Performed by: FAMILY MEDICINE

## 2022-06-22 PROCEDURE — 3060F POS MICROALBUMINURIA REV: CPT | Mod: CPTII,,, | Performed by: FAMILY MEDICINE

## 2022-06-22 PROCEDURE — 82947 ASSAY GLUCOSE BLOOD QUANT: CPT | Mod: ,,, | Performed by: CLINICAL MEDICAL LABORATORY

## 2022-06-22 PROCEDURE — 1160F PR REVIEW ALL MEDS BY PRESCRIBER/CLIN PHARMACIST DOCUMENTED: ICD-10-PCS | Mod: CPTII,,, | Performed by: FAMILY MEDICINE

## 2022-06-22 PROCEDURE — 82570 ASSAY OF URINE CREATININE: CPT | Mod: ,,, | Performed by: CLINICAL MEDICAL LABORATORY

## 2022-06-22 PROCEDURE — 82570 MICROALBUMIN / CREATININE RATIO URINE: ICD-10-PCS | Mod: ,,, | Performed by: CLINICAL MEDICAL LABORATORY

## 2022-06-22 PROCEDURE — 83036 HEMOGLOBIN GLYCOSYLATED A1C: CPT | Mod: ,,, | Performed by: CLINICAL MEDICAL LABORATORY

## 2022-06-22 PROCEDURE — 3052F HG A1C>EQUAL 8.0%<EQUAL 9.0%: CPT | Mod: CPTII,,, | Performed by: FAMILY MEDICINE

## 2022-06-22 PROCEDURE — 3079F PR MOST RECENT DIASTOLIC BLOOD PRESSURE 80-89 MM HG: ICD-10-PCS | Mod: CPTII,,, | Performed by: FAMILY MEDICINE

## 2022-06-22 PROCEDURE — 1160F RVW MEDS BY RX/DR IN RCRD: CPT | Mod: CPTII,,, | Performed by: FAMILY MEDICINE

## 2022-06-22 PROCEDURE — 82043 UR ALBUMIN QUANTITATIVE: CPT | Mod: ,,, | Performed by: CLINICAL MEDICAL LABORATORY

## 2022-06-22 PROCEDURE — 1159F PR MEDICATION LIST DOCUMENTED IN MEDICAL RECORD: ICD-10-PCS | Mod: CPTII,,, | Performed by: FAMILY MEDICINE

## 2022-06-22 PROCEDURE — 82043 MICROALBUMIN / CREATININE RATIO URINE: ICD-10-PCS | Mod: ,,, | Performed by: CLINICAL MEDICAL LABORATORY

## 2022-06-22 PROCEDURE — 3074F SYST BP LT 130 MM HG: CPT | Mod: CPTII,,, | Performed by: FAMILY MEDICINE

## 2022-06-22 PROCEDURE — 3052F PR MOST RECENT HEMOGLOBIN A1C LEVEL 8.0 - < 9.0%: ICD-10-PCS | Mod: CPTII,,, | Performed by: FAMILY MEDICINE

## 2022-06-22 PROCEDURE — 3066F NEPHROPATHY DOC TX: CPT | Mod: CPTII,,, | Performed by: FAMILY MEDICINE

## 2022-06-22 PROCEDURE — 3008F BODY MASS INDEX DOCD: CPT | Mod: CPTII,,, | Performed by: FAMILY MEDICINE

## 2022-06-22 PROCEDURE — 3079F DIAST BP 80-89 MM HG: CPT | Mod: CPTII,,, | Performed by: FAMILY MEDICINE

## 2022-06-22 PROCEDURE — 3074F PR MOST RECENT SYSTOLIC BLOOD PRESSURE < 130 MM HG: ICD-10-PCS | Mod: CPTII,,, | Performed by: FAMILY MEDICINE

## 2022-06-22 PROCEDURE — 99396 PREV VISIT EST AGE 40-64: CPT | Mod: ,,, | Performed by: FAMILY MEDICINE

## 2022-06-22 PROCEDURE — 3066F PR DOCUMENTATION OF TREATMENT FOR NEPHROPATHY: ICD-10-PCS | Mod: CPTII,,, | Performed by: FAMILY MEDICINE

## 2022-06-22 PROCEDURE — 99396 PR PREVENTIVE VISIT,EST,40-64: ICD-10-PCS | Mod: ,,, | Performed by: FAMILY MEDICINE

## 2022-06-22 PROCEDURE — 1159F MED LIST DOCD IN RCRD: CPT | Mod: CPTII,,, | Performed by: FAMILY MEDICINE

## 2022-06-22 PROCEDURE — 80061 LIPID PANEL: CPT | Mod: GZ,,, | Performed by: CLINICAL MEDICAL LABORATORY

## 2022-06-22 PROCEDURE — 80061 LIPID PANEL: ICD-10-PCS | Mod: GZ,,, | Performed by: CLINICAL MEDICAL LABORATORY

## 2022-06-22 RX ORDER — INSULIN LISPRO 100 [IU]/ML
INJECTION, SOLUTION INTRAVENOUS; SUBCUTANEOUS
Qty: 15 ML | Refills: 1 | Status: SHIPPED | OUTPATIENT
Start: 2022-06-22

## 2022-06-22 RX ORDER — SEMAGLUTIDE 1.34 MG/ML
1 INJECTION, SOLUTION SUBCUTANEOUS
Qty: 1.5 PEN | Refills: 5 | Status: SHIPPED | OUTPATIENT
Start: 2022-06-22 | End: 2022-09-19 | Stop reason: SDUPTHER

## 2022-06-22 RX ORDER — FENOFIBRATE 48 MG/1
48 TABLET, FILM COATED ORAL DAILY
Qty: 90 TABLET | Refills: 1 | Status: SHIPPED | OUTPATIENT
Start: 2022-06-22 | End: 2022-09-15

## 2022-06-22 RX ORDER — SEMAGLUTIDE 1.34 MG/ML
INJECTION, SOLUTION SUBCUTANEOUS
COMMUNITY
Start: 2022-02-22 | End: 2022-06-22 | Stop reason: SDUPTHER

## 2022-06-22 RX ORDER — ATORVASTATIN CALCIUM 80 MG/1
80 TABLET, FILM COATED ORAL NIGHTLY
Qty: 90 TABLET | Refills: 1 | Status: SHIPPED | OUTPATIENT
Start: 2022-06-22 | End: 2022-09-15

## 2022-06-22 RX ORDER — EMPAGLIFLOZIN 10 MG/1
10 TABLET, FILM COATED ORAL DAILY
Qty: 90 TABLET | Refills: 1 | Status: SHIPPED | OUTPATIENT
Start: 2022-06-22 | End: 2022-09-21 | Stop reason: SDUPTHER

## 2022-06-22 RX ORDER — ISOSORBIDE MONONITRATE 30 MG/1
30 TABLET, EXTENDED RELEASE ORAL DAILY
Qty: 90 TABLET | Refills: 1 | Status: SHIPPED | OUTPATIENT
Start: 2022-06-22 | End: 2022-09-15

## 2022-06-22 RX ORDER — ESOMEPRAZOLE MAGNESIUM 40 MG/1
40 CAPSULE, DELAYED RELEASE ORAL
Qty: 90 CAPSULE | Refills: 1 | Status: SHIPPED | OUTPATIENT
Start: 2022-06-22 | End: 2022-09-15

## 2022-06-22 RX ORDER — METOPROLOL SUCCINATE 50 MG/1
50 TABLET, EXTENDED RELEASE ORAL DAILY
Qty: 90 TABLET | Refills: 1 | Status: SHIPPED | OUTPATIENT
Start: 2022-06-22 | End: 2022-09-21 | Stop reason: SDUPTHER

## 2022-06-22 RX ORDER — TIZANIDINE 4 MG/1
4 TABLET ORAL 3 TIMES DAILY
Qty: 270 TABLET | Refills: 1 | Status: SHIPPED | OUTPATIENT
Start: 2022-06-22 | End: 2022-08-25

## 2022-06-22 RX ORDER — EMPAGLIFLOZIN 10 MG/1
10 TABLET, FILM COATED ORAL DAILY
COMMUNITY
Start: 2022-06-18 | End: 2022-06-22 | Stop reason: SDUPTHER

## 2022-06-22 RX ORDER — AMITRIPTYLINE HYDROCHLORIDE 75 MG/1
TABLET ORAL
Qty: 30 TABLET | Refills: 1 | Status: SHIPPED | OUTPATIENT
Start: 2022-06-22 | End: 2022-10-06 | Stop reason: SDUPTHER

## 2022-06-22 RX ORDER — INSULIN GLARGINE 100 [IU]/ML
INJECTION, SOLUTION SUBCUTANEOUS
Qty: 45 ML | Refills: 5 | Status: SHIPPED | OUTPATIENT
Start: 2022-06-22 | End: 2022-12-14 | Stop reason: SDUPTHER

## 2022-06-22 RX ORDER — MUPIROCIN 20 MG/G
OINTMENT TOPICAL 3 TIMES DAILY
Qty: 15 G | Refills: 0 | Status: SHIPPED | OUTPATIENT
Start: 2022-06-22 | End: 2023-05-24 | Stop reason: SDUPTHER

## 2022-06-22 NOTE — PROGRESS NOTES
"New Clinic Note    Kennedy West is a 41 y.o. male     CC:   Chief Complaint   Patient presents with    Annual Exam     Patient stated he is here for a [facility] visit and refills on medications sent to [facility]  and is " fasting "for labs.     Hypertension    Diabetes    Hyperlipidemia    Medication Refill        Subjective    History of Present Illness HPI   Patient is here for an [facility] Visit. He needs refills. Patient reports that he is no longer taking lisinopril per his cardiologist Dr. Raygoza. He reports that he is compliant with all his other meds including his beta blocker and statin.     Current Outpatient Medications:     ACCU-CHEK GUIDE TEST STRIPS Strp, USE STRIP TO CHECK GLUCOSE TWICE DAILY, Disp: , Rfl:     ACCU-CHEK SOFTCLIX LANCETS Misc, USE TO CHECK GLUCOSE TWICE DAILY, Disp: , Rfl:     albuterol (PROAIR HFA) 90 mcg/actuation inhaler, Inhale 2 puffs into the lungs every 4 (four) hours as needed for Wheezing. Rescue, Disp: 18 g, Rfl: 5    aspirin (ECOTRIN) 81 MG EC tablet, Take 1 tablet (81 mg total) by mouth once daily., Disp: 30 tablet, Rfl: 2    BD CANDIDA 2ND GEN PEN NEEDLE 32 gauge x 5/32" Ndle, USE 1 ONCE DAILY, Disp: 100 each, Rfl: 11    ergocalciferol (ERGOCALCIFEROL) 50,000 unit Cap, Take one capsule weekly for 12 weeks then reduce to one capsule monthly, Disp: 12 capsule, Rfl: 1    HYDROcodone-acetaminophen (NORCO)  mg per tablet, Take 1 tablet by mouth every 12 (twelve) hours., Disp: 60 tablet, Rfl: 0    [START ON 7/13/2022] HYDROcodone-acetaminophen (NORCO)  mg per tablet, Take 1 tablet by mouth every 12 (twelve) hours., Disp: 60 tablet, Rfl: 0    nitroGLYCERIN (NITROSTAT) 0.3 MG SL tablet, Place 1 tablet (0.3 mg total) under the tongue every 5 (five) minutes as needed for Chest pain. Up to 3 times., Disp: 30 tablet, Rfl: 0    pregabalin (LYRICA) 75 MG capsule, Take 1 capsule (75 mg total) by mouth 3 (three) times daily., Disp: 90 " capsule, Rfl: 1    amitriptyline (ELAVIL) 75 MG tablet, Take one tablet at bedtime, Disp: 30 tablet, Rfl: 1    atorvastatin (LIPITOR) 80 MG tablet, Take 1 tablet (80 mg total) by mouth every evening., Disp: 90 tablet, Rfl: 1    esomeprazole (NEXIUM) 40 MG capsule, Take 1 capsule (40 mg total) by mouth before breakfast., Disp: 90 capsule, Rfl: 1    fenofibrate (TRICOR) 48 MG tablet, Take 1 tablet (48 mg total) by mouth once daily., Disp: 90 tablet, Rfl: 1    HUMALOG KWIKPEN INSULIN 100 unit/mL pen, SMARTSI Unit(s) SUB-Q 3 Times Daily, Disp: 15 mL, Rfl: 1    insulin (BASAGLAR KWIKPEN U-100 INSULIN) glargine 100 units/mL (3mL) SubQ pen, Inject 36 units sq and increase as directed.  Not to exceed 60 units a day., Disp: 45 mL, Rfl: 5    isosorbide mononitrate (IMDUR) 30 MG 24 hr tablet, Take 1 tablet (30 mg total) by mouth once daily., Disp: 90 tablet, Rfl: 1    JARDIANCE 10 mg tablet, Take 1 tablet (10 mg total) by mouth once daily., Disp: 90 tablet, Rfl: 1    metoprolol succinate (TOPROL-XL) 50 MG 24 hr tablet, Take 1 tablet (50 mg total) by mouth once daily., Disp: 90 tablet, Rfl: 1    mupirocin (BACTROBAN) 2 % ointment, Apply topically 3 (three) times daily., Disp: 15 g, Rfl: 0    OZEMPIC 1 mg/dose (2 mg/1.5 mL) PnIj, Inject 1 mg into the skin every 7 days., Disp: 1.5 pen, Rfl: 5    tiZANidine (ZANAFLEX) 4 MG tablet, Take 1 tablet (4 mg total) by mouth 3 (three) times daily., Disp: 270 tablet, Rfl: 1    Current Facility-Administered Medications:     EPINEPHrine (EPIPEN) 0.3 mg/0.3 mL pen injection 0.3 mg, 0.3 mg, Intramuscular, PRN, Karlee Liu MD     Past Medical History:   Diagnosis Date    Allergy     Anxiety     Asthma     Chronic pain     Complex regional pain syndrome type I of right upper extremity     COPD (chronic obstructive pulmonary disease)     Coronary artery disease     Depression     Diabetes mellitus, type 2     Diabetic peripheral neuropathy     Gastritis     GERD  (gastroesophageal reflux disease)     Hyperlipidemia     Hypertension     Lumbar radiculopathy     Lumbosacral spondylosis     Non-pressure chronic ulcer of other part of right foot limited to breakdown of skin     Obesity     Obstructive sleep apnea     Osteoarthritis, multiple sites     Polyneuropathy     Tobacco dependence         Family History   Problem Relation Age of Onset    Diabetes Mother     Hypertension Mother     Cancer Mother     Arthritis Mother     Diabetes Father     Heart disease Father     Hyperlipidemia Father     Hypertension Father     Arthritis Father     COPD Father     Rheum arthritis Sister     Arthritis Sister     Stroke Maternal Uncle     Cancer Paternal Aunt     Stroke Maternal Grandmother     Stroke Maternal Grandfather     Cancer Paternal Grandmother     Heart disease Paternal Grandfather         Past Surgical History:   Procedure Laterality Date    ANGIOGRAM, CORONARY, WITH LEFT HEART CATHETERIZATION N/A 08/24/2021    Procedure: Left heart cath w/ coronary angiograms;  Surgeon: Zaheer Real MD;  Location[facility] C[facility] LAB;  Service: Cardiology;  Laterality: N/A;    CARPAL TUNNEL RELEASE      right    INJECTION OF ANESTHETIC AGENT AROUND MEDIAL BRANCH NERVES INNERVATING LUMBAR FACET JOINT Bilateral 04/15/2021    Procedure: BLOCK, NERVE, FACET JOINT, LUMBAR, MEDIAL BRANCH;  Surgeon: Sandra Johnson MD;  Location: [facility];  Service: Pain Management;  Laterality: Bilateral;  Bilateral L3-S1 MBB    INJECTION OF ANESTHETIC AGENT AROUND MEDIAL BRANCH NERVES INNERVATING LUMBAR FACET JOINT Bilateral 12/23/2021    Procedure: Block-nerve-medial branch-lumbar, bilateral L4 through S1;  Surgeon: Sandra Johnson MD;  Location: [facility];  Service: Pain Management;  Laterality: Bilateral;  pt will bring vaccine verification card- notified in office    INJECTION OF FACET JOINT Bilateral 06/01/2020    Bilateral L3-S1 MBB - Dr Johnson     LEFT HEART CATHETERIZATION N/A 07/21/2021    Procedure: Left heart cath with possible intervention;  Surgeon: Brock Burkett DO;  [facility] C[facility] LAB;  Service: Cardiology;  Laterality: N/A;    RBKA Right 03/16/2022    thumb surgery      right    TONSILLECTOMY  1990        Review of Systems   Constitutional: Negative for fatigue and fever.   HENT: Negative for ear pain, postnasal drip, rhinorrhea and sinus pressure/congestion.    Respiratory: Negative for cough and shortness of breath.    Cardiovascular: Negative for chest pain.   Gastrointestinal: Negative for abdominal pain, diarrhea, nausea and vomiting.   Genitourinary: Negative for dysuria.   Neurological: Negative for headaches.        /85 (BP Location: Right arm, Patient Position: Sitting, BP Method: Large (Automatic))   Pulse 100   Temp 98.5 °F (36.9 °C) (Oral)   Resp 18   Ht 6' (1.829 m)   Wt (!) 156.5 kg (345 lb)   SpO2 98%   BMI 46.79 kg/m²      Physical Exam  HENT:      Head: Normocephalic and atraumatic.   Cardiovascular:      Rate and Rhythm: Normal rate and regular rhythm.   Pulmonary:      Effort: Pulmonary effort is normal.      Breath sounds: Normal breath sounds.   Musculoskeletal:      Comments: Right BKA   Neurological:      Mental Status: He is alert and oriented to person, place, and time.      Gait: Gait abnormal.      Comments: In wheelchair   Psychiatric:         Mood and Affect: Mood normal.         Behavior: Behavior normal.          Assessment and Plan      ICD-10-CM ICD-9-CM   1. Annual visit for general adult medical examination with abnormal findings  Z00.01 V70.0   2. Type 2 diabetes mellitus with hyperglycemia, with long-term current use of insulin  E11.65 250.00    Z79.4 790.29     V58.67   3. Screening for diabetes mellitus (DM)  Z13.1 V77.1   4. Screening for lipid disorders  Z13.220 V77.91        Problem List Items Addressed This Visit        Endocrine    Diabetes mellitus, type 2 (Chronic)    Relevant  Medications    HUMALOG KWIKPEN INSULIN 100 unit/mL pen    insulin (BASAGLAR KWIKPEN U-100 INSULIN) glargine 100 units/mL (3mL) SubQ pen    JARDIANCE 10 mg tablet    OZEMPIC 1 mg/dose (2 mg/1.5 mL) PnIj    Other Relevant Orders    Hemoglobin A1C (Completed)    Microalbumin/Creatinine Ratio, Urine (Completed)      Other Visit Diagnoses     Annual visit for general adult medical examination with abnormal findings    -  Primary    Screening for diabetes mellitus (DM)        Relevant Orders    Glucose, Fasting (Completed)    Screening for lipid disorders        Relevant Orders    Lipid Panel (Completed)           Patient Instructions   1. Check glucose outside of clinic, records numbers, and bring to follow up visit.   2. Take medications as directed.   3. Eat a diabetic diet  4. Cardiovascular exercise at least 3 times per week.         Follow up in about 3 months (around 9/22/2022).

## 2022-06-23 LAB
CREAT UR-MCNC: 59 MG/DL (ref 39–259)
MICROALBUMIN UR-MCNC: 6.1 MG/DL (ref 0–2.8)
MICROALBUMIN/CREAT RATIO PNL UR: 103.4 MG/G (ref 0–30)

## 2022-08-08 NOTE — PROGRESS NOTES
Subjective:      Patient ID: Kennedy West is a 41 y.o. male.    Chief Complaint: Back Pain and Leg Pain      Pain  This is a chronic problem. The current episode started more than 1 year ago. The problem occurs daily. The problem has been waxing and waning. Associated symptoms include arthralgias. Pertinent negatives include no change in bowel habit, chest pain, chills, coughing, fatigue, fever, rash, sore throat, vertigo or vomiting.     Review of Systems   Constitutional: Negative for activity change, appetite change, chills, fatigue and fever.   HENT: Negative for drooling, ear discharge, ear pain, facial swelling, nosebleeds, sore throat, trouble swallowing, voice change and goiter.    Eyes: Negative for photophobia, pain, discharge, redness and visual disturbance.   Respiratory: Negative for apnea, cough, choking, chest tightness, shortness of breath, wheezing and stridor.    Cardiovascular: Negative for chest pain, palpitations and leg swelling.   Gastrointestinal: Negative for abdominal distention, change in bowel habit, diarrhea, rectal pain, vomiting, fecal incontinence and change in bowel habit.   Endocrine: Negative for cold intolerance, heat intolerance, polydipsia, polyphagia and polyuria.   Genitourinary: Negative for bladder incontinence, dysuria, flank pain and frequency.   Musculoskeletal: Positive for arthralgias, back pain, leg pain and neck stiffness.   Integumentary:  Negative for color change, pallor and rash.   Neurological: Negative for dizziness, vertigo, seizures, syncope, facial asymmetry, speech difficulty, light-headedness, disturbances in coordination, memory loss and coordination difficulties.   Hematological: Negative for adenopathy. Does not bruise/bleed easily.   Psychiatric/Behavioral: Negative for agitation, behavioral problems, confusion, decreased concentration, dysphoric mood, hallucinations, self-injury and suicidal ideas. The patient is not nervous/anxious and is  not hyperactive.             Past Surgical History:   Procedure Laterality Date    ANGIOGRAM, CORONARY, WITH LEFT HEART CATHETERIZATION N/A 08/24/2021    Procedure: Left heart cath w/ coronary angiograms;  Surgeon: Zaheer Real MD;  Location: [facility] CATH LAB;  Service: Cardiology;  Laterality: N/A;    CARPAL TUNNEL RELEASE      right    INJECTION OF ANESTHETIC AGENT AROUND MEDIAL BRANCH NERVES INNERVATING LUMBAR FACET JOINT Bilateral 04/15/2021    Procedure: BLOCK, NERVE, FACET JOINT, LUMBAR, MEDIAL BRANCH;  Surgeon: Sandra Johnson MD;  Location: [facility] PAIN MGMT;  Service: Pain Management;  Laterality: Bilateral;  Bilateral L3-S1 MBB    INJECTION OF ANESTHETIC AGENT AROUND MEDIAL BRANCH NERVES INNERVATING LUMBAR FACET JOINT Bilateral 12/23/2021    Procedure: Block-nerve-medial branch-lumbar, bilateral L4 through S1;  Surgeon: Sandra Johnson MD;  Location: [facility] PAIN MGMT;  Service: Pain Management;  Laterality: Bilateral;  pt will bring vaccine verification card- notified in office    INJECTION OF FACET JOINT Bilateral 06/01/2020    Bilateral L3-S1 MBB - Dr Johnson    LEFT HEART CATHETERIZATION N/A 07/21/2021    Procedure: Left heart cath with possible intervention;  Surgeon: Brock Burkett DO;  Location: [facility] CATH LAB;  Service: Cardiology;  Laterality: N/A;    RBKA Right 03/16/2022    thumb surgery      right    TONSILLECTOMY  1990       Objective:  Vitals:    08/09/22 0806   BP: (!) 186/69   Pulse: 110   Weight: (!) 159.2 kg (351 lb)   Height: 6' (1.829 m)   PainSc:   6         Physical Exam  Vitals and nursing note reviewed. Exam conducted with a chaperone present.   Constitutional:       General: He is awake. He is not in acute distress.     Appearance: Normal appearance. He is not toxic-appearing.   HENT:      Head: Normocephalic and atraumatic.      Nose: Nose normal.      Mouth/Throat:      Mouth: Mucous membranes are moist.      Pharynx: Oropharynx is clear.   Eyes:       Conjunctiva/sclera: Conjunctivae normal.      Pupils: Pupils are equal, round, and reactive to light.   Cardiovascular:      Rate and Rhythm: Normal rate.   Pulmonary:      Effort: Pulmonary effort is normal. No respiratory distress.   Abdominal:      Palpations: Abdomen is soft.      Tenderness: There is no guarding.   Musculoskeletal:         General: Normal range of motion.      Cervical back: Normal range of motion and neck supple. No rigidity.      Thoracic back: Tenderness present.      Lumbar back: Tenderness present.   Skin:     General: Skin is warm and dry.      Coloration: Skin is not jaundiced or pale.   Neurological:      General: No focal deficit present.      Mental Status: He is alert and oriented to person, place, and time. Mental status is at baseline.      Cranial Nerves: No cranial nerve deficit (II-XII).   Psychiatric:         Mood and Affect: Mood normal.         Behavior: Behavior normal. Behavior is cooperative.         Thought Content: Thought content normal.           Orders Placed This Encounter   Procedures    POCT Urine Drug Screen Presump     Interpretive Information:     Negative:  No drug detected at the cut off level.   Positive:  This result represents presumptive positive for the   tested drug, other substances may yield a positive response other   than the analyte of interest. This result should be utilized for   diagnostic purpose only. Confirmation testing will be performed upon physician request only.           US Lower Extrem Arteries Bilat with ROGELIO (xpd)  Narrative: EXAMINATION:  US ARTERIAL LOWER EXTREMITY BILAT WITH ROGELIO (XPD)    CLINICAL HISTORY:  Peripheral vascular disease, unspecified    TECHNIQUE:  Grayscale, spectral Doppler, and color flow analysis performed and interpreted    FINDINGS:  3 cuff method performed    There are atherosclerotic changes present on the 2 dimensional images    Ankle brachial indices 1.27 on the right and 1.34 on the left    There is a  triphasic waveform in the right common femoral, superficial femoral, popliteal, and posterior tibial arteries    There is a biphasic waveform with moderate spectral broadening in the right dorsalis pedis    There is a triphasic waveform in the left common femoral, superficial femoral, popliteal, posterior tibial arteries and dorsalis pedis with mild spectral broadening distally    No focal areas of doubling velocities seen bilaterally  Impression: 1. Mild to moderate right calf vessel disease  2. Mild left calf vessel disease  Ultrasound images captured and stored.    Electronically signed by: Elsie Boone  Date:    02/22/2022  Time:    12:24  US Lower Extremity Veins Bilateral  Narrative: EXAMINATION:  US LOWER EXTREMITY VEINS BILATERAL    CLINICAL HISTORY:  US LOWER EXTREMITY VEINS BILATERAL Pain in leg, unspecified    TECHNIQUE:  Grayscale, spectral Doppler, and color flow analysis performed and interpreted.    COMPARISON:  None.    FINDINGS:  No evidence of echogenic, non-compressible thrombus seen in either common femoral, superficial femoral, popliteal, or saphenous veins.  Impression: No evidence of DVT seen in either lower extremity.    Electronically signed by: Elsie Boone  Date:    02/22/2022  Time:    11:51       Office Visit on 06/22/2022   Component Date Value Ref Range Status    Creatinine, Urine 06/22/2022 59  39 - 259 mg/dL Final    Microalbumin 06/22/2022 6.1 (A) 0.0 - 2.8 mg/dL Final    Microalbumin/Creatinine Ratio 06/22/2022 103.4 (A) 0.0 - 30.0 mg/g Final    Glucose, Fasting 06/22/2022 205 (A) 74 - 106 mg/dL Final    Triglycerides 06/22/2022 204 (A) 35 - 150 mg/dL Final    Cholesterol 06/22/2022 120  0 - 200 mg/dL Final    HDL Cholesterol 06/22/2022 38 (A) 40 - 60 mg/dL Final    Cholesterol/HDL Ratio (Risk Factor) 06/22/2022 3.2   Final    Non-HDL 06/22/2022 82  mg/dL Final    LDL Calculated 06/22/2022 41  mg/dL Final    LDL/HDL 06/22/2022 1.1   Final    VLDL 06/22/2022 41  mg/dL Final     Hemoglobin A1C 06/22/2022 8.8 (A) 4.5 - 6.6 % Final    Estimated Average Glucose 06/22/2022 207  mg/dL Final   Office Visit on 04/13/2022   Component Date Value Ref Range Status    POC Amphetamines 04/13/2022 Negative  Negative, Inconclusive Final    POC Barbiturates 04/13/2022 Negative  Negative, Inconclusive Final    POC Benzodiazepines 04/13/2022 Negative  Negative, Inconclusive Final    POC Cocaine 04/13/2022 Negative  Negative, Inconclusive Final    POC THC 04/13/2022 Negative  Negative, Inconclusive Final    POC Methadone 04/13/2022 Negative  Negative, Inconclusive Final    POC Methamphetamine 04/13/2022 Negative  Negative, Inconclusive Final    POC Opiates 04/13/2022 Presumptive Positive (A) Negative, Inconclusive Final    POC Oxycodone 04/13/2022 Negative  Negative, Inconclusive Final    POC Phencyclidine 04/13/2022 Negative  Negative, Inconclusive Final    POC Methylenedioxymethamphetamine * 04/13/2022 Negative  Negative, Inconclusive Final    POC Tricyclic Antidepressants 04/13/2022 Negative  Negative, Inconclusive Final    POC Buprenorphine 04/13/2022 Negative   Final     Acceptable 04/13/2022 Yes   Final    POC Temperature (Urine) 04/13/2022 90   Final   Lab Visit on 02/22/2022   Component Date Value Ref Range Status    Sodium 02/22/2022 138  136 - 145 mmol/L Final    Potassium 02/22/2022 3.8  3.5 - 5.1 mmol/L Final    Chloride 02/22/2022 104  98 - 107 mmol/L Final    CO2 02/22/2022 28  21 - 32 mmol/L Final    Anion Gap 02/22/2022 10  7 - 16 mmol/L Final    Glucose 02/22/2022 187 (A) 74 - 106 mg/dL Final    BUN 02/22/2022 14  7 - 18 mg/dL Final    Creatinine 02/22/2022 0.99  0.70 - 1.30 mg/dL Final    BUN/Creatinine Ratio 02/22/2022 14  6 - 20 Final    Calcium 02/22/2022 9.3  8.5 - 10.1 mg/dL Final    Total Protein 02/22/2022 7.3  6.4 - 8.2 g/dL Final    Albumin 02/22/2022 3.7  3.5 - 5.0 g/dL Final    Globulin 02/22/2022 3.6  2.0 - 4.0 g/dL Final    A/G  Ratio 02/22/2022 1.0   Final    Bilirubin, Total 02/22/2022 0.6  0.0 - 1.2 mg/dL Final    Alk Phos 02/22/2022 154 (A) 45 - 115 U/L Final    ALT 02/22/2022 30  16 - 61 U/L Final    AST 02/22/2022 12 (A) 15 - 37 U/L Final    eGFR 02/22/2022 89  >=60 mL/min/1.73m² Final    Vitamin B12 02/22/2022 324  193 - 986 pg/mL Final    Folate 02/22/2022 5.6  3.1 - 17.5 ng/mL Final    Vitamin D 25-Hydroxy, Blood 02/22/2022 8.2  ng/mL Final    WBC 02/22/2022 15.53 (A) 4.50 - 11.00 K/uL Final    RBC 02/22/2022 5.87  4.60 - 6.20 M/uL Final    Hemoglobin 02/22/2022 17.0  13.5 - 18.0 g/dL Final    Hematocrit 02/22/2022 50.6  40.0 - 54.0 % Final    MCV 02/22/2022 86.2  80.0 - 96.0 fL Final    MCH 02/22/2022 29.0  27.0 - 31.0 pg Final    MCHC 02/22/2022 33.6  32.0 - 36.0 g/dL Final    RDW 02/22/2022 13.3  11.5 - 14.5 % Final    Platelet Count 02/22/2022 279  150 - 400 K/uL Final    MPV 02/22/2022 9.9  9.4 - 12.4 fL Final    Neutrophils % 02/22/2022 67.1 (A) 53.0 - 65.0 % Final    Lymphocytes % 02/22/2022 27.2  27.0 - 41.0 % Final    Monocytes % 02/22/2022 4.4  2.0 - 6.0 % Final    Eosinophils % 02/22/2022 0.5 (A) 1.0 - 4.0 % Final    Basophils % 02/22/2022 0.5  0.0 - 1.0 % Final    Immature Granulocytes % 02/22/2022 0.3  0.0 - 0.4 % Final    nRBC, Auto 02/22/2022 0.0  <=0.0 % Final    Neutrophils, Abs 02/22/2022 10.43 (A) 1.80 - 7.70 K/uL Final    Lymphocytes, Absolute 02/22/2022 4.23  1.00 - 4.80 K/uL Final    Monocytes, Absolute 02/22/2022 0.68  0.00 - 0.80 K/uL Final    Eosinophils, Absolute 02/22/2022 0.07  0.00 - 0.50 K/uL Final    Basophils, Absolute 02/22/2022 0.07  0.00 - 0.20 K/uL Final    Immature Granulocytes, Absolute 02/22/2022 0.05 (A) 0.00 - 0.04 K/uL Final    nRBC, Absolute 02/22/2022 0.00  <=0.00 x10e3/uL Final    Diff Type 02/22/2022 Auto   Final           Assessment:      1. 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 Lumbosacral spondylosis without myelopathy    3. Diabetic  peripheral neuropathy    4. Complex regional pain syndrome type 1 of right upper extremity    5. Encounter for long-term (current) use of other medications            A's of Opioid Risk Assessment  Activity:Patient can perform ADL.   Analgesia:Patients pain is partially controlled by current medication. Patient has tried OTC medications such as Tylenol and Ibuprofen with out relief.   Adverse Effects: Patient denies constipation or sedation.  Aberrant Behavior:  reviewed with no aberrant drug seeking/taking behavior.  Overdose reversal drug naloxone discussed    Drug screen reviewed      Requested Prescriptions     Signed Prescriptions Disp Refills    HYDROcodone-acetaminophen (NORCO)  mg per tablet 60 tablet 0     Sig: Take 1 tablet by mouth every 12 (twelve) hours.    HYDROcodone-acetaminophen (NORCO)  mg per tablet 60 tablet 0     Sig: Take 1 tablet by mouth every 12 (twelve) hours.    gabapentin (NEURONTIN) 600 MG tablet 90 tablet 1     Sig: Take 1 tablet (600 mg total) by mouth 3 (three) times daily.         Plan:    Using wheelchair for mobility has not had right lower extremity prosthetic device yet due to infection at stump    He follows diabetic educator    Follows Neurology [facility] right hand arm    Chronic right lower extremity phantom limb pain     Primary per care provider giving him Elavil    Requesting resume his gabapentin 600 mg t.i.d., insurance will not cover Lyrica    Continue other medication as directed    Continue exercise program as directed    Follow-up 2 months    Dr. Johnson December 2022    Pill count    Bring original prescription medication bottles/container/box with labels to each visit

## 2022-08-09 ENCOUNTER — OFFICE VISIT (OUTPATIENT)
Dept: PAIN MEDICINE | Facility: CLINIC | Age: 42
End: 2022-08-09
Payer: COMMERCIAL

## 2022-08-09 VITALS
HEART RATE: 110 BPM | HEIGHT: 72 IN | WEIGHT: 315 LBS | BODY MASS INDEX: 42.66 KG/M2 | DIASTOLIC BLOOD PRESSURE: 69 MMHG | SYSTOLIC BLOOD PRESSURE: 186 MMHG

## 2022-08-09 DIAGNOSIS — G90.511 COMPLEX REGIONAL PAIN SYNDROME TYPE 1 OF RIGHT UPPER EXTREMITY: Chronic | ICD-10-CM

## 2022-08-09 DIAGNOSIS — E11.42 DIABETIC PERIPHERAL NEUROPATHY: ICD-10-CM

## 2022-08-09 DIAGNOSIS — Z79.899 ENCOUNTER FOR LONG-TERM (CURRENT) USE OF OTHER MEDICATIONS: ICD-10-CM

## 2022-08-09 DIAGNOSIS — M47.817 LUMBOSACRAL SPONDYLOSIS WITHOUT MYELOPATHY: Chronic | ICD-10-CM

## 2022-08-09 DIAGNOSIS — G54.6 PHANTOM PAIN FOLLOWING AMPUTATION OF LOWER LIMB: Primary | ICD-10-CM

## 2022-08-09 PROCEDURE — 80305 DRUG TEST PRSMV DIR OPT OBS: CPT | Mod: PBBFAC | Performed by: PHYSICIAN ASSISTANT

## 2022-08-09 PROCEDURE — 3060F PR POS MICROALBUMINURIA RESULT DOCUMENTED/REVIEW: ICD-10-PCS | Mod: CPTII,,, | Performed by: PHYSICIAN ASSISTANT

## 2022-08-09 PROCEDURE — 3066F NEPHROPATHY DOC TX: CPT | Mod: CPTII,,, | Performed by: PHYSICIAN ASSISTANT

## 2022-08-09 PROCEDURE — 99215 OFFICE O/P EST HI 40 MIN: CPT | Mod: PBBFAC | Performed by: PHYSICIAN ASSISTANT

## 2022-08-09 PROCEDURE — 3052F HG A1C>EQUAL 8.0%<EQUAL 9.0%: CPT | Mod: CPTII,,, | Performed by: PHYSICIAN ASSISTANT

## 2022-08-09 PROCEDURE — 3008F BODY MASS INDEX DOCD: CPT | Mod: CPTII,,, | Performed by: PHYSICIAN ASSISTANT

## 2022-08-09 PROCEDURE — 3060F POS MICROALBUMINURIA REV: CPT | Mod: CPTII,,, | Performed by: PHYSICIAN ASSISTANT

## 2022-08-09 PROCEDURE — 3077F PR MOST RECENT SYSTOLIC BLOOD PRESSURE >= 140 MM HG: ICD-10-PCS | Mod: CPTII,,, | Performed by: PHYSICIAN ASSISTANT

## 2022-08-09 PROCEDURE — 1159F MED LIST DOCD IN RCRD: CPT | Mod: CPTII,,, | Performed by: PHYSICIAN ASSISTANT

## 2022-08-09 PROCEDURE — 3078F PR MOST RECENT DIASTOLIC BLOOD PRESSURE < 80 MM HG: ICD-10-PCS | Mod: CPTII,,, | Performed by: PHYSICIAN ASSISTANT

## 2022-08-09 PROCEDURE — 99214 OFFICE O/P EST MOD 30 MIN: CPT | Mod: S$PBB,,, | Performed by: PHYSICIAN ASSISTANT

## 2022-08-09 PROCEDURE — 3078F DIAST BP <80 MM HG: CPT | Mod: CPTII,,, | Performed by: PHYSICIAN ASSISTANT

## 2022-08-09 PROCEDURE — 99214 PR OFFICE/OUTPT VISIT, EST, LEVL IV, 30-39 MIN: ICD-10-PCS | Mod: S$PBB,,, | Performed by: PHYSICIAN ASSISTANT

## 2022-08-09 PROCEDURE — 3066F PR DOCUMENTATION OF TREATMENT FOR NEPHROPATHY: ICD-10-PCS | Mod: CPTII,,, | Performed by: PHYSICIAN ASSISTANT

## 2022-08-09 PROCEDURE — 3008F PR BODY MASS INDEX (BMI) DOCUMENTED: ICD-10-PCS | Mod: CPTII,,, | Performed by: PHYSICIAN ASSISTANT

## 2022-08-09 PROCEDURE — 3052F PR MOST RECENT HEMOGLOBIN A1C LEVEL 8.0 - < 9.0%: ICD-10-PCS | Mod: CPTII,,, | Performed by: PHYSICIAN ASSISTANT

## 2022-08-09 PROCEDURE — 1159F PR MEDICATION LIST DOCUMENTED IN MEDICAL RECORD: ICD-10-PCS | Mod: CPTII,,, | Performed by: PHYSICIAN ASSISTANT

## 2022-08-09 PROCEDURE — 3077F SYST BP >= 140 MM HG: CPT | Mod: CPTII,,, | Performed by: PHYSICIAN ASSISTANT

## 2022-08-09 RX ORDER — GABAPENTIN 600 MG/1
600 TABLET ORAL 3 TIMES DAILY
Qty: 90 TABLET | Refills: 1 | Status: SHIPPED | OUTPATIENT
Start: 2022-08-09 | End: 2022-09-21 | Stop reason: SDUPTHER

## 2022-08-09 RX ORDER — HYDROCODONE BITARTRATE AND ACETAMINOPHEN 10; 325 MG/1; MG/1
1 TABLET ORAL EVERY 12 HOURS
Qty: 60 TABLET | Refills: 0 | Status: SHIPPED | OUTPATIENT
Start: 2022-09-12 | End: 2022-10-05 | Stop reason: SDUPTHER

## 2022-08-09 RX ORDER — HYDROCODONE BITARTRATE AND ACETAMINOPHEN 10; 325 MG/1; MG/1
1 TABLET ORAL EVERY 12 HOURS
Qty: 60 TABLET | Refills: 0 | Status: SHIPPED | OUTPATIENT
Start: 2022-08-13 | End: 2022-09-12

## 2022-08-16 ENCOUNTER — OFFICE VISIT (OUTPATIENT)
Dept: CARDIOLOGY | Facility: CLINIC | Age: 42
End: 2022-08-16
Payer: COMMERCIAL

## 2022-08-16 VITALS
HEIGHT: 72 IN | DIASTOLIC BLOOD PRESSURE: 60 MMHG | OXYGEN SATURATION: 96 % | BODY MASS INDEX: 42.66 KG/M2 | WEIGHT: 315 LBS | SYSTOLIC BLOOD PRESSURE: 102 MMHG | HEART RATE: 117 BPM

## 2022-08-16 DIAGNOSIS — R00.0 SINUS TACHYCARDIA: ICD-10-CM

## 2022-08-16 DIAGNOSIS — I25.10 CORONARY ARTERY DISEASE, UNSPECIFIED VESSEL OR LESION TYPE, UNSPECIFIED WHETHER ANGINA PRESENT, UNSPECIFIED WHETHER NATIVE OR TRANSPLANTED HEART: Primary | ICD-10-CM

## 2022-08-16 PROCEDURE — 1160F PR REVIEW ALL MEDS BY PRESCRIBER/CLIN PHARMACIST DOCUMENTED: ICD-10-PCS | Mod: CPTII,,, | Performed by: NURSE PRACTITIONER

## 2022-08-16 PROCEDURE — 93010 ELECTROCARDIOGRAM REPORT: CPT | Mod: S$PBB,,, | Performed by: INTERNAL MEDICINE

## 2022-08-16 PROCEDURE — 1160F RVW MEDS BY RX/DR IN RCRD: CPT | Mod: CPTII,,, | Performed by: NURSE PRACTITIONER

## 2022-08-16 PROCEDURE — 3074F PR MOST RECENT SYSTOLIC BLOOD PRESSURE < 130 MM HG: ICD-10-PCS | Mod: CPTII,,, | Performed by: NURSE PRACTITIONER

## 2022-08-16 PROCEDURE — 3078F DIAST BP <80 MM HG: CPT | Mod: CPTII,,, | Performed by: NURSE PRACTITIONER

## 2022-08-16 PROCEDURE — 3008F BODY MASS INDEX DOCD: CPT | Mod: CPTII,,, | Performed by: NURSE PRACTITIONER

## 2022-08-16 PROCEDURE — 3066F PR DOCUMENTATION OF TREATMENT FOR NEPHROPATHY: ICD-10-PCS | Mod: CPTII,,, | Performed by: NURSE PRACTITIONER

## 2022-08-16 PROCEDURE — 3008F PR BODY MASS INDEX (BMI) DOCUMENTED: ICD-10-PCS | Mod: CPTII,,, | Performed by: NURSE PRACTITIONER

## 2022-08-16 PROCEDURE — 93005 ELECTROCARDIOGRAM TRACING: CPT | Mod: PBBFAC | Performed by: INTERNAL MEDICINE

## 2022-08-16 PROCEDURE — 3052F PR MOST RECENT HEMOGLOBIN A1C LEVEL 8.0 - < 9.0%: ICD-10-PCS | Mod: CPTII,,, | Performed by: NURSE PRACTITIONER

## 2022-08-16 PROCEDURE — 99215 OFFICE O/P EST HI 40 MIN: CPT | Mod: PBBFAC | Performed by: NURSE PRACTITIONER

## 2022-08-16 PROCEDURE — 1159F PR MEDICATION LIST DOCUMENTED IN MEDICAL RECORD: ICD-10-PCS | Mod: CPTII,,, | Performed by: NURSE PRACTITIONER

## 2022-08-16 PROCEDURE — 3078F PR MOST RECENT DIASTOLIC BLOOD PRESSURE < 80 MM HG: ICD-10-PCS | Mod: CPTII,,, | Performed by: NURSE PRACTITIONER

## 2022-08-16 PROCEDURE — 3074F SYST BP LT 130 MM HG: CPT | Mod: CPTII,,, | Performed by: NURSE PRACTITIONER

## 2022-08-16 PROCEDURE — 3060F POS MICROALBUMINURIA REV: CPT | Mod: CPTII,,, | Performed by: NURSE PRACTITIONER

## 2022-08-16 PROCEDURE — 3066F NEPHROPATHY DOC TX: CPT | Mod: CPTII,,, | Performed by: NURSE PRACTITIONER

## 2022-08-16 PROCEDURE — 3052F HG A1C>EQUAL 8.0%<EQUAL 9.0%: CPT | Mod: CPTII,,, | Performed by: NURSE PRACTITIONER

## 2022-08-16 PROCEDURE — 99204 OFFICE O/P NEW MOD 45 MIN: CPT | Mod: S$PBB,,, | Performed by: NURSE PRACTITIONER

## 2022-08-16 PROCEDURE — 1159F MED LIST DOCD IN RCRD: CPT | Mod: CPTII,,, | Performed by: NURSE PRACTITIONER

## 2022-08-16 PROCEDURE — 3060F PR POS MICROALBUMINURIA RESULT DOCUMENTED/REVIEW: ICD-10-PCS | Mod: CPTII,,, | Performed by: NURSE PRACTITIONER

## 2022-08-16 PROCEDURE — 99204 PR OFFICE/OUTPT VISIT, NEW, LEVL IV, 45-59 MIN: ICD-10-PCS | Mod: S$PBB,,, | Performed by: NURSE PRACTITIONER

## 2022-08-16 PROCEDURE — 93010 EKG 12-LEAD: ICD-10-PCS | Mod: S$PBB,,, | Performed by: INTERNAL MEDICINE

## 2022-08-17 NOTE — PROGRESS NOTES
[facility] note        DATE OF SERVICE: 08/17/2022       PCP: Karlee Liu MD      CHIEF COMPLAINT:   Chief Complaint   Patient presents with    Shortness of Breath     With exertion    Edema     Goes down at night.    Palpitations     At times        HISTORY OF PRESENT ILLNESS:  Kennedy West is a 41 y.o. male with a PMH of   Past Medical History:   Diagnosis Date    Allergy     Anxiety     Asthma     Chronic pain     Complex regional pain syndrome type I of right upper extremity     COPD (chronic obstructive pulmonary disease)     Coronary artery disease     Depression     Diabetes mellitus, type 2     Diabetic peripheral neuropathy     Gastritis     GERD (gastroesophageal reflux disease)     Hyperlipidemia     Hypertension     Lumbar radiculopathy     Lumbosacral spondylosis     Non-pressure chronic ulcer of other part of right foot limited to breakdown of skin     Obesity     Obstructive sleep apnea     Osteoarthritis, multiple sites     Polyneuropathy     Tobacco dependence      who presents for routine follow up. He has had RLE BKA in the spring of this year. He is still having issues with phantom pain. He is hoping to be healed enough soon to get a prosthesis.   Chief Complaint   Patient presents with    Shortness of Breath     With exertion    Edema     Goes down at night.    Palpitations     At times          Review of Systems: Review of Systems   Respiratory: Positive for shortness of breath.    Cardiovascular: Positive for palpitations and leg swelling. Negative for chest pain.   Neurological: Negative for loss of consciousness.        PAST MEDICAL HISTORY:  Past Medical History:   Diagnosis Date    Allergy     Anxiety     Asthma     Chronic pain     Complex regional pain syndrome type I of right upper extremity     COPD (chronic obstructive pulmonary disease)     Coronary artery disease     Depression     Diabetes mellitus, type 2      Diabetic peripheral neuropathy     Gastritis     GERD (gastroesophageal reflux disease)     Hyperlipidemia     Hypertension     Lumbar radiculopathy     Lumbosacral spondylosis     Non-pressure chronic ulcer of other part of right foot limited to breakdown of skin     Obesity     Obstructive sleep apnea     Osteoarthritis, multiple sites     Polyneuropathy     Tobacco dependence        PAST SURGICAL HISTORY:  Past Surgical History:   Procedure Laterality Date    ANGIOGRAM, CORONARY, WITH LEFT HEART CATHETERIZATION N/A 08/24/2021    Procedure: Left heart cath w/ coronary angiograms;  Surgeon: Zaheer Real MD;  Location: [facility] CATH LAB;  Service: Cardiology;  Laterality: N/A;    CARPAL TUNNEL RELEASE      right    INJECTION OF ANESTHETIC AGENT AROUND MEDIAL BRANCH NERVES INNERVATING LUMBAR FACET JOINT Bilateral 04/15/2021    Procedure: BLOCK, NERVE, FACET JOINT, LUMBAR, MEDIAL BRANCH;  Surgeon: Sandra Johnson MD;  Location: [facility];  Service: Pain Management;  Laterality: Bilateral;  Bilateral L3-S1 MBB    INJECTION OF ANESTHETIC AGENT AROUND MEDIAL BRANCH NERVES INNERVATING LUMBAR FACET JOINT Bilateral 12/23/2021    Procedure: Block-nerve-medial branch-lumbar, bilateral L4 through S1;  Surgeon: Sandra Johnson MD;  Location: [facility];  Service: Pain Management;  Laterality: Bilateral;  pt will bring vaccine verification card- notified in office    INJECTION OF FACET JOINT Bilateral 06/01/2020    Bilateral L3-S1 MBB - Dr Johnson    LEFT HEART CATHETERIZATION N/A 07/21/2021    Procedure: Left heart cath with possible intervention;  Surgeon: Brock Burkett DO;  Location: [facility] CATH LAB;  Service: Cardiology;  Laterality: N/A;    RBKA Right 03/16/2022    thumb surgery      right    TONSILLECTOMY  1990       SOCIAL HISTORY:  Social History     Socioeconomic History    Marital status:    Occupational History    Occupation: [employer]   Tobacco Use     Smoking status: Light Tobacco Smoker     Packs/day: 0.25     Years: 21.00     Pack years: 5.25     Types: Cigarettes     Start date: 11/9/2000    Smokeless tobacco: Never Used   Substance and Sexual Activity    Alcohol use: Not Currently     Alcohol/week: 0.0 standard drinks    Drug use: Never    Sexual activity: Not Currently     Partners: Female       FAMILY HISTORY:  Family History   Problem Relation Age of Onset    Diabetes Mother     Hypertension Mother     Cancer Mother     Arthritis Mother     Diabetes Father     Heart disease Father     Hyperlipidemia Father     Hypertension Father     Arthritis Father     COPD Father     Rheum arthritis Sister     Arthritis Sister     Stroke Maternal Uncle     Cancer Paternal Aunt     Stroke Maternal Grandmother     Stroke Maternal Grandfather     Cancer Paternal Grandmother     Heart disease Paternal Grandfather          ALLERGIES:  Review of patient's allergies indicates:   Allergen Reactions    Banana Anaphylaxis    Biaxin [clarithromycin] Other (See Comments)     Passes out      Erythromycin Anaphylaxis, Hives, Rash, Shortness Of Breath and Other (See Comments)    Tetracyclines Anaphylaxis    Zithromax tri-shelby [azithromycin] Anaphylaxis    Pineapple Other (See Comments)     Gets extremely sick    Fig Blisters and Hives    Isoniazid     Tetracycline (bulk)     Naldecon dx Rash        MEDICATIONS:    Current Outpatient Medications:     ACCU-CHEK GUIDE TEST STRIPS Strp, USE STRIP TO CHECK GLUCOSE TWICE DAILY, Disp: , Rfl:     ACCU-CHEK SOFTCLIX LANCETS Misc, USE TO CHECK GLUCOSE TWICE DAILY, Disp: , Rfl:     albuterol (PROAIR HFA) 90 mcg/actuation inhaler, Inhale 2 puffs into the lungs every 4 (four) hours as needed for Wheezing. Rescue, Disp: 18 g, Rfl: 5    amitriptyline (ELAVIL) 75 MG tablet, Take one tablet at bedtime, Disp: 30 tablet, Rfl: 1    aspirin (ECOTRIN) 81 MG EC tablet, Take 1 tablet (81 mg total) by mouth once daily.,  "Disp: 30 tablet, Rfl: 2    atorvastatin (LIPITOR) 80 MG tablet, Take 1 tablet (80 mg total) by mouth every evening., Disp: 90 tablet, Rfl: 1    BD CANDIDA 2ND GEN PEN NEEDLE 32 gauge x " Ndle, USE 1 ONCE DAILY, Disp: 100 each, Rfl: 11    ergocalciferol (ERGOCALCIFEROL) 50,000 unit Cap, Take one capsule weekly for 12 weeks then reduce to one capsule monthly, Disp: 12 capsule, Rfl: 1    esomeprazole (NEXIUM) 40 MG capsule, Take 1 capsule (40 mg total) by mouth before breakfast., Disp: 90 capsule, Rfl: 1    fenofibrate (TRICOR) 48 MG tablet, Take 1 tablet (48 mg total) by mouth once daily., Disp: 90 tablet, Rfl: 1    gabapentin (NEURONTIN) 600 MG tablet, Take 1 tablet (600 mg total) by mouth 3 (three) times daily., Disp: 90 tablet, Rfl: 1    HUMALOG KWIKPEN INSULIN 100 unit/mL pen, SMARTSI Unit(s) SUB-Q 3 Times Daily, Disp: 15 mL, Rfl: 1    HYDROcodone-acetaminophen (NORCO)  mg per tablet, Take 1 tablet by mouth every 12 (twelve) hours., Disp: 60 tablet, Rfl: 0    [START ON 2022] HYDROcodone-acetaminophen (NORCO)  mg per tablet, Take 1 tablet by mouth every 12 (twelve) hours., Disp: 60 tablet, Rfl: 0    insulin (BASAGLAR KWIKPEN U-100 INSULIN) glargine 100 units/mL (3mL) SubQ pen, Inject 36 units sq and increase as directed.  Not to exceed 60 units a day., Disp: 45 mL, Rfl: 5    isosorbide mononitrate (IMDUR) 30 MG 24 hr tablet, Take 1 tablet (30 mg total) by mouth once daily., Disp: 90 tablet, Rfl: 1    JARDIANCE 10 mg tablet, Take 1 tablet (10 mg total) by mouth once daily., Disp: 90 tablet, Rfl: 1    metoprolol succinate (TOPROL-XL) 50 MG 24 hr tablet, Take 1 tablet (50 mg total) by mouth once daily., Disp: 90 tablet, Rfl: 1    mupirocin (BACTROBAN) 2 % ointment, Apply topically 3 (three) times daily., Disp: 15 g, Rfl: 0    nitroGLYCERIN (NITROSTAT) 0.3 MG SL tablet, Place 1 tablet (0.3 mg total) under the tongue every 5 (five) minutes as needed for Chest pain. Up to 3 times., " Disp: 30 tablet, Rfl: 0    OZEMPIC 1 mg/dose (2 mg/1.5 mL) PnIj, Inject 1 mg into the skin every 7 days., Disp: 1.5 pen, Rfl: 5    tiZANidine (ZANAFLEX) 4 MG tablet, Take 1 tablet (4 mg total) by mouth 3 (three) times daily., Disp: 270 tablet, Rfl: 1    Current Facility-Administered Medications:     EPINEPHrine (EPIPEN) 0.3 mg/0.3 mL pen injection 0.3 mg, 0.3 mg, Intramuscular, PRN, Karlee Liu MD     PHYSICAL EXAM:  /60 (BP Location: Left arm, Patient Position: Sitting)   Pulse (!) 117   Ht 6' (1.829 m)   Wt (!) 159.2 kg (351 lb)   SpO2 96%   BMI 47.60 kg/m²   Wt Readings from Last 3 Encounters:   08/16/22 (!) 159.2 kg (351 lb)   08/09/22 (!) 159.2 kg (351 lb)   06/22/22 (!) 156.5 kg (345 lb)      Body mass index is 47.6 kg/m².    Physical Exam  Vitals reviewed.   Constitutional:       Appearance: Normal appearance.   HENT:      Head: Normocephalic and atraumatic.   Neck:      Vascular: No carotid bruit or JVD.   Cardiovascular:      Rate and Rhythm: Normal rate and regular rhythm.      Pulses: Normal pulses.           Radial pulses are 2+ on the right side and 2+ on the left side.        Dorsalis pedis pulses are 2+ on the right side and 2+ on the left side.      Heart sounds: Normal heart sounds.   Pulmonary:      Effort: Pulmonary effort is normal.      Breath sounds: Normal breath sounds.   Musculoskeletal:      Left lower leg: No edema.      Comments: RLE BKA   Skin:     General: Skin is warm and dry.   Neurological:      Mental Status: He is alert.     c/o sore of bottom of right foot.     LABS REVIEWED:  Lab Results   Component Value Date    WBC 9.27 08/16/2022    RBC 5.99 08/16/2022    HGB 16.1 08/16/2022    HCT 48.5 08/16/2022    MCV 81.0 08/16/2022    MCH 26.9 (L) 08/16/2022    MCHC 33.2 08/16/2022    RDW 13.8 08/16/2022     08/16/2022    MPV 9.5 08/16/2022    NRBC 0.0 08/16/2022    INR 1.02 08/24/2021     Lab Results   Component Value Date     (L) 08/16/2022    K 3.4 (L)  08/16/2022     08/16/2022    CO2 24 08/16/2022    BUN 13 08/16/2022    MG 2.3 08/16/2022     Lab Results   Component Value Date    AST 15 08/16/2022    ALT 27 08/16/2022     Lab Results   Component Value Date     (H) 08/16/2022    HGBA1C 8.8 (H) 06/22/2022     Lab Results   Component Value Date    CHOL 120 06/22/2022    HDL 38 (L) 06/22/2022    TRIG 204 (H) 06/22/2022    CHOLHDL 3.2 06/22/2022       Cardiac Studies: EKG sinus tachycardia    Results for orders placed during the hospital encounter of 07/19/21    Echo    Interpretation Summary  · The left ventricle is normal in size with concentric remodeling and normal systolic function.  · The estimated ejection fraction is 60%.  · Normal left ventricular diastolic function.  · Mild right ventricular enlargement.  · Mild right atrial enlargement.  · Normal central venous pressure (3 mmHg).  · Trivial pericardial effusion.          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 04/13/2022    Claudication 02/16/2022    Foot pain, right 01/06/2022    Non-pressure chronic ulcer of other part of right foot limited to breakdown of skin     Gastroesophageal reflux disease without esophagitis 10/18/2021    Diabetic ulcer of right midfoot associated with diabetes mellitus due to underlying condition, limited to breakdown of skin 10/14/2021    Coronary artery disease involving native heart 09/10/2021    Palpitations 09/10/2021    Morbid obesity 08/24/2021    History of heart attack 08/15/2021    Chest pain 07/19/2021    Nicotine dependence 07/19/2021    Complex regional pain syndrome type I of right upper extremity     Diabetic polyneuropathy associated with type 2 diabetes mellitus 04/20/2021    Spondylosis of lumbosacral region without myelopathy or radiculopathy 04/15/2021    Chronic pain syndrome  04/01/2021    Lumbosacral spondylosis without myelopathy MRI [facility] 2019 04/01/2021    Complex regional pain syndrome of right upper extremity 04/01/2021    Depressive disorder     COPD (chronic obstructive pulmonary disease)     Hypertension, essential     Hyperlipidemia     Diabetes mellitus, type 2     Sleep apnea          PLAN: call results; needs improved glucose control    Orders Placed This Encounter   Procedures    Comprehensive Metabolic Panel     Standing Status:   Future     Number of Occurrences:   1     Standing Expiration Date:   10/15/2023    CBC Auto Differential     Standing Status:   Future     Number of Occurrences:   1     Standing Expiration Date:   10/15/2023    Magnesium     Standing Status:   Future     Number of Occurrences:   1     Standing Expiration Date:   10/15/2023    EKG 12-lead     Standing Status:   Future     Number of Occurrences:   1     Standing Expiration Date:   8/16/2023      RTC 6 months.

## 2022-08-22 NOTE — PROGRESS NOTES
Subjective:       Patient ID: Kennedy West is a 41 y.o. male.    Chief Complaint:  No chief complaint on file.      History of Present Illness  This pleasant right-handed 41-year-old  male presents to the clinic for follow-up of neuropathy, back pain and bilateral lower extremity weakness.  Patient reports today that he has been having left shoulder pain that started about one month ago and has decreased range of motion. He also reports left hand Dupuytren's contracture in the left hand. He states Dr. ALVA Williamson repaired his right hand and he desires to be referred to Dr. JESSICA Williamson to have the left hand evaluated. He also has an elevated heart rate this morning and will follow up with Cardiology for this. He is followed by cardiology for history of chest pains. He denies any SOB or chest pain at today's visit.  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 Patient states neuropathy is better than the last visit. He is pleased with the treatment of the neuropathy symptoms and desires to continue the current management.  Patient reports neuropathy started 7 years ago with burning, numbness, and tingling that he rates as a 2.5 on a scale of 0-5 in the bilateral legs, foot and hands. Neuropathy is now intermittent and pain is rated as a 6 on a scale of 0-10. Patient is diabetic and denies neck or back injury. He still reports weakness in the bilateral lower extremities. Discussed with patient that his diabetic neuropathy will not improve until he gets his blood sugars under control.  His last Hgb A1c in the computer was done on June 22, 2022 was 8.8 and in January 2022 was 10.6.  He is followed by SAVAGE Maravilla and a Endocrinologist at [facility] to bring his blood sugars under better control. He is currently on gabapentin 600 mg one three times a day and Elavil 75 mg at night. He is also using Capsaicin cream over the counter that he states helps. He is  tolerating these medications without side effects and states it helps. He states pain treatment tried him on Lyrica but then insurance would not pay for it. He states paint treatment then increased the Elavil to 75 mg at night and restarted the gabapentin.   He was on Cymbalta 60 mg twice a day and states pain treatment discontinued this medication due to reported suicidal thoughts. He states he has not had any suicidal thoughts since discontinuation of the Cymbalta. He  denies any suicidal thoughts at today's visit. BDI-II depression questionnaire score in February 2022 was 10 that showed mild depression. He declined again at today's visit for referral to psychiatry to further evaluate the depression and desires not to start any antidepressants. He previously reported chronic leg weakness and leg pain with ambulation prior to the amputation. We ordered arterial and venous doppler US at the last visit and referred him to vascular surgeon Dr. Luna. His note indicated that there was no significant vascular disease.       Patient states back pain started about 7 years ago and  improved some with back injections by pain treatment. He desires to continue the current management of the back pain and will continue to follow pain treatment for management of his back pain. Pain is located in the thoracic and lumbar spine that radiates to the bilateral legs and left foot that he rates as a 6 on a scale of 0-10. Pain is described as constant and sharp. He denies any back surgery. He states he completed physical therapy since his last visit and is followed by TONYA Garcia at pain treatment. He does have obstructive sleep apnea and was prescribed a CPAP but is not using the CPAP. Discussed RAKESH in detail with the patient and encouraged him to get restarted on the Cpap.  He was recommended to loses weight. He reports 2 falls since his last visit and denies any head injuries. Discussed fall precautions in detail and written education  given.  Discussed the plan in detail with Neurologist Dr. VALDEZ Winters and  the patient and he is in agreement with the plan. All his questions were answered at today's visit.     CT of the head without contrast done on July 19, 2021 showed no acute intracranial process.     MRI of the cervical spine without contrast done on November 30, 2020 showed tiny central posterior disc protrusions at C5-C6 and C6-C7. Moderate right posterior lateral disc protrusion causing right lateral recess and neuroforaminal narrowing mild in severity at T1-T2. Findings are also suggested at T2-T3.     MRI of the lumbar spine without contrast done on February 21, 2019 showed a desiccation with minimal facet arthrosis at L3-L4 and L4-L5.     MRI of the thoracic spine without contrast done on January 28,2021 showed no evidence of abnormality demonstrated     EMG nerve conduction study of the bilateral lower extremities done on March 14, 2019 showed a normal study. It was no electrophysiological evidence of peripheral polyneuropathy or LS radiculopathy in the muscles tested in both lower extremities.     EMG nerve conduction study of the bilateral upper extremities done on December 15, 2020 showed electrophysiologic findings suggest bilateral ulnar neuropathies that best localize to the forearm segments bilaterally without a convincing conduction block or marked slowing of conduction velocity. There is no evidence of acute or ongoing cervical radiculopathy affecting the muscles tested in the symptomatic right upper limb. Normal or borderline normal conduction values in multiple nerve segments may reflect underlying mild diabetic polyneuropathy.     EMG NCS of the BLE done on December 10, 2021 by Dr. 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 In conjunction with the clinical history of chronic diabetes, the findings suggest diabetic  polyneuropathy. There is no convincing evidence of coexisting diabetic radiculoplexus neuropathy, plexopathy, femoral neuropathy, or other mononeuropathies affecting the lower limbs. IF symptoms progress, particularly in the thighs, repeat EMG NCS should be repeated to look for denervation in proximal muscles.      Arterial doppler US bilaterally done on February 22, 2022 showed Mild to moderate right calf vessel disease and mild left calf vessel disease.      Venous doppler US bilaterally done on February 22, 2022 showed No evidence of DVT seen in either lower extremity.     Review of Systems  Review of Systems   Constitutional: Negative for activity change, diaphoresis, fever and unexpected weight change.   HENT: Negative for congestion, ear pain, facial swelling, hearing loss, tinnitus, trouble swallowing and voice change.    Eyes: Negative for photophobia, pain and visual disturbance.   Respiratory: Negative for chest tightness, shortness of breath and wheezing.    Cardiovascular: Negative for chest pain, palpitations and leg swelling.   Gastrointestinal: Negative for constipation, diarrhea, nausea and vomiting.   Genitourinary: Negative for difficulty urinating.   Musculoskeletal: Positive for back pain. Negative for gait problem, neck pain and neck stiffness.        Dupuytren's contracture        Left shoulder pain    Skin: Negative for color change, pallor, rash and wound.   Neurological: Positive for weakness and numbness. Negative for dizziness, tremors, seizures, syncope, facial asymmetry, speech difficulty, light-headedness and headaches.   Psychiatric/Behavioral: Negative for agitation, behavioral problems, confusion and hallucinations. The patient is not nervous/anxious and is not hyperactive.        Objective:      Neurologic Exam     Mental Status   Oriented to person, place, and time.   Oriented to person.   Oriented to place.   Oriented to time.   Attention: normal. Concentration: normal.   Speech:  speech is normal   Level of consciousness: alert  Knowledge: good.     Cranial Nerves     CN II   Visual fields full to confrontation.   Right visual field deficit: none  Left visual field deficit: none     CN III, IV, VI   Pupils are equal, round, and reactive to light.  Extraocular motions are normal.   Right pupil: Size: 3 mm. Shape: regular. Reactivity: brisk.   Left pupil: Size: 3 mm. Shape: regular. Reactivity: brisk.   CN III: no CN III palsy  CN VI: no CN VI palsy  Nystagmus: none     CN V   Facial sensation intact.     CN VII   Facial expression full, symmetric.     CN VIII   CN VIII normal.   Hearing: intact    CN IX, X   CN IX normal.     CN XI   CN XI normal.     CN XII   CN XII normal.     Motor Exam   Muscle bulk: normal  Overall muscle tone: normal    Strength   Right deltoid: 5/5  Left deltoid: 5/5  Right biceps: 5/5  Left biceps: 5/5  Right triceps: 5/5  Left triceps: 5/5  Right quadriceps: 3/5  Left quadriceps: 5/5  Right hamstring: 3/5  Left hamstrin/5  Right below the knee amputation 2022      Sensory Exam   Light touch normal.     Gait, Coordination, and Reflexes     Gait  Gait: normal    Coordination   Finger to nose coordination: normal  Tandem walking coordination: normal    Tremor   Resting tremor: absent  Intention tremor: absent  Action tremor: absent    Reflexes   Right brachioradialis: 2+  Left brachioradialis: 2+  Right biceps: 2+  Left biceps: 2+  Right triceps: 2+  Left triceps: 2+  Right patellar: 2+  Left patellar: 2+  Right achilles: 2+  Left achilles: 2+  Right : 4+  Left : 4+  Patient in wheelchair        Physical Exam  Constitutional:       General: He is not in acute distress.  HENT:      Head: Normocephalic.      Mouth/Throat:      Mouth: Mucous membranes are moist.   Eyes:      Extraocular Movements: Extraocular movements intact and EOM normal.      Pupils: Pupils are equal, round, and reactive to light.   Cardiovascular:      Rate and Rhythm: Regular  rhythm. Tachycardia present.      Heart sounds: Normal heart sounds. No murmur heard.  Pulmonary:      Effort: Pulmonary effort is normal. No respiratory distress.      Breath sounds: Normal breath sounds. No wheezing, rhonchi or rales.   Musculoskeletal:         General: No swelling, tenderness, deformity or signs of injury. Normal range of motion.      Cervical back: Normal range of motion. No rigidity or tenderness.      Right lower leg: No edema.      Left lower leg: No edema.   Skin:     General: Skin is warm and dry.      Capillary Refill: Capillary refill takes less than 2 seconds.      Coloration: Skin is not jaundiced or pale.      Findings: No bruising, erythema, lesion or rash.   Neurological:      Mental Status: He is alert and oriented to person, place, and time.      Coordination: Finger-Nose-Finger Test normal.      Gait: Gait is intact. Tandem walk normal.      Deep Tendon Reflexes:      Reflex Scores:       Tricep reflexes are 2+ on the right side and 2+ on the left side.       Bicep reflexes are 2+ on the right side and 2+ on the left side.       Brachioradialis reflexes are 2+ on the right side and 2+ on the left side.       Patellar reflexes are 2+ on the right side and 2+ on the left side.       Achilles reflexes are 2+ on the right side and 2+ on the left side.  Psychiatric:         Mood and Affect: Mood normal.         Speech: Speech normal.         Behavior: Behavior normal.           Assessment:     Problem List Items Addressed This Visit        Neuro    Diabetic polyneuropathy associated with type 2 diabetes mellitus - Primary (Chronic)       Psychiatric    Depressive disorder (Chronic)       Endocrine    Morbid obesity (Chronic)    Vitamin D deficiency       Orthopedic    Dupuytren's contracture of both hands    Relevant Orders    Ambulatory referral/consult to Orthopedics       Other    Sleep apnea (Chronic)      Other Visit Diagnoses     Acute pain of left shoulder        Relevant Orders     X-Ray Shoulder 2 or More Views Left            Plan:       1. Go to ER for any chest pain and SOB with exertion  2. Fall precautions  3. Continue elavil to 75 mg at bedtime per pain treatment  4. Smoking cessation  5. Xray left shoulder   6. Keep blood sugars under control  7. Recommend weight loss  8. Continue vitamin D 50,000 IU one capsule monthly  9. Keep follow up with PCP for medical management  10. Keep follow up with SAVAGE Maravilla and Endocrinologist for diabetes management  11. Keep follow up with Cardiologist Dr. Raygoza  12. Keep follow up with Pain treatment for back pain  13. Referral to orthopedics for Dupuytren's contracture  14. Patient declined referral to psychiatry to treat depression  15. Continue Gabapentin 600 mg three times a day per pain treatment   16. Continue Capsaicin cream over the counter, follow directions on package  17. Follow up with Neurology in 3 months or sooner if needed

## 2022-08-25 ENCOUNTER — HOSPITAL ENCOUNTER (OUTPATIENT)
Dept: RADIOLOGY | Facility: HOSPITAL | Age: 42
Discharge: HOME | End: 2022-08-25
Attending: NURSE PRACTITIONER
Payer: COMMERCIAL

## 2022-08-25 ENCOUNTER — OFFICE VISIT (OUTPATIENT)
Dept: NEUROLOGY | Facility: CLINIC | Age: 42
End: 2022-08-25
Payer: COMMERCIAL

## 2022-08-25 VITALS
DIASTOLIC BLOOD PRESSURE: 72 MMHG | SYSTOLIC BLOOD PRESSURE: 116 MMHG | BODY MASS INDEX: 42.66 KG/M2 | OXYGEN SATURATION: 97 % | WEIGHT: 315 LBS | HEIGHT: 72 IN | HEART RATE: 116 BPM

## 2022-08-25 DIAGNOSIS — E66.01 MORBID OBESITY: Chronic | ICD-10-CM

## 2022-08-25 DIAGNOSIS — M25.512 ACUTE PAIN OF LEFT SHOULDER: ICD-10-CM

## 2022-08-25 DIAGNOSIS — E55.9 VITAMIN D DEFICIENCY: ICD-10-CM

## 2022-08-25 DIAGNOSIS — M72.0 DUPUYTREN'S CONTRACTURE OF BOTH HANDS: ICD-10-CM

## 2022-08-25 DIAGNOSIS — G47.33 OBSTRUCTIVE SLEEP APNEA SYNDROME: Chronic | ICD-10-CM

## 2022-08-25 DIAGNOSIS — F32.A DEPRESSIVE DISORDER: Chronic | ICD-10-CM

## 2022-08-25 DIAGNOSIS — E11.42 DIABETIC POLYNEUROPATHY ASSOCIATED WITH TYPE 2 DIABETES MELLITUS: Primary | Chronic | ICD-10-CM

## 2022-08-25 PROCEDURE — 1159F MED LIST DOCD IN RCRD: CPT | Mod: CPTII,,, | Performed by: NURSE PRACTITIONER

## 2022-08-25 PROCEDURE — 3008F BODY MASS INDEX DOCD: CPT | Mod: CPTII,,, | Performed by: NURSE PRACTITIONER

## 2022-08-25 PROCEDURE — 3078F DIAST BP <80 MM HG: CPT | Mod: CPTII,,, | Performed by: NURSE PRACTITIONER

## 2022-08-25 PROCEDURE — 3074F PR MOST RECENT SYSTOLIC BLOOD PRESSURE < 130 MM HG: ICD-10-PCS | Mod: CPTII,,, | Performed by: NURSE PRACTITIONER

## 2022-08-25 PROCEDURE — 1159F PR MEDICATION LIST DOCUMENTED IN MEDICAL RECORD: ICD-10-PCS | Mod: CPTII,,, | Performed by: NURSE PRACTITIONER

## 2022-08-25 PROCEDURE — 3008F PR BODY MASS INDEX (BMI) DOCUMENTED: ICD-10-PCS | Mod: CPTII,,, | Performed by: NURSE PRACTITIONER

## 2022-08-25 PROCEDURE — 3060F POS MICROALBUMINURIA REV: CPT | Mod: CPTII,,, | Performed by: NURSE PRACTITIONER

## 2022-08-25 PROCEDURE — 3066F PR DOCUMENTATION OF TREATMENT FOR NEPHROPATHY: ICD-10-PCS | Mod: CPTII,,, | Performed by: NURSE PRACTITIONER

## 2022-08-25 PROCEDURE — 73030 X-RAY EXAM OF SHOULDER: CPT | Mod: TC,LT

## 2022-08-25 PROCEDURE — 3052F HG A1C>EQUAL 8.0%<EQUAL 9.0%: CPT | Mod: CPTII,,, | Performed by: NURSE PRACTITIONER

## 2022-08-25 PROCEDURE — 99214 OFFICE O/P EST MOD 30 MIN: CPT | Mod: S$PBB,,, | Performed by: NURSE PRACTITIONER

## 2022-08-25 PROCEDURE — 3052F PR MOST RECENT HEMOGLOBIN A1C LEVEL 8.0 - < 9.0%: ICD-10-PCS | Mod: CPTII,,, | Performed by: NURSE PRACTITIONER

## 2022-08-25 PROCEDURE — 3066F NEPHROPATHY DOC TX: CPT | Mod: CPTII,,, | Performed by: NURSE PRACTITIONER

## 2022-08-25 PROCEDURE — 3078F PR MOST RECENT DIASTOLIC BLOOD PRESSURE < 80 MM HG: ICD-10-PCS | Mod: CPTII,,, | Performed by: NURSE PRACTITIONER

## 2022-08-25 PROCEDURE — 73030 XR SHOULDER COMPLETE 2 OR MORE VIEWS LEFT: ICD-10-PCS | Mod: 26,LT,, | Performed by: RADIOLOGY

## 2022-08-25 PROCEDURE — 3060F PR POS MICROALBUMINURIA RESULT DOCUMENTED/REVIEW: ICD-10-PCS | Mod: CPTII,,, | Performed by: NURSE PRACTITIONER

## 2022-08-25 PROCEDURE — 99215 OFFICE O/P EST HI 40 MIN: CPT | Mod: PBBFAC | Performed by: NURSE PRACTITIONER

## 2022-08-25 PROCEDURE — 73030 X-RAY EXAM OF SHOULDER: CPT | Mod: 26,LT,, | Performed by: RADIOLOGY

## 2022-08-25 PROCEDURE — 1160F RVW MEDS BY RX/DR IN RCRD: CPT | Mod: CPTII,,, | Performed by: NURSE PRACTITIONER

## 2022-08-25 PROCEDURE — 99214 PR OFFICE/OUTPT VISIT, EST, LEVL IV, 30-39 MIN: ICD-10-PCS | Mod: S$PBB,,, | Performed by: NURSE PRACTITIONER

## 2022-08-25 PROCEDURE — 3074F SYST BP LT 130 MM HG: CPT | Mod: CPTII,,, | Performed by: NURSE PRACTITIONER

## 2022-08-25 PROCEDURE — 1160F PR REVIEW ALL MEDS BY PRESCRIBER/CLIN PHARMACIST DOCUMENTED: ICD-10-PCS | Mod: CPTII,,, | Performed by: NURSE PRACTITIONER

## 2022-08-25 NOTE — PATIENT INSTRUCTIONS
1. Go to ER for any chest pain and SOB with exertion  2. Fall precautions  3. Continue elavil to 75 mg at bedtime per pain treatment  4. Smoking cessation  5. Xray left shoulder   6. Keep blood sugars under control  7. Recommend weight loss  8. Continue vitamin D 50,000 IU one capsule monthly  9. Keep follow up with PCP for medical management  10. Keep follow up with SAVAGE Maravilla and Endocrinologist for diabetes management  11. Keep follow up with Cardiologist Dr. Raygoza  12. Keep follow up with Pain treatment for back pain  13. Referral to orthopedics for Dupuytren's contracture  14. Patient declined referral to psychiatry to treat depression  15. Continue Gabapentin 600 mg three times a day per pain treatment   16. Continue Capsaicin cream over the counter, follow directions on package  17. Follow up with Neurology in 3 months or sooner if needed

## 2022-08-29 ENCOUNTER — TELEPHONE (OUTPATIENT)
Dept: NEUROLOGY | Facility: CLINIC | Age: 42
End: 2022-08-29
Payer: COMMERCIAL

## 2022-08-29 NOTE — TELEPHONE ENCOUNTER
Left voicemail to return call if any questions  ----- Message from Mahin Negrete NP sent at 8/25/2022 10:53 AM CDT -----  Please let patient know his shoulder xray showed No convincing acute fracture or dislocation demonstrated. No concerning radiopaque foreign body visualized. Have him follow up with PCP for shoulder pain, thanks

## 2022-09-19 RX ORDER — PEN NEEDLE, DIABETIC 29 G X1/2"
NEEDLE, DISPOSABLE MISCELLANEOUS
COMMUNITY
Start: 2022-03-15 | End: 2023-10-05

## 2022-09-19 RX ORDER — INSULIN ASPART 100 [IU]/ML
INJECTION, SOLUTION INTRAVENOUS; SUBCUTANEOUS
COMMUNITY
Start: 2022-08-26 | End: 2022-12-14 | Stop reason: SDUPTHER

## 2022-09-19 RX ORDER — SEMAGLUTIDE 1.34 MG/ML
1 INJECTION, SOLUTION SUBCUTANEOUS
COMMUNITY
Start: 2022-08-26 | End: 2022-12-14 | Stop reason: SDUPTHER

## 2022-09-21 ENCOUNTER — OFFICE VISIT (OUTPATIENT)
Dept: FAMILY MEDICINE | Facility: CLINIC | Age: 42
End: 2022-09-21
Payer: COMMERCIAL

## 2022-09-21 VITALS
HEIGHT: 72 IN | BODY MASS INDEX: 42.66 KG/M2 | TEMPERATURE: 98 F | HEART RATE: 103 BPM | DIASTOLIC BLOOD PRESSURE: 87 MMHG | SYSTOLIC BLOOD PRESSURE: 124 MMHG | WEIGHT: 315 LBS | OXYGEN SATURATION: 98 %

## 2022-09-21 DIAGNOSIS — E78.2 MIXED HYPERLIPIDEMIA: Chronic | ICD-10-CM

## 2022-09-21 DIAGNOSIS — E11.65 TYPE 2 DIABETES MELLITUS WITH HYPERGLYCEMIA, WITH LONG-TERM CURRENT USE OF INSULIN: ICD-10-CM

## 2022-09-21 DIAGNOSIS — G54.6 PHANTOM PAIN FOLLOWING AMPUTATION OF LOWER LIMB: ICD-10-CM

## 2022-09-21 DIAGNOSIS — G90.511 COMPLEX REGIONAL PAIN SYNDROME TYPE 1 OF RIGHT UPPER EXTREMITY: Chronic | ICD-10-CM

## 2022-09-21 DIAGNOSIS — K21.9 GASTROESOPHAGEAL REFLUX DISEASE, UNSPECIFIED WHETHER ESOPHAGITIS PRESENT: ICD-10-CM

## 2022-09-21 DIAGNOSIS — I10 HYPERTENSION, ESSENTIAL: Primary | Chronic | ICD-10-CM

## 2022-09-21 DIAGNOSIS — M47.817 LUMBOSACRAL SPONDYLOSIS WITHOUT MYELOPATHY: Chronic | ICD-10-CM

## 2022-09-21 DIAGNOSIS — Z79.4 TYPE 2 DIABETES MELLITUS WITH HYPERGLYCEMIA, WITH LONG-TERM CURRENT USE OF INSULIN: ICD-10-CM

## 2022-09-21 DIAGNOSIS — Z23 NEED FOR PNEUMOCOCCAL VACCINE: ICD-10-CM

## 2022-09-21 DIAGNOSIS — E11.42 DIABETIC PERIPHERAL NEUROPATHY: ICD-10-CM

## 2022-09-21 DIAGNOSIS — I25.10 CORONARY ARTERY DISEASE INVOLVING NATIVE HEART, UNSPECIFIED VESSEL OR LESION TYPE, UNSPECIFIED WHETHER ANGINA PRESENT: Chronic | ICD-10-CM

## 2022-09-21 PROBLEM — M72.0 DUPUYTREN'S CONTRACTURE OF BOTH HANDS: Chronic | Status: ACTIVE | Noted: 2022-08-25

## 2022-09-21 PROCEDURE — 90471 PR IMMUNIZ ADMIN,1 SINGLE/COMB VAC/TOXOID: ICD-10-PCS | Mod: ,,, | Performed by: FAMILY MEDICINE

## 2022-09-21 PROCEDURE — 99214 PR OFFICE/OUTPT VISIT, EST, LEVL IV, 30-39 MIN: ICD-10-PCS | Mod: 25,,, | Performed by: FAMILY MEDICINE

## 2022-09-21 PROCEDURE — 90471 IMMUNIZATION ADMIN: CPT | Mod: ,,, | Performed by: FAMILY MEDICINE

## 2022-09-21 PROCEDURE — 83036 HEMOGLOBIN A1C: ICD-10-PCS | Mod: ,,, | Performed by: CLINICAL MEDICAL LABORATORY

## 2022-09-21 PROCEDURE — 3074F PR MOST RECENT SYSTOLIC BLOOD PRESSURE < 130 MM HG: ICD-10-PCS | Mod: CPTII,,, | Performed by: FAMILY MEDICINE

## 2022-09-21 PROCEDURE — 3052F HG A1C>EQUAL 8.0%<EQUAL 9.0%: CPT | Mod: CPTII,,, | Performed by: FAMILY MEDICINE

## 2022-09-21 PROCEDURE — 3079F DIAST BP 80-89 MM HG: CPT | Mod: CPTII,,, | Performed by: FAMILY MEDICINE

## 2022-09-21 PROCEDURE — 3079F PR MOST RECENT DIASTOLIC BLOOD PRESSURE 80-89 MM HG: ICD-10-PCS | Mod: CPTII,,, | Performed by: FAMILY MEDICINE

## 2022-09-21 PROCEDURE — 3066F NEPHROPATHY DOC TX: CPT | Mod: CPTII,,, | Performed by: FAMILY MEDICINE

## 2022-09-21 PROCEDURE — 3060F POS MICROALBUMINURIA REV: CPT | Mod: CPTII,,, | Performed by: FAMILY MEDICINE

## 2022-09-21 PROCEDURE — 1160F PR REVIEW ALL MEDS BY PRESCRIBER/CLIN PHARMACIST DOCUMENTED: ICD-10-PCS | Mod: CPTII,,, | Performed by: FAMILY MEDICINE

## 2022-09-21 PROCEDURE — 3008F BODY MASS INDEX DOCD: CPT | Mod: CPTII,,, | Performed by: FAMILY MEDICINE

## 2022-09-21 PROCEDURE — 1159F PR MEDICATION LIST DOCUMENTED IN MEDICAL RECORD: ICD-10-PCS | Mod: CPTII,,, | Performed by: FAMILY MEDICINE

## 2022-09-21 PROCEDURE — 90677 PR PNEUMOCOCCAL CONJ VACCINE, 20 VALENT, IM: ICD-10-PCS | Mod: ,,, | Performed by: FAMILY MEDICINE

## 2022-09-21 PROCEDURE — 3066F PR DOCUMENTATION OF TREATMENT FOR NEPHROPATHY: ICD-10-PCS | Mod: CPTII,,, | Performed by: FAMILY MEDICINE

## 2022-09-21 PROCEDURE — 3074F SYST BP LT 130 MM HG: CPT | Mod: CPTII,,, | Performed by: FAMILY MEDICINE

## 2022-09-21 PROCEDURE — 3052F PR MOST RECENT HEMOGLOBIN A1C LEVEL 8.0 - < 9.0%: ICD-10-PCS | Mod: CPTII,,, | Performed by: FAMILY MEDICINE

## 2022-09-21 PROCEDURE — 90677 PCV20 VACCINE IM: CPT | Mod: ,,, | Performed by: FAMILY MEDICINE

## 2022-09-21 PROCEDURE — 1159F MED LIST DOCD IN RCRD: CPT | Mod: CPTII,,, | Performed by: FAMILY MEDICINE

## 2022-09-21 PROCEDURE — 3060F PR POS MICROALBUMINURIA RESULT DOCUMENTED/REVIEW: ICD-10-PCS | Mod: CPTII,,, | Performed by: FAMILY MEDICINE

## 2022-09-21 PROCEDURE — 3008F PR BODY MASS INDEX (BMI) DOCUMENTED: ICD-10-PCS | Mod: CPTII,,, | Performed by: FAMILY MEDICINE

## 2022-09-21 PROCEDURE — 1160F RVW MEDS BY RX/DR IN RCRD: CPT | Mod: CPTII,,, | Performed by: FAMILY MEDICINE

## 2022-09-21 PROCEDURE — 99214 OFFICE O/P EST MOD 30 MIN: CPT | Mod: 25,,, | Performed by: FAMILY MEDICINE

## 2022-09-21 PROCEDURE — 83036 HEMOGLOBIN GLYCOSYLATED A1C: CPT | Mod: ,,, | Performed by: CLINICAL MEDICAL LABORATORY

## 2022-09-21 RX ORDER — EMPAGLIFLOZIN 10 MG/1
10 TABLET, FILM COATED ORAL DAILY
Qty: 90 TABLET | Refills: 1 | Status: SHIPPED | OUTPATIENT
Start: 2022-09-21 | End: 2023-02-22 | Stop reason: SDUPTHER

## 2022-09-21 RX ORDER — TIZANIDINE 4 MG/1
4 TABLET ORAL 3 TIMES DAILY
Qty: 270 TABLET | Refills: 1 | Status: SHIPPED | OUTPATIENT
Start: 2022-09-21 | End: 2022-12-15 | Stop reason: SDUPTHER

## 2022-09-21 RX ORDER — ESOMEPRAZOLE MAGNESIUM 40 MG/1
40 CAPSULE, DELAYED RELEASE ORAL
Qty: 90 CAPSULE | Refills: 1 | Status: SHIPPED | OUTPATIENT
Start: 2022-09-21 | End: 2022-12-15 | Stop reason: SDUPTHER

## 2022-09-21 RX ORDER — ATORVASTATIN CALCIUM 80 MG/1
80 TABLET, FILM COATED ORAL NIGHTLY
Qty: 90 TABLET | Refills: 1 | Status: SHIPPED | OUTPATIENT
Start: 2022-09-21 | End: 2022-12-15 | Stop reason: SDUPTHER

## 2022-09-21 RX ORDER — GABAPENTIN 600 MG/1
600 TABLET ORAL 3 TIMES DAILY
Qty: 90 TABLET | Refills: 1 | Status: SHIPPED | OUTPATIENT
Start: 2022-09-21 | End: 2022-10-06 | Stop reason: SDUPTHER

## 2022-09-21 RX ORDER — FENOFIBRATE 48 MG/1
48 TABLET, FILM COATED ORAL DAILY
Qty: 90 TABLET | Refills: 1 | Status: SHIPPED | OUTPATIENT
Start: 2022-09-21 | End: 2022-12-15 | Stop reason: SDUPTHER

## 2022-09-21 RX ORDER — METOPROLOL SUCCINATE 50 MG/1
50 TABLET, EXTENDED RELEASE ORAL DAILY
Qty: 90 TABLET | Refills: 1 | Status: SHIPPED | OUTPATIENT
Start: 2022-09-21 | End: 2022-12-15 | Stop reason: SDUPTHER

## 2022-09-21 NOTE — PROGRESS NOTES
"New Clinic Note    Kennedy West is a 41 y.o. male     CC:   Chief Complaint   Patient presents with    Diabetes     3 mth f/u        Subjective    History of Present Illness HPI   Patient is here for an evaluation of chronic medical problems. He needs refills.     Current Outpatient Medications:     ACCU-CHEK GUIDE TEST STRIPS Strp, USE STRIP TO CHECK GLUCOSE TWICE DAILY, Disp: , Rfl:     ACCU-CHEK SOFTCLIX LANCETS Misc, USE TO CHECK GLUCOSE TWICE DAILY, Disp: , Rfl:     albuterol (PROAIR HFA) 90 mcg/actuation inhaler, Inhale 2 puffs into the lungs every 4 (four) hours as needed for Wheezing. Rescue, Disp: 18 g, Rfl: 5    amitriptyline (ELAVIL) 75 MG tablet, Take one tablet at bedtime, Disp: 30 tablet, Rfl: 1    BD CANDIDA 2ND GEN PEN NEEDLE 32 gauge x 5/32" Ndle, USE 1 ONCE DAILY, Disp: 100 each, Rfl: 11    ergocalciferol (ERGOCALCIFEROL) 50,000 unit Cap, Take one capsule weekly for 12 weeks then reduce to one capsule monthly, Disp: 12 capsule, Rfl: 1    HUMALOG KWIKPEN INSULIN 100 unit/mL pen, SMARTSI Unit(s) SUB-Q 3 Times Daily, Disp: 15 mL, Rfl: 1    HYDROcodone-acetaminophen (NORCO)  mg per tablet, Take 1 tablet by mouth every 12 (twelve) hours., Disp: 60 tablet, Rfl: 0    insulin (BASAGLAR KWIKPEN U-100 INSULIN) glargine 100 units/mL (3mL) SubQ pen, Inject 36 units sq and increase as directed.  Not to exceed 60 units a day., Disp: 45 mL, Rfl: 5    isosorbide mononitrate (IMDUR) 30 MG 24 hr tablet, Take 1 tablet by mouth once daily, Disp: 90 tablet, Rfl: 0    pen needle, diabetic 31 gauge x 1/4" Ndle, Inject up to 3 times per day, Disp: , Rfl:     aspirin (ECOTRIN) 81 MG EC tablet, Take 1 tablet (81 mg total) by mouth once daily., Disp: 30 tablet, Rfl: 2    atorvastatin (LIPITOR) 80 MG tablet, Take 1 tablet (80 mg total) by mouth every evening., Disp: 90 tablet, Rfl: 1    esomeprazole (NEXIUM) 40 MG capsule, Take 1 capsule (40 mg total) by mouth before breakfast., Disp: 90 capsule, Rfl: 1    " fenofibrate (TRICOR) 48 MG tablet, Take 1 tablet (48 mg total) by mouth once daily., Disp: 90 tablet, Rfl: 1    gabapentin (NEURONTIN) 600 MG tablet, Take 1 tablet (600 mg total) by mouth 3 (three) times daily., Disp: 90 tablet, Rfl: 1    JARDIANCE 10 mg tablet, Take 1 tablet (10 mg total) by mouth once daily., Disp: 90 tablet, Rfl: 1    metoprolol succinate (TOPROL-XL) 50 MG 24 hr tablet, Take 1 tablet (50 mg total) by mouth once daily., Disp: 90 tablet, Rfl: 1    mupirocin (BACTROBAN) 2 % ointment, Apply topically 3 (three) times daily., Disp: 15 g, Rfl: 0    nitroGLYCERIN (NITROSTAT) 0.3 MG SL tablet, Place 1 tablet (0.3 mg total) under the tongue every 5 (five) minutes as needed for Chest pain. Up to 3 times., Disp: 30 tablet, Rfl: 0    NOVOLOG FLEXPEN U-100 INSULIN 100 unit/mL (3 mL) InPn pen, IF >200 BEFORE YOU EAT, TAKE 8 UNITS. IF >300, TAKE 10 UNITS. IF >400, TAKE 12 UNITS. DO NOT EXCEED 40 UNITS PER DAY, Disp: , Rfl:     OZEMPIC 1 mg/dose (4 mg/3 mL), Inject 1 mg into the skin every 7 days., Disp: , Rfl:     tiZANidine (ZANAFLEX) 4 MG tablet, Take 1 tablet (4 mg total) by mouth 3 (three) times daily., Disp: 270 tablet, Rfl: 1    Current Facility-Administered Medications:     EPINEPHrine (EPIPEN) 0.3 mg/0.3 mL pen injection 0.3 mg, 0.3 mg, Intramuscular, PRN, Karlee Liu MD     Past Medical History:   Diagnosis Date    Allergy     Anxiety     Asthma     Chronic pain     Complex regional pain syndrome type I of right upper extremity     COPD (chronic obstructive pulmonary disease)     Coronary artery disease     Depression     Diabetes mellitus, type 2     Diabetic peripheral neuropathy     Gastritis     GERD (gastroesophageal reflux disease)     Hyperlipidemia     Hypertension     Lumbar radiculopathy     Lumbosacral spondylosis     Non-pressure chronic ulcer of other part of right foot limited to breakdown of skin     Obesity     Obstructive sleep apnea     Osteoarthritis, multiple sites      Polyneuropathy     Tobacco dependence         Family History   Problem Relation Age of Onset    Diabetes Mother     Hypertension Mother     Cancer Mother     Arthritis Mother     Diabetes Father     Heart disease Father     Hyperlipidemia Father     Hypertension Father     Arthritis Father     COPD Father     Rheum arthritis Sister     Arthritis Sister     Stroke Maternal Uncle     Cancer Paternal Aunt     Stroke Maternal Grandmother     Stroke Maternal Grandfather     Cancer Paternal Grandmother     Heart disease Paternal Grandfather         Past Surgical History:   Procedure Laterality Date    ANGIOGRAM, CORONARY, WITH LEFT HEART CATHETERIZATION N/A 08/24/2021    Procedure: Left heart cath w/ coronary angiograms;  Surgeon: Zaheer Real MD;  Location: [facility] CATH LAB;  Service: Cardiology;  Laterality: N/A;    CARPAL TUNNEL RELEASE      right    INJECTION OF ANESTHETIC AGENT AROUND MEDIAL BRANCH NERVES INNERVATING LUMBAR FACET JOINT Bilateral 04/15/2021    Procedure: BLOCK, NERVE, FACET JOINT, LUMBAR, MEDIAL BRANCH;  Surgeon: Sandra Johnson MD;  Location: [facility] PAIN MGMT;  Service: Pain Management;  Laterality: Bilateral;  Bilateral L3-S1 MBB    INJECTION OF ANESTHETIC AGENT AROUND MEDIAL BRANCH NERVES INNERVATING LUMBAR FACET JOINT Bilateral 12/23/2021    Procedure: Block-nerve-medial branch-lumbar, bilateral L4 through S1;  Surgeon: Sandra Johnson MD;  Location: [facility] PAIN MGMT;  Service: Pain Management;  Laterality: Bilateral;  pt will bring vaccine verification card- notified in office    INJECTION OF FACET JOINT Bilateral 06/01/2020    Bilateral L3-S1 MBB - Dr Johnson    LEFT HEART CATHETERIZATION N/A 07/21/2021    Procedure: Left heart cath with possible intervention;  Surgeon: Brock Burkett DO;  Location: [facility] CATH LAB;  Service: Cardiology;  Laterality: N/A;    RBKA Right 03/16/2022    thumb surgery      right    TONSILLECTOMY  1990        Social History     Socioeconomic History     Marital status:    Occupational History    Occupation: [employer]   Tobacco Use    Smoking status: Light Smoker     Packs/day: 0.25     Years: 21.00     Pack years: 5.25     Types: Cigarettes     Start date: 11/9/2000    Smokeless tobacco: Never   Substance and Sexual Activity    Alcohol use: Not Currently     Alcohol/week: 0.0 standard drinks    Drug use: Never    Sexual activity: Not Currently     Partners: Female        Review of Systems   Constitutional:  Negative for activity change and unexpected weight change.   HENT:  Negative for hearing loss, rhinorrhea and trouble swallowing.    Eyes:  Negative for discharge and visual disturbance.   Respiratory:  Negative for chest tightness and wheezing.    Cardiovascular:  Positive for palpitations. Negative for chest pain.   Gastrointestinal:  Positive for constipation. Negative for blood in stool, diarrhea and vomiting.   Endocrine: Negative for polydipsia and polyuria.   Genitourinary:  Negative for difficulty urinating, hematuria and urgency.   Musculoskeletal:  Positive for arthralgias and joint swelling. Negative for neck pain.   Neurological:  Negative for weakness and headaches.   Psychiatric/Behavioral:  Negative for confusion and dysphoric mood.       /87 (BP Location: Left arm, Patient Position: Sitting, BP Method: Large (Automatic))   Pulse 103   Temp 98.2 °F (36.8 °C)   Ht 6' (1.829 m)   Wt (!) 158.8 kg (350 lb)   SpO2 98%   BMI 47.47 kg/m²      Physical Exam  HENT:      Head: Normocephalic and atraumatic.   Cardiovascular:      Rate and Rhythm: Normal rate and regular rhythm.   Pulmonary:      Effort: Pulmonary effort is normal.      Breath sounds: Normal breath sounds.   Neurological:      Mental Status: He is alert and oriented to person, place, and time.   Psychiatric:         Mood and Affect: Mood normal.         Behavior: Behavior normal.        Assessment and Plan      ICD-10-CM ICD-9-CM   1. Hypertension, essential  I10  401.9   2. Lumbosacral spondylosis without myelopathy  M47.817 721.3   3. Diabetic peripheral neuropathy  E11.42 250.60     357.2   4. Complex regional pain syndrome type 1 of right upper extremity  G90.511 337.21   5. 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 Type 2 diabetes mellitus with hyperglycemia, with long-term current use of insulin  E11.65 250.00    Z79.4 790.29     V58.67   7. Mixed hyperlipidemia  E78.2 272.2   8. Gastroesophageal reflux disease, unspecified whether esophagitis present  K21.9 530.81   9. Coronary artery disease involving native heart, unspecified vessel or lesion type, unspecified whether angina present  I25.10 414.01   10. 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 (Chronic)    Relevant Medications    gabapentin (NEURONTIN) 600 MG tablet       Cardiac/Vascular    Hypertension, essential - Primary (Chronic)    Relevant Medications    metoprolol succinate (TOPROL-XL) 50 MG 24 hr tablet    Hyperlipidemia (Chronic)    Relevant Medications    atorvastatin (LIPITOR) 80 MG tablet    fenofibrate (TRICOR) 48 MG tablet    Coronary artery disease involving native heart (Chronic)       Endocrine    Diabetes mellitus, type 2 (Chronic)    Relevant Medications    OZEMPIC 1 mg/dose (4 mg/3 mL)    NOVOLOG FLEXPEN U-100 INSULIN 100 unit/mL (3 mL) InPn pen    JARDIANCE 10 mg tablet    Other Relevant Orders    Hemoglobin A1C     Other Visit Diagnoses       Diabetic peripheral neuropathy        Relevant Medications    OZEMPIC 1 mg/dose (4 mg/3 mL)    NOVOLOG FLEXPEN U-100  INSULIN 100 unit/mL (3 mL) InPn pen    gabapentin (NEURONTIN) 600 MG tablet    Gastroesophageal reflux disease, unspecified whether esophagitis present        Relevant Medications    esomeprazole (NEXIUM) 40 MG capsule    Need for pneumococcal vaccine        Relevant Medications    pneumoc 20-praveen conj-dip cr(PF) (PREVNAR-20 (PF)) injection Syrg 0.5 mL (Completed)             Patient Instructions   Check glucose outside of clinic, records numbers, and bring to follow up visit.   Take medications as directed.   Eat a diabetic diet  Cardiovascular exercise at least 3 times per week.       Follow up in about 3 months (around 12/21/2022).

## 2022-09-22 ENCOUNTER — TELEPHONE (OUTPATIENT)
Dept: FAMILY MEDICINE | Facility: CLINIC | Age: 42
End: 2022-09-22
Payer: COMMERCIAL

## 2022-09-22 LAB
EST. AVERAGE GLUCOSE BLD GHB EST-MCNC: 170 MG/DL
HBA1C MFR BLD HPLC: 7.7 % (ref 4.5–6.6)

## 2022-09-22 NOTE — TELEPHONE ENCOUNTER
----- Message from Karlee Liu MD sent at 9/22/2022  3:18 PM CDT -----  Better. Scheduled to see endocrinology next month.

## 2022-10-05 NOTE — PROGRESS NOTES
Subjective:         Patient ID: Kennedy West is a 41 y.o. male.    Chief Complaint: Low-back Pain, Leg Pain, and phantom pain       Pain  This is a chronic problem. The current episode started more than 1 year ago. The problem occurs daily. The problem has been unchanged. Associated symptoms include arthralgias and neck pain. Pertinent negatives include no anorexia, change in bowel habit, chest pain, chills, coughing, diaphoresis, fatigue, fever, rash, sore throat, vertigo or vomiting.   Review of Systems   Constitutional:  Negative for activity change, appetite change, chills, diaphoresis, fatigue and fever.   HENT:  Negative for drooling, ear discharge, ear pain, facial swelling, nosebleeds, sore throat, trouble swallowing, voice change and goiter.    Eyes:  Negative for photophobia, pain, discharge, redness and visual disturbance.   Respiratory:  Negative for apnea, cough, choking, chest tightness, shortness of breath, wheezing and stridor.    Cardiovascular:  Negative for chest pain, palpitations and leg swelling.   Gastrointestinal:  Negative for abdominal distention, anorexia, change in bowel habit, diarrhea, rectal pain, vomiting, fecal incontinence and change in bowel habit.   Endocrine: Negative for cold intolerance, heat intolerance, polydipsia, polyphagia and polyuria.   Genitourinary:  Negative for bladder incontinence, dysuria, flank pain and frequency.   Musculoskeletal:  Positive for arthralgias, back pain, leg pain, neck pain and neck stiffness.   Integumentary:  Negative for color change, pallor and rash.   Neurological:  Negative for dizziness, vertigo, seizures, syncope, facial asymmetry, speech difficulty, light-headedness, coordination difficulties, memory loss and coordination difficulties.   Hematological:  Negative for adenopathy. Does not bruise/bleed easily.   Psychiatric/Behavioral:  Negative for agitation, behavioral problems, confusion, decreased concentration, dysphoric  mood, hallucinations, self-injury and suicidal ideas. The patient is not nervous/anxious and is not hyperactive.          Past Medical History:   Diagnosis Date    Allergy     Anxiety     Asthma     Chronic pain     Complex regional pain syndrome type I of right upper extremity     COPD (chronic obstructive pulmonary disease)     Coronary artery disease     Depression     Diabetes mellitus, type 2     Diabetic peripheral neuropathy     Gastritis     GERD (gastroesophageal reflux disease)     Hyperlipidemia     Hypertension     Lumbar radiculopathy     Lumbosacral spondylosis     Non-pressure chronic ulcer of other part of right foot limited to breakdown of skin     Obesity     Obstructive sleep apnea     Osteoarthritis, multiple sites     Polyneuropathy     Tobacco dependence      Past Surgical History:   Procedure Laterality Date    ANGIOGRAM, CORONARY, WITH LEFT HEART CATHETERIZATION N/A 08/24/2021    Procedure: Left heart cath w/ coronary angiograms;  Surgeon: Zaheer Real MD;  Location: [facility] CATH LAB;  Service: Cardiology;  Laterality: N/A;    CARPAL TUNNEL RELEASE      right    INJECTION OF ANESTHETIC AGENT AROUND MEDIAL BRANCH NERVES INNERVATING LUMBAR FACET JOINT Bilateral 04/15/2021    Procedure: BLOCK, NERVE, FACET JOINT, LUMBAR, MEDIAL BRANCH;  Surgeon: Sandra Johnson MD;  Location: [facility];  Service: Pain Management;  Laterality: Bilateral;  Bilateral L3-S1 MBB    INJECTION OF ANESTHETIC AGENT AROUND MEDIAL BRANCH NERVES INNERVATING LUMBAR FACET JOINT Bilateral 12/23/2021    Procedure: Block-nerve-medial branch-lumbar, bilateral L4 through S1;  Surgeon: Sandra Johnson MD;  Location: [facility];  Service: Pain Management;  Laterality: Bilateral;  pt will bring vaccine verification card- notified in office    INJECTION OF FACET JOINT Bilateral 06/01/2020    Bilateral L3-S1 MBB - Dr Johnson    LEFT HEART CATHETERIZATION N/A 07/21/2021    Procedure:  Left heart cath with possible intervention;  Surgeon: Brock Burkett DO;  Location: [facility] [place] CATH LAB;  Service: Cardiology;  Laterality: N/A;    RBKA Right 03/16/2022    thumb surgery      right    TONSILLECTOMY  1990     Social History     Socioeconomic History    Marital status:    Occupational History    Occupation: [employer]   Tobacco Use    Smoking status: Light Smoker     Packs/day: 0.25     Years: 21.00     Pack years: 5.25     Types: Cigarettes     Start date: 11/9/2000    Smokeless tobacco: Never   Substance and Sexual Activity    Alcohol use: Not Currently     Alcohol/week: 0.0 standard drinks    Drug use: Never    Sexual activity: Not Currently     Partners: Female     Family History   Problem Relation Age of Onset    Diabetes Mother     Hypertension Mother     Cancer Mother     Arthritis Mother     Diabetes Father     Heart disease Father     Hyperlipidemia Father     Hypertension Father     Arthritis Father     COPD Father     Rheum arthritis Sister     Arthritis Sister     Stroke Maternal Uncle     Cancer Paternal Aunt     Stroke Maternal Grandmother     Stroke Maternal Grandfather     Cancer Paternal Grandmother     Heart disease Paternal Grandfather      Review of patient's allergies indicates:   Allergen Reactions    Banana Anaphylaxis    Biaxin [clarithromycin] Other (See Comments)     Passes out      Erythromycin Anaphylaxis, Hives, Rash, Shortness Of Breath and Other (See Comments)    Tetracyclines Anaphylaxis    Zithromax tri-shelby [azithromycin] Anaphylaxis    Pineapple Other (See Comments)     Gets extremely sick    Fig Blisters and Hives    Isoniazid     Tetracycline (bulk)     Naldecon dx Rash        Objective:  Vitals:    10/06/22 0840 10/06/22 0841   BP: 129/85    Pulse: 102    Weight: (!) 158.8 kg (350 lb)    Height: 6' (1.829 m)    PainSc:   7   7         Physical Exam  Vitals and nursing note reviewed. Exam conducted with a  chaperone present.   Constitutional:       General: He is awake. He is not in acute distress.     Appearance: Normal appearance. He is not ill-appearing, toxic-appearing or diaphoretic.   HENT:      Head: Normocephalic and atraumatic.      Nose: Nose normal.      Mouth/Throat:      Mouth: Mucous membranes are moist.      Pharynx: Oropharynx is clear.   Eyes:      Conjunctiva/sclera: Conjunctivae normal.      Pupils: Pupils are equal, round, and reactive to light.   Cardiovascular:      Rate and Rhythm: Normal rate.   Pulmonary:      Effort: Pulmonary effort is normal. No respiratory distress.   Abdominal:      Palpations: Abdomen is soft.      Tenderness: There is no guarding.   Musculoskeletal:         General: Normal range of motion.      Cervical back: Normal range of motion and neck supple. No rigidity.      Thoracic back: Tenderness present.      Lumbar back: Tenderness present.   Skin:     General: Skin is warm and dry.      Coloration: Skin is not jaundiced or pale.   Neurological:      General: No focal deficit present.      Mental Status: He is alert and oriented to person, place, and time. Mental status is at baseline.      Cranial Nerves: No cranial nerve deficit (II-XII).   Psychiatric:         Mood and Affect: Mood normal.         Behavior: Behavior normal. Behavior is cooperative.         Thought Content: Thought content normal.         X-Ray Shoulder 2 or More Views Left  Narrative: EXAMINATION:  XR SHOULDER COMPLETE 2 OR MORE VIEWS LEFT    CLINICAL HISTORY:  Pain in left shoulder    COMPARISON:  None    TECHNIQUE:  Frontal views of the left shoulder were obtained in internal and external rotation .    FINDINGS:  No convincing acute fracture or dislocation demonstrated. No concerning radiopaque foreign body visualized.  Impression: As above.    Point of Service: [facility]    Electronically signed by: David Rust  Date:    08/25/2022  Time:    10:14       Office Visit on 09/21/2022    Component Date Value Ref Range Status    Hemoglobin A1C 09/21/2022 7.7 (H)  4.5 - 6.6 % Final    Estimated Average Glucose 09/21/2022 170  mg/dL Final   Lab Visit on 08/16/2022   Component Date Value Ref Range Status    Sodium 08/16/2022 134 (L)  136 - 145 mmol/L Final    Potassium 08/16/2022 3.4 (L)  3.5 - 5.1 mmol/L Final    Chloride 08/16/2022 103  98 - 107 mmol/L Final    CO2 08/16/2022 24  21 - 32 mmol/L Final    Anion Gap 08/16/2022 10  7 - 16 mmol/L Final    Glucose 08/16/2022 239 (H)  74 - 106 mg/dL Final    BUN 08/16/2022 13  7 - 18 mg/dL Final    Creatinine 08/16/2022 0.71  0.70 - 1.30 mg/dL Final    BUN/Creatinine Ratio 08/16/2022 18  6 - 20 Final    Calcium 08/16/2022 9.3  8.5 - 10.1 mg/dL Final    Total Protein 08/16/2022 7.7  6.4 - 8.2 g/dL Final    Albumin 08/16/2022 3.6  3.5 - 5.0 g/dL Final    Globulin 08/16/2022 4.1 (H)  2.0 - 4.0 g/dL Final    A/G Ratio 08/16/2022 0.9   Final    Bilirubin, Total 08/16/2022 0.7  0.0 - 1.2 mg/dL Final    Alk Phos 08/16/2022 199 (H)  45 - 115 U/L Final    ALT 08/16/2022 27  16 - 61 U/L Final    AST 08/16/2022 15  15 - 37 U/L Final    eGFR 08/16/2022 119  >=60 mL/min/1.73m² Final    Magnesium 08/16/2022 2.3  1.7 - 2.3 mg/dL Final    WBC 08/16/2022 9.27  4.50 - 11.00 K/uL Final    RBC 08/16/2022 5.99  4.60 - 6.20 M/uL Final    Hemoglobin 08/16/2022 16.1  13.5 - 18.0 g/dL Final    Hematocrit 08/16/2022 48.5  40.0 - 54.0 % Final    MCV 08/16/2022 81.0  80.0 - 96.0 fL Final    MCH 08/16/2022 26.9 (L)  27.0 - 31.0 pg Final    MCHC 08/16/2022 33.2  32.0 - 36.0 g/dL Final    RDW 08/16/2022 13.8  11.5 - 14.5 % Final    Platelet Count 08/16/2022 298  150 - 400 K/uL Final    MPV 08/16/2022 9.5  9.4 - 12.4 fL Final    Neutrophils % 08/16/2022 48.6 (L)  53.0 - 65.0 % Final    Lymphocytes % 08/16/2022 45.0 (H)  27.0 - 41.0 % Final    Monocytes % 08/16/2022 4.9  2.0 - 6.0 % Final    Eosinophils % 08/16/2022 0.6 (L)  1.0 - 4.0 % Final     Basophils % 08/16/2022 0.5  0.0 - 1.0 % Final    Immature Granulocytes % 08/16/2022 0.4  0.0 - 0.4 % Final    nRBC, Auto 08/16/2022 0.0  <=0.0 % Final    Neutrophils, Abs 08/16/2022 4.50  1.80 - 7.70 K/uL Final    Lymphocytes, Absolute 08/16/2022 4.17  1.00 - 4.80 K/uL Final    Monocytes, Absolute 08/16/2022 0.45  0.00 - 0.80 K/uL Final    Eosinophils, Absolute 08/16/2022 0.06  0.00 - 0.50 K/uL Final    Basophils, Absolute 08/16/2022 0.05  0.00 - 0.20 K/uL Final    Immature Granulocytes, Absolute 08/16/2022 0.04  0.00 - 0.04 K/uL Final    nRBC, Absolute 08/16/2022 0.00  <=0.00 x10e3/uL Final    Diff Type 08/16/2022 Auto   Final   Office Visit on 08/09/2022   Component Date Value Ref Range Status    POC Amphetamines 08/09/2022 Negative  Negative, Inconclusive Final    POC Barbiturates 08/09/2022 Negative  Negative, Inconclusive Final    POC Benzodiazepines 08/09/2022 Negative  Negative, Inconclusive Final    POC Cocaine 08/09/2022 Negative  Negative, Inconclusive Final    POC THC 08/09/2022 Negative  Negative, Inconclusive Final    POC Methadone 08/09/2022 Negative  Negative, Inconclusive Final    POC Methamphetamine 08/09/2022 Negative  Negative, Inconclusive Final    POC Opiates 08/09/2022 Presumptive Positive (A)  Negative, Inconclusive Final    POC Oxycodone 08/09/2022 Negative  Negative, Inconclusive Final    POC Phencyclidine 08/09/2022 Negative  Negative, Inconclusive Final    POC Methylenedioxymethamphetamine * 08/09/2022 Negative  Negative, Inconclusive Final    POC Tricyclic Antidepressants 08/09/2022 Negative  Negative, Inconclusive Final    POC Buprenorphine 08/09/2022 Negative   Final     Acceptable 08/09/2022 Yes   Final    POC Temperature (Urine) 08/09/2022 94   Final   Office Visit on 06/22/2022   Component Date Value Ref Range Status    Creatinine, Urine 06/22/2022 59  39 - 259 mg/dL Final    Microalbumin 06/22/2022 6.1 (H)  0.0 - 2.8 mg/dL Final     Microalbumin/Creatinine Ratio 06/22/2022 103.4 (H)  0.0 - 30.0 mg/g Final    Glucose, Fasting 06/22/2022 205 (H)  74 - 106 mg/dL Final    Triglycerides 06/22/2022 204 (H)  35 - 150 mg/dL Final    Cholesterol 06/22/2022 120  0 - 200 mg/dL Final    HDL Cholesterol 06/22/2022 38 (L)  40 - 60 mg/dL Final    Cholesterol/HDL Ratio (Risk Factor) 06/22/2022 3.2   Final    Non-HDL 06/22/2022 82  mg/dL Final    LDL Calculated 06/22/2022 41  mg/dL Final    LDL/HDL 06/22/2022 1.1   Final    VLDL 06/22/2022 41  mg/dL Final    Hemoglobin A1C 06/22/2022 8.8 (H)  4.5 - 6.6 % Final    Estimated Average Glucose 06/22/2022 207  mg/dL Final   Office Visit on 04/13/2022   Component Date Value Ref Range Status    POC Amphetamines 04/13/2022 Negative  Negative, Inconclusive Final    POC Barbiturates 04/13/2022 Negative  Negative, Inconclusive Final    POC Benzodiazepines 04/13/2022 Negative  Negative, Inconclusive Final    POC Cocaine 04/13/2022 Negative  Negative, Inconclusive Final    POC THC 04/13/2022 Negative  Negative, Inconclusive Final    POC Methadone 04/13/2022 Negative  Negative, Inconclusive Final    POC Methamphetamine 04/13/2022 Negative  Negative, Inconclusive Final    POC Opiates 04/13/2022 Presumptive Positive (A)  Negative, Inconclusive Final    POC Oxycodone 04/13/2022 Negative  Negative, Inconclusive Final    POC Phencyclidine 04/13/2022 Negative  Negative, Inconclusive Final    POC Methylenedioxymethamphetamine * 04/13/2022 Negative  Negative, Inconclusive Final    POC Tricyclic Antidepressants 04/13/2022 Negative  Negative, Inconclusive Final    POC Buprenorphine 04/13/2022 Negative   Final     Acceptable 04/13/2022 Yes   Final    POC Temperature (Urine) 04/13/2022 90   Final         No orders of the defined types were placed in this encounter.      Requested Prescriptions     Pending Prescriptions Disp Refills    HYDROcodone-acetaminophen (NORCO)  mg per tablet 60  tablet 0     Sig: Take 1 tablet by mouth every 12 (twelve) hours.    HYDROcodone-acetaminophen (NORCO)  mg per tablet 60 tablet 0     Sig: Take 1 tablet by mouth every 12 (twelve) hours.       Assessment:     1. Complex regional pain syndrome type 1 of right upper extremity    2. Lumbosacral spondylosis without myelopathy    3. Diabetic peripheral neuropathy    4. 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   Analgesia:Patients pain is partially controlled by current medication. Patient has tried OTC medications such as Tylenol and Ibuprofen with out relief.   Adverse Effects: Patient denies constipation or sedation.  Aberrant Behavior:  reviewed with no aberrant drug seeking/taking behavior.  Overdose reversal drug naloxone discussed    Drug screen reviewed      Plan:    Using wheelchair for mobility, waiting for right lower extremity prosthetic device    He follows diabetic educator    Follows Neurology [facility]PS right hand arm    Chronic right lower extremity phantom limb pain     Would like to continue with conservative management he states current medications helping control his discomfort    Continue exercise program as directed    Follow-up 2 months    Dr. Johnson December 2022    Bring original prescription medication bottles/container/box with labels to each visit    Pill count    Physical therapy    Massage therapy declines

## 2022-10-06 ENCOUNTER — OFFICE VISIT (OUTPATIENT)
Dept: PAIN MEDICINE | Facility: CLINIC | Age: 42
End: 2022-10-06
Payer: COMMERCIAL

## 2022-10-06 VITALS
BODY MASS INDEX: 42.66 KG/M2 | WEIGHT: 315 LBS | SYSTOLIC BLOOD PRESSURE: 129 MMHG | HEART RATE: 102 BPM | HEIGHT: 72 IN | DIASTOLIC BLOOD PRESSURE: 85 MMHG

## 2022-10-06 DIAGNOSIS — G90.511 COMPLEX REGIONAL PAIN SYNDROME TYPE 1 OF RIGHT UPPER EXTREMITY: Primary | Chronic | ICD-10-CM

## 2022-10-06 DIAGNOSIS — G54.6 PHANTOM PAIN FOLLOWING AMPUTATION OF LOWER LIMB: ICD-10-CM

## 2022-10-06 DIAGNOSIS — M47.817 LUMBOSACRAL SPONDYLOSIS WITHOUT MYELOPATHY: Chronic | ICD-10-CM

## 2022-10-06 DIAGNOSIS — E11.42 DIABETIC PERIPHERAL NEUROPATHY: ICD-10-CM

## 2022-10-06 PROCEDURE — 99214 OFFICE O/P EST MOD 30 MIN: CPT | Mod: S$PBB,,, | Performed by: PHYSICIAN ASSISTANT

## 2022-10-06 PROCEDURE — 3060F POS MICROALBUMINURIA REV: CPT | Mod: CPTII,,, | Performed by: PHYSICIAN ASSISTANT

## 2022-10-06 PROCEDURE — 3051F PR MOST RECENT HEMOGLOBIN A1C LEVEL 7.0 - < 8.0%: ICD-10-PCS | Mod: CPTII,,, | Performed by: PHYSICIAN ASSISTANT

## 2022-10-06 PROCEDURE — 3066F PR DOCUMENTATION OF TREATMENT FOR NEPHROPATHY: ICD-10-PCS | Mod: CPTII,,, | Performed by: PHYSICIAN ASSISTANT

## 2022-10-06 PROCEDURE — 99215 OFFICE O/P EST HI 40 MIN: CPT | Mod: PBBFAC | Performed by: PHYSICIAN ASSISTANT

## 2022-10-06 PROCEDURE — 3060F PR POS MICROALBUMINURIA RESULT DOCUMENTED/REVIEW: ICD-10-PCS | Mod: CPTII,,, | Performed by: PHYSICIAN ASSISTANT

## 2022-10-06 PROCEDURE — 1159F PR MEDICATION LIST DOCUMENTED IN MEDICAL RECORD: ICD-10-PCS | Mod: CPTII,,, | Performed by: PHYSICIAN ASSISTANT

## 2022-10-06 PROCEDURE — 3079F PR MOST RECENT DIASTOLIC BLOOD PRESSURE 80-89 MM HG: ICD-10-PCS | Mod: CPTII,,, | Performed by: PHYSICIAN ASSISTANT

## 2022-10-06 PROCEDURE — 3008F BODY MASS INDEX DOCD: CPT | Mod: CPTII,,, | Performed by: PHYSICIAN ASSISTANT

## 2022-10-06 PROCEDURE — 1159F MED LIST DOCD IN RCRD: CPT | Mod: CPTII,,, | Performed by: PHYSICIAN ASSISTANT

## 2022-10-06 PROCEDURE — 3008F PR BODY MASS INDEX (BMI) DOCUMENTED: ICD-10-PCS | Mod: CPTII,,, | Performed by: PHYSICIAN ASSISTANT

## 2022-10-06 PROCEDURE — 99214 PR OFFICE/OUTPT VISIT, EST, LEVL IV, 30-39 MIN: ICD-10-PCS | Mod: S$PBB,,, | Performed by: PHYSICIAN ASSISTANT

## 2022-10-06 PROCEDURE — 3074F PR MOST RECENT SYSTOLIC BLOOD PRESSURE < 130 MM HG: ICD-10-PCS | Mod: CPTII,,, | Performed by: PHYSICIAN ASSISTANT

## 2022-10-06 PROCEDURE — 3066F NEPHROPATHY DOC TX: CPT | Mod: CPTII,,, | Performed by: PHYSICIAN ASSISTANT

## 2022-10-06 PROCEDURE — 3051F HG A1C>EQUAL 7.0%<8.0%: CPT | Mod: CPTII,,, | Performed by: PHYSICIAN ASSISTANT

## 2022-10-06 PROCEDURE — 3079F DIAST BP 80-89 MM HG: CPT | Mod: CPTII,,, | Performed by: PHYSICIAN ASSISTANT

## 2022-10-06 PROCEDURE — 3074F SYST BP LT 130 MM HG: CPT | Mod: CPTII,,, | Performed by: PHYSICIAN ASSISTANT

## 2022-10-06 RX ORDER — GABAPENTIN 600 MG/1
600 TABLET ORAL 3 TIMES DAILY
Qty: 90 TABLET | Refills: 1 | Status: SHIPPED | OUTPATIENT
Start: 2022-10-06 | End: 2022-12-06 | Stop reason: SDUPTHER

## 2022-10-06 RX ORDER — AMITRIPTYLINE HYDROCHLORIDE 75 MG/1
TABLET ORAL
Qty: 30 TABLET | Refills: 1 | Status: SHIPPED | OUTPATIENT
Start: 2022-10-06 | End: 2022-12-06 | Stop reason: SDUPTHER

## 2022-10-06 RX ORDER — HYDROCODONE BITARTRATE AND ACETAMINOPHEN 10; 325 MG/1; MG/1
1 TABLET ORAL EVERY 12 HOURS
Qty: 60 TABLET | Refills: 0 | Status: SHIPPED | OUTPATIENT
Start: 2022-10-13 | End: 2022-11-12

## 2022-10-06 RX ORDER — HYDROCODONE BITARTRATE AND ACETAMINOPHEN 10; 325 MG/1; MG/1
1 TABLET ORAL EVERY 12 HOURS
Qty: 60 TABLET | Refills: 0 | Status: SHIPPED | OUTPATIENT
Start: 2022-11-12 | End: 2022-12-06 | Stop reason: SDUPTHER

## 2022-12-06 NOTE — PROGRESS NOTES
Subjective:         Patient ID: Kennedy West is a 42 y.o. male.    Chief Complaint: Low-back Pain, Shoulder Pain, and Leg Pain        Pain  This is a chronic problem. The current episode started more than 1 year ago. The problem occurs daily. The problem has been waxing and waning. Associated symptoms include arthralgias and neck pain. Pertinent negatives include no anorexia, change in bowel habit, chest pain, chills, coughing, diaphoresis, fever, sore throat, vertigo or vomiting.   Review of Systems   Constitutional:  Negative for activity change, appetite change, chills, diaphoresis and fever.   HENT:  Negative for drooling, ear discharge, ear pain, facial swelling, nosebleeds, sore throat, trouble swallowing, voice change and goiter.    Eyes:  Negative for photophobia, pain, discharge, redness and visual disturbance.   Respiratory:  Negative for apnea, cough, choking, chest tightness, shortness of breath, wheezing and stridor.    Cardiovascular:  Negative for chest pain, palpitations and leg swelling.   Gastrointestinal:  Negative for abdominal distention, anorexia, change in bowel habit, diarrhea, rectal pain, vomiting, fecal incontinence and change in bowel habit.   Endocrine: Negative for cold intolerance, heat intolerance, polydipsia, polyphagia and polyuria.   Genitourinary:  Negative for bladder incontinence, dysuria, flank pain and frequency.   Musculoskeletal:  Positive for arthralgias, back pain, leg pain, neck pain and neck stiffness.   Integumentary:  Negative for color change and pallor.   Neurological:  Negative for dizziness, vertigo, seizures, syncope, facial asymmetry, speech difficulty, light-headedness, coordination difficulties, memory loss and coordination difficulties.   Hematological:  Negative for adenopathy. Does not bruise/bleed easily.   Psychiatric/Behavioral:  Negative for agitation, behavioral problems, confusion, decreased concentration, dysphoric mood, hallucinations,  self-injury and suicidal ideas. The patient is not nervous/anxious and is not hyperactive.          Past Medical History:   Diagnosis Date    Allergy     Anxiety     Asthma     Chronic pain     Complex regional pain syndrome type I of right upper extremity     COPD (chronic obstructive pulmonary disease)     Coronary artery disease     Depression     Diabetes mellitus, type 2     Diabetic peripheral neuropathy     Gastritis     GERD (gastroesophageal reflux disease)     Hyperlipidemia     Hypertension     Lumbar radiculopathy     Lumbosacral spondylosis     Non-pressure chronic ulcer of other part of right foot limited to breakdown of skin     Obesity     Obstructive sleep apnea     Osteoarthritis, multiple sites     Polyneuropathy     Tobacco dependence      Past Surgical History:   Procedure Laterality Date    ANGIOGRAM, CORONARY, WITH LEFT HEART CATHETERIZATION N/A 08/24/2021    Procedure: Left heart cath w/ coronary angiograms;  Surgeon: Zaheer Real MD;  Location: [facility] CATH LAB;  Service: Cardiology;  Laterality: N/A;    CARPAL TUNNEL RELEASE      right    INJECTION OF ANESTHETIC AGENT AROUND MEDIAL BRANCH NERVES INNERVATING LUMBAR FACET JOINT Bilateral 04/15/2021    Procedure: BLOCK, NERVE, FACET JOINT, LUMBAR, MEDIAL BRANCH;  Surgeon: Sandra Johnson MD;  Location: [facility];  Service: Pain Management;  Laterality: Bilateral;  Bilateral L3-S1 MBB    INJECTION OF ANESTHETIC AGENT AROUND MEDIAL BRANCH NERVES INNERVATING LUMBAR FACET JOINT Bilateral 12/23/2021    Procedure: Block-nerve-medial branch-lumbar, bilateral L4 through S1;  Surgeon: Sandra Johnson MD;  Location: [facility];  Service: Pain Management;  Laterality: Bilateral;  pt will bring vaccine verification card- notified in office    INJECTION OF FACET JOINT Bilateral 06/01/2020    Bilateral L3-S1 MBB - Dr Johnson    LEFT HEART CATHETERIZATION N/A 07/21/2021    Procedure: Left heart cath with possible intervention;   Surgeon: Brock Burkett DO;  Location: [facility] CATH LAB;  Service: Cardiology;  Laterality: N/A;    RBKA Right 03/16/2022    thumb surgery      right    TONSILLECTOMY  1990     Social History     Socioeconomic History    Marital status:    Occupational History    Occupation: [employer]   Tobacco Use    Smoking status: Light Smoker     Packs/day: 0.25     Years: 21.00     Pack years: 5.25     Types: Cigarettes     Start date: 11/9/2000    Smokeless tobacco: Never   Substance and Sexual Activity    Alcohol use: Not Currently     Alcohol/week: 0.0 standard drinks    Drug use: Never    Sexual activity: Not Currently     Partners: Female     Family History   Problem Relation Age of Onset    Diabetes Mother     Hypertension Mother     Cancer Mother     Arthritis Mother     Diabetes Father     Heart disease Father     Hyperlipidemia Father     Hypertension Father     Arthritis Father     COPD Father     Rheum arthritis Sister     Arthritis Sister     Stroke Maternal Uncle     Cancer Paternal Aunt     Stroke Maternal Grandmother     Stroke Maternal Grandfather     Cancer Paternal Grandmother     Heart disease Paternal Grandfather      Review of patient's allergies indicates:   Allergen Reactions    Banana Anaphylaxis    Biaxin [clarithromycin] Other (See Comments)     Passes out      Erythromycin Anaphylaxis, Hives, Rash, Shortness Of Breath and Other (See Comments)    Tetracyclines Anaphylaxis    Zithromax tri-shelby [azithromycin] Anaphylaxis    Pineapple Other (See Comments)     Gets extremely sick    Fig Blisters and Hives    Isoniazid     Tetracycline (bulk)     Naldecon dx Rash        Objective:  Vitals:    12/12/22 0832   BP: 126/80   Pulse: 105   Resp: 18   Weight: (!) 158.8 kg (350 lb)   Height: 6' (1.829 m)   PainSc:   7         Physical Exam  Vitals and nursing note reviewed. Exam conducted with a chaperone present.   Constitutional:       General: He is awake. He is not in acute distress.      Appearance: Normal appearance. He is not ill-appearing, toxic-appearing or diaphoretic.   HENT:      Head: Normocephalic and atraumatic.      Nose: Nose normal.      Mouth/Throat:      Mouth: Mucous membranes are moist.      Pharynx: Oropharynx is clear.   Eyes:      Conjunctiva/sclera: Conjunctivae normal.      Pupils: Pupils are equal, round, and reactive to light.   Cardiovascular:      Rate and Rhythm: Normal rate.   Pulmonary:      Effort: Pulmonary effort is normal. No respiratory distress.   Abdominal:      Palpations: Abdomen is soft.      Tenderness: There is no guarding.   Musculoskeletal:         General: Normal range of motion.      Cervical back: Normal range of motion and neck supple. No rigidity.      Thoracic back: Tenderness present.      Lumbar back: Tenderness present.   Skin:     General: Skin is warm and dry.      Coloration: Skin is not jaundiced or pale.   Neurological:      General: No focal deficit present.      Mental Status: He is alert and oriented to person, place, and time. Mental status is at baseline.      Cranial Nerves: No cranial nerve deficit (II-XII).   Psychiatric:         Mood and Affect: Mood normal.         Behavior: Behavior normal. Behavior is cooperative.         Thought Content: Thought content normal.         X-Ray Shoulder 2 or More Views Left  Narrative: EXAMINATION:  XR SHOULDER COMPLETE 2 OR MORE VIEWS LEFT    CLINICAL HISTORY:  Pain in left shoulder    COMPARISON:  None    TECHNIQUE:  Frontal views of the left shoulder were obtained in internal and external rotation .    FINDINGS:  No convincing acute fracture or dislocation demonstrated. No concerning radiopaque foreign body visualized.  Impression: As above.    Point of Service: [facility]    Electronically signed by: David Rust  Date:    08/25/2022  Time:    10:14         Office Visit on 09/21/2022   Component Date Value Ref Range Status    Hemoglobin A1C 09/21/2022 7.7 (H)  4.5 - 6.6 % Final     Estimated Average Glucose 09/21/2022 170  mg/dL Final   Lab Visit on 08/16/2022   Component Date Value Ref Range Status    Sodium 08/16/2022 134 (L)  136 - 145 mmol/L Final    Potassium 08/16/2022 3.4 (L)  3.5 - 5.1 mmol/L Final    Chloride 08/16/2022 103  98 - 107 mmol/L Final    CO2 08/16/2022 24  21 - 32 mmol/L Final    Anion Gap 08/16/2022 10  7 - 16 mmol/L Final    Glucose 08/16/2022 239 (H)  74 - 106 mg/dL Final    BUN 08/16/2022 13  7 - 18 mg/dL Final    Creatinine 08/16/2022 0.71  0.70 - 1.30 mg/dL Final    BUN/Creatinine Ratio 08/16/2022 18  6 - 20 Final    Calcium 08/16/2022 9.3  8.5 - 10.1 mg/dL Final    Total Protein 08/16/2022 7.7  6.4 - 8.2 g/dL Final    Albumin 08/16/2022 3.6  3.5 - 5.0 g/dL Final    Globulin 08/16/2022 4.1 (H)  2.0 - 4.0 g/dL Final    A/G Ratio 08/16/2022 0.9   Final    Bilirubin, Total 08/16/2022 0.7  0.0 - 1.2 mg/dL Final    Alk Phos 08/16/2022 199 (H)  45 - 115 U/L Final    ALT 08/16/2022 27  16 - 61 U/L Final    AST 08/16/2022 15  15 - 37 U/L Final    eGFR 08/16/2022 119  >=60 mL/min/1.73m² Final    Magnesium 08/16/2022 2.3  1.7 - 2.3 mg/dL Final    WBC 08/16/2022 9.27  4.50 - 11.00 K/uL Final    RBC 08/16/2022 5.99  4.60 - 6.20 M/uL Final    Hemoglobin 08/16/2022 16.1  13.5 - 18.0 g/dL Final    Hematocrit 08/16/2022 48.5  40.0 - 54.0 % Final    MCV 08/16/2022 81.0  80.0 - 96.0 fL Final    MCH 08/16/2022 26.9 (L)  27.0 - 31.0 pg Final    MCHC 08/16/2022 33.2  32.0 - 36.0 g/dL Final    RDW 08/16/2022 13.8  11.5 - 14.5 % Final    Platelet Count 08/16/2022 298  150 - 400 K/uL Final    MPV 08/16/2022 9.5  9.4 - 12.4 fL Final    Neutrophils % 08/16/2022 48.6 (L)  53.0 - 65.0 % Final    Lymphocytes % 08/16/2022 45.0 (H)  27.0 - 41.0 % Final    Monocytes % 08/16/2022 4.9  2.0 - 6.0 % Final    Eosinophils % 08/16/2022 0.6 (L)  1.0 - 4.0 % Final    Basophils % 08/16/2022 0.5  0.0 - 1.0 % Final    Immature Granulocytes % 08/16/2022 0.4  0.0 - 0.4 % Final    nRBC, Auto 08/16/2022 0.0  <=0.0  % Final    Neutrophils, Abs 08/16/2022 4.50  1.80 - 7.70 K/uL Final    Lymphocytes, Absolute 08/16/2022 4.17  1.00 - 4.80 K/uL Final    Monocytes, Absolute 08/16/2022 0.45  0.00 - 0.80 K/uL Final    Eosinophils, Absolute 08/16/2022 0.06  0.00 - 0.50 K/uL Final    Basophils, Absolute 08/16/2022 0.05  0.00 - 0.20 K/uL Final    Immature Granulocytes, Absolute 08/16/2022 0.04  0.00 - 0.04 K/uL Final    nRBC, Absolute 08/16/2022 0.00  <=0.00 x10e3/uL Final    Diff Type 08/16/2022 Auto   Final   Office Visit on 08/09/2022   Component Date Value Ref Range Status    POC Amphetamines 08/09/2022 Negative  Negative, Inconclusive Final    POC Barbiturates 08/09/2022 Negative  Negative, Inconclusive Final    POC Benzodiazepines 08/09/2022 Negative  Negative, Inconclusive Final    POC Cocaine 08/09/2022 Negative  Negative, Inconclusive Final    POC THC 08/09/2022 Negative  Negative, Inconclusive Final    POC Methadone 08/09/2022 Negative  Negative, Inconclusive Final    POC Methamphetamine 08/09/2022 Negative  Negative, Inconclusive Final    POC Opiates 08/09/2022 Presumptive Positive (A)  Negative, Inconclusive Final    POC Oxycodone 08/09/2022 Negative  Negative, Inconclusive Final    POC Phencyclidine 08/09/2022 Negative  Negative, Inconclusive Final    POC Methylenedioxymethamphetamine * 08/09/2022 Negative  Negative, Inconclusive Final    POC Tricyclic Antidepressants 08/09/2022 Negative  Negative, Inconclusive Final    POC Buprenorphine 08/09/2022 Negative   Final     Acceptable 08/09/2022 Yes   Final    POC Temperature (Urine) 08/09/2022 94   Final   Office Visit on 06/22/2022   Component Date Value Ref Range Status    Creatinine, Urine 06/22/2022 59  39 - 259 mg/dL Final    Microalbumin 06/22/2022 6.1 (H)  0.0 - 2.8 mg/dL Final    Microalbumin/Creatinine Ratio 06/22/2022 103.4 (H)  0.0 - 30.0 mg/g Final    Glucose, Fasting 06/22/2022 205 (H)  74 - 106 mg/dL Final    Triglycerides 06/22/2022 204 (H)  35 -  150 mg/dL Final    Cholesterol 2022 120  0 - 200 mg/dL Final    HDL Cholesterol 2022 38 (L)  40 - 60 mg/dL Final    Cholesterol/HDL Ratio (Risk Factor) 2022 3.2   Final    Non-HDL 2022 82  mg/dL Final    LDL Calculated 2022 41  mg/dL Final    LDL/HDL 2022 1.1   Final    VLDL 2022 41  mg/dL Final    Hemoglobin A1C 2022 8.8 (H)  4.5 - 6.6 % Final    Estimated Average Glucose 2022 207  mg/dL Final         No orders of the defined types were placed in this encounter.        Requested Prescriptions     Signed Prescriptions Disp Refills    amitriptyline (ELAVIL) 75 MG tablet 30 tablet 1     Sig: Take one tablet at bedtime    gabapentin (NEURONTIN) 600 MG tablet 90 tablet 1     Sig: Take 1 tablet (600 mg total) by mouth 3 (three) times daily.    HYDROcodone-acetaminophen (NORCO)  mg per tablet 60 tablet 0     Sig: Take 1 tablet by mouth every 12 (twelve) hours.    naloxone (NARCAN) 4 mg/actuation Spry 1 each 0     Si spray (4 mg total) by Nasal route once. for 1 dose    HYDROcodone-acetaminophen (NORCO)  mg per tablet 60 tablet 0     Sig: Take 1 tablet by mouth every 12 (twelve) hours.       Assessment:     1. Complex regional pain syndrome type 1 of right upper extremity    2. Lumbosacral spondylosis without myelopathy    3. Diabetic peripheral neuropathy    4. 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   Analgesia:Patients pain is partially controlled by current medication. Patient has tried OTC medications such as Tylenol and Ibuprofen with out relief.   Adverse Effects: Patient denies constipation or sedation.  Aberrant Behavior:  reviewed with no aberrant drug seeking/taking behavior.  Overdose reversal drug naloxone discussed    Drug screen reviewed      Plan:    Narcan 2022    Using wheelchair for mobility, waiting for right lower extremity prosthetic  device    He follows diabetic educator    Follows Neurology [facility] right hand arm    Chronic right lower extremity phantom limb pain     Would like to continue with conservative management he states current medications helping control his discomfort    Continue exercise program as directed    Follow-up 2 months    Dr. Johnson December 2022    Bring original prescription medication bottles/container/box with labels to each visit    Pill count    Physical therapy    Massage therapy declines

## 2022-12-12 ENCOUNTER — OFFICE VISIT (OUTPATIENT)
Dept: PAIN MEDICINE | Facility: CLINIC | Age: 42
End: 2022-12-12
Payer: COMMERCIAL

## 2022-12-12 VITALS
BODY MASS INDEX: 42.66 KG/M2 | DIASTOLIC BLOOD PRESSURE: 80 MMHG | HEART RATE: 105 BPM | HEIGHT: 72 IN | WEIGHT: 315 LBS | SYSTOLIC BLOOD PRESSURE: 126 MMHG | RESPIRATION RATE: 18 BRPM

## 2022-12-12 DIAGNOSIS — E11.42 DIABETIC PERIPHERAL NEUROPATHY: ICD-10-CM

## 2022-12-12 DIAGNOSIS — G90.511 COMPLEX REGIONAL PAIN SYNDROME TYPE 1 OF RIGHT UPPER EXTREMITY: Primary | Chronic | ICD-10-CM

## 2022-12-12 DIAGNOSIS — M47.817 LUMBOSACRAL SPONDYLOSIS WITHOUT MYELOPATHY: Chronic | ICD-10-CM

## 2022-12-12 DIAGNOSIS — Z79.899 ENCOUNTER FOR LONG-TERM (CURRENT) USE OF OTHER MEDICATIONS: ICD-10-CM

## 2022-12-12 DIAGNOSIS — G54.6 PHANTOM PAIN FOLLOWING AMPUTATION OF LOWER LIMB: ICD-10-CM

## 2022-12-12 LAB
CTP QC/QA: YES
POC (AMP) AMPHETAMINE: NEGATIVE
POC (BAR) BARBITURATES: NEGATIVE
POC (BUP) BUPRENORPHINE: NEGATIVE
POC (BZO) BENZODIAZEPINES: NEGATIVE
POC (COC) COCAINE: NEGATIVE
POC (MDMA) METHYLENEDIOXYMETHAMPHETAMINE 3,4: NEGATIVE
POC (MET) METHAMPHETAMINE: NEGATIVE
POC (MOP) OPIATES: ABNORMAL
POC (MTD) METHADONE: NEGATIVE
POC (OXY) OXYCODONE: NEGATIVE
POC (PCP) PHENCYCLIDINE: NEGATIVE
POC (TCA) TRICYCLIC ANTIDEPRESSANTS: NEGATIVE
POC TEMPERATURE (URINE): 92
POC THC: NEGATIVE

## 2022-12-12 PROCEDURE — 99214 OFFICE O/P EST MOD 30 MIN: CPT | Mod: S$PBB,,, | Performed by: PHYSICIAN ASSISTANT

## 2022-12-12 PROCEDURE — 3066F NEPHROPATHY DOC TX: CPT | Mod: CPTII,,, | Performed by: PHYSICIAN ASSISTANT

## 2022-12-12 PROCEDURE — 3051F PR MOST RECENT HEMOGLOBIN A1C LEVEL 7.0 - < 8.0%: ICD-10-PCS | Mod: CPTII,,, | Performed by: PHYSICIAN ASSISTANT

## 2022-12-12 PROCEDURE — 1159F MED LIST DOCD IN RCRD: CPT | Mod: CPTII,,, | Performed by: PHYSICIAN ASSISTANT

## 2022-12-12 PROCEDURE — 99214 PR OFFICE/OUTPT VISIT, EST, LEVL IV, 30-39 MIN: ICD-10-PCS | Mod: S$PBB,,, | Performed by: PHYSICIAN ASSISTANT

## 2022-12-12 PROCEDURE — 1159F PR MEDICATION LIST DOCUMENTED IN MEDICAL RECORD: ICD-10-PCS | Mod: CPTII,,, | Performed by: PHYSICIAN ASSISTANT

## 2022-12-12 PROCEDURE — 3074F PR MOST RECENT SYSTOLIC BLOOD PRESSURE < 130 MM HG: ICD-10-PCS | Mod: CPTII,,, | Performed by: PHYSICIAN ASSISTANT

## 2022-12-12 PROCEDURE — 3066F PR DOCUMENTATION OF TREATMENT FOR NEPHROPATHY: ICD-10-PCS | Mod: CPTII,,, | Performed by: PHYSICIAN ASSISTANT

## 2022-12-12 PROCEDURE — 99215 OFFICE O/P EST HI 40 MIN: CPT | Mod: PBBFAC | Performed by: PHYSICIAN ASSISTANT

## 2022-12-12 PROCEDURE — 3079F PR MOST RECENT DIASTOLIC BLOOD PRESSURE 80-89 MM HG: ICD-10-PCS | Mod: CPTII,,, | Performed by: PHYSICIAN ASSISTANT

## 2022-12-12 PROCEDURE — 3074F SYST BP LT 130 MM HG: CPT | Mod: CPTII,,, | Performed by: PHYSICIAN ASSISTANT

## 2022-12-12 PROCEDURE — 3060F POS MICROALBUMINURIA REV: CPT | Mod: CPTII,,, | Performed by: PHYSICIAN ASSISTANT

## 2022-12-12 PROCEDURE — 3008F BODY MASS INDEX DOCD: CPT | Mod: CPTII,,, | Performed by: PHYSICIAN ASSISTANT

## 2022-12-12 PROCEDURE — 3008F PR BODY MASS INDEX (BMI) DOCUMENTED: ICD-10-PCS | Mod: CPTII,,, | Performed by: PHYSICIAN ASSISTANT

## 2022-12-12 PROCEDURE — 3060F PR POS MICROALBUMINURIA RESULT DOCUMENTED/REVIEW: ICD-10-PCS | Mod: CPTII,,, | Performed by: PHYSICIAN ASSISTANT

## 2022-12-12 PROCEDURE — 80305 DRUG TEST PRSMV DIR OPT OBS: CPT | Mod: PBBFAC | Performed by: PHYSICIAN ASSISTANT

## 2022-12-12 PROCEDURE — 3051F HG A1C>EQUAL 7.0%<8.0%: CPT | Mod: CPTII,,, | Performed by: PHYSICIAN ASSISTANT

## 2022-12-12 PROCEDURE — 3079F DIAST BP 80-89 MM HG: CPT | Mod: CPTII,,, | Performed by: PHYSICIAN ASSISTANT

## 2022-12-12 RX ORDER — GABAPENTIN 600 MG/1
600 TABLET ORAL 3 TIMES DAILY
Qty: 90 TABLET | Refills: 1 | Status: SHIPPED | OUTPATIENT
Start: 2022-12-12 | End: 2023-02-08 | Stop reason: SDUPTHER

## 2022-12-12 RX ORDER — HYDROCODONE BITARTRATE AND ACETAMINOPHEN 10; 325 MG/1; MG/1
1 TABLET ORAL EVERY 12 HOURS
Qty: 60 TABLET | Refills: 0 | Status: SHIPPED | OUTPATIENT
Start: 2023-01-11 | End: 2023-02-08 | Stop reason: SDUPTHER

## 2022-12-12 RX ORDER — AMITRIPTYLINE HYDROCHLORIDE 75 MG/1
TABLET ORAL
Qty: 30 TABLET | Refills: 1 | Status: SHIPPED | OUTPATIENT
Start: 2022-12-12 | End: 2023-02-08 | Stop reason: SDUPTHER

## 2022-12-12 RX ORDER — HYDROCODONE BITARTRATE AND ACETAMINOPHEN 10; 325 MG/1; MG/1
1 TABLET ORAL EVERY 12 HOURS
Qty: 60 TABLET | Refills: 0 | Status: SHIPPED | OUTPATIENT
Start: 2022-12-12 | End: 2023-02-08 | Stop reason: SDUPTHER

## 2022-12-12 RX ORDER — NALOXONE HYDROCHLORIDE 4 MG/.1ML
1 SPRAY NASAL ONCE
Qty: 1 EACH | Refills: 0 | Status: SHIPPED | OUTPATIENT
Start: 2022-12-12 | End: 2022-12-12

## 2022-12-14 RX ORDER — INSULIN ASPART 100 [IU]/ML
10 INJECTION, SOLUTION INTRAVENOUS; SUBCUTANEOUS
COMMUNITY
Start: 2022-11-28 | End: 2023-02-27

## 2022-12-14 RX ORDER — SEMAGLUTIDE 1.34 MG/ML
1 INJECTION, SOLUTION SUBCUTANEOUS
COMMUNITY
Start: 2022-11-28 | End: 2023-02-22 | Stop reason: SDUPTHER

## 2022-12-14 RX ORDER — INSULIN GLARGINE 100 [IU]/ML
50 INJECTION, SOLUTION SUBCUTANEOUS
COMMUNITY
Start: 2022-11-28 | End: 2023-05-24 | Stop reason: SDUPTHER

## 2022-12-15 ENCOUNTER — OFFICE VISIT (OUTPATIENT)
Dept: FAMILY MEDICINE | Facility: CLINIC | Age: 42
End: 2022-12-15
Payer: COMMERCIAL

## 2022-12-15 VITALS
DIASTOLIC BLOOD PRESSURE: 80 MMHG | BODY MASS INDEX: 47.47 KG/M2 | WEIGHT: 315 LBS | SYSTOLIC BLOOD PRESSURE: 126 MMHG | HEART RATE: 111 BPM | TEMPERATURE: 98 F | OXYGEN SATURATION: 98 % | RESPIRATION RATE: 17 BRPM

## 2022-12-15 DIAGNOSIS — G90.511 COMPLEX REGIONAL PAIN SYNDROME TYPE 1 OF RIGHT UPPER EXTREMITY: Chronic | ICD-10-CM

## 2022-12-15 DIAGNOSIS — I25.2 HISTORY OF HEART ATTACK: Chronic | ICD-10-CM

## 2022-12-15 DIAGNOSIS — I25.10 CORONARY ARTERY DISEASE INVOLVING NATIVE HEART, UNSPECIFIED VESSEL OR LESION TYPE, UNSPECIFIED WHETHER ANGINA PRESENT: Chronic | ICD-10-CM

## 2022-12-15 DIAGNOSIS — G54.6 PHANTOM PAIN FOLLOWING AMPUTATION OF LOWER LIMB: ICD-10-CM

## 2022-12-15 DIAGNOSIS — M47.817 LUMBOSACRAL SPONDYLOSIS WITHOUT MYELOPATHY: Chronic | ICD-10-CM

## 2022-12-15 DIAGNOSIS — Z89.511 HX OF RIGHT BKA: Chronic | ICD-10-CM

## 2022-12-15 DIAGNOSIS — I10 HYPERTENSION, ESSENTIAL: Chronic | ICD-10-CM

## 2022-12-15 DIAGNOSIS — E78.2 MIXED HYPERLIPIDEMIA: Chronic | ICD-10-CM

## 2022-12-15 DIAGNOSIS — K21.9 GASTROESOPHAGEAL REFLUX DISEASE, UNSPECIFIED WHETHER ESOPHAGITIS PRESENT: ICD-10-CM

## 2022-12-15 DIAGNOSIS — E55.9 VITAMIN D DEFICIENCY: ICD-10-CM

## 2022-12-15 DIAGNOSIS — E11.42 DIABETIC POLYNEUROPATHY ASSOCIATED WITH TYPE 2 DIABETES MELLITUS: Primary | Chronic | ICD-10-CM

## 2022-12-15 PROCEDURE — 3066F PR DOCUMENTATION OF TREATMENT FOR NEPHROPATHY: ICD-10-PCS | Mod: CPTII,,, | Performed by: FAMILY MEDICINE

## 2022-12-15 PROCEDURE — 3060F PR POS MICROALBUMINURIA RESULT DOCUMENTED/REVIEW: ICD-10-PCS | Mod: CPTII,,, | Performed by: FAMILY MEDICINE

## 2022-12-15 PROCEDURE — 3008F PR BODY MASS INDEX (BMI) DOCUMENTED: ICD-10-PCS | Mod: CPTII,,, | Performed by: FAMILY MEDICINE

## 2022-12-15 PROCEDURE — 1160F PR REVIEW ALL MEDS BY PRESCRIBER/CLIN PHARMACIST DOCUMENTED: ICD-10-PCS | Mod: CPTII,,, | Performed by: FAMILY MEDICINE

## 2022-12-15 PROCEDURE — 1159F MED LIST DOCD IN RCRD: CPT | Mod: CPTII,,, | Performed by: FAMILY MEDICINE

## 2022-12-15 PROCEDURE — 3051F PR MOST RECENT HEMOGLOBIN A1C LEVEL 7.0 - < 8.0%: ICD-10-PCS | Mod: CPTII,,, | Performed by: FAMILY MEDICINE

## 2022-12-15 PROCEDURE — 3008F BODY MASS INDEX DOCD: CPT | Mod: CPTII,,, | Performed by: FAMILY MEDICINE

## 2022-12-15 PROCEDURE — 99214 PR OFFICE/OUTPT VISIT, EST, LEVL IV, 30-39 MIN: ICD-10-PCS | Mod: ,,, | Performed by: FAMILY MEDICINE

## 2022-12-15 PROCEDURE — 3074F SYST BP LT 130 MM HG: CPT | Mod: CPTII,,, | Performed by: FAMILY MEDICINE

## 2022-12-15 PROCEDURE — 3066F NEPHROPATHY DOC TX: CPT | Mod: CPTII,,, | Performed by: FAMILY MEDICINE

## 2022-12-15 PROCEDURE — 3074F PR MOST RECENT SYSTOLIC BLOOD PRESSURE < 130 MM HG: ICD-10-PCS | Mod: CPTII,,, | Performed by: FAMILY MEDICINE

## 2022-12-15 PROCEDURE — 99214 OFFICE O/P EST MOD 30 MIN: CPT | Mod: ,,, | Performed by: FAMILY MEDICINE

## 2022-12-15 PROCEDURE — 3060F POS MICROALBUMINURIA REV: CPT | Mod: CPTII,,, | Performed by: FAMILY MEDICINE

## 2022-12-15 PROCEDURE — 3079F PR MOST RECENT DIASTOLIC BLOOD PRESSURE 80-89 MM HG: ICD-10-PCS | Mod: CPTII,,, | Performed by: FAMILY MEDICINE

## 2022-12-15 PROCEDURE — 1160F RVW MEDS BY RX/DR IN RCRD: CPT | Mod: CPTII,,, | Performed by: FAMILY MEDICINE

## 2022-12-15 PROCEDURE — 3051F HG A1C>EQUAL 7.0%<8.0%: CPT | Mod: CPTII,,, | Performed by: FAMILY MEDICINE

## 2022-12-15 PROCEDURE — 3079F DIAST BP 80-89 MM HG: CPT | Mod: CPTII,,, | Performed by: FAMILY MEDICINE

## 2022-12-15 PROCEDURE — 1159F PR MEDICATION LIST DOCUMENTED IN MEDICAL RECORD: ICD-10-PCS | Mod: CPTII,,, | Performed by: FAMILY MEDICINE

## 2022-12-15 RX ORDER — METOPROLOL SUCCINATE 50 MG/1
50 TABLET, EXTENDED RELEASE ORAL DAILY
Qty: 90 TABLET | Refills: 1 | Status: SHIPPED | OUTPATIENT
Start: 2022-12-15 | End: 2023-02-20 | Stop reason: DRUGHIGH

## 2022-12-15 RX ORDER — ISOSORBIDE MONONITRATE 30 MG/1
30 TABLET, EXTENDED RELEASE ORAL DAILY
Qty: 90 TABLET | Refills: 0 | Status: SHIPPED | OUTPATIENT
Start: 2022-12-15 | End: 2023-02-20

## 2022-12-15 RX ORDER — FENOFIBRATE 48 MG/1
48 TABLET, FILM COATED ORAL DAILY
Qty: 90 TABLET | Refills: 1 | Status: SHIPPED | OUTPATIENT
Start: 2022-12-15 | End: 2023-02-22 | Stop reason: SDUPTHER

## 2022-12-15 RX ORDER — AMITRIPTYLINE HYDROCHLORIDE 75 MG/1
TABLET ORAL
Qty: 30 TABLET | Refills: 1 | Status: CANCELLED | OUTPATIENT
Start: 2022-12-15

## 2022-12-15 RX ORDER — TIZANIDINE 4 MG/1
4 TABLET ORAL 3 TIMES DAILY
Qty: 270 TABLET | Refills: 1 | Status: SHIPPED | OUTPATIENT
Start: 2022-12-15 | End: 2023-02-22 | Stop reason: SDUPTHER

## 2022-12-15 RX ORDER — ATORVASTATIN CALCIUM 80 MG/1
80 TABLET, FILM COATED ORAL NIGHTLY
Qty: 90 TABLET | Refills: 1 | Status: SHIPPED | OUTPATIENT
Start: 2022-12-15 | End: 2023-02-22 | Stop reason: SDUPTHER

## 2022-12-15 RX ORDER — ESOMEPRAZOLE MAGNESIUM 40 MG/1
40 CAPSULE, DELAYED RELEASE ORAL
Qty: 90 CAPSULE | Refills: 1 | Status: SHIPPED | OUTPATIENT
Start: 2022-12-15 | End: 2023-02-22 | Stop reason: SDUPTHER

## 2022-12-15 RX ORDER — ERGOCALCIFEROL 1.25 MG/1
CAPSULE ORAL
Qty: 12 CAPSULE | Refills: 1 | Status: SHIPPED | OUTPATIENT
Start: 2022-12-15 | End: 2023-05-24 | Stop reason: SDUPTHER

## 2022-12-15 RX ORDER — GABAPENTIN 600 MG/1
600 TABLET ORAL 3 TIMES DAILY
Qty: 90 TABLET | Refills: 1 | Status: CANCELLED | OUTPATIENT
Start: 2022-12-15

## 2022-12-15 NOTE — PROGRESS NOTES
"New Clinic Note    Kennedy West is a 42 y.o. male     CC:   Chief Complaint   Patient presents with    Follow-up     Patient state he's in for 3 month follow up.        Subjective    History of Present Illness HPI   Patient is here for evaluation of chronic medical problems. He needs refills. He had his A1C done at his endocrinologist on 22. It was 7.2.     Current Outpatient Medications:     ACCU-CHEK GUIDE TEST STRIPS Strp, USE STRIP TO CHECK GLUCOSE TWICE DAILY, Disp: , Rfl:     ACCU-CHEK SOFTCLIX LANCETS Misc, USE TO CHECK GLUCOSE TWICE DAILY, Disp: , Rfl:     albuterol (PROAIR HFA) 90 mcg/actuation inhaler, Inhale 2 puffs into the lungs every 4 (four) hours as needed for Wheezing. Rescue, Disp: 18 g, Rfl: 5    amitriptyline (ELAVIL) 75 MG tablet, Take one tablet at bedtime, Disp: 30 tablet, Rfl: 1    BD CANDIDA 2ND GEN PEN NEEDLE 32 gauge x 5/32" Ndle, USE 1 ONCE DAILY, Disp: 100 each, Rfl: 11    gabapentin (NEURONTIN) 600 MG tablet, Take 1 tablet (600 mg total) by mouth 3 (three) times daily., Disp: 90 tablet, Rfl: 1    HUMALOG KWIKPEN INSULIN 100 unit/mL pen, SMARTSI Unit(s) SUB-Q 3 Times Daily, Disp: 15 mL, Rfl: 1    HYDROcodone-acetaminophen (NORCO)  mg per tablet, Take 1 tablet by mouth every 12 (twelve) hours., Disp: 60 tablet, Rfl: 0    [START ON 2023] HYDROcodone-acetaminophen (NORCO)  mg per tablet, Take 1 tablet by mouth every 12 (twelve) hours., Disp: 60 tablet, Rfl: 0    insulin aspart U-100 (NOVOLOG) 100 unit/mL (3 mL) InPn pen, Inject 10 Units into the skin., Disp: , Rfl:     insulin glargine 100 units/mL SubQ pen, Inject 50 Units into the skin., Disp: , Rfl:     JARDIANCE 10 mg tablet, Take 1 tablet (10 mg total) by mouth once daily., Disp: 90 tablet, Rfl: 1    mupirocin (BACTROBAN) 2 % ointment, Apply topically 3 (three) times daily., Disp: 15 g, Rfl: 0    nitroGLYCERIN (NITROSTAT) 0.3 MG SL tablet, Place 1 tablet (0.3 mg total) under the tongue every 5 (five) " "minutes as needed for Chest pain. Up to 3 times., Disp: 30 tablet, Rfl: 0    pen needle, diabetic 31 gauge x 1/4" Ndle, Inject up to 3 times per day, Disp: , Rfl:     semaglutide (OZEMPIC) 1 mg/dose (4 mg/3 mL), Inject 1 mg into the skin., Disp: , Rfl:     aspirin (ECOTRIN) 81 MG EC tablet, Take 1 tablet (81 mg total) by mouth once daily., Disp: 30 tablet, Rfl: 2    atorvastatin (LIPITOR) 80 MG tablet, Take 1 tablet (80 mg total) by mouth every evening., Disp: 90 tablet, Rfl: 1    ergocalciferol (ERGOCALCIFEROL) 50,000 unit Cap, Take one capsule weekly for 12 weeks then reduce to one capsule monthly, Disp: 12 capsule, Rfl: 1    esomeprazole (NEXIUM) 40 MG capsule, Take 1 capsule (40 mg total) by mouth before breakfast., Disp: 90 capsule, Rfl: 1    fenofibrate (TRICOR) 48 MG tablet, Take 1 tablet (48 mg total) by mouth once daily., Disp: 90 tablet, Rfl: 1    isosorbide mononitrate (IMDUR) 30 MG 24 hr tablet, Take 1 tablet (30 mg total) by mouth once daily., Disp: 90 tablet, Rfl: 0    metoprolol succinate (TOPROL-XL) 50 MG 24 hr tablet, Take 1 tablet (50 mg total) by mouth once daily., Disp: 90 tablet, Rfl: 1    tiZANidine (ZANAFLEX) 4 MG tablet, Take 1 tablet (4 mg total) by mouth 3 (three) times daily., Disp: 270 tablet, Rfl: 1    Current Facility-Administered Medications:     EPINEPHrine (EPIPEN) 0.3 mg/0.3 mL pen injection 0.3 mg, 0.3 mg, Intramuscular, PRN, Karlee Liu MD     Past Medical History:   Diagnosis Date    Allergy     Anxiety     Asthma     Chronic pain     Complex regional pain syndrome type I of right upper extremity     COPD (chronic obstructive pulmonary disease)     Coronary artery disease     Depression     Diabetes mellitus, type 2     Diabetic peripheral neuropathy     Gastritis     GERD (gastroesophageal reflux disease)     Hyperlipidemia     Hypertension     Lumbar radiculopathy     Lumbosacral spondylosis     Non-pressure chronic ulcer of other part of right foot limited to breakdown " of skin     Obesity     Obstructive sleep apnea     Osteoarthritis, multiple sites     Polyneuropathy     Tobacco dependence         Family History   Problem Relation Age of Onset    Diabetes Mother     Hypertension Mother     Cancer Mother     Arthritis Mother     Diabetes Father     Heart disease Father     Hyperlipidemia Father     Hypertension Father     Arthritis Father     COPD Father     Rheum arthritis Sister     Arthritis Sister     Stroke Maternal Uncle     Cancer Paternal Aunt     Stroke Maternal Grandmother     Stroke Maternal Grandfather     Cancer Paternal Grandmother     Heart disease Paternal Grandfather         Past Surgical History:   Procedure Laterality Date    ANGIOGRAM, CORONARY, WITH LEFT HEART CATHETERIZATION N/A 08/24/2021    Procedure: Left heart cath w/ coronary angiograms;  Surgeon: Zaheer Real MD;  Location: [facility] CATH LAB;  Service: Cardiology;  Laterality: N/A;    CARPAL TUNNEL RELEASE      right    INJECTION OF ANESTHETIC AGENT AROUND MEDIAL BRANCH NERVES INNERVATING LUMBAR FACET JOINT Bilateral 04/15/2021    Procedure: BLOCK, NERVE, FACET JOINT, LUMBAR, MEDIAL BRANCH;  Surgeon: Sandra Johnson MD;  Location: [facility] PAIN MGMT;  Service: Pain Management;  Laterality: Bilateral;  Bilateral L3-S1 MBB    INJECTION OF ANESTHETIC AGENT AROUND MEDIAL BRANCH NERVES INNERVATING LUMBAR FACET JOINT Bilateral 12/23/2021    Procedure: Block-nerve-medial branch-lumbar, bilateral L4 through S1;  Surgeon: Sandra Johnson MD;  Location: [facility] PAIN MGMT;  Service: Pain Management;  Laterality: Bilateral;  pt will bring vaccine verification card- notified in office    INJECTION OF FACET JOINT Bilateral 06/01/2020    Bilateral L3-S1 MBB - Dr Johnson    LEFT HEART CATHETERIZATION N/A 07/21/2021    Procedure: Left heart cath with possible intervention;  Surgeon: Brock Burkett DO;  Location: [facility] CATH LAB;  Service: Cardiology;  Laterality: N/A;    RBKA Right 03/16/2022    thumb  surgery      right    TONSILLECTOMY  1990        Review of Systems   Constitutional:  Negative for activity change, fatigue, fever and unexpected weight change.   HENT:  Negative for ear pain, hearing loss, postnasal drip, rhinorrhea, sinus pressure/congestion and trouble swallowing.    Eyes:  Negative for discharge and visual disturbance.   Respiratory:  Negative for cough, chest tightness, shortness of breath and wheezing.    Cardiovascular:  Positive for palpitations. Negative for chest pain.   Gastrointestinal:  Positive for constipation. Negative for abdominal pain, blood in stool, diarrhea, nausea and vomiting.   Endocrine: Negative for polydipsia and polyuria.   Genitourinary:  Positive for urgency. Negative for difficulty urinating, dysuria and hematuria.   Musculoskeletal:  Positive for arthralgias and joint swelling. Negative for neck pain.   Neurological:  Positive for weakness and headaches.   Psychiatric/Behavioral:  Negative for confusion and dysphoric mood.       /80 (BP Location: Right arm, Patient Position: Sitting, BP Method: Large (Manual))   Pulse (!) 111   Temp 98.3 °F (36.8 °C) (Oral)   Resp 17   Wt (!) 158.8 kg (350 lb)   SpO2 98%   BMI 47.47 kg/m²      Physical Exam  HENT:      Head: Normocephalic and atraumatic.   Cardiovascular:      Rate and Rhythm: Normal rate and regular rhythm.   Pulmonary:      Effort: Pulmonary effort is normal.      Breath sounds: Normal breath sounds.   Musculoskeletal:      Right Lower Extremity: Right leg is amputated below knee.   Neurological:      Mental Status: He is alert and oriented to person, place, and time.      Gait: Gait abnormal.      Comments: In wheelchair   Psychiatric:         Mood and Affect: Mood normal.         Behavior: Behavior normal.        Assessment and Plan      ICD-10-CM ICD-9-CM   1. Diabetic polyneuropathy associated with type 2 diabetes mellitus  E11.42 250.60     357.2   2. Lumbosacral spondylosis without myelopathy   M47.817 721.3   3. Complex regional pain syndrome type 1 of right upper extremity  G90.511 337.21   4. Phantom pain following amputation of lower limb  G54.6 353.6   5. Mixed hyperlipidemia  E78.2 272.2   6. Vitamin D deficiency  E55.9 268.9   7. Gastroesophageal reflux disease, unspecified whether esophagitis present  K21.9 530.81   8. Hypertension, essential  I10 401.9   9. History of heart attack  I25.2 412   10. Coronary artery disease involving native heart, unspecified vessel or lesion type, unspecified whether angina present  I25.10 414.01   11. Hx of right BKA  Z89.511 V49.75        Problem List Items Addressed This Visit          Neuro    Lumbosacral spondylosis without myelopathy MRI [facility] 2019 (Chronic)    Overview     Stable. Followed by [facility]         Complex regional pain syndrome of right upper extremity (Chronic)    Relevant Medications    tiZANidine (ZANAFLEX) 4 MG tablet    Diabetic polyneuropathy associated with type 2 diabetes mellitus - Primary (Chronic)    Overview     Stable. Followed by endocrinology at [facility]         Relevant Medications    insulin aspart U-100 (NOVOLOG) 100 unit/mL (3 mL) InPn pen    insulin glargine 100 units/mL SubQ pen    semaglutide (OZEMPIC) 1 mg/dose (4 mg/3 mL)    Phantom pain following amputation of lower limb (Chronic)       Cardiac/Vascular    Hypertension, essential (Chronic)    Relevant Medications    metoprolol succinate (TOPROL-XL) 50 MG 24 hr tablet    Hyperlipidemia (Chronic)    Relevant Medications    atorvastatin (LIPITOR) 80 MG tablet    fenofibrate (TRICOR) 48 MG tablet    History of heart attack (Chronic)    Coronary artery disease involving native heart (Chronic)    Relevant Medications    isosorbide mononitrate (IMDUR) 30 MG 24 hr tablet       Endocrine    Vitamin D deficiency    Relevant Medications    ergocalciferol (ERGOCALCIFEROL) 50,000 unit Cap       Orthopedic    Hx of right BKA (Chronic)     Other Visit Diagnoses        Gastroesophageal reflux disease, unspecified whether esophagitis present        Relevant Medications    esomeprazole (NEXIUM) 40 MG capsule             Patient Instructions   Check glucose outside of clinic, records numbers, and bring to follow up visit.   Take medications as directed.   Eat a diabetic diet  Cardiovascular exercise at least 3 times per week.       Follow up in about 3 months (around 3/15/2023).

## 2023-02-08 NOTE — PROGRESS NOTES
Subjective:         Patient ID: Kennedy West is a 42 y.o. male.    Chief Complaint: Low-back Pain and Shoulder Pain        Pain  This is a chronic problem. The current episode started more than 1 year ago. The problem occurs daily. The problem has been unchanged. Associated symptoms include arthralgias and neck pain. Pertinent negatives include no anorexia, chest pain, chills, coughing, diaphoresis, fatigue, fever, sore throat, vertigo or vomiting.   Review of Systems   Constitutional:  Negative for activity change, appetite change, chills, diaphoresis, fatigue and fever.   HENT:  Negative for drooling, ear discharge, ear pain, facial swelling, nosebleeds, sore throat, trouble swallowing, voice change and goiter.    Eyes:  Negative for photophobia, pain, discharge, redness and visual disturbance.   Respiratory:  Negative for apnea, cough, choking, chest tightness, shortness of breath, wheezing and stridor.    Cardiovascular:  Negative for chest pain, palpitations and leg swelling.   Gastrointestinal:  Negative for abdominal distention, anorexia, diarrhea, rectal pain, vomiting and fecal incontinence.   Endocrine: Negative for cold intolerance, heat intolerance, polydipsia, polyphagia and polyuria.   Genitourinary:  Negative for bladder incontinence, dysuria, flank pain and frequency.   Musculoskeletal:  Positive for arthralgias, back pain, leg pain, neck pain and neck stiffness.   Integumentary:  Negative for color change and pallor.   Neurological:  Negative for dizziness, vertigo, seizures, syncope, facial asymmetry, speech difficulty, light-headedness, coordination difficulties, memory loss and coordination difficulties.   Hematological:  Negative for adenopathy. Does not bruise/bleed easily.   Psychiatric/Behavioral:  Negative for agitation, behavioral problems, confusion, decreased concentration, dysphoric mood, hallucinations, self-injury and suicidal ideas. The patient is not nervous/anxious and  is not hyperactive.          Past Medical History:   Diagnosis Date    Allergy     Anxiety     Asthma     Chronic pain     Complex regional pain syndrome type I of right upper extremity     COPD (chronic obstructive pulmonary disease)     Coronary artery disease     Depression     Diabetes mellitus, type 2     Diabetic peripheral neuropathy     Gastritis     GERD (gastroesophageal reflux disease)     Hyperlipidemia     Hypertension     Lumbar radiculopathy     Lumbosacral spondylosis     Non-pressure chronic ulcer of other part of right foot limited to breakdown of skin     Obesity     Obstructive sleep apnea     Osteoarthritis, multiple sites     Polyneuropathy     Tobacco dependence      Past Surgical History:   Procedure Laterality Date    ANGIOGRAM, CORONARY, WITH LEFT HEART CATHETERIZATION N/A 08/24/2021    Procedure: Left heart cath w/ coronary angiograms;  Surgeon: Zaheer Real MD;  Location: [facility] CATH LAB;  Service: Cardiology;  Laterality: N/A;    CARPAL TUNNEL RELEASE      right    INJECTION OF ANESTHETIC AGENT AROUND MEDIAL BRANCH NERVES INNERVATING LUMBAR FACET JOINT Bilateral 04/15/2021    Procedure: BLOCK, NERVE, FACET JOINT, LUMBAR, MEDIAL BRANCH;  Surgeon: Sandra Johnson MD;  Location: [facility] PAIN MGMT;  Service: Pain Management;  Laterality: Bilateral;  Bilateral L3-S1 MBB    INJECTION OF ANESTHETIC AGENT AROUND MEDIAL BRANCH NERVES INNERVATING LUMBAR FACET JOINT Bilateral 12/23/2021    Procedure: Block-nerve-medial branch-lumbar, bilateral L4 through S1;  Surgeon: Sandra Johnson MD;  Location: [facility] PAIN MGMT;  Service: Pain Management;  Laterality: Bilateral;  pt will bring vaccine verification card- notified in office    INJECTION OF FACET JOINT Bilateral 06/01/2020    Bilateral L3-S1 MBB - Dr Johnson    LEFT HEART CATHETERIZATION N/A 07/21/2021    Procedure: Left heart cath with possible intervention;  Surgeon: Brock Burkett DO;  Location: [facility] CATH LAB;  Service:  Cardiology;  Laterality: N/A;    RBKA Right 03/16/2022    thumb surgery      right    TONSILLECTOMY  1990     Social History     Socioeconomic History    Marital status:    Occupational History    Occupation: [employer]   Tobacco Use    Smoking status: Light Smoker     Packs/day: 0.25     Years: 21.00     Pack years: 5.25     Types: Cigarettes     Start date: 11/9/2000    Smokeless tobacco: Never   Substance and Sexual Activity    Alcohol use: Not Currently     Alcohol/week: 0.0 standard drinks    Drug use: Never    Sexual activity: Not Currently     Partners: Female     Family History   Problem Relation Age of Onset    Diabetes Mother     Hypertension Mother     Cancer Mother     Arthritis Mother     Diabetes Father     Heart disease Father     Hyperlipidemia Father     Hypertension Father     Arthritis Father     COPD Father     Rheum arthritis Sister     Arthritis Sister     Stroke Maternal Uncle     Cancer Paternal Aunt     Stroke Maternal Grandmother     Stroke Maternal Grandfather     Cancer Paternal Grandmother     Heart disease Paternal Grandfather      Review of patient's allergies indicates:   Allergen Reactions    Banana Anaphylaxis    Biaxin [clarithromycin] Other (See Comments)     Passes out      Erythromycin Anaphylaxis, Hives, Rash, Shortness Of Breath and Other (See Comments)    Tetracyclines Anaphylaxis    Zithromax tri-shelby [azithromycin] Anaphylaxis    Pineapple Other (See Comments)     Gets extremely sick    Fig Blisters and Hives    Isoniazid     Tetracycline (bulk)     Naldecon dx Rash        Objective:  Vitals:    02/09/23 0851   BP: 119/75   Pulse: 94   Resp: 18   Weight: (!) 159.2 kg (351 lb)   Height: 6' (1.829 m)   PainSc:   7         Physical Exam  Vitals and nursing note reviewed. Exam conducted with a chaperone present.   Constitutional:       General: He is awake. He is not in acute distress.     Appearance: Normal appearance. He is not ill-appearing or diaphoretic.    HENT:      Head: Normocephalic and atraumatic.      Nose: Nose normal.      Mouth/Throat:      Mouth: Mucous membranes are moist.      Pharynx: Oropharynx is clear.   Eyes:      Conjunctiva/sclera: Conjunctivae normal.      Pupils: Pupils are equal, round, and reactive to light.   Cardiovascular:      Rate and Rhythm: Normal rate.   Pulmonary:      Effort: Pulmonary effort is normal. No respiratory distress.   Abdominal:      Palpations: Abdomen is soft.      Tenderness: There is no guarding.   Musculoskeletal:         General: Normal range of motion.      Cervical back: Normal range of motion and neck supple. No rigidity.      Thoracic back: Tenderness present.      Lumbar back: Tenderness present.   Skin:     General: Skin is warm and dry.      Coloration: Skin is not jaundiced or pale.   Neurological:      General: No focal deficit present.      Mental Status: He is alert and oriented to person, place, and time. Mental status is at baseline.      Cranial Nerves: No cranial nerve deficit (II-XII).   Psychiatric:         Mood and Affect: Mood normal.         Behavior: Behavior normal. Behavior is cooperative.         Thought Content: Thought content normal.         X-Ray Shoulder 2 or More Views Left  Narrative: EXAMINATION:  XR SHOULDER COMPLETE 2 OR MORE VIEWS LEFT    CLINICAL HISTORY:  Pain in left shoulder    COMPARISON:  None    TECHNIQUE:  Frontal views of the left shoulder were obtained in internal and external rotation .    FINDINGS:  No convincing acute fracture or dislocation demonstrated. No concerning radiopaque foreign body visualized.  Impression: As above.    Point of Service: [facility]    Electronically signed by: David Rust  Date:    08/25/2022  Time:    10:14         Office Visit on 12/12/2022   Component Date Value Ref Range Status    POC Amphetamines 12/12/2022 Negative  Negative, Inconclusive Final    POC Barbiturates 12/12/2022 Negative  Negative, Inconclusive Final    POC  Benzodiazepines 12/12/2022 Negative  Negative, Inconclusive Final    POC Cocaine 12/12/2022 Negative  Negative, Inconclusive Final    POC THC 12/12/2022 Negative  Negative, Inconclusive Final    POC Methadone 12/12/2022 Negative  Negative, Inconclusive Final    POC Methamphetamine 12/12/2022 Negative  Negative, Inconclusive Final    POC Opiates 12/12/2022 Presumptive Positive (A)  Negative, Inconclusive Final    POC Oxycodone 12/12/2022 Negative  Negative, Inconclusive Final    POC Phencyclidine 12/12/2022 Negative  Negative, Inconclusive Final    POC Methylenedioxymethamphetamine * 12/12/2022 Negative  Negative, Inconclusive Final    POC Tricyclic Antidepressants 12/12/2022 Negative  Negative, Inconclusive Final    POC Buprenorphine 12/12/2022 Negative   Final     Acceptable 12/12/2022 Yes   Final    POC Temperature (Urine) 12/12/2022 92   Final   Office Visit on 09/21/2022   Component Date Value Ref Range Status    Hemoglobin A1C 09/21/2022 7.7 (H)  4.5 - 6.6 % Final    Estimated Average Glucose 09/21/2022 170  mg/dL Final   Lab Visit on 08/16/2022   Component Date Value Ref Range Status    Sodium 08/16/2022 134 (L)  136 - 145 mmol/L Final    Potassium 08/16/2022 3.4 (L)  3.5 - 5.1 mmol/L Final    Chloride 08/16/2022 103  98 - 107 mmol/L Final    CO2 08/16/2022 24  21 - 32 mmol/L Final    Anion Gap 08/16/2022 10  7 - 16 mmol/L Final    Glucose 08/16/2022 239 (H)  74 - 106 mg/dL Final    BUN 08/16/2022 13  7 - 18 mg/dL Final    Creatinine 08/16/2022 0.71  0.70 - 1.30 mg/dL Final    BUN/Creatinine Ratio 08/16/2022 18  6 - 20 Final    Calcium 08/16/2022 9.3  8.5 - 10.1 mg/dL Final    Total Protein 08/16/2022 7.7  6.4 - 8.2 g/dL Final    Albumin 08/16/2022 3.6  3.5 - 5.0 g/dL Final    Globulin 08/16/2022 4.1 (H)  2.0 - 4.0 g/dL Final    A/G Ratio 08/16/2022 0.9   Final    Bilirubin, Total 08/16/2022 0.7  0.0 - 1.2 mg/dL Final    Alk Phos 08/16/2022 199 (H)  45 - 115 U/L Final    ALT 08/16/2022 27  16 -  61 U/L Final    AST 08/16/2022 15  15 - 37 U/L Final    eGFR 08/16/2022 119  >=60 mL/min/1.73m² Final    Magnesium 08/16/2022 2.3  1.7 - 2.3 mg/dL Final    WBC 08/16/2022 9.27  4.50 - 11.00 K/uL Final    RBC 08/16/2022 5.99  4.60 - 6.20 M/uL Final    Hemoglobin 08/16/2022 16.1  13.5 - 18.0 g/dL Final    Hematocrit 08/16/2022 48.5  40.0 - 54.0 % Final    MCV 08/16/2022 81.0  80.0 - 96.0 fL Final    MCH 08/16/2022 26.9 (L)  27.0 - 31.0 pg Final    MCHC 08/16/2022 33.2  32.0 - 36.0 g/dL Final    RDW 08/16/2022 13.8  11.5 - 14.5 % Final    Platelet Count 08/16/2022 298  150 - 400 K/uL Final    MPV 08/16/2022 9.5  9.4 - 12.4 fL Final    Neutrophils % 08/16/2022 48.6 (L)  53.0 - 65.0 % Final    Lymphocytes % 08/16/2022 45.0 (H)  27.0 - 41.0 % Final    Monocytes % 08/16/2022 4.9  2.0 - 6.0 % Final    Eosinophils % 08/16/2022 0.6 (L)  1.0 - 4.0 % Final    Basophils % 08/16/2022 0.5  0.0 - 1.0 % Final    Immature Granulocytes % 08/16/2022 0.4  0.0 - 0.4 % Final    nRBC, Auto 08/16/2022 0.0  <=0.0 % Final    Neutrophils, Abs 08/16/2022 4.50  1.80 - 7.70 K/uL Final    Lymphocytes, Absolute 08/16/2022 4.17  1.00 - 4.80 K/uL Final    Monocytes, Absolute 08/16/2022 0.45  0.00 - 0.80 K/uL Final    Eosinophils, Absolute 08/16/2022 0.06  0.00 - 0.50 K/uL Final    Basophils, Absolute 08/16/2022 0.05  0.00 - 0.20 K/uL Final    Immature Granulocytes, Absolute 08/16/2022 0.04  0.00 - 0.04 K/uL Final    nRBC, Absolute 08/16/2022 0.00  <=0.00 x10e3/uL Final    Diff Type 08/16/2022 Auto   Final         No orders of the defined types were placed in this encounter.        Requested Prescriptions     Signed Prescriptions Disp Refills    amitriptyline (ELAVIL) 75 MG tablet 30 tablet 1     Sig: Take one tablet at bedtime    gabapentin (NEURONTIN) 600 MG tablet 90 tablet 1     Sig: Take 1 tablet (600 mg total) by mouth 3 (three) times daily.    HYDROcodone-acetaminophen (NORCO)  mg per tablet 60 tablet 0     Sig: Take 1 tablet by mouth  every 12 (twelve) hours.    HYDROcodone-acetaminophen (NORCO)  mg per tablet 60 tablet 0     Sig: Take 1 tablet by mouth every 12 (twelve) hours.       Assessment:     1. Lumbosacral spondylosis without myelopathy    2. Diabetic peripheral neuropathy    3. Complex regional pain syndrome type 1 of right upper extremity    4. 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   Analgesia:Patients pain is partially controlled by current medication. Patient has tried OTC medications such as Tylenol and Ibuprofen with out relief.   Adverse Effects: Patient denies constipation or sedation.  Aberrant Behavior:  reviewed with no aberrant drug seeking/taking behavior.  Overdose reversal drug naloxone discussed    Drug screen reviewed      Plan:    Narcan December 2022    Using wheelchair for mobility, waiting for right lower extremity prosthetic device    He follows diabetic educator    Follows Neurology [facility] right hand arm    Chronic right lower extremity phantom limb pain after right below-the-knee amputation    Having some left shoulder pain worse with range of motion he states he will discuss this with his primary care provider    Otherwise he would like to continue conservative management from our standpoint    Continue exercise program as directed    Follow-up 2 months    Dr. Johnson December 2022    Bring original prescription medication bottles/container/box with labels to each visit    Pill count    Physical therapy    Massage therapy declines

## 2023-02-09 ENCOUNTER — OFFICE VISIT (OUTPATIENT)
Dept: PAIN MEDICINE | Facility: CLINIC | Age: 43
End: 2023-02-09
Payer: COMMERCIAL

## 2023-02-09 VITALS
WEIGHT: 315 LBS | SYSTOLIC BLOOD PRESSURE: 119 MMHG | BODY MASS INDEX: 42.66 KG/M2 | DIASTOLIC BLOOD PRESSURE: 75 MMHG | RESPIRATION RATE: 18 BRPM | HEART RATE: 94 BPM | HEIGHT: 72 IN

## 2023-02-09 DIAGNOSIS — G90.511 COMPLEX REGIONAL PAIN SYNDROME TYPE 1 OF RIGHT UPPER EXTREMITY: Chronic | ICD-10-CM

## 2023-02-09 DIAGNOSIS — E11.42 DIABETIC PERIPHERAL NEUROPATHY: ICD-10-CM

## 2023-02-09 DIAGNOSIS — G54.6 PHANTOM PAIN FOLLOWING AMPUTATION OF LOWER LIMB: ICD-10-CM

## 2023-02-09 DIAGNOSIS — M47.817 LUMBOSACRAL SPONDYLOSIS WITHOUT MYELOPATHY: Primary | Chronic | ICD-10-CM

## 2023-02-09 PROCEDURE — 99214 OFFICE O/P EST MOD 30 MIN: CPT | Mod: S$PBB,,, | Performed by: PHYSICIAN ASSISTANT

## 2023-02-09 PROCEDURE — 99214 PR OFFICE/OUTPT VISIT, EST, LEVL IV, 30-39 MIN: ICD-10-PCS | Mod: S$PBB,,, | Performed by: PHYSICIAN ASSISTANT

## 2023-02-09 PROCEDURE — 99215 OFFICE O/P EST HI 40 MIN: CPT | Mod: PBBFAC | Performed by: PHYSICIAN ASSISTANT

## 2023-02-09 PROCEDURE — 3078F DIAST BP <80 MM HG: CPT | Mod: CPTII,,, | Performed by: PHYSICIAN ASSISTANT

## 2023-02-09 PROCEDURE — 1159F PR MEDICATION LIST DOCUMENTED IN MEDICAL RECORD: ICD-10-PCS | Mod: CPTII,,, | Performed by: PHYSICIAN ASSISTANT

## 2023-02-09 PROCEDURE — 1159F MED LIST DOCD IN RCRD: CPT | Mod: CPTII,,, | Performed by: PHYSICIAN ASSISTANT

## 2023-02-09 PROCEDURE — 3008F PR BODY MASS INDEX (BMI) DOCUMENTED: ICD-10-PCS | Mod: CPTII,,, | Performed by: PHYSICIAN ASSISTANT

## 2023-02-09 PROCEDURE — 3074F SYST BP LT 130 MM HG: CPT | Mod: CPTII,,, | Performed by: PHYSICIAN ASSISTANT

## 2023-02-09 PROCEDURE — 3008F BODY MASS INDEX DOCD: CPT | Mod: CPTII,,, | Performed by: PHYSICIAN ASSISTANT

## 2023-02-09 PROCEDURE — 3078F PR MOST RECENT DIASTOLIC BLOOD PRESSURE < 80 MM HG: ICD-10-PCS | Mod: CPTII,,, | Performed by: PHYSICIAN ASSISTANT

## 2023-02-09 PROCEDURE — 3074F PR MOST RECENT SYSTOLIC BLOOD PRESSURE < 130 MM HG: ICD-10-PCS | Mod: CPTII,,, | Performed by: PHYSICIAN ASSISTANT

## 2023-02-09 RX ORDER — HYDROCODONE BITARTRATE AND ACETAMINOPHEN 10; 325 MG/1; MG/1
1 TABLET ORAL EVERY 12 HOURS
Qty: 60 TABLET | Refills: 0 | Status: SHIPPED | OUTPATIENT
Start: 2023-03-13 | End: 2023-04-12

## 2023-02-09 RX ORDER — AMITRIPTYLINE HYDROCHLORIDE 75 MG/1
TABLET ORAL
Qty: 30 TABLET | Refills: 1 | Status: SHIPPED | OUTPATIENT
Start: 2023-02-09 | End: 2023-04-10 | Stop reason: SDUPTHER

## 2023-02-09 RX ORDER — HYDROCODONE BITARTRATE AND ACETAMINOPHEN 10; 325 MG/1; MG/1
1 TABLET ORAL EVERY 12 HOURS
Qty: 60 TABLET | Refills: 0 | Status: SHIPPED | OUTPATIENT
Start: 2023-02-11 | End: 2023-03-13

## 2023-02-09 RX ORDER — GABAPENTIN 600 MG/1
600 TABLET ORAL 3 TIMES DAILY
Qty: 90 TABLET | Refills: 1 | Status: SHIPPED | OUTPATIENT
Start: 2023-02-09 | End: 2023-04-01 | Stop reason: SDUPTHER

## 2023-02-20 ENCOUNTER — OFFICE VISIT (OUTPATIENT)
Dept: CARDIOLOGY | Facility: CLINIC | Age: 43
End: 2023-02-20
Payer: COMMERCIAL

## 2023-02-20 VITALS
BODY MASS INDEX: 47.6 KG/M2 | DIASTOLIC BLOOD PRESSURE: 82 MMHG | HEIGHT: 72 IN | HEART RATE: 91 BPM | SYSTOLIC BLOOD PRESSURE: 122 MMHG | OXYGEN SATURATION: 97 %

## 2023-02-20 DIAGNOSIS — R60.0 LOWER EXTREMITY EDEMA: ICD-10-CM

## 2023-02-20 DIAGNOSIS — G47.33 OBSTRUCTIVE SLEEP APNEA SYNDROME: Chronic | ICD-10-CM

## 2023-02-20 DIAGNOSIS — R07.9 CHEST PAIN, UNSPECIFIED TYPE: ICD-10-CM

## 2023-02-20 DIAGNOSIS — I25.10 CORONARY ARTERY DISEASE INVOLVING NATIVE HEART, UNSPECIFIED VESSEL OR LESION TYPE, UNSPECIFIED WHETHER ANGINA PRESENT: Chronic | ICD-10-CM

## 2023-02-20 DIAGNOSIS — Z79.899 HIGH RISK MEDICATION USE: ICD-10-CM

## 2023-02-20 DIAGNOSIS — I25.10 CORONARY ARTERY DISEASE, UNSPECIFIED VESSEL OR LESION TYPE, UNSPECIFIED WHETHER ANGINA PRESENT, UNSPECIFIED WHETHER NATIVE OR TRANSPLANTED HEART: Primary | ICD-10-CM

## 2023-02-20 DIAGNOSIS — E78.2 MIXED HYPERLIPIDEMIA: Chronic | ICD-10-CM

## 2023-02-20 DIAGNOSIS — I25.10 CORONARY ARTERY DISEASE INVOLVING NATIVE CORONARY ARTERY OF NATIVE HEART, UNSPECIFIED WHETHER ANGINA PRESENT: Chronic | ICD-10-CM

## 2023-02-20 DIAGNOSIS — I10 HYPERTENSION, ESSENTIAL: Chronic | ICD-10-CM

## 2023-02-20 DIAGNOSIS — R00.2 PALPITATIONS: ICD-10-CM

## 2023-02-20 PROCEDURE — 93010 EKG 12-LEAD: ICD-10-PCS | Mod: S$PBB,,, | Performed by: INTERNAL MEDICINE

## 2023-02-20 PROCEDURE — 3008F PR BODY MASS INDEX (BMI) DOCUMENTED: ICD-10-PCS | Mod: CPTII,,, | Performed by: NURSE PRACTITIONER

## 2023-02-20 PROCEDURE — 93246 EXT ECG>7D<15D RECORDING: CPT | Performed by: NURSE PRACTITIONER

## 2023-02-20 PROCEDURE — 99214 PR OFFICE/OUTPT VISIT, EST, LEVL IV, 30-39 MIN: ICD-10-PCS | Mod: S$PBB,,, | Performed by: NURSE PRACTITIONER

## 2023-02-20 PROCEDURE — 3008F BODY MASS INDEX DOCD: CPT | Mod: CPTII,,, | Performed by: NURSE PRACTITIONER

## 2023-02-20 PROCEDURE — 93005 ELECTROCARDIOGRAM TRACING: CPT | Mod: PBBFAC,59 | Performed by: INTERNAL MEDICINE

## 2023-02-20 PROCEDURE — 1159F MED LIST DOCD IN RCRD: CPT | Mod: CPTII,,, | Performed by: NURSE PRACTITIONER

## 2023-02-20 PROCEDURE — 99215 OFFICE O/P EST HI 40 MIN: CPT | Mod: PBBFAC | Performed by: NURSE PRACTITIONER

## 2023-02-20 PROCEDURE — 99214 OFFICE O/P EST MOD 30 MIN: CPT | Mod: S$PBB,,, | Performed by: NURSE PRACTITIONER

## 2023-02-20 PROCEDURE — 3074F PR MOST RECENT SYSTOLIC BLOOD PRESSURE < 130 MM HG: ICD-10-PCS | Mod: CPTII,,, | Performed by: NURSE PRACTITIONER

## 2023-02-20 PROCEDURE — 93010 ELECTROCARDIOGRAM REPORT: CPT | Mod: S$PBB,,, | Performed by: INTERNAL MEDICINE

## 2023-02-20 PROCEDURE — 3079F DIAST BP 80-89 MM HG: CPT | Mod: CPTII,,, | Performed by: NURSE PRACTITIONER

## 2023-02-20 PROCEDURE — 3079F PR MOST RECENT DIASTOLIC BLOOD PRESSURE 80-89 MM HG: ICD-10-PCS | Mod: CPTII,,, | Performed by: NURSE PRACTITIONER

## 2023-02-20 PROCEDURE — 3074F SYST BP LT 130 MM HG: CPT | Mod: CPTII,,, | Performed by: NURSE PRACTITIONER

## 2023-02-20 PROCEDURE — 1159F PR MEDICATION LIST DOCUMENTED IN MEDICAL RECORD: ICD-10-PCS | Mod: CPTII,,, | Performed by: NURSE PRACTITIONER

## 2023-02-20 RX ORDER — METOPROLOL SUCCINATE 100 MG/1
100 TABLET, EXTENDED RELEASE ORAL DAILY
Qty: 90 TABLET | Refills: 3 | Status: SHIPPED | OUTPATIENT
Start: 2023-02-20 | End: 2023-03-28 | Stop reason: SDUPTHER

## 2023-02-20 RX ORDER — ISOSORBIDE MONONITRATE 30 MG/1
60 TABLET, EXTENDED RELEASE ORAL DAILY
Qty: 180 TABLET | Refills: 3 | Status: SHIPPED | OUTPATIENT
Start: 2023-02-20 | End: 2023-03-28 | Stop reason: SDUPTHER

## 2023-02-20 NOTE — ASSESSMENT & PLAN NOTE
Well controlled today 122/82 mmHg  Patient to keep HR and BP log since we are increasing his metoprolol.

## 2023-02-20 NOTE — ASSESSMENT & PLAN NOTE
Increase metoprolol succinate to 100 mg po daily\  Increase imdur to 60 mg po daily  Patient's weight is approx 351 lbs. Too heavy for a Lexiscan.  Schedule echo

## 2023-02-20 NOTE — PROGRESS NOTES
PCP: Karlee Liu MD    Referring Provider:     Subjective:   Kennedy West is a 42 y.o. male with hx of single vessel CAD with a 60% mid RCA stenosis (FFR 0.98 8/24/2021- Dr. Real), DM, COPD, RAKESH (intolerant of CPAP), HTN and HLD,  who presents for routine follow up. He states that he has had intermittent episodes of chest pain in the last month occurring every 3-4 days. The episodes occur with the exertion of transferring from WC (RBKA) and with sitting. Each episodes is preceded by palpitations described as a heart racing and pounding sensation. He has used sublingual ntg on 2 occasions requiring 2 sublingual ntg each time. He denies orthopnea or pnd. He does have lower ext edema even above the stump on the right. He also has dizziness with standing and feels week but he states that he drinks a gallon of water per day. He has RAKESH and can not tolerate CPAP.         Fhx:  Family History   Problem Relation Age of Onset    Diabetes Mother     Hypertension Mother     Cancer Mother     Arthritis Mother     Epilepsy Mother     Diabetes Father     Heart disease Father     Hyperlipidemia Father     Hypertension Father     Arthritis Father     COPD Father     Rheum arthritis Sister     Arthritis Sister     Stroke Maternal Grandmother     Stroke Maternal Grandfather     Cancer Paternal Grandmother     Heart disease Paternal Grandfather     Stroke Maternal Uncle     Cancer Paternal Aunt      Shx:   Social History     Socioeconomic History    Marital status:    Occupational History    Occupation: [employer]   Tobacco Use    Smoking status: Light Smoker     Packs/day: 0.25     Years: 21.00     Pack years: 5.25     Types: Cigarettes     Start date: 11/9/2000    Smokeless tobacco: Never   Substance and Sexual Activity    Alcohol use: Not Currently     Alcohol/week: 0.0 standard drinks    Drug use: Never    Sexual activity: Not Currently     Partners: Female       EKG 2/20/2023 RSR with sinu  arrhythmia; HR 89 bpm; poor r wave progression  8/16/2022 sinus tachycardia ;  bpm; T wvae inversion no longer evident in the inferior leads compared to EKG done 8/24/2021  ECHO Results for orders placed during the hospital encounter of 07/19/21    Echo    Interpretation Summary  · The left ventricle is normal in size with concentric remodeling and normal systolic function.  · The estimated ejection fraction is 60%.  · Normal left ventricular diastolic function.  · Mild right ventricular enlargement.  · Mild right atrial enlargement.  · Normal central venous pressure (3 mmHg).  · Trivial pericardial effusion.    [facility] Results for orders placed during the hospital encounter of 08/24/21    Cardiac catheterization    Conclusion  · The Mid RCA lesion was 60% stenosed. The diagnostic FFR was measured at 0.98.  · The pre-procedure left ventricular end diastolic pressure was 11.  · The estimated blood loss was none.  · There was single vessel coronary artery disease.    The procedure log was documented by Documenter: RT Maria Teresa and verified by Zaheer Real MD.    Date: 8/24/2021  Time: 4:34 PM        Lab Results   Component Value Date     (L) 08/16/2022    K 3.4 (L) 08/16/2022     08/16/2022    CO2 24 08/16/2022    BUN 13 08/16/2022    CREATININE 0.71 08/16/2022    CALCIUM 9.3 08/16/2022    ANIONGAP 10 08/16/2022    EGFRNONAA 89 02/22/2022       Lab Results   Component Value Date    CHOL 120 06/22/2022    CHOL 158 07/19/2021    CHOL 184 06/17/2021     Lab Results   Component Value Date    HDL 38 (L) 06/22/2022    HDL 25 (L) 07/19/2021    HDL 33 (L) 06/17/2021     Lab Results   Component Value Date    LDLCALC 41 06/22/2022    LDLCALC  07/19/2021      Comment:      Unable to calculate due to one of the following values:  Cholesterol <5  HDL Cholesterol <5  Triglycerides <10 or >400    LDLCALC 84 06/17/2021     Lab Results   Component Value Date    TRIG 204 (H) 06/22/2022    TRIG 432 (H)  "2021    TRIG 334 (H) 2021     Lab Results   Component Value Date    CHOLHDL 3.2 2022    CHOLHDL 6.3 2021    CHOLHDL 5.6 2021       Lab Results   Component Value Date    WBC 9.27 2022    HGB 16.1 2022    HCT 48.5 2022    MCV 81.0 2022     2022           Current Outpatient Medications:     ACCU-CHEK GUIDE TEST STRIPS Strp, USE STRIP TO CHECK GLUCOSE TWICE DAILY, Disp: , Rfl:     ACCU-CHEK SOFTCLIX LANCETS Misc, USE TO CHECK GLUCOSE TWICE DAILY, Disp: , Rfl:     albuterol (PROAIR HFA) 90 mcg/actuation inhaler, Inhale 2 puffs into the lungs every 4 (four) hours as needed for Wheezing. Rescue, Disp: 18 g, Rfl: 5    amitriptyline (ELAVIL) 75 MG tablet, Take one tablet at bedtime, Disp: 30 tablet, Rfl: 1    aspirin (ECOTRIN) 81 MG EC tablet, Take 1 tablet (81 mg total) by mouth once daily., Disp: 30 tablet, Rfl: 2    atorvastatin (LIPITOR) 80 MG tablet, Take 1 tablet (80 mg total) by mouth every evening., Disp: 90 tablet, Rfl: 1    BD CANDIDA 2ND GEN PEN NEEDLE 32 gauge x 5/32" Ndle, USE 1 ONCE DAILY, Disp: 100 each, Rfl: 11    ergocalciferol (ERGOCALCIFEROL) 50,000 unit Cap, Take one capsule weekly for 12 weeks then reduce to one capsule monthly, Disp: 12 capsule, Rfl: 1    esomeprazole (NEXIUM) 40 MG capsule, Take 1 capsule (40 mg total) by mouth before breakfast., Disp: 90 capsule, Rfl: 1    fenofibrate (TRICOR) 48 MG tablet, Take 1 tablet (48 mg total) by mouth once daily., Disp: 90 tablet, Rfl: 1    gabapentin (NEURONTIN) 600 MG tablet, Take 1 tablet (600 mg total) by mouth 3 (three) times daily., Disp: 90 tablet, Rfl: 1    HUMALOG KWIKPEN INSULIN 100 unit/mL pen, SMARTSI Unit(s) SUB-Q 3 Times Daily, Disp: 15 mL, Rfl: 1    HYDROcodone-acetaminophen (NORCO)  mg per tablet, Take 1 tablet by mouth every 12 (twelve) hours., Disp: 60 tablet, Rfl: 0    [START ON 3/13/2023] HYDROcodone-acetaminophen (NORCO)  mg per tablet, Take 1 tablet by mouth " "every 12 (twelve) hours., Disp: 60 tablet, Rfl: 0    insulin aspart U-100 (NOVOLOG) 100 unit/mL (3 mL) InPn pen, Inject 10 Units into the skin., Disp: , Rfl:     insulin glargine 100 units/mL SubQ pen, Inject 50 Units into the skin., Disp: , Rfl:     isosorbide mononitrate (IMDUR) 30 MG 24 hr tablet, Take 2 tablets (60 mg total) by mouth once daily., Disp: 180 tablet, Rfl: 3    JARDIANCE 10 mg tablet, Take 1 tablet (10 mg total) by mouth once daily., Disp: 90 tablet, Rfl: 1    metoprolol succinate (TOPROL-XL) 100 MG 24 hr tablet, Take 1 tablet (100 mg total) by mouth once daily., Disp: 90 tablet, Rfl: 3    mupirocin (BACTROBAN) 2 % ointment, Apply topically 3 (three) times daily., Disp: 15 g, Rfl: 0    nitroGLYCERIN (NITROSTAT) 0.3 MG SL tablet, Place 1 tablet (0.3 mg total) under the tongue every 5 (five) minutes as needed for Chest pain. Up to 3 times., Disp: 30 tablet, Rfl: 0    pen needle, diabetic 31 gauge x 1/4" Ndle, Inject up to 3 times per day, Disp: , Rfl:     semaglutide (OZEMPIC) 1 mg/dose (4 mg/3 mL), Inject 1 mg into the skin., Disp: , Rfl:     tiZANidine (ZANAFLEX) 4 MG tablet, Take 1 tablet (4 mg total) by mouth 3 (three) times daily., Disp: 270 tablet, Rfl: 1    Current Facility-Administered Medications:     EPINEPHrine (EPIPEN) 0.3 mg/0.3 mL pen injection 0.3 mg, 0.3 mg, Intramuscular, PRN, Karlee Liu MD  Meds reviewed but not reconciled. He did not bring his meds today.   Review of Systems   Respiratory:  Negative for shortness of breath.    Cardiovascular:  Positive for chest pain, palpitations and leg swelling. Negative for orthopnea, claudication and PND.   Neurological:  Positive for dizziness and weakness. Negative for loss of consciousness.         Objective:   /82 (BP Location: Left arm, Patient Position: Sitting)   Pulse 91   Ht 6' (1.829 m)   SpO2 97%   BMI 47.60 kg/m²     Physical Exam  Vitals and nursing note reviewed.   Constitutional:       Appearance: Normal " appearance. He is obese.      Comments: In a wheelchair   HENT:      Head: Normocephalic and atraumatic.   Neck:      Vascular: No carotid bruit.   Cardiovascular:      Rate and Rhythm: Normal rate and regular rhythm.      Pulses: Normal pulses.      Heart sounds: Normal heart sounds.   Pulmonary:      Effort: Pulmonary effort is normal.      Breath sounds: Normal breath sounds.   Abdominal:      Palpations: Abdomen is soft.   Musculoskeletal:      Cervical back: Neck supple.      Right lower leg: Edema present.      Left lower leg: Edema present.      Comments: Right BKA   Skin:     General: Skin is warm and dry.   Neurological:      Mental Status: He is alert.         Assessment:     1. Coronary artery disease, unspecified vessel or lesion type, unspecified whether angina present, unspecified whether native or transplanted heart  EKG 12-lead      2. Coronary artery disease involving native coronary artery of native heart, unspecified whether angina present        3. Mixed hyperlipidemia        4. Hypertension, essential  Comprehensive Metabolic Panel      5. Chest pain, unspecified type  TSH    CBC Auto Differential    Magnesium    Echo    Cardiac Monitor - 3-15 Day Adult (Cupid Only)      6. Palpitations  metoprolol succinate (TOPROL-XL) 100 MG 24 hr tablet    TSH    Comprehensive Metabolic Panel    CBC Auto Differential    Magnesium    Echo    Cardiac Monitor - 3-15 Day Adult (Cupid Only)      7. Obstructive sleep apnea syndrome        8. Coronary artery disease involving native heart, unspecified vessel or lesion type, unspecified whether angina present  isosorbide mononitrate (IMDUR) 30 MG 24 hr tablet    Cardiac Monitor - 3-15 Day Adult (Cupid Only)      9. Lower extremity edema  TSH    NT-Pro Natriuretic Peptide      10. High risk medication use  Comprehensive Metabolic Panel            Plan:   Hyperlipidemia  Lipid result from 6/22/2022 reviewed  T 204 mgdl  LDL 41 mg/dl  Lipids followed by  "PCP  Atorvastatin 80 mg po daily  Tricor 48 mg po daily    Hypertension, essential  Well controlled today 122/82 mmHg  Patient to keep HR and BP log since we are increasing his metoprolol.    Chest pain, unspecified    Increase metoprolol succinate to 100 mg po daily\  Increase imdur to 60 mg po daily  Patient's weight is approx 351 lbs. Too heavy for a Lexiscan.  Schedule echo    Palpitations  Heart racing and pounding becoming more frequent and more severe. May last "a couple of minutes to an hour".  This precedes the chest discomfort.   Adamo monitor  Hr/bp log  RTC 2 months    "

## 2023-02-20 NOTE — ASSESSMENT & PLAN NOTE
"Heart racing and pounding becoming more frequent and more severe. May last "a couple of minutes to an hour".  This precedes the chest discomfort.   Zio monitor  "

## 2023-02-20 NOTE — ASSESSMENT & PLAN NOTE
Lipid result from 6/22/2022 reviewed  T 204 mgdl  LDL 41 mg/dl  Lipids followed by PCP  Atorvastatin 80 mg po daily  Tricor 48 mg po daily

## 2023-02-22 ENCOUNTER — OFFICE VISIT (OUTPATIENT)
Dept: FAMILY MEDICINE | Facility: CLINIC | Age: 43
End: 2023-02-22
Payer: COMMERCIAL

## 2023-02-22 VITALS
HEART RATE: 83 BPM | TEMPERATURE: 99 F | OXYGEN SATURATION: 99 % | DIASTOLIC BLOOD PRESSURE: 84 MMHG | RESPIRATION RATE: 18 BRPM | SYSTOLIC BLOOD PRESSURE: 117 MMHG | HEIGHT: 72 IN | WEIGHT: 315 LBS | BODY MASS INDEX: 42.66 KG/M2

## 2023-02-22 DIAGNOSIS — Z79.4 TYPE 2 DIABETES MELLITUS WITH HYPERGLYCEMIA, WITH LONG-TERM CURRENT USE OF INSULIN: ICD-10-CM

## 2023-02-22 DIAGNOSIS — E78.2 MIXED HYPERLIPIDEMIA: Chronic | ICD-10-CM

## 2023-02-22 DIAGNOSIS — K21.9 GASTROESOPHAGEAL REFLUX DISEASE, UNSPECIFIED WHETHER ESOPHAGITIS PRESENT: ICD-10-CM

## 2023-02-22 DIAGNOSIS — E66.01 MORBID OBESITY: Chronic | ICD-10-CM

## 2023-02-22 DIAGNOSIS — G54.6 PHANTOM PAIN FOLLOWING AMPUTATION OF LOWER LIMB: Primary | Chronic | ICD-10-CM

## 2023-02-22 DIAGNOSIS — E11.65 TYPE 2 DIABETES MELLITUS WITH HYPERGLYCEMIA, WITH LONG-TERM CURRENT USE OF INSULIN: ICD-10-CM

## 2023-02-22 DIAGNOSIS — G90.511 COMPLEX REGIONAL PAIN SYNDROME TYPE 1 OF RIGHT UPPER EXTREMITY: Chronic | ICD-10-CM

## 2023-02-22 DIAGNOSIS — Z89.511 HX OF RIGHT BKA: Chronic | ICD-10-CM

## 2023-02-22 PROBLEM — L97.411 DIABETIC ULCER OF RIGHT MIDFOOT ASSOCIATED WITH DIABETES MELLITUS DUE TO UNDERLYING CONDITION, LIMITED TO BREAKDOWN OF SKIN: Chronic | Status: RESOLVED | Noted: 2021-10-14 | Resolved: 2023-02-22

## 2023-02-22 PROBLEM — E08.621 DIABETIC ULCER OF RIGHT MIDFOOT ASSOCIATED WITH DIABETES MELLITUS DUE TO UNDERLYING CONDITION, LIMITED TO BREAKDOWN OF SKIN: Chronic | Status: RESOLVED | Noted: 2021-10-14 | Resolved: 2023-02-22

## 2023-02-22 PROBLEM — M79.671 FOOT PAIN, RIGHT: Status: RESOLVED | Noted: 2022-01-06 | Resolved: 2023-02-22

## 2023-02-22 LAB
EST. AVERAGE GLUCOSE BLD GHB EST-MCNC: 154 MG/DL
HBA1C MFR BLD HPLC: 7.2 % (ref 4.5–6.6)

## 2023-02-22 PROCEDURE — 3079F DIAST BP 80-89 MM HG: CPT | Mod: CPTII,,, | Performed by: FAMILY MEDICINE

## 2023-02-22 PROCEDURE — 3051F HG A1C>EQUAL 7.0%<8.0%: CPT | Mod: CPTII,,, | Performed by: FAMILY MEDICINE

## 2023-02-22 PROCEDURE — 3074F SYST BP LT 130 MM HG: CPT | Mod: CPTII,,, | Performed by: FAMILY MEDICINE

## 2023-02-22 PROCEDURE — 3074F PR MOST RECENT SYSTOLIC BLOOD PRESSURE < 130 MM HG: ICD-10-PCS | Mod: CPTII,,, | Performed by: FAMILY MEDICINE

## 2023-02-22 PROCEDURE — 83036 HEMOGLOBIN GLYCOSYLATED A1C: CPT | Mod: ,,, | Performed by: CLINICAL MEDICAL LABORATORY

## 2023-02-22 PROCEDURE — 3008F PR BODY MASS INDEX (BMI) DOCUMENTED: ICD-10-PCS | Mod: CPTII,,, | Performed by: FAMILY MEDICINE

## 2023-02-22 PROCEDURE — 99214 OFFICE O/P EST MOD 30 MIN: CPT | Mod: ,,, | Performed by: FAMILY MEDICINE

## 2023-02-22 PROCEDURE — 1160F PR REVIEW ALL MEDS BY PRESCRIBER/CLIN PHARMACIST DOCUMENTED: ICD-10-PCS | Mod: CPTII,,, | Performed by: FAMILY MEDICINE

## 2023-02-22 PROCEDURE — 3079F PR MOST RECENT DIASTOLIC BLOOD PRESSURE 80-89 MM HG: ICD-10-PCS | Mod: CPTII,,, | Performed by: FAMILY MEDICINE

## 2023-02-22 PROCEDURE — 1160F RVW MEDS BY RX/DR IN RCRD: CPT | Mod: CPTII,,, | Performed by: FAMILY MEDICINE

## 2023-02-22 PROCEDURE — 83036 HEMOGLOBIN A1C: ICD-10-PCS | Mod: ,,, | Performed by: CLINICAL MEDICAL LABORATORY

## 2023-02-22 PROCEDURE — 1159F MED LIST DOCD IN RCRD: CPT | Mod: CPTII,,, | Performed by: FAMILY MEDICINE

## 2023-02-22 PROCEDURE — 3008F BODY MASS INDEX DOCD: CPT | Mod: CPTII,,, | Performed by: FAMILY MEDICINE

## 2023-02-22 PROCEDURE — 3051F PR MOST RECENT HEMOGLOBIN A1C LEVEL 7.0 - < 8.0%: ICD-10-PCS | Mod: CPTII,,, | Performed by: FAMILY MEDICINE

## 2023-02-22 PROCEDURE — 99214 PR OFFICE/OUTPT VISIT, EST, LEVL IV, 30-39 MIN: ICD-10-PCS | Mod: ,,, | Performed by: FAMILY MEDICINE

## 2023-02-22 PROCEDURE — 1159F PR MEDICATION LIST DOCUMENTED IN MEDICAL RECORD: ICD-10-PCS | Mod: CPTII,,, | Performed by: FAMILY MEDICINE

## 2023-02-22 RX ORDER — SEMAGLUTIDE 1.34 MG/ML
1 INJECTION, SOLUTION SUBCUTANEOUS
Qty: 1 PEN | Refills: 2 | Status: SHIPPED | OUTPATIENT
Start: 2023-02-22 | End: 2023-05-24

## 2023-02-22 RX ORDER — EMPAGLIFLOZIN 10 MG/1
10 TABLET, FILM COATED ORAL DAILY
Qty: 90 TABLET | Refills: 1 | Status: SHIPPED | OUTPATIENT
Start: 2023-02-22 | End: 2023-05-24 | Stop reason: SDUPTHER

## 2023-02-22 RX ORDER — ESOMEPRAZOLE MAGNESIUM 40 MG/1
40 CAPSULE, DELAYED RELEASE ORAL
Qty: 90 CAPSULE | Refills: 1 | Status: SHIPPED | OUTPATIENT
Start: 2023-02-22 | End: 2023-05-24 | Stop reason: SDUPTHER

## 2023-02-22 RX ORDER — TIZANIDINE 4 MG/1
4 TABLET ORAL 3 TIMES DAILY
Qty: 270 TABLET | Refills: 1 | Status: SHIPPED | OUTPATIENT
Start: 2023-02-22 | End: 2023-05-24 | Stop reason: SDUPTHER

## 2023-02-22 RX ORDER — FENOFIBRATE 48 MG/1
48 TABLET, FILM COATED ORAL DAILY
Qty: 90 TABLET | Refills: 1 | Status: SHIPPED | OUTPATIENT
Start: 2023-02-22 | End: 2023-09-27 | Stop reason: SDUPTHER

## 2023-02-22 RX ORDER — ATORVASTATIN CALCIUM 80 MG/1
80 TABLET, FILM COATED ORAL NIGHTLY
Qty: 90 TABLET | Refills: 1 | Status: SHIPPED | OUTPATIENT
Start: 2023-02-22 | End: 2023-10-17 | Stop reason: SDUPTHER

## 2023-02-27 NOTE — PROGRESS NOTES
New Clinic Note    Kennedy West is a 42 y.o. male     CC:   Chief Complaint   Patient presents with    Diabetes     Patient stated he is here for a 3 month follow up on diabetes. He unable to use a prosthesis due to pain in his RLE. Never had a Hepatitis C Screen done.     Hypertension    Follow-up    Medication Refill    Did not bring medications in for review     Patient was provided with a current list of medications from Epic to review. Request renewal of prescriptions sent to [facility].        Subjective    History of Present Illness Diabetes  Hypoglycemia symptoms include headaches. Pertinent negatives for hypoglycemia include no confusion. Pertinent negatives for diabetes include no chest pain, no fatigue, no polydipsia, no polyuria and no weakness.   Hypertension  Associated symptoms include headaches, neck pain and palpitations. Pertinent negatives include no chest pain or shortness of breath.   Follow-up  Associated symptoms include arthralgias, headaches, joint swelling and neck pain. Pertinent negatives include no abdominal pain, chest pain, coughing, fatigue, fever, nausea, vomiting or weakness.   Medication Refill  Associated symptoms include arthralgias, headaches, joint swelling and neck pain. Pertinent negatives include no abdominal pain, chest pain, coughing, fatigue, fever, nausea, vomiting or weakness.    Patient is here for evaluation of chronic medical problems. He needs refills. He is tolerating his medications well. He is unable to use a prosthesis due to chronic pain in his right BKA.     Current Outpatient Medications:     ACCU-CHEK GUIDE TEST STRIPS Strp, USE STRIP TO CHECK GLUCOSE TWICE DAILY, Disp: , Rfl:     ACCU-CHEK SOFTCLIX LANCETS Misc, USE TO CHECK GLUCOSE TWICE DAILY, Disp: , Rfl:     albuterol (PROAIR HFA) 90 mcg/actuation inhaler, Inhale 2 puffs into the lungs every 4 (four) hours as needed for Wheezing. Rescue, Disp: 18 g, Rfl: 5    amitriptyline (ELAVIL) 75  "MG tablet, Take one tablet at bedtime, Disp: 30 tablet, Rfl: 1    BD CANDIDA 2ND GEN PEN NEEDLE 32 gauge x 5/32" Ndle, USE 1 ONCE DAILY, Disp: 100 each, Rfl: 11    ergocalciferol (ERGOCALCIFEROL) 50,000 unit Cap, Take one capsule weekly for 12 weeks then reduce to one capsule monthly, Disp: 12 capsule, Rfl: 1    gabapentin (NEURONTIN) 600 MG tablet, Take 1 tablet (600 mg total) by mouth 3 (three) times daily., Disp: 90 tablet, Rfl: 1    HUMALOG KWIKPEN INSULIN 100 unit/mL pen, SMARTSI Unit(s) SUB-Q 3 Times Daily, Disp: 15 mL, Rfl: 1    HYDROcodone-acetaminophen (NORCO)  mg per tablet, Take 1 tablet by mouth every 12 (twelve) hours., Disp: 60 tablet, Rfl: 0    [START ON 3/13/2023] HYDROcodone-acetaminophen (NORCO)  mg per tablet, Take 1 tablet by mouth every 12 (twelve) hours., Disp: 60 tablet, Rfl: 0    insulin glargine 100 units/mL SubQ pen, Inject 50 Units into the skin., Disp: , Rfl:     isosorbide mononitrate (IMDUR) 30 MG 24 hr tablet, Take 2 tablets (60 mg total) by mouth once daily., Disp: 180 tablet, Rfl: 3    metoprolol succinate (TOPROL-XL) 100 MG 24 hr tablet, Take 1 tablet (100 mg total) by mouth once daily., Disp: 90 tablet, Rfl: 3    mupirocin (BACTROBAN) 2 % ointment, Apply topically 3 (three) times daily., Disp: 15 g, Rfl: 0    nitroGLYCERIN (NITROSTAT) 0.3 MG SL tablet, Place 1 tablet (0.3 mg total) under the tongue every 5 (five) minutes as needed for Chest pain. Up to 3 times., Disp: 30 tablet, Rfl: 0    pen needle, diabetic 31 gauge x 1/4" Ndle, Inject up to 3 times per day, Disp: , Rfl:     aspirin (ECOTRIN) 81 MG EC tablet, Take 1 tablet (81 mg total) by mouth once daily., Disp: 30 tablet, Rfl: 2    atorvastatin (LIPITOR) 80 MG tablet, Take 1 tablet (80 mg total) by mouth every evening., Disp: 90 tablet, Rfl: 1    esomeprazole (NEXIUM) 40 MG capsule, Take 1 capsule (40 mg total) by mouth before breakfast., Disp: 90 capsule, Rfl: 1    fenofibrate (TRICOR) 48 MG tablet, Take 1 tablet " (48 mg total) by mouth once daily., Disp: 90 tablet, Rfl: 1    JARDIANCE 10 mg tablet, Take 1 tablet (10 mg total) by mouth once daily., Disp: 90 tablet, Rfl: 1    semaglutide (OZEMPIC) 1 mg/dose (4 mg/3 mL), Inject 1 mg into the skin every 7 days., Disp: 1 pen, Rfl: 2    tiZANidine (ZANAFLEX) 4 MG tablet, Take 1 tablet (4 mg total) by mouth 3 (three) times daily., Disp: 270 tablet, Rfl: 1    Current Facility-Administered Medications:     EPINEPHrine (EPIPEN) 0.3 mg/0.3 mL pen injection 0.3 mg, 0.3 mg, Intramuscular, PRN, Karlee Liu MD     Past Medical History:   Diagnosis Date    Allergy     Anxiety     Asthma     Chronic pain     Complex regional pain syndrome type I of right upper extremity     COPD (chronic obstructive pulmonary disease)     Depression     Diabetes mellitus, type 2     Diabetic peripheral neuropathy     Gastritis     GERD (gastroesophageal reflux disease)     Hyperlipidemia     Lumbar radiculopathy     Lumbosacral spondylosis     Non-pressure chronic ulcer of other part of right foot limited to breakdown of skin     Obesity     Obstructive sleep apnea     Osteoarthritis, multiple sites     Polyneuropathy     Tobacco dependence         Family History   Problem Relation Age of Onset    Diabetes Mother     Hypertension Mother     Cancer Mother     Arthritis Mother     Epilepsy Mother     Diabetes Father     Heart disease Father     Hyperlipidemia Father     Hypertension Father     Arthritis Father     COPD Father     Rheum arthritis Sister     Arthritis Sister     Stroke Maternal Grandmother     Stroke Maternal Grandfather     Cancer Paternal Grandmother     Heart disease Paternal Grandfather     Stroke Maternal Uncle     Cancer Paternal Aunt         Past Surgical History:   Procedure Laterality Date    ANGIOGRAM, CORONARY, WITH LEFT HEART CATHETERIZATION N/A 08/24/2021    Procedure: Left heart cath w/ coronary angiograms;  Surgeon: Zaheer Real MD;  Location: [facility] CATH LAB;   Service: Cardiology;  Laterality: N/A;    CARPAL TUNNEL RELEASE      right    INJECTION OF ANESTHETIC AGENT AROUND MEDIAL BRANCH NERVES INNERVATING LUMBAR FACET JOINT Bilateral 04/15/2021    Procedure: BLOCK, NERVE, FACET JOINT, LUMBAR, MEDIAL BRANCH;  Surgeon: Sandra Johnson MD;  Location: [facility] PAIN [facility];  Service: Pain Management;  Laterality: Bilateral;  Bilateral L3-S1 MBB    INJECTION OF ANESTHETIC AGENT AROUND MEDIAL BRANCH NERVES INNERVATING LUMBAR FACET JOINT Bilateral 12/23/2021    Procedure: Block-nerve-medial branch-lumbar, bilateral L4 through S1;  Surgeon: Sandra Johnson MD;  Location: [facility] PAIN MG[facility];  Service: Pain Management;  Laterality: Bilateral;  pt will bring vaccine verification card- notified in office    INJECTION OF FACET JOINT Bilateral 06/01/2020    Bilateral L3-S1 MBB - Dr Johnson    LEFT HEART CATHETERIZATION N/A 07/21/2021    Procedure: Left heart cath with possible intervention;  Surgeon: Brock Burkett DO;  Location: [facility] CATH LAB;  Service: Cardiology;  Laterality: N/A;    RBKA Right 03/16/2022    thumb surgery      right    TONSILLECTOMY  1990        Review of Systems   Constitutional:  Positive for activity change. Negative for fatigue, fever and unexpected weight change.   HENT:  Negative for ear pain, hearing loss, postnasal drip, rhinorrhea, sinus pressure/congestion and trouble swallowing.    Eyes:  Negative for discharge and visual disturbance.   Respiratory:  Negative for cough, chest tightness, shortness of breath and wheezing.    Cardiovascular:  Positive for palpitations. Negative for chest pain.   Gastrointestinal:  Negative for abdominal pain, blood in stool, constipation, diarrhea, nausea and vomiting.   Endocrine: Negative for polydipsia and polyuria.   Genitourinary:  Positive for urgency. Negative for difficulty urinating, dysuria and hematuria.   Musculoskeletal:  Positive for arthralgias, joint swelling and neck pain.   Neurological:  Positive for  headaches. Negative for weakness.   Psychiatric/Behavioral:  Negative for confusion and dysphoric mood.       /84 (BP Location: Left arm, Patient Position: Sitting, BP Method: Large (Automatic))   Pulse 83   Temp 99.1 °F (37.3 °C) (Oral)   Resp 18   Ht 6' (1.829 m)   Wt (!) 159.2 kg (351 lb)   SpO2 99%   BMI 47.60 kg/m²      Physical Exam  HENT:      Head: Normocephalic and atraumatic.      Mouth/Throat:      Pharynx: Oropharynx is clear.   Cardiovascular:      Rate and Rhythm: Normal rate and regular rhythm.   Pulmonary:      Effort: Pulmonary effort is normal.      Breath sounds: Normal breath sounds.   Musculoskeletal:      Right Lower Extremity: Right leg is amputated below knee.   Neurological:      Mental Status: He is alert and oriented to person, place, and time.      Gait: Gait abnormal.   Psychiatric:         Mood and Affect: Mood normal.         Behavior: Behavior normal.        Assessment and Plan      ICD-10-CM ICD-9-CM   1. Phantom pain following amputation of lower limb  G54.6 353.6   2. Mixed hyperlipidemia  E78.2 272.2   3. Gastroesophageal reflux disease, unspecified whether esophagitis present  K21.9 530.81   4. Type 2 diabetes mellitus with hyperglycemia, with long-term current use of insulin  E11.65 250.00    Z79.4 790.29     V58.67   5. Complex regional pain syndrome type 1 of right upper extremity  G90.511 337.21   6. Hx of right BKA  Z89.511 V49.75   7. Morbid obesity  E66.01 278.01        1. 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 Followed by [facility].   Overview:  Patient can not tolerate prosthetic due to pain in stump      2. Mixed hyperlipidemia  The current medical regimen is effective;  continue present plan and medications.  -     atorvastatin (LIPITOR) 80 MG tablet; Take 1 tablet (80 mg total) by mouth every evening.  Dispense: 90 tablet; Refill: 1  -     fenofibrate (TRICOR) 48 MG tablet; Take 1 tablet (48 mg total) by mouth once daily.   Dispense: 90 tablet; Refill: 1    3. Gastroesophageal reflux disease, unspecified whether esophagitis present  The current medical regimen is effective;  continue present plan and medications.  -     esomeprazole (NEXIUM) 40 MG capsule; Take 1 capsule (40 mg total) by mouth before breakfast.  Dispense: 90 capsule; Refill: 1    4. Type 2 diabetes mellitus with hyperglycemia, with long-term current use of insulin  The current medical regimen is effective;  continue present plan and medications.  -     JARDIANCE 10 mg tablet; Take 1 tablet (10 mg total) by mouth once daily.  Dispense: 90 tablet; Refill: 1  -     semaglutide (OZEMPIC) 1 mg/dose (4 mg/3 mL); Inject 1 mg into the skin every 7 days.  Dispense: 1 pen; Refill: 2  -     Hemoglobin A1C; Future; Expected date: 02/22/2023    5. Complex regional pain syndrome type 1 of right upper extremity  Stable. Followed by [facility]  -     tiZANidine (ZANAFLEX) 4 MG tablet; Take 1 tablet (4 mg total) by mouth 3 (three) times daily.  Dispense: 270 tablet; Refill: 1    6. Hx of right BKA  Stable  Overview:  Patient can not tolerate prosthetic due to pain in stump.       7. Morbid obesity  Educational handouts given.           Follow up in about 3 months (around 5/22/2023).

## 2023-03-10 ENCOUNTER — HOSPITAL ENCOUNTER (OUTPATIENT)
Dept: CARDIOLOGY | Facility: HOSPITAL | Age: 43
Discharge: HOME | End: 2023-03-10
Attending: NURSE PRACTITIONER
Payer: COMMERCIAL

## 2023-03-10 VITALS — WEIGHT: 315 LBS | HEIGHT: 72 IN | BODY MASS INDEX: 42.66 KG/M2

## 2023-03-10 DIAGNOSIS — R00.2 PALPITATIONS: ICD-10-CM

## 2023-03-10 DIAGNOSIS — R07.9 CHEST PAIN, UNSPECIFIED TYPE: ICD-10-CM

## 2023-03-10 PROCEDURE — 93306 ECHO (CUPID ONLY): ICD-10-PCS | Mod: 26,,, | Performed by: INTERNAL MEDICINE

## 2023-03-10 PROCEDURE — 93306 TTE W/DOPPLER COMPLETE: CPT

## 2023-03-10 PROCEDURE — 93306 TTE W/DOPPLER COMPLETE: CPT | Mod: 26,,, | Performed by: INTERNAL MEDICINE

## 2023-03-27 ENCOUNTER — PATIENT MESSAGE (OUTPATIENT)
Dept: CARDIOLOGY | Facility: CLINIC | Age: 43
End: 2023-03-27
Payer: COMMERCIAL

## 2023-03-28 DIAGNOSIS — I25.10 CORONARY ARTERY DISEASE INVOLVING NATIVE HEART, UNSPECIFIED VESSEL OR LESION TYPE, UNSPECIFIED WHETHER ANGINA PRESENT: Chronic | ICD-10-CM

## 2023-03-28 DIAGNOSIS — R00.2 PALPITATIONS: ICD-10-CM

## 2023-03-28 RX ORDER — METOPROLOL SUCCINATE 100 MG/1
100 TABLET, EXTENDED RELEASE ORAL DAILY
Qty: 90 TABLET | Refills: 1 | Status: SHIPPED | OUTPATIENT
Start: 2023-03-28 | End: 2023-09-27 | Stop reason: SDUPTHER

## 2023-03-28 RX ORDER — ISOSORBIDE MONONITRATE 30 MG/1
60 TABLET, EXTENDED RELEASE ORAL DAILY
Qty: 180 TABLET | Refills: 1 | Status: SHIPPED | OUTPATIENT
Start: 2023-03-28 | End: 2023-09-27 | Stop reason: SDUPTHER

## 2023-03-30 ENCOUNTER — OFFICE VISIT (OUTPATIENT)
Dept: CARDIOLOGY | Facility: CLINIC | Age: 43
End: 2023-03-30
Payer: COMMERCIAL

## 2023-03-30 VITALS
SYSTOLIC BLOOD PRESSURE: 115 MMHG | OXYGEN SATURATION: 91 % | DIASTOLIC BLOOD PRESSURE: 72 MMHG | BODY MASS INDEX: 47.6 KG/M2 | HEIGHT: 72 IN | HEART RATE: 98 BPM

## 2023-03-30 DIAGNOSIS — R07.9 CHEST PAIN, UNSPECIFIED TYPE: ICD-10-CM

## 2023-03-30 DIAGNOSIS — R00.2 PALPITATIONS: ICD-10-CM

## 2023-03-30 LAB
AORTIC VALVE CUSP SEPERATION: 2.35 CM
AV INDEX (PROSTH): 0.74
AV MEAN GRADIENT: 3 MMHG
AV PEAK GRADIENT: 7 MMHG
AV VALVE AREA: 3.34 CM2
AV VELOCITY RATIO: 0.62
BSA FOR ECHO PROCEDURE: 2.84 M2
CV ECHO LV RWT: 0.41 CM
DOP CALC AO PEAK VEL: 1.3 M/S
DOP CALC AO VTI: 23.9 CM
DOP CALC LVOT AREA: 4.5 CM2
DOP CALC LVOT DIAMETER: 2.4 CM
DOP CALC LVOT PEAK VEL: 0.8 M/S
DOP CALC LVOT STROKE VOLUME: 79.94 CM3
DOP CALCLVOT PEAK VEL VTI: 17.68 CM
E WAVE DECELERATION TIME: 229 MSEC
ECHO LV POSTERIOR WALL: 0.95 CM (ref 0.6–1.1)
EJECTION FRACTION: 55 %
FRACTIONAL SHORTENING: 28 % (ref 28–44)
INTERVENTRICULAR SEPTUM: 0.73 CM (ref 0.6–1.1)
LEFT ATRIUM SIZE: 2.96 CM
LEFT INTERNAL DIMENSION IN SYSTOLE: 3.3 CM (ref 2.1–4)
LEFT VENTRICLE DIASTOLIC VOLUME INDEX: 35.9 ML/M2
LEFT VENTRICLE DIASTOLIC VOLUME: 97.3 ML
LEFT VENTRICLE MASS INDEX: 46 G/M2
LEFT VENTRICLE SYSTOLIC VOLUME INDEX: 16.3 ML/M2
LEFT VENTRICLE SYSTOLIC VOLUME: 44.1 ML
LEFT VENTRICULAR INTERNAL DIMENSION IN DIASTOLE: 4.6 CM (ref 3.5–6)
LEFT VENTRICULAR MASS: 125.68 G
LVOT MG: 1.4 MMHG
MV PEAK E VEL: 0.6 M/S
RIGHT ATRIAL AREA: 13.2 CM2
RIGHT VENTRICULAR END-DIASTOLIC DIMENSION: 2.6 CM
TRICUSPID ANNULAR PLANE SYSTOLIC EXCURSION: 1.8 CM

## 2023-03-30 PROCEDURE — 93248 PR EXT ECG, CONT, > 7 DAYS <= 15 DAYS, REVIEW W/INT: ICD-10-PCS | Mod: ,,, | Performed by: INTERNAL MEDICINE

## 2023-03-30 PROCEDURE — 3008F PR BODY MASS INDEX (BMI) DOCUMENTED: ICD-10-PCS | Mod: CPTII,,, | Performed by: NURSE PRACTITIONER

## 2023-03-30 PROCEDURE — 3051F PR MOST RECENT HEMOGLOBIN A1C LEVEL 7.0 - < 8.0%: ICD-10-PCS | Mod: CPTII,,, | Performed by: NURSE PRACTITIONER

## 2023-03-30 PROCEDURE — 3078F DIAST BP <80 MM HG: CPT | Mod: CPTII,,, | Performed by: NURSE PRACTITIONER

## 2023-03-30 PROCEDURE — 3051F HG A1C>EQUAL 7.0%<8.0%: CPT | Mod: CPTII,,, | Performed by: NURSE PRACTITIONER

## 2023-03-30 PROCEDURE — 3074F SYST BP LT 130 MM HG: CPT | Mod: CPTII,,, | Performed by: NURSE PRACTITIONER

## 2023-03-30 PROCEDURE — 3078F PR MOST RECENT DIASTOLIC BLOOD PRESSURE < 80 MM HG: ICD-10-PCS | Mod: CPTII,,, | Performed by: NURSE PRACTITIONER

## 2023-03-30 PROCEDURE — 3074F PR MOST RECENT SYSTOLIC BLOOD PRESSURE < 130 MM HG: ICD-10-PCS | Mod: CPTII,,, | Performed by: NURSE PRACTITIONER

## 2023-03-30 PROCEDURE — 99214 PR OFFICE/OUTPT VISIT, EST, LEVL IV, 30-39 MIN: ICD-10-PCS | Mod: S$PBB,,, | Performed by: NURSE PRACTITIONER

## 2023-03-30 PROCEDURE — 3008F BODY MASS INDEX DOCD: CPT | Mod: CPTII,,, | Performed by: NURSE PRACTITIONER

## 2023-03-30 PROCEDURE — 99214 OFFICE O/P EST MOD 30 MIN: CPT | Mod: S$PBB,,, | Performed by: NURSE PRACTITIONER

## 2023-03-30 PROCEDURE — 99215 OFFICE O/P EST HI 40 MIN: CPT | Mod: PBBFAC | Performed by: NURSE PRACTITIONER

## 2023-03-30 PROCEDURE — 1159F PR MEDICATION LIST DOCUMENTED IN MEDICAL RECORD: ICD-10-PCS | Mod: CPTII,,, | Performed by: NURSE PRACTITIONER

## 2023-03-30 PROCEDURE — 1159F MED LIST DOCD IN RCRD: CPT | Mod: CPTII,,, | Performed by: NURSE PRACTITIONER

## 2023-03-30 PROCEDURE — 93248 EXT ECG>7D<15D REV&INTERPJ: CPT | Mod: ,,, | Performed by: INTERNAL MEDICINE

## 2023-03-30 RX ORDER — METOPROLOL SUCCINATE 50 MG/1
50 TABLET, EXTENDED RELEASE ORAL DAILY
Qty: 90 TABLET | Refills: 3 | Status: SHIPPED | OUTPATIENT
Start: 2023-03-30 | End: 2023-05-24 | Stop reason: SDUPTHER

## 2023-03-30 NOTE — ASSESSMENT & PLAN NOTE
Improved but not resolved  Zio did not show any significant arrhythmia  increase metoprolol to 150 mg po daily

## 2023-03-30 NOTE — PROGRESS NOTES
"PCP: Karlee Liu MD    Referring Provider:     Subjective:   Kennedy West is a 42 y.o. male with hx of single vessel CAD with a 60% mid RCA stenosis (FFR 0.98 8/24/2021- Dr. Real), DM, COPD, RAKESH (intolerant of CPAP), HTN and HLD,  who presents for follow up to discuss Zio results and assess affect of Imdur and increased metoprolol dose on his chest pain. He states that he has had only 2 episodes of cp since starting the med change. He still feels his heart racing when he "dose much of anything."    2/20/2023 routine follow up. He states that he has had intermittent episodes of chest pain in the last month occurring every 3-4 days. The episodes occur with the exertion of transferring from WC (RBKA) and with sitting. Each episodes is preceded by palpitations described as a heart racing and pounding sensation. He has used sublingual ntg on 2 occasions requiring 2 sublingual ntg each time. He denies orthopnea or pnd. He does have lower ext edema even above the stump on the right. He also has dizziness with standing and feels week but he states that he drinks a gallon of water per day. He has RKAESH and can not tolerate CPAP.         Fhx:  Family History   Problem Relation Age of Onset    Diabetes Mother     Hypertension Mother     Cancer Mother     Arthritis Mother     Epilepsy Mother     Diabetes Father     Heart disease Father     Hyperlipidemia Father     Hypertension Father     Arthritis Father     COPD Father     Rheum arthritis Sister     Arthritis Sister     Stroke Maternal Grandmother     Stroke Maternal Grandfather     Cancer Paternal Grandmother     Heart disease Paternal Grandfather     Stroke Maternal Uncle     Cancer Paternal Aunt      Shx:   Social History     Socioeconomic History    Marital status:    Occupational History    Occupation: [employer]   Tobacco Use    Smoking status: Every Day     Packs/day: 0.25     Years: 21.00     Pack years: 5.25     Types: Cigarettes     " Start date: 11/9/2000     Passive exposure: Current    Smokeless tobacco: Never   Substance and Sexual Activity    Alcohol use: Not Currently     Alcohol/week: 0.0 standard drinks    Drug use: Never    Sexual activity: Not Currently     Partners: Female   Social History Narrative    Lives with his parents. Unable to wear prosthesis due to pain.        EKG 2/20/2023 RSR with sinu arrhythmia; HR 89 bpm; poor r wave progression  8/16/2022 sinus tachycardia ;  bpm; T wvae inversion no longer evident in the inferior leads compared to EKG done 8/24/2021      Zio  monitor worn 2/20/2023-3/6/2023  Basic rhythm is sinus, avg HR 89 bpm  Rare PAC's  Rare PVC's   No pateint triggered events.    ECHO Results for orders placed during the hospital encounter of 07/19/21    Echo    Interpretation Summary  · The left ventricle is normal in size with concentric remodeling and normal systolic function.  · The estimated ejection fraction is 60%.  · Normal left ventricular diastolic function.  · Mild right ventricular enlargement.  · Mild right atrial enlargement.  · Normal central venous pressure (3 mmHg).  · Trivial pericardial effusion.    [facility] Results for orders placed during the hospital encounter of 08/24/21    Cardiac catheterization    Conclusion  · The Mid RCA lesion was 60% stenosed. The diagnostic FFR was measured at 0.98.  · The pre-procedure left ventricular end diastolic pressure was 11.  · The estimated blood loss was none.  · There was single vessel coronary artery disease.    The procedure log was documented by Documenter: RT Maria Teresa and verified by Zaheer Real MD.    Date: 8/24/2021  Time: 4:34 PM        Lab Results   Component Value Date     02/20/2023    K 3.8 02/20/2023     02/20/2023    CO2 24 02/20/2023    BUN 13 02/20/2023    CREATININE 0.61 (L) 02/20/2023    CALCIUM 9.2 02/20/2023    ANIONGAP 12 02/20/2023    EGFRNONAA 89 02/22/2022       Lab Results   Component Value Date     "CHOL 120 06/22/2022    CHOL 158 07/19/2021    CHOL 184 06/17/2021     Lab Results   Component Value Date    HDL 38 (L) 06/22/2022    HDL 25 (L) 07/19/2021    HDL 33 (L) 06/17/2021     Lab Results   Component Value Date    LDLCALC 41 06/22/2022    LDLCALC  07/19/2021      Comment:      Unable to calculate due to one of the following values:  Cholesterol <5  HDL Cholesterol <5  Triglycerides <10 or >400    LDLCALC 84 06/17/2021     Lab Results   Component Value Date    TRIG 204 (H) 06/22/2022    TRIG 432 (H) 07/19/2021    TRIG 334 (H) 06/17/2021     Lab Results   Component Value Date    CHOLHDL 3.2 06/22/2022    CHOLHDL 6.3 07/19/2021    CHOLHDL 5.6 06/17/2021       Lab Results   Component Value Date    WBC 11.99 (H) 02/20/2023    HGB 14.9 02/20/2023    HCT 46.9 02/20/2023    MCV 87.0 02/20/2023     02/20/2023           Current Outpatient Medications:     ACCU-CHEK GUIDE TEST STRIPS Strp, USE STRIP TO CHECK GLUCOSE TWICE DAILY, Disp: , Rfl:     ACCU-CHEK SOFTCLIX LANCETS Misc, USE TO CHECK GLUCOSE TWICE DAILY, Disp: , Rfl:     albuterol (PROAIR HFA) 90 mcg/actuation inhaler, Inhale 2 puffs into the lungs every 4 (four) hours as needed for Wheezing. Rescue, Disp: 18 g, Rfl: 5    amitriptyline (ELAVIL) 75 MG tablet, Take one tablet at bedtime, Disp: 30 tablet, Rfl: 1    aspirin (ECOTRIN) 81 MG EC tablet, Take 1 tablet (81 mg total) by mouth once daily., Disp: 30 tablet, Rfl: 2    atorvastatin (LIPITOR) 80 MG tablet, Take 1 tablet (80 mg total) by mouth every evening., Disp: 90 tablet, Rfl: 1    BD CANDIDA 2ND GEN PEN NEEDLE 32 gauge x 5/32" Ndle, USE 1 ONCE DAILY, Disp: 100 each, Rfl: 11    ergocalciferol (ERGOCALCIFEROL) 50,000 unit Cap, Take one capsule weekly for 12 weeks then reduce to one capsule monthly, Disp: 12 capsule, Rfl: 1    esomeprazole (NEXIUM) 40 MG capsule, Take 1 capsule (40 mg total) by mouth before breakfast., Disp: 90 capsule, Rfl: 1    fenofibrate (TRICOR) 48 MG tablet, Take 1 tablet (48 mg " "total) by mouth once daily., Disp: 90 tablet, Rfl: 1    gabapentin (NEURONTIN) 600 MG tablet, Take 1 tablet (600 mg total) by mouth 3 (three) times daily., Disp: 90 tablet, Rfl: 1    HUMALOG KWIKPEN INSULIN 100 unit/mL pen, SMARTSI Unit(s) SUB-Q 3 Times Daily, Disp: 15 mL, Rfl: 1    HYDROcodone-acetaminophen (NORCO)  mg per tablet, Take 1 tablet by mouth every 12 (twelve) hours., Disp: 60 tablet, Rfl: 0    insulin glargine 100 units/mL SubQ pen, Inject 50 Units into the skin., Disp: , Rfl:     isosorbide mononitrate (IMDUR) 30 MG 24 hr tablet, Take 2 tablets (60 mg total) by mouth once daily., Disp: 180 tablet, Rfl: 1    JARDIANCE 10 mg tablet, Take 1 tablet (10 mg total) by mouth once daily., Disp: 90 tablet, Rfl: 1    metoprolol succinate (TOPROL-XL) 100 MG 24 hr tablet, Take 1 tablet (100 mg total) by mouth once daily., Disp: 90 tablet, Rfl: 1    metoprolol succinate (TOPROL-XL) 50 MG 24 hr tablet, Take 1 tablet (50 mg total) by mouth once daily., Disp: 90 tablet, Rfl: 3    mupirocin (BACTROBAN) 2 % ointment, Apply topically 3 (three) times daily., Disp: 15 g, Rfl: 0    nitroGLYCERIN (NITROSTAT) 0.3 MG SL tablet, Place 1 tablet (0.3 mg total) under the tongue every 5 (five) minutes as needed for Chest pain. Up to 3 times., Disp: 30 tablet, Rfl: 0    pen needle, diabetic 31 gauge x 1/4" Ndle, Inject up to 3 times per day, Disp: , Rfl:     semaglutide (OZEMPIC) 1 mg/dose (4 mg/3 mL), Inject 1 mg into the skin every 7 days., Disp: 1 pen, Rfl: 2    tiZANidine (ZANAFLEX) 4 MG tablet, Take 1 tablet (4 mg total) by mouth 3 (three) times daily., Disp: 270 tablet, Rfl: 1  Meds reviewed but not reconciled. He did not bring his meds today.   Review of Systems   Respiratory:  Negative for shortness of breath.    Cardiovascular:  Positive for chest pain, palpitations and leg swelling. Negative for orthopnea, claudication and PND.   Neurological:  Negative for loss of consciousness.         Objective:   /72 (BP " Location: Left arm, Patient Position: Sitting)   Pulse 98   Ht 6' (1.829 m)   SpO2 (!) 91%   BMI 47.60 kg/m²     Physical Exam  Vitals and nursing note reviewed.   Constitutional:       Appearance: Normal appearance. He is obese.      Comments: In a wheelchair   HENT:      Head: Normocephalic and atraumatic.   Neck:      Vascular: No carotid bruit.   Cardiovascular:      Rate and Rhythm: Normal rate and regular rhythm.      Pulses: Normal pulses.      Heart sounds: Normal heart sounds.   Pulmonary:      Effort: Pulmonary effort is normal.      Breath sounds: Normal breath sounds.   Abdominal:      Palpations: Abdomen is soft.   Musculoskeletal:      Cervical back: Neck supple.      Right lower leg: No edema.      Left lower leg: No edema.      Comments: Right BKA   Skin:     General: Skin is warm and dry.   Neurological:      Mental Status: He is alert.         Assessment:     1. Palpitations  metoprolol succinate (TOPROL-XL) 50 MG 24 hr tablet      2. Chest pain, unspecified type              Plan:   Palpitations  Improved but not resolved  Zio did not show any significant arrhythmia  increase metoprolol to 150 mg po daily    Chest pain, unspecified  Significantly improved with adding Imdur and increasing the dose of metoprolol  Increase metoprolol to 150 mg po daily      RTC 6 months

## 2023-03-30 NOTE — ASSESSMENT & PLAN NOTE
Significantly improved with adding Imdur and increasing the dose of metoprolol  Increase metoprolol to 150 mg po daily

## 2023-04-10 ENCOUNTER — OFFICE VISIT (OUTPATIENT)
Dept: PAIN MEDICINE | Facility: CLINIC | Age: 43
End: 2023-04-10
Payer: COMMERCIAL

## 2023-04-10 VITALS
HEIGHT: 72 IN | WEIGHT: 315 LBS | RESPIRATION RATE: 18 BRPM | DIASTOLIC BLOOD PRESSURE: 76 MMHG | SYSTOLIC BLOOD PRESSURE: 109 MMHG | BODY MASS INDEX: 42.66 KG/M2 | HEART RATE: 106 BPM

## 2023-04-10 DIAGNOSIS — E11.42 DIABETIC PERIPHERAL NEUROPATHY: ICD-10-CM

## 2023-04-10 DIAGNOSIS — M47.817 LUMBOSACRAL SPONDYLOSIS WITHOUT MYELOPATHY: Chronic | ICD-10-CM

## 2023-04-10 DIAGNOSIS — G90.511 COMPLEX REGIONAL PAIN SYNDROME TYPE 1 OF RIGHT UPPER EXTREMITY: Chronic | ICD-10-CM

## 2023-04-10 DIAGNOSIS — G54.6 PHANTOM PAIN FOLLOWING AMPUTATION OF LOWER LIMB: ICD-10-CM

## 2023-04-10 DIAGNOSIS — Z79.899 ENCOUNTER FOR LONG-TERM (CURRENT) USE OF OTHER MEDICATIONS: Primary | ICD-10-CM

## 2023-04-10 PROCEDURE — 3008F PR BODY MASS INDEX (BMI) DOCUMENTED: ICD-10-PCS | Mod: CPTII,,, | Performed by: PAIN MEDICINE

## 2023-04-10 PROCEDURE — 3008F BODY MASS INDEX DOCD: CPT | Mod: CPTII,,, | Performed by: PAIN MEDICINE

## 2023-04-10 PROCEDURE — 3051F HG A1C>EQUAL 7.0%<8.0%: CPT | Mod: CPTII,,, | Performed by: PAIN MEDICINE

## 2023-04-10 PROCEDURE — 3051F PR MOST RECENT HEMOGLOBIN A1C LEVEL 7.0 - < 8.0%: ICD-10-PCS | Mod: CPTII,,, | Performed by: PAIN MEDICINE

## 2023-04-10 PROCEDURE — 3078F PR MOST RECENT DIASTOLIC BLOOD PRESSURE < 80 MM HG: ICD-10-PCS | Mod: CPTII,,, | Performed by: PAIN MEDICINE

## 2023-04-10 PROCEDURE — 80305 DRUG TEST PRSMV DIR OPT OBS: CPT | Mod: PBBFAC | Performed by: PAIN MEDICINE

## 2023-04-10 PROCEDURE — 3074F SYST BP LT 130 MM HG: CPT | Mod: CPTII,,, | Performed by: PAIN MEDICINE

## 2023-04-10 PROCEDURE — 1159F MED LIST DOCD IN RCRD: CPT | Mod: CPTII,,, | Performed by: PAIN MEDICINE

## 2023-04-10 PROCEDURE — 1159F PR MEDICATION LIST DOCUMENTED IN MEDICAL RECORD: ICD-10-PCS | Mod: CPTII,,, | Performed by: PAIN MEDICINE

## 2023-04-10 PROCEDURE — 99214 PR OFFICE/OUTPT VISIT, EST, LEVL IV, 30-39 MIN: ICD-10-PCS | Mod: S$PBB,,, | Performed by: PAIN MEDICINE

## 2023-04-10 PROCEDURE — 99214 OFFICE O/P EST MOD 30 MIN: CPT | Mod: S$PBB,,, | Performed by: PAIN MEDICINE

## 2023-04-10 PROCEDURE — 3074F PR MOST RECENT SYSTOLIC BLOOD PRESSURE < 130 MM HG: ICD-10-PCS | Mod: CPTII,,, | Performed by: PAIN MEDICINE

## 2023-04-10 PROCEDURE — 99215 OFFICE O/P EST HI 40 MIN: CPT | Mod: PBBFAC | Performed by: PAIN MEDICINE

## 2023-04-10 PROCEDURE — 3078F DIAST BP <80 MM HG: CPT | Mod: CPTII,,, | Performed by: PAIN MEDICINE

## 2023-04-10 RX ORDER — AMITRIPTYLINE HYDROCHLORIDE 75 MG/1
TABLET ORAL
Qty: 30 TABLET | Refills: 1 | Status: SHIPPED | OUTPATIENT
Start: 2023-04-10 | End: 2023-06-01 | Stop reason: SDUPTHER

## 2023-04-10 RX ORDER — HYDROCODONE BITARTRATE AND ACETAMINOPHEN 10; 325 MG/1; MG/1
1 TABLET ORAL EVERY 12 HOURS PRN
Qty: 60 TABLET | Refills: 0 | Status: SHIPPED | OUTPATIENT
Start: 2023-05-12 | End: 2023-05-18

## 2023-04-10 RX ORDER — GABAPENTIN 600 MG/1
600 TABLET ORAL 3 TIMES DAILY
Qty: 90 TABLET | Refills: 1 | Status: SHIPPED | OUTPATIENT
Start: 2023-04-10 | End: 2023-06-01 | Stop reason: SDUPTHER

## 2023-04-10 RX ORDER — HYDROCODONE BITARTRATE AND ACETAMINOPHEN 10; 325 MG/1; MG/1
1 TABLET ORAL EVERY 12 HOURS PRN
Qty: 60 TABLET | Refills: 0 | Status: SHIPPED | OUTPATIENT
Start: 2023-04-12 | End: 2023-06-01 | Stop reason: SDUPTHER

## 2023-04-10 NOTE — PROGRESS NOTES
She Disclaimer: This note has been generated using voice-recognition software. There may be typographical errors that have been missed during proof-reading        Patient ID: Kennedy West is a 42 y.o. male.      Chief Complaint: No chief complaint on file.      42-year-old male returns for re-evaluation and medication refill.  Patient suffers from diabetic neuropathy and  right lower extremity phantom limb pain.  His current medications are providing tolerable pain relief.  He does not desire nerve block injections and he continues an active lifestyle to the best of his ability.  He returns today for medication refill.  He denies  side effects prescribed meds.        Pain Assessment  Pain Assessment: 0-10  Pain Score:   6  Pain Location: Back (Right leg)  Pain Orientation: Right, Lower  Pain Descriptors: Aching, Burning  Pain Frequency: Continuous  Pain Onset: On-going  Clinical Progression: Not changed  Aggravating Factors: Standing, Other (Comment) (Sitting)  Pain Intervention(s): Medication (See eMAR), Rest, Other (Comment) (Lying)      A's of Opioid Risk Assessment  Activity:Patient has difficulty  performing ADL.   Analgesia:Patients pain is partially controlled by current medication.   Adverse Effects: Patient denies constipation or sedation.  Aberrant Behavior:  reviewed with no aberrant drug seeking/taking behavior.      Patient denies any suicidal or homicidal ideations    Physical Therapy/Home Exercise: no      Echo  · The left ventricle is normal in size with normal systolic function.  · The estimated ejection fraction is 55%.  · Normal left ventricular diastolic function.  · Normal right ventricular size.         Review of Systems   Constitutional: Negative.    HENT: Negative.     Eyes: Negative.    Respiratory: Negative.     Cardiovascular: Negative.    Gastrointestinal: Negative.    Endocrine: Negative.    Genitourinary: Negative.    Musculoskeletal:  Positive for arthralgias, gait  problem and leg pain (RLE).   Integumentary:  Negative.   Allergic/Immunologic: Negative.    Neurological:  Positive for numbness (BLE).   Hematological: Negative.    Psychiatric/Behavioral: Negative.             Past Medical History:   Diagnosis Date    Allergy     Anxiety     Asthma     Chronic pain     Complex regional pain syndrome type I of right upper extremity     COPD (chronic obstructive pulmonary disease)     Depression     Diabetes mellitus, type 2     Diabetic peripheral neuropathy     Gastritis     GERD (gastroesophageal reflux disease)     Hyperlipidemia     Lumbar radiculopathy     Lumbosacral spondylosis     Non-pressure chronic ulcer of other part of right foot limited to breakdown of skin     Obesity     Obstructive sleep apnea     Osteoarthritis, multiple sites     Polyneuropathy     Tobacco dependence      Past Surgical History:   Procedure Laterality Date    ANGIOGRAM, CORONARY, WITH LEFT HEART CATHETERIZATION N/A 08/24/2021    Procedure: Left heart cath w/ coronary angiograms;  Surgeon: Zaheer Real MD;  Location: [facility] CATH LAB;  Service: Cardiology;  Laterality: N/A;    CARPAL TUNNEL RELEASE      right    INJECTION OF ANESTHETIC AGENT AROUND MEDIAL BRANCH NERVES INNERVATING LUMBAR FACET JOINT Bilateral 04/15/2021    Procedure: BLOCK, NERVE, FACET JOINT, LUMBAR, MEDIAL BRANCH;  Surgeon: Sandra Johnson MD;  Location: [facility];  Service: Pain Management;  Laterality: Bilateral;  Bilateral L3-S1 MBB    INJECTION OF ANESTHETIC AGENT AROUND MEDIAL BRANCH NERVES INNERVATING LUMBAR FACET JOINT Bilateral 12/23/2021    Procedure: Block-nerve-medial branch-lumbar, bilateral L4 through S1;  Surgeon: Sandra Johnson MD;  Location: [facility];  Service: Pain Management;  Laterality: Bilateral;  pt will bring vaccine verification card- notified in office    INJECTION OF FACET JOINT Bilateral 06/01/2020    Bilateral L3-S1 MBB - Dr Johnson    LEFT HEART CATHETERIZATION N/A  07/21/2021    Procedure: Left heart cath with possible intervention;  Surgeon: Brock Burkett DO;  Location: [facility] CATH LAB;  Service: Cardiology;  Laterality: N/A;    RBKA Right 03/16/2022    thumb surgery      right    TONSILLECTOMY  1990     Social History     Socioeconomic History    Marital status:    Occupational History    Occupation: [employer]   Tobacco Use    Smoking status: Every Day     Packs/day: 0.25     Years: 21.00     Pack years: 5.25     Types: Cigarettes     Start date: 11/9/2000     Passive exposure: Current    Smokeless tobacco: Never   Substance and Sexual Activity    Alcohol use: Not Currently     Alcohol/week: 0.0 standard drinks    Drug use: Never    Sexual activity: Not Currently     Partners: Female   Social History Narrative    Lives with his parents. Unable to wear prosthesis due to pain.      Family History   Problem Relation Age of Onset    Diabetes Mother     Hypertension Mother     Cancer Mother     Arthritis Mother     Epilepsy Mother     Diabetes Father     Heart disease Father     Hyperlipidemia Father     Hypertension Father     Arthritis Father     COPD Father     Rheum arthritis Sister     Arthritis Sister     Stroke Maternal Grandmother     Stroke Maternal Grandfather     Cancer Paternal Grandmother     Heart disease Paternal Grandfather     Stroke Maternal Uncle     Cancer Paternal Aunt      Review of patient's allergies indicates:   Allergen Reactions    Banana Anaphylaxis    Biaxin [clarithromycin] Other (See Comments)     Passes out      Erythromycin Anaphylaxis, Hives, Rash, Shortness Of Breath and Other (See Comments)    Tetracyclines Anaphylaxis    Zithromax tri-shelby [azithromycin] Anaphylaxis    Pineapple Other (See Comments)     Gets extremely sick    Fig Blisters and Hives    Isoniazid     Tetracycline (bulk)     Naldecon dx Rash     has a current medication list which includes the following prescription(s): accu-chek guide test strips, accu-chek  softclix lancets, albuterol, aspirin, atorvastatin, bd angel 2nd gen pen needle, ergocalciferol, esomeprazole, fenofibrate, humalog kwikpen insulin, hydrocodone-acetaminophen, isosorbide mononitrate, jardiance, metoprolol succinate, metoprolol succinate, mupirocin, nitroglycerin, pen needle, diabetic, ozempic, tizanidine, amitriptyline, gabapentin, [START ON 4/12/2023] hydrocodone-acetaminophen, [START ON 5/12/2023] hydrocodone-acetaminophen, and insulin.      Objective:  Vitals:    04/10/23 0807   BP: 109/76   Pulse: 106   Resp: 18        Physical Exam  Vitals and nursing note reviewed.   Constitutional:       General: He is not in acute distress.     Appearance: Normal appearance. He is obese. He is not ill-appearing, toxic-appearing or diaphoretic.   HENT:      Head: Normocephalic and atraumatic.      Nose: Nose normal.      Mouth/Throat:      Mouth: Mucous membranes are moist.   Eyes:      Extraocular Movements: Extraocular movements intact.      Pupils: Pupils are equal, round, and reactive to light.   Cardiovascular:      Rate and Rhythm: Normal rate and regular rhythm.      Heart sounds: Normal heart sounds.   Pulmonary:      Effort: Pulmonary effort is normal. No respiratory distress.      Breath sounds: Normal breath sounds. No stridor. No wheezing or rhonchi.   Abdominal:      General: Bowel sounds are normal.      Palpations: Abdomen is soft.   Musculoskeletal:         General: No swelling or deformity. Normal range of motion.      Cervical back: Normal and normal range of motion. No spasms or tenderness. No pain with movement. Normal range of motion.      Thoracic back: Normal.      Lumbar back: No spasms, tenderness or bony tenderness. Normal range of motion. Negative right straight leg raise test and negative left straight leg raise test. No scoliosis.      Right lower leg: Tenderness present. No edema.      Left lower leg: No edema.      Right Lower Extremity: Right leg is amputated below knee.    Skin:     General: Skin is warm.   Neurological:      General: No focal deficit present.      Mental Status: He is alert and oriented to person, place, and time. Mental status is at baseline.      Cranial Nerves: No cranial nerve deficit.      Sensory: Sensation is intact. No sensory deficit.      Motor: No weakness.      Coordination: Coordination normal.      Gait: Gait abnormal.      Deep Tendon Reflexes: Reflexes are normal and symmetric.      Comments: Uses a wheelchair to assist with mobility   Psychiatric:         Mood and Affect: Mood normal.         Behavior: Behavior normal.         Assessment:      1. Encounter for long-term (current) use of other medications    2. Lumbosacral spondylosis without myelopathy    3. Diabetic peripheral neuropathy    4. Complex regional pain syndrome type 1 of right upper extremity    5. 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 reviewed  2.Addiction, Dependency, Tolerance, Opioid abuse-misuse, Death, Diversion Discussed. Overdose reversal drug Naloxone discussed.  3.Refill/Continue medications for pain control and function       Requested Prescriptions     Signed Prescriptions Disp Refills    HYDROcodone-acetaminophen (NORCO)  mg per tablet 60 tablet 0     Sig: Take 1 tablet by mouth every 12 (twelve) hours as needed for Pain.    HYDROcodone-acetaminophen (NORCO)  mg per tablet 60 tablet 0     Sig: Take 1 tablet by mouth every 12 (twelve) hours as needed for Pain.    amitriptyline (ELAVIL) 75 MG tablet 30 tablet 1     Sig: Take one tablet at bedtime    gabapentin (NEURONTIN) 600 MG tablet 90 tablet 1     Sig: Take 1 tablet (600 mg total) by mouth 3 (three) times daily.     4.Urine drug screen and confirmation testing was ordered as documented on the requisition form in order to verify medication compliance, test for illicit substances.    Orders Placed This Encounter   Procedures    POCT Urine Drug Screen ([facility])      Interpretive Information:     Negative:  No drug detected at the cut off level.   Positive:  This result represents presumptive positive for the   tested drug, other substances may yield a positive response other   than the analyte of interest. This result should be utilized for   diagnostic purpose only. Confirmation testing will be performed upon physician request only.         5.Patient defers nerve block injections, physical therapy or surgical consultation  6.Follow with TONYA Orosco in 2 months for re-evaluation and medication refill       report:  Reviewed and consistent with medication use as prescribed.      The total time spent for evaluation and management on 04/10/2023 including reviewing separately obtained history, performing a medically appropriate exam and evaluation, documenting clinical information in the health record, independently interpreting results and communicating them to the patient/family/caregiver, and ordering medications/tests/procedures was between 15-29 minutes.    The above plan and management options were discussed at length with patient. Patient is in agreement with the above and verbalized understanding. It will be communicated with the referring physician via electronic record, fax, or mail.

## 2023-05-17 ENCOUNTER — PATIENT MESSAGE (OUTPATIENT)
Dept: PAIN MEDICINE | Facility: CLINIC | Age: 43
End: 2023-05-17
Payer: COMMERCIAL

## 2023-05-17 ENCOUNTER — TELEPHONE (OUTPATIENT)
Dept: PAIN MEDICINE | Facility: CLINIC | Age: 43
End: 2023-05-17
Payer: COMMERCIAL

## 2023-05-17 NOTE — TELEPHONE ENCOUNTER
----- Message from Carmenza House sent at 5/17/2023  2:47 PM CDT -----  Regarding: RX  PATIENT CALLED SAID HE NEEDS HIS NORCO SENT TO [facility] IN [place]. HIS PHARMACY DOES NOT HAVE IT IN STOCK. [phone]

## 2023-05-18 RX ORDER — HYDROCODONE BITARTRATE AND ACETAMINOPHEN 10; 325 MG/1; MG/1
1 TABLET ORAL EVERY 12 HOURS PRN
Qty: 60 TABLET | Refills: 0 | Status: SHIPPED | OUTPATIENT
Start: 2023-05-18 | End: 2023-06-01 | Stop reason: SDUPTHER

## 2023-05-24 ENCOUNTER — OFFICE VISIT (OUTPATIENT)
Dept: FAMILY MEDICINE | Facility: CLINIC | Age: 43
End: 2023-05-24
Payer: COMMERCIAL

## 2023-05-24 VITALS
RESPIRATION RATE: 18 BRPM | SYSTOLIC BLOOD PRESSURE: 121 MMHG | HEART RATE: 85 BPM | BODY MASS INDEX: 42.66 KG/M2 | TEMPERATURE: 99 F | OXYGEN SATURATION: 100 % | WEIGHT: 315 LBS | DIASTOLIC BLOOD PRESSURE: 75 MMHG | HEIGHT: 72 IN

## 2023-05-24 DIAGNOSIS — E11.42 DIABETIC POLYNEUROPATHY ASSOCIATED WITH TYPE 2 DIABETES MELLITUS: Primary | Chronic | ICD-10-CM

## 2023-05-24 DIAGNOSIS — E55.9 VITAMIN D DEFICIENCY: ICD-10-CM

## 2023-05-24 DIAGNOSIS — K21.9 GASTROESOPHAGEAL REFLUX DISEASE, UNSPECIFIED WHETHER ESOPHAGITIS PRESENT: ICD-10-CM

## 2023-05-24 DIAGNOSIS — G90.511 COMPLEX REGIONAL PAIN SYNDROME TYPE 1 OF RIGHT UPPER EXTREMITY: Chronic | ICD-10-CM

## 2023-05-24 LAB
CREAT UR-MCNC: 65 MG/DL (ref 39–259)
EST. AVERAGE GLUCOSE BLD GHB EST-MCNC: 157 MG/DL
HBA1C MFR BLD HPLC: 7.3 % (ref 4.5–6.6)
MICROALBUMIN UR-MCNC: 5.3 MG/DL (ref 0–2.8)
MICROALBUMIN/CREAT RATIO PNL UR: 81.5 MG/G (ref 0–30)

## 2023-05-24 PROCEDURE — 3051F PR MOST RECENT HEMOGLOBIN A1C LEVEL 7.0 - < 8.0%: ICD-10-PCS | Mod: CPTII,,, | Performed by: FAMILY MEDICINE

## 2023-05-24 PROCEDURE — 1159F MED LIST DOCD IN RCRD: CPT | Mod: CPTII,,, | Performed by: FAMILY MEDICINE

## 2023-05-24 PROCEDURE — 3078F PR MOST RECENT DIASTOLIC BLOOD PRESSURE < 80 MM HG: ICD-10-PCS | Mod: CPTII,,, | Performed by: FAMILY MEDICINE

## 2023-05-24 PROCEDURE — 99214 OFFICE O/P EST MOD 30 MIN: CPT | Mod: ,,, | Performed by: FAMILY MEDICINE

## 2023-05-24 PROCEDURE — 3066F NEPHROPATHY DOC TX: CPT | Mod: CPTII,,, | Performed by: FAMILY MEDICINE

## 2023-05-24 PROCEDURE — 3074F SYST BP LT 130 MM HG: CPT | Mod: CPTII,,, | Performed by: FAMILY MEDICINE

## 2023-05-24 PROCEDURE — 83036 HEMOGLOBIN GLYCOSYLATED A1C: CPT | Mod: ,,, | Performed by: CLINICAL MEDICAL LABORATORY

## 2023-05-24 PROCEDURE — 99214 PR OFFICE/OUTPT VISIT, EST, LEVL IV, 30-39 MIN: ICD-10-PCS | Mod: ,,, | Performed by: FAMILY MEDICINE

## 2023-05-24 PROCEDURE — 3078F DIAST BP <80 MM HG: CPT | Mod: CPTII,,, | Performed by: FAMILY MEDICINE

## 2023-05-24 PROCEDURE — 3066F PR DOCUMENTATION OF TREATMENT FOR NEPHROPATHY: ICD-10-PCS | Mod: CPTII,,, | Performed by: FAMILY MEDICINE

## 2023-05-24 PROCEDURE — 82570 MICROALBUMIN / CREATININE RATIO URINE: ICD-10-PCS | Mod: ,,, | Performed by: CLINICAL MEDICAL LABORATORY

## 2023-05-24 PROCEDURE — 82043 MICROALBUMIN / CREATININE RATIO URINE: ICD-10-PCS | Mod: ,,, | Performed by: CLINICAL MEDICAL LABORATORY

## 2023-05-24 PROCEDURE — 1159F PR MEDICATION LIST DOCUMENTED IN MEDICAL RECORD: ICD-10-PCS | Mod: CPTII,,, | Performed by: FAMILY MEDICINE

## 2023-05-24 PROCEDURE — 3008F PR BODY MASS INDEX (BMI) DOCUMENTED: ICD-10-PCS | Mod: CPTII,,, | Performed by: FAMILY MEDICINE

## 2023-05-24 PROCEDURE — 83036 HEMOGLOBIN A1C: ICD-10-PCS | Mod: ,,, | Performed by: CLINICAL MEDICAL LABORATORY

## 2023-05-24 PROCEDURE — 3008F BODY MASS INDEX DOCD: CPT | Mod: CPTII,,, | Performed by: FAMILY MEDICINE

## 2023-05-24 PROCEDURE — 82043 UR ALBUMIN QUANTITATIVE: CPT | Mod: ,,, | Performed by: CLINICAL MEDICAL LABORATORY

## 2023-05-24 PROCEDURE — 82570 ASSAY OF URINE CREATININE: CPT | Mod: ,,, | Performed by: CLINICAL MEDICAL LABORATORY

## 2023-05-24 PROCEDURE — 3060F POS MICROALBUMINURIA REV: CPT | Mod: CPTII,,, | Performed by: FAMILY MEDICINE

## 2023-05-24 PROCEDURE — 3074F PR MOST RECENT SYSTOLIC BLOOD PRESSURE < 130 MM HG: ICD-10-PCS | Mod: CPTII,,, | Performed by: FAMILY MEDICINE

## 2023-05-24 PROCEDURE — 1160F PR REVIEW ALL MEDS BY PRESCRIBER/CLIN PHARMACIST DOCUMENTED: ICD-10-PCS | Mod: CPTII,,, | Performed by: FAMILY MEDICINE

## 2023-05-24 PROCEDURE — 3051F HG A1C>EQUAL 7.0%<8.0%: CPT | Mod: CPTII,,, | Performed by: FAMILY MEDICINE

## 2023-05-24 PROCEDURE — 1160F RVW MEDS BY RX/DR IN RCRD: CPT | Mod: CPTII,,, | Performed by: FAMILY MEDICINE

## 2023-05-24 PROCEDURE — 3060F PR POS MICROALBUMINURIA RESULT DOCUMENTED/REVIEW: ICD-10-PCS | Mod: CPTII,,, | Performed by: FAMILY MEDICINE

## 2023-05-24 RX ORDER — ERGOCALCIFEROL 1.25 MG/1
CAPSULE ORAL
Qty: 12 CAPSULE | Refills: 1 | Status: SHIPPED | OUTPATIENT
Start: 2023-05-24

## 2023-05-24 RX ORDER — INSULIN ASPART 100 [IU]/ML
INJECTION, SOLUTION INTRAVENOUS; SUBCUTANEOUS
COMMUNITY
Start: 2023-03-02 | End: 2023-10-05 | Stop reason: SDUPTHER

## 2023-05-24 RX ORDER — TIZANIDINE 4 MG/1
4 TABLET ORAL 3 TIMES DAILY
Qty: 270 TABLET | Refills: 1 | Status: SHIPPED | OUTPATIENT
Start: 2023-05-24

## 2023-05-24 RX ORDER — INSULIN GLARGINE 100 [IU]/ML
40 INJECTION, SOLUTION SUBCUTANEOUS NIGHTLY
COMMUNITY
Start: 2023-03-02 | End: 2023-10-05 | Stop reason: SDUPTHER

## 2023-05-24 RX ORDER — METOPROLOL SUCCINATE 50 MG/1
1 TABLET, EXTENDED RELEASE ORAL DAILY
COMMUNITY
End: 2023-09-27 | Stop reason: SDUPTHER

## 2023-05-24 RX ORDER — MUPIROCIN 20 MG/G
OINTMENT TOPICAL 3 TIMES DAILY
Qty: 15 G | Refills: 0 | Status: SHIPPED | OUTPATIENT
Start: 2023-05-24 | End: 2023-10-05 | Stop reason: SDUPTHER

## 2023-05-24 RX ORDER — ESOMEPRAZOLE MAGNESIUM 40 MG/1
40 CAPSULE, DELAYED RELEASE ORAL
Qty: 90 CAPSULE | Refills: 1 | Status: SHIPPED | OUTPATIENT
Start: 2023-05-24

## 2023-05-24 RX ORDER — EMPAGLIFLOZIN 25 MG/1
25 TABLET, FILM COATED ORAL EVERY MORNING
COMMUNITY
Start: 2023-03-22 | End: 2023-10-05 | Stop reason: SDUPTHER

## 2023-05-24 NOTE — PROGRESS NOTES
"New Clinic Note    Kennedy West is a 42 y.o. male     CC:   Chief Complaint   Patient presents with    Diabetes     Did not bring medications in for review but did review his medication list provided from Epic chart and concurred they were all correct.     Follow-up     Patient stated he is here for a three month follow up on diabetes. Stated he has been waiting on disability approval for one year and two months and stated he has to rely on friends to buy his medications.     Medication Refill     Wants refills sent into [facility].         Subjective    History of Present Illness   Patient is for evaluation of chronic medical problems. Patient needs refills. Shane  is tolerating medications well without side effects.       Current Outpatient Medications:     ACCU-CHEK GUIDE TEST STRIPS Strp, USE STRIP TO CHECK GLUCOSE TWICE DAILY, Disp: , Rfl:     ACCU-CHEK SOFTCLIX LANCETS Misc, USE TO CHECK GLUCOSE TWICE DAILY, Disp: , Rfl:     albuterol (PROAIR HFA) 90 mcg/actuation inhaler, Inhale 2 puffs into the lungs every 4 (four) hours as needed for Wheezing. Rescue, Disp: 18 g, Rfl: 5    aspirin (ECOTRIN) 81 MG EC tablet, Take 1 tablet (81 mg total) by mouth once daily., Disp: 30 tablet, Rfl: 2    atorvastatin (LIPITOR) 80 MG tablet, Take 1 tablet (80 mg total) by mouth every evening., Disp: 90 tablet, Rfl: 1    BD CANDIDA 2ND GEN PEN NEEDLE 32 gauge x 5/32" Ndle, USE 1 ONCE DAILY, Disp: 100 each, Rfl: 11    fenofibrate (TRICOR) 48 MG tablet, Take 1 tablet (48 mg total) by mouth once daily., Disp: 90 tablet, Rfl: 1    insulin aspart U-100 (NOVOLOG) 100 unit/mL (3 mL) InPn pen, Inject 10 Units before lunch and dinner. Max 20 Units per day, Disp: , Rfl:     insulin glargine 100 units/mL SubQ pen, Inject 40 Units into the skin every evening., Disp: , Rfl:     isosorbide mononitrate (IMDUR) 30 MG 24 hr tablet, Take 2 tablets (60 mg total) by mouth once daily., Disp: 180 tablet, Rfl: 1    JARDIANCE 25 mg tablet, " "Take 25 mg by mouth every morning., Disp: , Rfl:     metoprolol succinate (TOPROL-XL) 100 MG 24 hr tablet, Take 1 tablet (100 mg total) by mouth once daily., Disp: 90 tablet, Rfl: 1    metoprolol succinate (TOPROL-XL) 50 MG 24 hr tablet, Take 1 tablet by mouth once daily., Disp: , Rfl:     nitroGLYCERIN (NITROSTAT) 0.3 MG SL tablet, Place 1 tablet (0.3 mg total) under the tongue every 5 (five) minutes as needed for Chest pain. Up to 3 times., Disp: 30 tablet, Rfl: 0    pen needle, diabetic 31 gauge x 1/4" Ndle, Inject up to 3 times per day, Disp: , Rfl:     amitriptyline (ELAVIL) 75 MG tablet, Take one tablet at bedtime, Disp: 30 tablet, Rfl: 1    ergocalciferol (ERGOCALCIFEROL) 50,000 unit Cap, Take one capsule weekly for 12 weeks then reduce to one capsule monthly, Disp: 12 capsule, Rfl: 1    esomeprazole (NEXIUM) 40 MG capsule, Take 1 capsule (40 mg total) by mouth before breakfast., Disp: 90 capsule, Rfl: 1    gabapentin (NEURONTIN) 600 MG tablet, Take 1 tablet (600 mg total) by mouth 3 (three) times daily., Disp: 90 tablet, Rfl: 1    HUMALOG KWIKPEN INSULIN 100 unit/mL pen, SMARTSI Unit(s) SUB-Q 3 Times Daily (Patient not taking: Reported on 2023), Disp: 15 mL, Rfl: 1    HYDROcodone-acetaminophen (NORCO)  mg per tablet, Take 1 tablet by mouth every 12 (twelve) hours as needed for Pain., Disp: 60 tablet, Rfl: 0    [START ON 2023] HYDROcodone-acetaminophen (NORCO)  mg per tablet, Take 1 tablet by mouth every 12 (twelve) hours as needed for Pain., Disp: 60 tablet, Rfl: 0    mupirocin (BACTROBAN) 2 % ointment, Apply topically 3 (three) times daily., Disp: 15 g, Rfl: 0    OZEMPIC 1 mg/dose (4 mg/3 mL), Inject 1 mg into the skin every 7 days., Disp: , Rfl:     tiZANidine (ZANAFLEX) 4 MG tablet, Take 1 tablet (4 mg total) by mouth 3 (three) times daily., Disp: 270 tablet, Rfl: 1     Past Medical History:   Diagnosis Date    Allergy     Anxiety     Asthma     Chronic pain     Complex regional " pain syndrome type I of right upper extremity     COPD (chronic obstructive pulmonary disease)     Depression     Diabetes mellitus, type 2     Diabetic peripheral neuropathy     Gastritis     GERD (gastroesophageal reflux disease)     Hyperlipidemia     Lumbar radiculopathy     Lumbosacral spondylosis     Non-pressure chronic ulcer of other part of right foot limited to breakdown of skin     Obesity     Obstructive sleep apnea     Osteoarthritis, multiple sites     Polyneuropathy     Tobacco dependence         Family History   Problem Relation Age of Onset    Diabetes Mother     Hypertension Mother     Cancer Mother     Arthritis Mother     Epilepsy Mother     Diabetes Father     Heart disease Father     Hyperlipidemia Father     Hypertension Father     Arthritis Father     COPD Father     Rheum arthritis Sister     Arthritis Sister     Stroke Maternal Grandmother     Stroke Maternal Grandfather     Cancer Paternal Grandmother     Heart disease Paternal Grandfather     Stroke Maternal Uncle     Cancer Paternal Aunt         Past Surgical History:   Procedure Laterality Date    ANGIOGRAM, CORONARY, WITH LEFT HEART CATHETERIZATION N/A 08/24/2021    Procedure: Left heart cath w/ coronary angiograms;  Surgeon: Zaheer Real MD;  Location: [facility] CATH LAB;  Service: Cardiology;  Laterality: N/A;    CARPAL TUNNEL RELEASE      right    INJECTION OF ANESTHETIC AGENT AROUND MEDIAL BRANCH NERVES INNERVATING LUMBAR FACET JOINT Bilateral 04/15/2021    Procedure: BLOCK, NERVE, FACET JOINT, LUMBAR, MEDIAL BRANCH;  Surgeon: Sandra Johnson MD;  Location: [facility];  Service: Pain Management;  Laterality: Bilateral;  Bilateral L3-S1 MBB    INJECTION OF ANESTHETIC AGENT AROUND MEDIAL BRANCH NERVES INNERVATING LUMBAR FACET JOINT Bilateral 12/23/2021    Procedure: Block-nerve-medial branch-lumbar, bilateral L4 through S1;  Surgeon: Sandra Johnson MD;  Location: [facility];  Service: Pain Management;   Laterality: Bilateral;  pt will bring vaccine verification card- notified in office    INJECTION OF FACET JOINT Bilateral 06/01/2020    Bilateral L3-S1 RAGINI Johnson    LEFT HEART CATHETERIZATION N/A 07/21/2021    Procedure: Left heart cath with possible intervention;  Surgeon: Brock Burkett DO;  Location: [facility] CATH LAB;  Service: Cardiology;  Laterality: N/A;    RBKA Right 03/16/2022    thumb surgery      right    TONSILLECTOMY  1990        Review of Systems   Constitutional:  Negative for activity change, fatigue, fever and unexpected weight change.   HENT:  Positive for rhinorrhea. Negative for ear pain, hearing loss, postnasal drip, sinus pressure/congestion and trouble swallowing.    Eyes:  Negative for discharge and visual disturbance.   Respiratory:  Negative for cough, chest tightness, shortness of breath and wheezing.    Cardiovascular:  Positive for palpitations. Negative for chest pain.   Gastrointestinal:  Positive for constipation. Negative for abdominal pain, blood in stool, diarrhea, nausea and vomiting.   Endocrine: Negative for polydipsia and polyuria.   Genitourinary:  Negative for difficulty urinating, dysuria, hematuria and urgency.   Musculoskeletal:  Positive for arthralgias and joint swelling. Negative for neck pain.   Neurological:  Positive for headaches. Negative for weakness.   Psychiatric/Behavioral:  Negative for confusion and dysphoric mood.       /75 (BP Location: Left arm, Patient Position: Sitting, BP Method: Large (Automatic))   Pulse 85   Temp 98.8 °F (37.1 °C) (Oral)   Resp 18   Ht 6' (1.829 m)   Wt (!) 159.2 kg (351 lb)   SpO2 100%   BMI 47.60 kg/m²      Physical Exam  HENT:      Head: Normocephalic and atraumatic.   Cardiovascular:      Rate and Rhythm: Normal rate and regular rhythm.   Pulmonary:      Effort: Pulmonary effort is normal.      Breath sounds: Normal breath sounds.   Neurological:      Mental Status: He is alert and oriented to person, place,  and time.      Gait: Gait abnormal.      Comments: In a wheelchair   Psychiatric:         Mood and Affect: Mood normal.         Behavior: Behavior normal.        Assessment and Plan      ICD-10-CM ICD-9-CM   1. Diabetic polyneuropathy associated with type 2 diabetes mellitus  E11.42 250.60     357.2   2. Vitamin D deficiency  E55.9 268.9   3. Gastroesophageal reflux disease, unspecified whether esophagitis present  K21.9 530.81   4. Complex regional pain syndrome type 1 of right upper extremity  G90.511 337.21        1. Diabetic polyneuropathy associated with type 2 diabetes mellitus  Stable. Recheck lab.  Overview:  Stable. Followed by endocrinology at [facility]    Orders:  -     Hemoglobin A1C; Future; Expected date: 05/24/2023  -     Microalbumin/Creatinine Ratio, Urine    2. Vitamin D deficiency  The current medical regimen is effective;  continue present plan and medications.  -     ergocalciferol (ERGOCALCIFEROL) 50,000 unit Cap; Take one capsule weekly for 12 weeks then reduce to one capsule monthly  Dispense: 12 capsule; Refill: 1    3. Gastroesophageal reflux disease, unspecified whether esophagitis present  The current medical regimen is effective;  continue present plan and medications.  -     esomeprazole (NEXIUM) 40 MG capsule; Take 1 capsule (40 mg total) by mouth before breakfast.  Dispense: 90 capsule; Refill: 1    4. Complex regional pain syndrome type 1 of right upper extremity  The current medical regimen is effective;  continue present plan and medications.  -     tiZANidine (ZANAFLEX) 4 MG tablet; Take 1 tablet (4 mg total) by mouth 3 (three) times daily.  Dispense: 270 tablet; Refill: 1    Other orders  -     mupirocin (BACTROBAN) 2 % ointment; Apply topically 3 (three) times daily.  Dispense: 15 g; Refill: 0        Follow up in about 3 months (around 8/24/2023).

## 2023-06-01 NOTE — PROGRESS NOTES
Subjective:         Patient ID: Kennedy West is a 42 y.o. male.    Chief Complaint: Low-back Pain and Leg Pain        Pain  This is a chronic problem. The current episode started more than 1 year ago. The problem occurs daily. The problem has been unchanged. Associated symptoms include arthralgias and neck pain. Pertinent negatives include no anorexia, chest pain, chills, coughing, diaphoresis, fever, sore throat, vertigo or vomiting.   Review of Systems   Constitutional:  Negative for activity change, appetite change, chills, diaphoresis and fever.   HENT:  Negative for drooling, ear discharge, ear pain, facial swelling, nosebleeds, sore throat, trouble swallowing, voice change and goiter.    Eyes:  Negative for photophobia, pain, discharge, redness and visual disturbance.   Respiratory:  Negative for apnea, cough, choking, chest tightness, shortness of breath, wheezing and stridor.    Cardiovascular:  Negative for chest pain, palpitations and leg swelling.   Gastrointestinal:  Negative for abdominal distention, anorexia, diarrhea, rectal pain, vomiting and fecal incontinence.   Endocrine: Negative for cold intolerance, heat intolerance, polydipsia, polyphagia and polyuria.   Genitourinary:  Negative for bladder incontinence, dysuria, flank pain and frequency.   Musculoskeletal:  Positive for arthralgias, back pain, gait problem, leg pain, neck pain and neck stiffness.   Integumentary:  Negative for color change and pallor.   Neurological:  Negative for dizziness, vertigo, seizures, syncope, facial asymmetry, speech difficulty, light-headedness, coordination difficulties, memory loss and coordination difficulties.   Hematological:  Negative for adenopathy. Does not bruise/bleed easily.   Psychiatric/Behavioral:  Negative for agitation, behavioral problems, confusion, decreased concentration, dysphoric mood, hallucinations, self-injury and suicidal ideas. The patient is not nervous/anxious and is not  hyperactive.          Past Medical History:   Diagnosis Date    Allergy     Anxiety     Asthma     Chronic pain     Complex regional pain syndrome type I of right upper extremity     COPD (chronic obstructive pulmonary disease)     Depression     Diabetes mellitus, type 2     Diabetic peripheral neuropathy     Gastritis     GERD (gastroesophageal reflux disease)     Hyperlipidemia     Lumbar radiculopathy     Lumbosacral spondylosis     Non-pressure chronic ulcer of other part of right foot limited to breakdown of skin     Obesity     Obstructive sleep apnea     Osteoarthritis, multiple sites     Polyneuropathy     Tobacco dependence      Past Surgical History:   Procedure Laterality Date    ANGIOGRAM, CORONARY, WITH LEFT HEART CATHETERIZATION N/A 08/24/2021    Procedure: Left heart cath w/ coronary angiograms;  Surgeon: Zaheer Real MD;  Location: [facility] CATH LAB;  Service: Cardiology;  Laterality: N/A;    CARPAL TUNNEL RELEASE      right    INJECTION OF ANESTHETIC AGENT AROUND MEDIAL BRANCH NERVES INNERVATING LUMBAR FACET JOINT Bilateral 04/15/2021    Procedure: BLOCK, NERVE, FACET JOINT, LUMBAR, MEDIAL BRANCH;  Surgeon: Sandra Johnson MD;  Location: [facility] [place] [facility]IN MGMT;  Service: Pain Management;  Laterality: Bilateral;  Bilateral L3-S1 MBB    INJECTION OF ANESTHETIC AGENT AROUND MEDIAL BRANCH NERVES INNERVATING LUMBAR FACET JOINT Bilateral 12/23/2021    Procedure: Block-nerve-medial branch-lumbar, bilateral L4 through S1;  Surgeon: Sandra Johnson MD;  Location: [facility] [place] [facility];  Service: Pain Management;  Laterality: Bilateral;  pt will bring vaccine verification card- notified in office    INJECTION OF FACET JOINT Bilateral 06/01/2020    Bilateral L3-S1 MBB - Dr Johnson    LEFT HEART CATHETERIZATION N/A 07/21/2021    Procedure: Left heart cath with possible intervention;  Surgeon: Brock Burkett DO;  Location: [facility] CATH LAB;  Service: Cardiology;  Laterality: N/A;    RBKA Right 03/16/2022     thumb surgery      right    TONSILLECTOMY  1990     Social History     Socioeconomic History    Marital status:    Occupational History    Occupation: [employer]   Tobacco Use    Smoking status: Every Day     Packs/day: 0.25     Years: 21.00     Pack years: 5.25     Types: Cigarettes     Start date: 11/9/2000     Passive exposure: Current    Smokeless tobacco: Never   Substance and Sexual Activity    Alcohol use: Not Currently     Alcohol/week: 0.0 standard drinks    Drug use: Never    Sexual activity: Not Currently     Partners: Female   Social History Narrative    Lives with his parents. Unable to wear prosthesis due to pain. Still has not heard from Disability after one year and 2 months.      Family History   Problem Relation Age of Onset    Diabetes Mother     Hypertension Mother     Cancer Mother     Arthritis Mother     Epilepsy Mother     Diabetes Father     Heart disease Father     Hyperlipidemia Father     Hypertension Father     Arthritis Father     COPD Father     Rheum arthritis Sister     Arthritis Sister     Stroke Maternal Grandmother     Stroke Maternal Grandfather     Cancer Paternal Grandmother     Heart disease Paternal Grandfather     Stroke Maternal Uncle     Cancer Paternal Aunt      Review of patient's allergies indicates:   Allergen Reactions    Banana Anaphylaxis    Biaxin [clarithromycin] Other (See Comments)     Passes out      Erythromycin Anaphylaxis, Hives, Rash, Shortness Of Breath and Other (See Comments)    Tetracyclines Anaphylaxis    Zithromax tri-shelby [azithromycin] Anaphylaxis    Pineapple Other (See Comments)     Gets extremely sick    Fig Blisters and Hives    Isoniazid     Tetracycline (bulk)     Naldecon dx Rash        Objective:  Vitals:    06/06/23 0817   BP: (!) 144/96   Pulse: 107   Resp: 18   Weight: (!) 159.2 kg (351 lb)   Height: 6' (1.829 m)   PainSc:   6         Physical Exam  Vitals and nursing note reviewed. Exam conducted with a chaperone present.    Constitutional:       General: He is awake. He is not in acute distress.     Appearance: Normal appearance. He is obese. He is not ill-appearing or diaphoretic.   HENT:      Head: Normocephalic and atraumatic.      Nose: Nose normal.      Mouth/Throat:      Mouth: Mucous membranes are moist.      Pharynx: Oropharynx is clear.   Eyes:      Conjunctiva/sclera: Conjunctivae normal.      Pupils: Pupils are equal, round, and reactive to light.   Cardiovascular:      Rate and Rhythm: Normal rate.   Pulmonary:      Effort: Pulmonary effort is normal. No respiratory distress.   Abdominal:      Palpations: Abdomen is soft.      Tenderness: There is no guarding.   Musculoskeletal:         General: Normal range of motion.      Cervical back: Normal range of motion and neck supple. No rigidity.      Thoracic back: Tenderness present.      Lumbar back: Tenderness present.   Skin:     General: Skin is warm and dry.      Coloration: Skin is not jaundiced or pale.   Neurological:      General: No focal deficit present.      Mental Status: He is alert and oriented to person, place, and time. Mental status is at baseline.      Cranial Nerves: No cranial nerve deficit (II-XII).      Gait: Gait abnormal.   Psychiatric:         Mood and Affect: Mood normal.         Behavior: Behavior normal. Behavior is cooperative.         Thought Content: Thought content normal.         Echo  · The left ventricle is normal in size with normal systolic function.  · The estimated ejection fraction is 55%.  · Normal left ventricular diastolic function.  · Normal right ventricular size.            Office Visit on 05/24/2023   Component Date Value Ref Range Status    Creatinine, Urine 05/24/2023 65  39 - 259 mg/dL Final    Microalbumin 05/24/2023 5.3 (H)  0.0 - 2.8 mg/dL Final    Microalbumin/Creatinine Ratio 05/24/2023 81.5 (H)  0.0 - 30.0 mg/g Final    Hemoglobin A1C 05/24/2023 7.3 (H)  4.5 - 6.6 % Final    Estimated Average Glucose 05/24/2023 157   mg/dL Final   Office Visit on 04/10/2023   Component Date Value Ref Range Status    POC Amphetamines 04/10/2023 Negative  Negative, Inconclusive Final    POC Barbiturates 04/10/2023 Negative  Negative, Inconclusive Final    POC Benzodiazepines 04/10/2023 Negative  Negative, Inconclusive Final    POC Cocaine 04/10/2023 Negative  Negative, Inconclusive Final    POC THC 04/10/2023 Negative  Negative, Inconclusive Final    POC Methadone 04/10/2023 Negative  Negative, Inconclusive Final    POC Methamphetamine 04/10/2023 Negative  Negative, Inconclusive Final    POC Opiates 04/10/2023 Presumptive Positive (A)  Negative, Inconclusive Final    POC Oxycodone 04/10/2023 Negative  Negative, Inconclusive Final    POC Phencyclidine 04/10/2023 Negative  Negative, Inconclusive Final    POC Methylenedioxymethamphetamine * 04/10/2023 Negative  Negative, Inconclusive Final    POC Tricyclic Antidepressants 04/10/2023 Negative  Negative, Inconclusive Final    POC Buprenorphine 04/10/2023 Negative   Final     Acceptable 04/10/2023 Yes   Final    POC Temperature (Urine) 04/10/2023 92   Final   Hospital Outpatient Visit on 03/10/2023   Component Date Value Ref Range Status    BSA 03/10/2023 2.84  m2 Final    EF 03/10/2023 55  % Final    Left Ventricular Outflow Tract Sharon* 03/10/2023 1.40  mmHg Final    AORTIC VALVE CUSP SEPERATION 03/10/2023 2.35  cm Final    LVIDd 03/10/2023 4.60  3.5 - 6.0 cm Final    IVS 03/10/2023 0.73  0.6 - 1.1 cm Final    Posterior Wall 03/10/2023 0.95  0.6 - 1.1 cm Final    LVIDs 03/10/2023 3.30  2.1 - 4.0 cm Final    FS 03/10/2023 28  28 - 44 % Final    LV mass 03/10/2023 125.68  g Final    LA size 03/10/2023 2.96  cm Final    RVDD 03/10/2023 2.60  cm Final    TAPSE 03/10/2023 1.80  cm Final    RA area 03/10/2023 13.20  cm2 Final    Left Ventricle Relative Wall Thick* 03/10/2023 0.41  cm Final    AV mean gradient 03/10/2023 3  mmHg Final    AV valve area 03/10/2023 3.34  cm2 Final    AV Velocity  Ratio 03/10/2023 0.62   Final    AV index (prosthetic) 03/10/2023 0.74   Final    E wave deceleration time 03/10/2023 229.00  msec Final    LVOT diameter 03/10/2023 2.40  cm Final    LVOT area 03/10/2023 4.5  cm2 Final    LVOT peak marcos 03/10/2023 0.8  m/s Final    LVOT peak VTI 03/10/2023 17.68  cm Final    Ao peak marcos 03/10/2023 1.3  m/s Final    Ao VTI 03/10/2023 23.90  cm Final    LVOT stroke volume 03/10/2023 79.94  cm3 Final    AV peak gradient 03/10/2023 7  mmHg Final    MV Peak E Marcos 03/10/2023 0.60  m/s Final    LV Systolic Volume 03/10/2023 44.10  mL Final    LV Systolic Volume Index 03/10/2023 16.3  mL/m2 Final    LV Diastolic Volume 03/10/2023 97.30  mL Final    LV Diastolic Volume Index 03/10/2023 35.90  mL/m2 Final    LV Mass Index 03/10/2023 46  g/m2 Final   Office Visit on 02/22/2023   Component Date Value Ref Range Status    Hemoglobin A1C 02/22/2023 7.2 (H)  4.5 - 6.6 % Final    Estimated Average Glucose 02/22/2023 154  mg/dL Final   Lab Visit on 02/20/2023   Component Date Value Ref Range Status    TSH 02/20/2023 0.978  0.358 - 3.740 uIU/mL Final    Sodium 02/20/2023 139  136 - 145 mmol/L Final    Potassium 02/20/2023 3.8  3.5 - 5.1 mmol/L Final    Chloride 02/20/2023 107  98 - 107 mmol/L Final    CO2 02/20/2023 24  21 - 32 mmol/L Final    Anion Gap 02/20/2023 12  7 - 16 mmol/L Final    Glucose 02/20/2023 138 (H)  74 - 106 mg/dL Final    BUN 02/20/2023 13  7 - 18 mg/dL Final    Creatinine 02/20/2023 0.61 (L)  0.70 - 1.30 mg/dL Final    BUN/Creatinine Ratio 02/20/2023 21 (H)  6 - 20 Final    Calcium 02/20/2023 9.2  8.5 - 10.1 mg/dL Final    Total Protein 02/20/2023 6.9  6.4 - 8.2 g/dL Final    Albumin 02/20/2023 3.5  3.5 - 5.0 g/dL Final    Globulin 02/20/2023 3.4  2.0 - 4.0 g/dL Final    A/G Ratio 02/20/2023 1.0   Final    Bilirubin, Total 02/20/2023 0.5  >0.0 - 1.2 mg/dL Final    Alk Phos 02/20/2023 155 (H)  45 - 115 U/L Final    ALT 02/20/2023 26  16 - 61 U/L Final    AST 02/20/2023 18  15 - 37  U/L Final    eGFR 02/20/2023 124  >=60 mL/min/1.73m² Final    Magnesium 02/20/2023 2.1  1.7 - 2.3 mg/dL Final    ProBNP 02/20/2023 22  1 - 125 pg/mL Final    WBC 02/20/2023 11.99 (H)  4.50 - 11.00 K/uL Final    RBC 02/20/2023 5.39  4.60 - 6.20 M/uL Final    Hemoglobin 02/20/2023 14.9  13.5 - 18.0 g/dL Final    Hematocrit 02/20/2023 46.9  40.0 - 54.0 % Final    MCV 02/20/2023 87.0  80.0 - 96.0 fL Final    MCH 02/20/2023 27.6  27.0 - 31.0 pg Final    MCHC 02/20/2023 31.8 (L)  32.0 - 36.0 g/dL Final    RDW 02/20/2023 12.9  11.5 - 14.5 % Final    Platelet Count 02/20/2023 316  150 - 400 K/uL Final    MPV 02/20/2023 9.6  9.4 - 12.4 fL Final    Neutrophils % 02/20/2023 62.0  53.0 - 65.0 % Final    Lymphocytes % 02/20/2023 32.4  27.0 - 41.0 % Final    Monocytes % 02/20/2023 3.8  2.0 - 6.0 % Final    Eosinophils % 02/20/2023 1.0  1.0 - 4.0 % Final    Basophils % 02/20/2023 0.5  0.0 - 1.0 % Final    Immature Granulocytes % 02/20/2023 0.3  0.0 - 0.4 % Final    nRBC, Auto 02/20/2023 0.0  <=0.0 % Final    Neutrophils, Abs 02/20/2023 7.43  1.80 - 7.70 K/uL Final    Lymphocytes, Absolute 02/20/2023 3.89  1.00 - 4.80 K/uL Final    Monocytes, Absolute 02/20/2023 0.45  0.00 - 0.80 K/uL Final    Eosinophils, Absolute 02/20/2023 0.12  0.00 - 0.50 K/uL Final    Basophils, Absolute 02/20/2023 0.06  0.00 - 0.20 K/uL Final    Immature Granulocytes, Absolute 02/20/2023 0.04  0.00 - 0.04 K/uL Final    nRBC, Absolute 02/20/2023 0.00  <=0.00 x10e3/uL Final    Diff Type 02/20/2023 Auto   Final   Office Visit on 12/12/2022   Component Date Value Ref Range Status    POC Amphetamines 12/12/2022 Negative  Negative, Inconclusive Final    POC Barbiturates 12/12/2022 Negative  Negative, Inconclusive Final    POC Benzodiazepines 12/12/2022 Negative  Negative, Inconclusive Final    POC Cocaine 12/12/2022 Negative  Negative, Inconclusive Final    POC THC 12/12/2022 Negative  Negative, Inconclusive Final    POC Methadone 12/12/2022 Negative  Negative,  Inconclusive Final    POC Methamphetamine 12/12/2022 Negative  Negative, Inconclusive Final    POC Opiates 12/12/2022 Presumptive Positive (A)  Negative, Inconclusive Final    POC Oxycodone 12/12/2022 Negative  Negative, Inconclusive Final    POC Phencyclidine 12/12/2022 Negative  Negative, Inconclusive Final    POC Methylenedioxymethamphetamine * 12/12/2022 Negative  Negative, Inconclusive Final    POC Tricyclic Antidepressants 12/12/2022 Negative  Negative, Inconclusive Final    POC Buprenorphine 12/12/2022 Negative   Final     Acceptable 12/12/2022 Yes   Final    POC Temperature (Urine) 12/12/2022 92   Final         No orders of the defined types were placed in this encounter.        Requested Prescriptions     Signed Prescriptions Disp Refills    gabapentin (NEURONTIN) 600 MG tablet 90 tablet 1     Sig: Take 1 tablet (600 mg total) by mouth 3 (three) times daily.    amitriptyline (ELAVIL) 75 MG tablet 30 tablet 1     Sig: Take one tablet at bedtime    HYDROcodone-acetaminophen (NORCO)  mg per tablet 60 tablet 0     Sig: Take 1 tablet by mouth every 12 (twelve) hours as needed for Pain.    HYDROcodone-acetaminophen (NORCO)  mg per tablet 60 tablet 0     Sig: Take 1 tablet by mouth every 12 (twelve) hours as needed for Pain.       Assessment:     1. Complex regional pain syndrome type 1 of right upper extremity    2. Lumbosacral spondylosis without myelopathy    3. Diabetic peripheral neuropathy    4. 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   Analgesia:Patients pain is partially controlled by current medication. Patient has tried OTC medications such as Tylenol and Ibuprofen with out relief.   Adverse Effects: Patient denies constipation or sedation.  Aberrant Behavior:  reviewed with no aberrant drug seeking/taking behavior.  Overdose reversal drug naloxone discussed    Drug screen  reviewed      Plan:    Giovany December 2022    Using wheelchair for mobility, waiting for right lower extremity prosthetic device    He follows diabetic educator    Follows Neurology [facility] right hand arm    Chronic right lower extremity phantom limb pain after right below-the-knee amputation    No new complaints he states current medications helping control his discomfort    Continue exercise program as directed    Follow-up 2 months    Dr. Johnson April 2024    Bring original prescription medication bottles/container/box with labels to each visit

## 2023-06-06 ENCOUNTER — OFFICE VISIT (OUTPATIENT)
Dept: PAIN MEDICINE | Facility: CLINIC | Age: 43
End: 2023-06-06
Payer: COMMERCIAL

## 2023-06-06 VITALS
SYSTOLIC BLOOD PRESSURE: 144 MMHG | WEIGHT: 315 LBS | DIASTOLIC BLOOD PRESSURE: 96 MMHG | RESPIRATION RATE: 18 BRPM | HEIGHT: 72 IN | BODY MASS INDEX: 42.66 KG/M2 | HEART RATE: 107 BPM

## 2023-06-06 DIAGNOSIS — E11.42 DIABETIC PERIPHERAL NEUROPATHY: ICD-10-CM

## 2023-06-06 DIAGNOSIS — G90.511 COMPLEX REGIONAL PAIN SYNDROME TYPE 1 OF RIGHT UPPER EXTREMITY: Primary | Chronic | ICD-10-CM

## 2023-06-06 DIAGNOSIS — G54.6 PHANTOM PAIN FOLLOWING AMPUTATION OF LOWER LIMB: ICD-10-CM

## 2023-06-06 DIAGNOSIS — M47.817 LUMBOSACRAL SPONDYLOSIS WITHOUT MYELOPATHY: Chronic | ICD-10-CM

## 2023-06-06 PROCEDURE — 3066F PR DOCUMENTATION OF TREATMENT FOR NEPHROPATHY: ICD-10-PCS | Mod: CPTII,,, | Performed by: PHYSICIAN ASSISTANT

## 2023-06-06 PROCEDURE — 3008F BODY MASS INDEX DOCD: CPT | Mod: CPTII,,, | Performed by: PHYSICIAN ASSISTANT

## 2023-06-06 PROCEDURE — 3080F DIAST BP >= 90 MM HG: CPT | Mod: CPTII,,, | Performed by: PHYSICIAN ASSISTANT

## 2023-06-06 PROCEDURE — 99215 OFFICE O/P EST HI 40 MIN: CPT | Mod: PBBFAC | Performed by: PHYSICIAN ASSISTANT

## 2023-06-06 PROCEDURE — 3080F PR MOST RECENT DIASTOLIC BLOOD PRESSURE >= 90 MM HG: ICD-10-PCS | Mod: CPTII,,, | Performed by: PHYSICIAN ASSISTANT

## 2023-06-06 PROCEDURE — 3077F PR MOST RECENT SYSTOLIC BLOOD PRESSURE >= 140 MM HG: ICD-10-PCS | Mod: CPTII,,, | Performed by: PHYSICIAN ASSISTANT

## 2023-06-06 PROCEDURE — 3060F POS MICROALBUMINURIA REV: CPT | Mod: CPTII,,, | Performed by: PHYSICIAN ASSISTANT

## 2023-06-06 PROCEDURE — 3008F PR BODY MASS INDEX (BMI) DOCUMENTED: ICD-10-PCS | Mod: CPTII,,, | Performed by: PHYSICIAN ASSISTANT

## 2023-06-06 PROCEDURE — 3060F PR POS MICROALBUMINURIA RESULT DOCUMENTED/REVIEW: ICD-10-PCS | Mod: CPTII,,, | Performed by: PHYSICIAN ASSISTANT

## 2023-06-06 PROCEDURE — 3051F PR MOST RECENT HEMOGLOBIN A1C LEVEL 7.0 - < 8.0%: ICD-10-PCS | Mod: CPTII,,, | Performed by: PHYSICIAN ASSISTANT

## 2023-06-06 PROCEDURE — 1159F PR MEDICATION LIST DOCUMENTED IN MEDICAL RECORD: ICD-10-PCS | Mod: CPTII,,, | Performed by: PHYSICIAN ASSISTANT

## 2023-06-06 PROCEDURE — 3077F SYST BP >= 140 MM HG: CPT | Mod: CPTII,,, | Performed by: PHYSICIAN ASSISTANT

## 2023-06-06 PROCEDURE — 3051F HG A1C>EQUAL 7.0%<8.0%: CPT | Mod: CPTII,,, | Performed by: PHYSICIAN ASSISTANT

## 2023-06-06 PROCEDURE — 3066F NEPHROPATHY DOC TX: CPT | Mod: CPTII,,, | Performed by: PHYSICIAN ASSISTANT

## 2023-06-06 PROCEDURE — 1159F MED LIST DOCD IN RCRD: CPT | Mod: CPTII,,, | Performed by: PHYSICIAN ASSISTANT

## 2023-06-06 PROCEDURE — 99214 PR OFFICE/OUTPT VISIT, EST, LEVL IV, 30-39 MIN: ICD-10-PCS | Mod: S$PBB,,, | Performed by: PHYSICIAN ASSISTANT

## 2023-06-06 PROCEDURE — 99214 OFFICE O/P EST MOD 30 MIN: CPT | Mod: S$PBB,,, | Performed by: PHYSICIAN ASSISTANT

## 2023-06-06 RX ORDER — HYDROCODONE BITARTRATE AND ACETAMINOPHEN 10; 325 MG/1; MG/1
1 TABLET ORAL EVERY 12 HOURS PRN
Qty: 60 TABLET | Refills: 0 | Status: SHIPPED | OUTPATIENT
Start: 2023-07-17 | End: 2023-08-10 | Stop reason: SDUPTHER

## 2023-06-06 RX ORDER — GABAPENTIN 600 MG/1
600 TABLET ORAL 3 TIMES DAILY
Qty: 90 TABLET | Refills: 1 | Status: SHIPPED | OUTPATIENT
Start: 2023-06-06 | End: 2023-08-10 | Stop reason: SDUPTHER

## 2023-06-06 RX ORDER — AMITRIPTYLINE HYDROCHLORIDE 75 MG/1
TABLET ORAL
Qty: 30 TABLET | Refills: 1 | Status: SHIPPED | OUTPATIENT
Start: 2023-06-06 | End: 2023-08-17 | Stop reason: SDUPTHER

## 2023-06-06 RX ORDER — HYDROCODONE BITARTRATE AND ACETAMINOPHEN 10; 325 MG/1; MG/1
1 TABLET ORAL EVERY 12 HOURS PRN
Qty: 60 TABLET | Refills: 0 | Status: SHIPPED | OUTPATIENT
Start: 2023-06-17 | End: 2023-08-10 | Stop reason: SDUPTHER

## 2023-07-05 ENCOUNTER — OFFICE VISIT (OUTPATIENT)
Dept: FAMILY MEDICINE | Facility: CLINIC | Age: 43
End: 2023-07-05
Payer: COMMERCIAL

## 2023-07-05 VITALS
BODY MASS INDEX: 42.66 KG/M2 | HEIGHT: 72 IN | OXYGEN SATURATION: 98 % | SYSTOLIC BLOOD PRESSURE: 112 MMHG | WEIGHT: 315 LBS | HEART RATE: 95 BPM | DIASTOLIC BLOOD PRESSURE: 77 MMHG | TEMPERATURE: 98 F | RESPIRATION RATE: 18 BRPM

## 2023-07-05 DIAGNOSIS — Z13.220 SCREENING FOR LIPID DISORDERS: ICD-10-CM

## 2023-07-05 DIAGNOSIS — Z00.01 ANNUAL VISIT FOR GENERAL ADULT MEDICAL EXAMINATION WITH ABNORMAL FINDINGS: Primary | ICD-10-CM

## 2023-07-05 DIAGNOSIS — Z79.4 TYPE 2 DIABETES MELLITUS WITH OTHER SPECIFIED COMPLICATION, WITH LONG-TERM CURRENT USE OF INSULIN: Chronic | ICD-10-CM

## 2023-07-05 DIAGNOSIS — Z13.1 SCREENING FOR DIABETES MELLITUS (DM): ICD-10-CM

## 2023-07-05 DIAGNOSIS — E11.69 TYPE 2 DIABETES MELLITUS WITH OTHER SPECIFIED COMPLICATION, WITH LONG-TERM CURRENT USE OF INSULIN: Chronic | ICD-10-CM

## 2023-07-05 LAB
CHOLEST SERPL-MCNC: 115 MG/DL (ref 0–200)
CHOLEST/HDLC SERPL: 2.8 {RATIO}
GLUCOSE SERPL-MCNC: 170 MG/DL (ref 74–106)
HDLC SERPL-MCNC: 41 MG/DL (ref 40–60)
LDLC SERPL CALC-MCNC: 37 MG/DL
NONHDLC SERPL-MCNC: 74 MG/DL
TRIGL SERPL-MCNC: 186 MG/DL (ref 35–150)
VLDLC SERPL-MCNC: 37 MG/DL

## 2023-07-05 PROCEDURE — 3066F NEPHROPATHY DOC TX: CPT | Mod: CPTII,,, | Performed by: FAMILY MEDICINE

## 2023-07-05 PROCEDURE — 99396 PREV VISIT EST AGE 40-64: CPT | Mod: ,,, | Performed by: FAMILY MEDICINE

## 2023-07-05 PROCEDURE — 1159F MED LIST DOCD IN RCRD: CPT | Mod: CPTII,,, | Performed by: FAMILY MEDICINE

## 2023-07-05 PROCEDURE — 3008F PR BODY MASS INDEX (BMI) DOCUMENTED: ICD-10-PCS | Mod: CPTII,,, | Performed by: FAMILY MEDICINE

## 2023-07-05 PROCEDURE — 80061 LIPID PANEL: CPT | Mod: GZ,,, | Performed by: CLINICAL MEDICAL LABORATORY

## 2023-07-05 PROCEDURE — 3060F PR POS MICROALBUMINURIA RESULT DOCUMENTED/REVIEW: ICD-10-PCS | Mod: CPTII,,, | Performed by: FAMILY MEDICINE

## 2023-07-05 PROCEDURE — 1159F PR MEDICATION LIST DOCUMENTED IN MEDICAL RECORD: ICD-10-PCS | Mod: CPTII,,, | Performed by: FAMILY MEDICINE

## 2023-07-05 PROCEDURE — 3066F PR DOCUMENTATION OF TREATMENT FOR NEPHROPATHY: ICD-10-PCS | Mod: CPTII,,, | Performed by: FAMILY MEDICINE

## 2023-07-05 PROCEDURE — 3008F BODY MASS INDEX DOCD: CPT | Mod: CPTII,,, | Performed by: FAMILY MEDICINE

## 2023-07-05 PROCEDURE — 1160F PR REVIEW ALL MEDS BY PRESCRIBER/CLIN PHARMACIST DOCUMENTED: ICD-10-PCS | Mod: CPTII,,, | Performed by: FAMILY MEDICINE

## 2023-07-05 PROCEDURE — 3060F POS MICROALBUMINURIA REV: CPT | Mod: CPTII,,, | Performed by: FAMILY MEDICINE

## 2023-07-05 PROCEDURE — 99396 PR PREVENTIVE VISIT,EST,40-64: ICD-10-PCS | Mod: ,,, | Performed by: FAMILY MEDICINE

## 2023-07-05 PROCEDURE — 3074F PR MOST RECENT SYSTOLIC BLOOD PRESSURE < 130 MM HG: ICD-10-PCS | Mod: CPTII,,, | Performed by: FAMILY MEDICINE

## 2023-07-05 PROCEDURE — 1160F RVW MEDS BY RX/DR IN RCRD: CPT | Mod: CPTII,,, | Performed by: FAMILY MEDICINE

## 2023-07-05 PROCEDURE — 3078F DIAST BP <80 MM HG: CPT | Mod: CPTII,,, | Performed by: FAMILY MEDICINE

## 2023-07-05 PROCEDURE — 82947 GLUCOSE, FASTING: ICD-10-PCS | Mod: ,,, | Performed by: CLINICAL MEDICAL LABORATORY

## 2023-07-05 PROCEDURE — 3051F PR MOST RECENT HEMOGLOBIN A1C LEVEL 7.0 - < 8.0%: ICD-10-PCS | Mod: CPTII,,, | Performed by: FAMILY MEDICINE

## 2023-07-05 PROCEDURE — 80061 LIPID PANEL: ICD-10-PCS | Mod: GZ,,, | Performed by: CLINICAL MEDICAL LABORATORY

## 2023-07-05 PROCEDURE — 3078F PR MOST RECENT DIASTOLIC BLOOD PRESSURE < 80 MM HG: ICD-10-PCS | Mod: CPTII,,, | Performed by: FAMILY MEDICINE

## 2023-07-05 PROCEDURE — 82947 ASSAY GLUCOSE BLOOD QUANT: CPT | Mod: ,,, | Performed by: CLINICAL MEDICAL LABORATORY

## 2023-07-05 PROCEDURE — 3074F SYST BP LT 130 MM HG: CPT | Mod: CPTII,,, | Performed by: FAMILY MEDICINE

## 2023-07-05 PROCEDURE — 3051F HG A1C>EQUAL 7.0%<8.0%: CPT | Mod: CPTII,,, | Performed by: FAMILY MEDICINE

## 2023-07-05 RX ORDER — SEMAGLUTIDE 1.34 MG/ML
1 INJECTION, SOLUTION SUBCUTANEOUS
COMMUNITY
Start: 2023-06-15

## 2023-07-05 NOTE — PROGRESS NOTES
"New Clinic Note    Kennedy West is a 42 y.o. male     CC:   Chief Complaint   Patient presents with    [facility]     Patient stated he is here for a [facility] visit. Did not bring his home medications in for review. Provided a copy of his medications from [facility] EHR to review and he stated they were all correct.         Subjective    History of Present Illness HPI   [facility]. He does not need refills.     Current Outpatient Medications:     ACCU-CHEK GUIDE TEST STRIPS Strp, USE STRIP TO CHECK GLUCOSE TWICE DAILY, Disp: , Rfl:     ACCU-CHEK SOFTCLIX LANCETS Misc, USE TO CHECK GLUCOSE TWICE DAILY, Disp: , Rfl:     albuterol (PROAIR HFA) 90 mcg/actuation inhaler, Inhale 2 puffs into the lungs every 4 (four) hours as needed for Wheezing. Rescue, Disp: 18 g, Rfl: 5    amitriptyline (ELAVIL) 75 MG tablet, Take one tablet at bedtime, Disp: 30 tablet, Rfl: 1    aspirin (ECOTRIN) 81 MG EC tablet, Take 1 tablet (81 mg total) by mouth once daily., Disp: 30 tablet, Rfl: 2    atorvastatin (LIPITOR) 80 MG tablet, Take 1 tablet (80 mg total) by mouth every evening., Disp: 90 tablet, Rfl: 1    BD CANDIDA 2ND GEN PEN NEEDLE 32 gauge x 5/32" Ndle, USE 1 ONCE DAILY, Disp: 100 each, Rfl: 11    ergocalciferol (ERGOCALCIFEROL) 50,000 unit Cap, Take one capsule weekly for 12 weeks then reduce to one capsule monthly, Disp: 12 capsule, Rfl: 1    esomeprazole (NEXIUM) 40 MG capsule, Take 1 capsule (40 mg total) by mouth before breakfast., Disp: 90 capsule, Rfl: 1    fenofibrate (TRICOR) 48 MG tablet, Take 1 tablet (48 mg total) by mouth once daily., Disp: 90 tablet, Rfl: 1    gabapentin (NEURONTIN) 600 MG tablet, Take 1 tablet (600 mg total) by mouth 3 (three) times daily., Disp: 90 tablet, Rfl: 1    HYDROcodone-acetaminophen (NORCO)  mg per tablet, Take 1 tablet by mouth every 12 (twelve) hours as needed for Pain., Disp: 60 tablet, Rfl: 0    [START ON 7/17/2023] " "HYDROcodone-acetaminophen (NORCO)  mg per tablet, Take 1 tablet by mouth every 12 (twelve) hours as needed for Pain., Disp: 60 tablet, Rfl: 0    insulin aspart U-100 (NOVOLOG) 100 unit/mL (3 mL) InPn pen, Inject 10 Units before lunch and dinner. Max 20 Units per day, Disp: , Rfl:     isosorbide mononitrate (IMDUR) 30 MG 24 hr tablet, Take 2 tablets (60 mg total) by mouth once daily., Disp: 180 tablet, Rfl: 1    JARDIANCE 25 mg tablet, Take 25 mg by mouth every morning., Disp: , Rfl:     metoprolol succinate (TOPROL-XL) 100 MG 24 hr tablet, Take 1 tablet (100 mg total) by mouth once daily., Disp: 90 tablet, Rfl: 1    metoprolol succinate (TOPROL-XL) 50 MG 24 hr tablet, Take 1 tablet by mouth once daily., Disp: , Rfl:     mupirocin (BACTROBAN) 2 % ointment, Apply topically 3 (three) times daily., Disp: 15 g, Rfl: 0    nitroGLYCERIN (NITROSTAT) 0.3 MG SL tablet, Place 1 tablet (0.3 mg total) under the tongue every 5 (five) minutes as needed for Chest pain. Up to 3 times., Disp: 30 tablet, Rfl: 0    OZEMPIC 1 mg/dose (4 mg/3 mL), Inject 1 mg into the skin every 7 days., Disp: , Rfl:     pen needle, diabetic 31 gauge x 1/4" Ndle, Inject up to 3 times per day, Disp: , Rfl:     tiZANidine (ZANAFLEX) 4 MG tablet, Take 1 tablet (4 mg total) by mouth 3 (three) times daily., Disp: 270 tablet, Rfl: 1    HUMALOG KWIKPEN INSULIN 100 unit/mL pen, SMARTSI Unit(s) SUB-Q 3 Times Daily (Patient not taking: Reported on 2023), Disp: 15 mL, Rfl: 1    insulin glargine 100 units/mL SubQ pen, Inject 40 Units into the skin every evening., Disp: , Rfl:      Past Medical History:   Diagnosis Date    Allergy     Anxiety     Asthma     Chronic pain     Complex regional pain syndrome type I of right upper extremity     COPD (chronic obstructive pulmonary disease)     Depression     Diabetes mellitus, type 2     Diabetic peripheral neuropathy     Gastritis     GERD (gastroesophageal reflux disease)     Hyperlipidemia     Lumbar " radiculopathy     Lumbosacral spondylosis     Non-pressure chronic ulcer of other part of right foot limited to breakdown of skin     Obesity     Obstructive sleep apnea     Osteoarthritis, multiple sites     Polyneuropathy     Tobacco dependence         Family History   Problem Relation Age of Onset    Diabetes Mother     Hypertension Mother     Cancer Mother     Arthritis Mother     Epilepsy Mother     Diabetes Father     Heart disease Father     Hyperlipidemia Father     Hypertension Father     Arthritis Father     COPD Father     Rheum arthritis Sister     Arthritis Sister     Stroke Maternal Grandmother     Stroke Maternal Grandfather     Cancer Paternal Grandmother     Heart disease Paternal Grandfather     Stroke Maternal Uncle     Cancer Paternal Aunt         Past Surgical History:   Procedure Laterality Date    ANGIOGRAM, CORONARY, WITH LEFT HEART CATHETERIZATION N/A 08/24/2021    Procedure: Left heart cath w/ coronary angiograms;  Surgeon: Zaheer Real MD;  Location: [facility] CATH LAB;  Service: Cardiology;  Laterality: N/A;    CARPAL TUNNEL RELEASE      right    INJECTION OF ANESTHETIC AGENT AROUND MEDIAL BRANCH NERVES INNERVATING LUMBAR FACET JOINT Bilateral 04/15/2021    Procedure: BLOCK, NERVE, FACET JOINT, LUMBAR, MEDIAL BRANCH;  Surgeon: Sandra Johnson MD;  Location: [facility];  Service: Pain Management;  Laterality: Bilateral;  Bilateral L3-S1 MBB    INJECTION OF ANESTHETIC AGENT AROUND MEDIAL BRANCH NERVES INNERVATING LUMBAR FACET JOINT Bilateral 12/23/2021    Procedure: Block-nerve-medial branch-lumbar, bilateral L4 through S1;  Surgeon: Sandra Johnson MD;  Location: [facility];  Service: Pain Management;  Laterality: Bilateral;  pt will bring vaccine verification card- notified in office    INJECTION OF FACET JOINT Bilateral 06/01/2020    Bilateral L3-S1 MBB - Dr Johnson    LEFT HEART CATHETERIZATION N/A 07/21/2021    Procedure: Left heart cath with possible  intervention;  Surgeon: Brock Burkett DO;  Location: [facility] CATH LAB;  Service: Cardiology;  Laterality: N/A;    RBKA Right 03/16/2022    thumb surgery      right    TONSILLECTOMY  1990        Review of Systems   Constitutional:  Negative for fatigue and fever.   HENT:  Negative for ear pain, postnasal drip, rhinorrhea and sinus pressure/congestion.    Respiratory:  Negative for cough and shortness of breath.    Cardiovascular:  Negative for chest pain.   Gastrointestinal:  Negative for abdominal pain, diarrhea, nausea and vomiting.   Genitourinary:  Negative for dysuria.   Neurological:  Negative for headaches.      /77 (BP Location: Left arm, Patient Position: Sitting, BP Method: Large (Automatic))   Pulse 95   Temp 98 °F (36.7 °C) (Oral)   Resp 18   Ht 6' (1.829 m)   Wt (!) 159.7 kg (352 lb)   SpO2 98%   BMI 47.74 kg/m²      Physical Exam  HENT:      Head: Normocephalic and atraumatic.      Mouth/Throat:      Pharynx: Oropharynx is clear.   Cardiovascular:      Rate and Rhythm: Normal rate and regular rhythm.   Pulmonary:      Effort: Pulmonary effort is normal.      Breath sounds: Normal breath sounds.   Neurological:      Mental Status: He is alert and oriented to person, place, and time.   Psychiatric:         Mood and Affect: Mood normal.         Behavior: Behavior normal.        Assessment and Plan      ICD-10-CM ICD-9-CM   1. Annual visit for general adult medical examination with abnormal findings  Z00.01 V70.0   2. Screening for diabetes mellitus (DM)  Z13.1 V77.1   3. Screening for lipid disorders  Z13.220 V77.91   4. Type 2 diabetes mellitus with other specified complication, with long-term current use of insulin  E11.69 250.80    Z79.4 V58.67        1. Annual visit for general adult medical examination with abnormal findings    2. Screening for diabetes mellitus (DM)  -     Glucose, Fasting; Future; Expected date: 07/05/2023    3. Screening for lipid disorders  -     Lipid Panel;  Future; Expected date: 07/05/2023    4. Type 2 diabetes mellitus with other specified complication, with long-term current use of insulin  Not controlled.       Labs done. Educational material given.    Follow up in about 3 months (around 10/5/2023).

## 2023-07-05 NOTE — LETTER
AUTHORIZATION FOR RELEASE OF   CONFIDENTIAL INFORMATION    Dear Dr Jonathon Dawn    We are seeing Kennedy West, date of birth 1980, in the clinic at [facility]. Karlee Liu MD is the patient's PCP. Kennedy West has an outstanding lab/procedure at the time we reviewed his chart. In order to help keep his health information updated, he has authorized us to request the following medical record(s):        (  )  MAMMOGRAM                                      (  )  COLONOSCOPY      (  )  PAP SMEAR                                          (  )  OUTSIDE LAB RESULTS     (  )  DEXA SCAN                                          (  x)  EYE EXAM            (  )  FOOT EXAM                                          (  )  ENTIRE RECORD     (  )  OUTSIDE IMMUNIZATIONS                 (  )  _______________         Please fax records to [facility], Krista L Boyette, MD, [phone]       If you have any questions, please contact Melania Cramer [phone]          Patient Name: Kennedy West  : 1980  Patient Phone #: [phone]

## 2023-08-15 ENCOUNTER — HOSPITAL ENCOUNTER (EMERGENCY)
Facility: HOSPITAL | Age: 43
Discharge: HOME | End: 2023-08-15
Payer: COMMERCIAL

## 2023-08-15 VITALS
RESPIRATION RATE: 18 BRPM | TEMPERATURE: 99 F | OXYGEN SATURATION: 97 % | SYSTOLIC BLOOD PRESSURE: 139 MMHG | DIASTOLIC BLOOD PRESSURE: 90 MMHG | HEART RATE: 87 BPM

## 2023-08-15 DIAGNOSIS — R07.9 CHEST PAIN: ICD-10-CM

## 2023-08-15 DIAGNOSIS — R07.89 CHEST WALL PAIN: Primary | ICD-10-CM

## 2023-08-15 LAB
ANION GAP SERPL CALCULATED.3IONS-SCNC: 11 MMOL/L (ref 7–16)
APTT PPP: 28.5 SECONDS (ref 25.2–37.3)
BASOPHILS # BLD AUTO: 0.07 K/UL (ref 0–0.2)
BASOPHILS NFR BLD AUTO: 0.6 % (ref 0–1)
BUN SERPL-MCNC: 9 MG/DL (ref 7–18)
BUN/CREAT SERPL: 10 (ref 6–20)
CALCIUM SERPL-MCNC: 9.2 MG/DL (ref 8.5–10.1)
CHLORIDE SERPL-SCNC: 106 MMOL/L (ref 98–107)
CO2 SERPL-SCNC: 25 MMOL/L (ref 21–32)
CREAT SERPL-MCNC: 0.91 MG/DL (ref 0.7–1.3)
D DIMER PPP FEU-MCNC: 0.27 ΜG/ML (ref 0–0.47)
DIFFERENTIAL METHOD BLD: ABNORMAL
EGFR (NO RACE VARIABLE) (RUSH/TITUS): 109 ML/MIN/1.73M2
EOSINOPHIL # BLD AUTO: 0.08 K/UL (ref 0–0.5)
EOSINOPHIL NFR BLD AUTO: 0.6 % (ref 1–4)
ERYTHROCYTE [DISTWIDTH] IN BLOOD BY AUTOMATED COUNT: 12.8 % (ref 11.5–14.5)
GLUCOSE SERPL-MCNC: 273 MG/DL (ref 74–106)
HCT VFR BLD AUTO: 47.1 % (ref 40–54)
HGB BLD-MCNC: 15.9 G/DL (ref 13.5–18)
IMM GRANULOCYTES # BLD AUTO: 0.05 K/UL (ref 0–0.04)
IMM GRANULOCYTES NFR BLD: 0.4 % (ref 0–0.4)
INR BLD: 1.04
LYMPHOCYTES # BLD AUTO: 2.8 K/UL (ref 1–4.8)
LYMPHOCYTES NFR BLD AUTO: 22.3 % (ref 27–41)
MAGNESIUM SERPL-MCNC: 2.4 MG/DL (ref 1.7–2.3)
MCH RBC QN AUTO: 28.4 PG (ref 27–31)
MCHC RBC AUTO-ENTMCNC: 33.8 G/DL (ref 32–36)
MCV RBC AUTO: 84.3 FL (ref 80–96)
MONOCYTES # BLD AUTO: 0.55 K/UL (ref 0–0.8)
MONOCYTES NFR BLD AUTO: 4.4 % (ref 2–6)
MPC BLD CALC-MCNC: 9.7 FL (ref 9.4–12.4)
NEUTROPHILS # BLD AUTO: 8.99 K/UL (ref 1.8–7.7)
NEUTROPHILS NFR BLD AUTO: 71.7 % (ref 53–65)
NRBC # BLD AUTO: 0 X10E3/UL
NRBC, AUTO (.00): 0 %
PLATELET # BLD AUTO: 272 K/UL (ref 150–400)
POTASSIUM SERPL-SCNC: 3.8 MMOL/L (ref 3.5–5.1)
PROTHROMBIN TIME: 13.5 SECONDS (ref 11.7–14.7)
RBC # BLD AUTO: 5.59 M/UL (ref 4.6–6.2)
SODIUM SERPL-SCNC: 138 MMOL/L (ref 136–145)
TROPONIN I SERPL DL<=0.01 NG/ML-MCNC: 4.4 PG/ML
TROPONIN I SERPL DL<=0.01 NG/ML-MCNC: 4.6 PG/ML
WBC # BLD AUTO: 12.54 K/UL (ref 4.5–11)

## 2023-08-15 PROCEDURE — 99284 PR EMERGENCY DEPT VISIT,LEVEL IV: ICD-10-PCS | Mod: ,,, | Performed by: NURSE PRACTITIONER

## 2023-08-15 PROCEDURE — 85610 PROTHROMBIN TIME: CPT | Performed by: NURSE PRACTITIONER

## 2023-08-15 PROCEDURE — 85379 FIBRIN DEGRADATION QUANT: CPT | Performed by: NURSE PRACTITIONER

## 2023-08-15 PROCEDURE — 93010 EKG 12-LEAD: ICD-10-PCS | Mod: ,,, | Performed by: HOSPITALIST

## 2023-08-15 PROCEDURE — 85025 COMPLETE CBC W/AUTO DIFF WBC: CPT | Performed by: NURSE PRACTITIONER

## 2023-08-15 PROCEDURE — 83735 ASSAY OF MAGNESIUM: CPT | Performed by: NURSE PRACTITIONER

## 2023-08-15 PROCEDURE — 93010 ELECTROCARDIOGRAM REPORT: CPT | Mod: ,,, | Performed by: HOSPITALIST

## 2023-08-15 PROCEDURE — 99284 EMERGENCY DEPT VISIT MOD MDM: CPT | Mod: ,,, | Performed by: NURSE PRACTITIONER

## 2023-08-15 PROCEDURE — 85730 THROMBOPLASTIN TIME PARTIAL: CPT | Performed by: NURSE PRACTITIONER

## 2023-08-15 PROCEDURE — 84484 ASSAY OF TROPONIN QUANT: CPT | Performed by: NURSE PRACTITIONER

## 2023-08-15 PROCEDURE — 93005 ELECTROCARDIOGRAM TRACING: CPT

## 2023-08-15 PROCEDURE — 80048 BASIC METABOLIC PNL TOTAL CA: CPT | Performed by: NURSE PRACTITIONER

## 2023-08-15 PROCEDURE — 99285 EMERGENCY DEPT VISIT HI MDM: CPT | Mod: 25

## 2023-08-15 NOTE — ED PROVIDER NOTES
Encounter Date: 8/15/2023       History     Chief Complaint   Patient presents with    Chest Pain     42-year-old male presents to the emergency department to be evaluated for chest pain.  He began having chest pain at 5:00 a.m. this morning.  His pain increases when he bends, twists or takes a deep breath.  He has had his right lower leg amputated in the past and uses his arms to maneuver himself around.  Denies any recent fall, injury, cough, fever, chills, known sick contacts.  He went to pain treatment this morning for a scheduled appointment and was told to come to the emergency department to be evaluated for his chest pain.  Denies any exertional chest pain or shortness of breath.    The history is provided by the patient.   Chest Pain  The current episode started 3 to 5 hours ago. The quality of the pain is described as pleuritic. Pertinent negatives for primary symptoms include no fever, no fatigue, no syncope, no shortness of breath, no cough, no wheezing, no palpitations, no abdominal pain, no nausea, no vomiting, no dizziness and no altered mental status.     Review of patient's allergies indicates:   Allergen Reactions    Banana Anaphylaxis    Biaxin [clarithromycin] Other (See Comments)     Passes out      Erythromycin Anaphylaxis, Hives, Rash, Shortness Of Breath and Other (See Comments)    Tetracyclines Anaphylaxis    Zithromax tri-shelby [azithromycin] Anaphylaxis    Pineapple Other (See Comments)     Gets extremely sick    Fig Blisters and Hives    Isoniazid     Tetracycline (bulk)     Naldecon dx Rash     Past Medical History:   Diagnosis Date    Allergy     Anxiety     Asthma     Chronic pain     Complex regional pain syndrome type I of right upper extremity     COPD (chronic obstructive pulmonary disease)     Depression     Diabetes mellitus, type 2     Diabetic peripheral neuropathy     Gastritis     GERD (gastroesophageal reflux disease)     Hyperlipidemia     Lumbar radiculopathy     Lumbosacral  spondylosis     Non-pressure chronic ulcer of other part of right foot limited to breakdown of skin     Obesity     Obstructive sleep apnea     Osteoarthritis, multiple sites     Polyneuropathy     Tobacco dependence      Past Surgical History:   Procedure Laterality Date    ANGIOGRAM, CORONARY, WITH LEFT HEART CATHETERIZATION N/A 08/24/2021    Procedure: Left heart cath w/ coronary angiograms;  Surgeon: Zaheer Real MD;  Location: [facility] CATH LAB;  Service: Cardiology;  Laterality: N/A;    CARPAL TUNNEL RELEASE      right    INJECTION OF ANESTHETIC AGENT AROUND MEDIAL BRANCH NERVES INNERVATING LUMBAR FACET JOINT Bilateral 04/15/2021    Procedure: BLOCK, NERVE, FACET JOINT, LUMBAR, MEDIAL BRANCH;  Surgeon: Sandra Johnson MD;  Location: [facility];  Service: Pain Management;  Laterality: Bilateral;  Bilateral L3-S1 MBB    INJECTION OF ANESTHETIC AGENT AROUND MEDIAL BRANCH NERVES INNERVATING LUMBAR FACET JOINT Bilateral 12/23/2021    Procedure: Block-nerve-medial branch-lumbar, bilateral L4 through S1;  Surgeon: Sandra Johnson MD;  Location: [facility];  Service: Pain Management;  Laterality: Bilateral;  pt will bring vaccine verification card- notified in office    INJECTION OF FACET JOINT Bilateral 06/01/2020    Bilateral L3-S1 MBB - Dr Johnson    LEFT HEART CATHETERIZATION N/A 07/21/2021    Procedure: Left heart cath with possible intervention;  Surgeon: Brock Burkett DO;  Location: [facility] CATH LAB;  Service: Cardiology;  Laterality: N/A;    RBKA Right 03/16/2022    thumb surgery      right    TONSILLECTOMY  1990     Family History   Problem Relation Age of Onset    Diabetes Mother     Hypertension Mother     Cancer Mother     Arthritis Mother     Epilepsy Mother     Diabetes Father     Heart disease Father     Hyperlipidemia Father     Hypertension Father     Arthritis Father     COPD Father     Rheum arthritis Sister     Arthritis Sister     Stroke Maternal Grandmother     Stroke  Maternal Grandfather     Cancer Paternal Grandmother     Heart disease Paternal Grandfather     Stroke Maternal Uncle     Cancer Paternal Aunt      Social History     Tobacco Use    Smoking status: Every Day     Current packs/day: 0.25     Average packs/day: 0.3 packs/day for 22.8 years (5.7 ttl pk-yrs)     Types: Cigarettes     Start date: 11/9/2000     Passive exposure: Current    Smokeless tobacco: Never   Substance Use Topics    Alcohol use: Not Currently     Alcohol/week: 0.0 standard drinks of alcohol    Drug use: Never     Review of Systems   Constitutional:  Negative for fatigue and fever.   Respiratory:  Negative for cough, shortness of breath and wheezing.    Cardiovascular:  Positive for chest pain. Negative for palpitations, leg swelling and syncope.   Gastrointestinal:  Negative for abdominal pain, nausea and vomiting.   Neurological:  Negative for dizziness.   All other systems reviewed and are negative.      Physical Exam     Initial Vitals   BP Pulse Resp Temp SpO2   08/15/23 0956 08/15/23 0956 08/15/23 0956 08/15/23 1013 08/15/23 0956   (!) 151/94 96 16 98.5 °F (36.9 °C) 95 %      MAP       --                Physical Exam    Vitals reviewed.  Constitutional: He appears well-developed and well-nourished.   Morbidly obese   Neck: Neck supple.   Cardiovascular:  Normal rate and regular rhythm.           Pulmonary/Chest: Breath sounds normal. He exhibits tenderness (Moderate tenderness to the anterior chest).   Abdominal: Abdomen is soft. Bowel sounds are normal. He exhibits no distension and no mass. There is no abdominal tenderness. There is no rebound and no guarding.   Musculoskeletal:         General: Normal range of motion.      Cervical back: Neck supple.      Comments: Right be low the knee amputation     Neurological: He is alert and oriented to person, place, and time. He has normal strength. GCS score is 15. GCS eye subscore is 4. GCS verbal subscore is 5. GCS motor subscore is 6.   Skin:  Skin is warm and dry. Capillary refill takes less than 2 seconds.   Psychiatric: He has a normal mood and affect.         Medical Screening Exam   See Full Note    ED Course   Procedures  Labs Reviewed   BASIC METABOLIC PANEL - Abnormal; Notable for the following components:       Result Value    Glucose 273 (*)     All other components within normal limits   MAGNESIUM - Abnormal; Notable for the following components:    Magnesium 2.4 (*)     All other components within normal limits   CBC WITH DIFFERENTIAL - Abnormal; Notable for the following components:    WBC 12.54 (*)     Neutrophils % 71.7 (*)     Lymphocytes % 22.3 (*)     Eosinophils % 0.6 (*)     Neutrophils, Abs 8.99 (*)     Immature Granulocytes, Absolute 0.05 (*)     All other components within normal limits   TROPONIN I - Normal   PROTIME-INR - Normal   APTT - Normal   D DIMER, QUANTITATIVE - Normal   TROPONIN I - Normal   CBC W/ AUTO DIFFERENTIAL    Narrative:     The following orders were created for panel order CBC Auto Differential.  Procedure                               Abnormality         Status                     ---------                               -----------         ------                     CBC with Differential[[id number]]        Abnormal            Final result                 Please view results for these tests on the individual orders.          Imaging Results              X-Ray Chest 1 View (Final result)  Result time 08/15/23 12:39:39      Final result by Cale Whitfield MD (08/15/23 12:39:39)                          Final result by Cale Whitfield MD (08/15/23 12:39:38)                   Impression:      No acute findings.      Electronically signed by: Cale Whitfield  Date:    08/15/2023  Time:    12:39               Narrative:    EXAMINATION:  XR CHEST 1 VIEW    CLINICAL HISTORY:  Chest pain, unspecified    TECHNIQUE:  Single frontal view of the chest was performed.    COMPARISON:  08/13/2021    FINDINGS:  Heart size normal. Lungs  clear. No pneumothorax or pleural effusion.                                       Medications - No data to display  Medical Decision Making:   History:   I obtained history from: someone other than patient.  42-year-old male presents to the emergency department to be evaluated for chest pain.  He began having chest pain at 5:00 a.m. this morning.  His pain increases when he bends, twists or takes a deep breath.  He has had his right lower leg amputated in the past and uses his arms to maneuver himself around.  Denies any recent fall, injury, cough, fever, chills, known sick contacts.  He went to pain treatment this morning for a scheduled appointment and was told to come to the emergency department to be evaluated for his chest pain.  Denies any exertional chest pain or shortness of breath.  I ordered labs and personally reviewed them.   I ordered X-rays and personally reviewed them and reviewed the radiologist interpretation.  Xray significant for no significant process.    I ordered EKG and personally reviewed it.  EKG significant for normal sinus rhythm.    Diagnosis: Chest wall pain Patient was discharged in stable condition.  Detailed return precautions discussed.                             Clinical Impression:   Final diagnoses:  [R07.9] Chest pain  [R07.89] Chest wall pain (Primary)        ED Disposition Condition    Discharge Stable          ED Prescriptions    None       Follow-up Information    None          Cherri Schroeder, JINA  08/15/23 8130

## 2023-08-17 ENCOUNTER — PATIENT MESSAGE (OUTPATIENT)
Dept: PAIN MEDICINE | Facility: CLINIC | Age: 43
End: 2023-08-17
Payer: COMMERCIAL

## 2023-08-30 ENCOUNTER — PATIENT MESSAGE (OUTPATIENT)
Dept: PAIN MEDICINE | Facility: CLINIC | Age: 43
End: 2023-08-30
Payer: COMMERCIAL

## 2023-09-22 ENCOUNTER — PATIENT MESSAGE (OUTPATIENT)
Dept: FAMILY MEDICINE | Facility: CLINIC | Age: 43
End: 2023-09-22
Payer: COMMERCIAL

## 2023-09-30 ENCOUNTER — PATIENT MESSAGE (OUTPATIENT)
Dept: PAIN MEDICINE | Facility: CLINIC | Age: 43
End: 2023-09-30
Payer: COMMERCIAL

## 2023-09-30 DIAGNOSIS — G54.6 PHANTOM PAIN FOLLOWING AMPUTATION OF LOWER LIMB: ICD-10-CM

## 2023-09-30 DIAGNOSIS — E11.42 DIABETIC PERIPHERAL NEUROPATHY: ICD-10-CM

## 2023-09-30 DIAGNOSIS — G90.511 COMPLEX REGIONAL PAIN SYNDROME TYPE 1 OF RIGHT UPPER EXTREMITY: Chronic | ICD-10-CM

## 2023-09-30 DIAGNOSIS — M47.817 LUMBOSACRAL SPONDYLOSIS WITHOUT MYELOPATHY: Chronic | ICD-10-CM

## 2023-10-02 RX ORDER — HYDROCODONE BITARTRATE AND ACETAMINOPHEN 7.5; 325 MG/1; MG/1
1 TABLET ORAL EVERY 6 HOURS PRN
COMMUNITY
End: 2023-10-03 | Stop reason: SDUPTHER

## 2023-10-03 RX ORDER — HYDROCODONE BITARTRATE AND ACETAMINOPHEN 7.5; 325 MG/1; MG/1
1 TABLET ORAL EVERY 12 HOURS
Qty: 60 TABLET | Refills: 0 | Status: SHIPPED | OUTPATIENT
Start: 2023-10-03 | End: 2023-10-10 | Stop reason: SDUPTHER

## 2023-10-03 NOTE — TELEPHONE ENCOUNTER
Jairo at [facility] pharmacy called and informed us that the patient did not  his medication on 9/30 like  it says. One of the other pharmasict ran it but did not filled it. They wasn't aware at the time they were out. Jairo explain that he has called  and they stated it will take 24/48 hours to update with the correct information.

## 2023-10-05 ENCOUNTER — OFFICE VISIT (OUTPATIENT)
Dept: FAMILY MEDICINE | Facility: CLINIC | Age: 43
End: 2023-10-05
Payer: COMMERCIAL

## 2023-10-05 VITALS
TEMPERATURE: 99 F | SYSTOLIC BLOOD PRESSURE: 109 MMHG | DIASTOLIC BLOOD PRESSURE: 80 MMHG | OXYGEN SATURATION: 98 % | BODY MASS INDEX: 42.66 KG/M2 | RESPIRATION RATE: 18 BRPM | HEIGHT: 72 IN | WEIGHT: 315 LBS

## 2023-10-05 DIAGNOSIS — E11.9 TYPE 2 DIABETES MELLITUS WITHOUT COMPLICATION, WITHOUT LONG-TERM CURRENT USE OF INSULIN: Primary | ICD-10-CM

## 2023-10-05 DIAGNOSIS — F17.210 CIGARETTE NICOTINE DEPENDENCE WITHOUT COMPLICATION: ICD-10-CM

## 2023-10-05 DIAGNOSIS — E66.01 MORBID OBESITY: Chronic | ICD-10-CM

## 2023-10-05 PROCEDURE — 3066F PR DOCUMENTATION OF TREATMENT FOR NEPHROPATHY: ICD-10-PCS | Mod: CPTII,,, | Performed by: FAMILY MEDICINE

## 2023-10-05 PROCEDURE — 83036 HEMOGLOBIN A1C: ICD-10-PCS | Mod: ,,, | Performed by: CLINICAL MEDICAL LABORATORY

## 2023-10-05 PROCEDURE — 1159F MED LIST DOCD IN RCRD: CPT | Mod: CPTII,,, | Performed by: FAMILY MEDICINE

## 2023-10-05 PROCEDURE — 3066F NEPHROPATHY DOC TX: CPT | Mod: CPTII,,, | Performed by: FAMILY MEDICINE

## 2023-10-05 PROCEDURE — 3060F PR POS MICROALBUMINURIA RESULT DOCUMENTED/REVIEW: ICD-10-PCS | Mod: CPTII,,, | Performed by: FAMILY MEDICINE

## 2023-10-05 PROCEDURE — 83036 HEMOGLOBIN GLYCOSYLATED A1C: CPT | Mod: ,,, | Performed by: CLINICAL MEDICAL LABORATORY

## 2023-10-05 PROCEDURE — 3051F HG A1C>EQUAL 7.0%<8.0%: CPT | Mod: CPTII,,, | Performed by: FAMILY MEDICINE

## 2023-10-05 PROCEDURE — 3074F PR MOST RECENT SYSTOLIC BLOOD PRESSURE < 130 MM HG: ICD-10-PCS | Mod: CPTII,,, | Performed by: FAMILY MEDICINE

## 2023-10-05 PROCEDURE — 1159F PR MEDICATION LIST DOCUMENTED IN MEDICAL RECORD: ICD-10-PCS | Mod: CPTII,,, | Performed by: FAMILY MEDICINE

## 2023-10-05 PROCEDURE — 3074F SYST BP LT 130 MM HG: CPT | Mod: CPTII,,, | Performed by: FAMILY MEDICINE

## 2023-10-05 PROCEDURE — 1160F RVW MEDS BY RX/DR IN RCRD: CPT | Mod: CPTII,,, | Performed by: FAMILY MEDICINE

## 2023-10-05 PROCEDURE — 1160F PR REVIEW ALL MEDS BY PRESCRIBER/CLIN PHARMACIST DOCUMENTED: ICD-10-PCS | Mod: CPTII,,, | Performed by: FAMILY MEDICINE

## 2023-10-05 PROCEDURE — 99214 OFFICE O/P EST MOD 30 MIN: CPT | Mod: ,,, | Performed by: FAMILY MEDICINE

## 2023-10-05 PROCEDURE — 3008F PR BODY MASS INDEX (BMI) DOCUMENTED: ICD-10-PCS | Mod: CPTII,,, | Performed by: FAMILY MEDICINE

## 2023-10-05 PROCEDURE — 3008F BODY MASS INDEX DOCD: CPT | Mod: CPTII,,, | Performed by: FAMILY MEDICINE

## 2023-10-05 PROCEDURE — 3060F POS MICROALBUMINURIA REV: CPT | Mod: CPTII,,, | Performed by: FAMILY MEDICINE

## 2023-10-05 PROCEDURE — 99214 PR OFFICE/OUTPT VISIT, EST, LEVL IV, 30-39 MIN: ICD-10-PCS | Mod: ,,, | Performed by: FAMILY MEDICINE

## 2023-10-05 PROCEDURE — 3079F DIAST BP 80-89 MM HG: CPT | Mod: CPTII,,, | Performed by: FAMILY MEDICINE

## 2023-10-05 PROCEDURE — 3051F PR MOST RECENT HEMOGLOBIN A1C LEVEL 7.0 - < 8.0%: ICD-10-PCS | Mod: CPTII,,, | Performed by: FAMILY MEDICINE

## 2023-10-05 PROCEDURE — 3079F PR MOST RECENT DIASTOLIC BLOOD PRESSURE 80-89 MM HG: ICD-10-PCS | Mod: CPTII,,, | Performed by: FAMILY MEDICINE

## 2023-10-05 RX ORDER — MUPIROCIN 20 MG/G
OINTMENT TOPICAL 3 TIMES DAILY
Qty: 15 G | Refills: 0 | Status: SHIPPED | OUTPATIENT
Start: 2023-10-05

## 2023-10-05 RX ORDER — PEN NEEDLE, DIABETIC 32GX 5/32"
NEEDLE, DISPOSABLE MISCELLANEOUS
Qty: 100 EACH | Refills: 11 | Status: SHIPPED | OUTPATIENT
Start: 2023-10-05

## 2023-10-05 RX ORDER — EMPAGLIFLOZIN 25 MG/1
25 TABLET, FILM COATED ORAL EVERY MORNING
Qty: 90 TABLET | Refills: 1 | Status: SHIPPED | OUTPATIENT
Start: 2023-10-05

## 2023-10-05 RX ORDER — INSULIN GLARGINE 100 [IU]/ML
40 INJECTION, SOLUTION SUBCUTANEOUS NIGHTLY
COMMUNITY
Start: 2023-09-07 | End: 2023-12-06

## 2023-10-05 NOTE — PROGRESS NOTES
"New Clinic Note    Kennedy West is a 42 y.o. male     CC:   Chief Complaint   Patient presents with    Diabetes     Patient stated he is here for his 3 month diabetic check up, renewal of medications, is "fasting" for labs. Refuses the flu vaccine.    Follow-up    Hyperlipidemia        Subjective    History of Present Illness    Patient is for evaluation of chronic medical problems. Patient needs refills. Shane  is tolerating medications well without side effects.       Current Outpatient Medications:     ACCU-CHEK GUIDE TEST STRIPS Strp, USE STRIP TO CHECK GLUCOSE TWICE DAILY, Disp: , Rfl:     ACCU-CHEK SOFTCLIX LANCETS Misc, USE TO CHECK GLUCOSE TWICE DAILY, Disp: , Rfl:     albuterol (PROAIR HFA) 90 mcg/actuation inhaler, Inhale 2 puffs into the lungs every 4 (four) hours as needed for Wheezing. Rescue, Disp: 18 g, Rfl: 5    amitriptyline (ELAVIL) 75 MG tablet, Take one tablet at bedtime, Disp: 30 tablet, Rfl: 1    aspirin (ECOTRIN) 81 MG EC tablet, Take 1 tablet (81 mg total) by mouth once daily., Disp: 30 tablet, Rfl: 2    atorvastatin (LIPITOR) 80 MG tablet, Take 1 tablet (80 mg total) by mouth every evening., Disp: 90 tablet, Rfl: 1    ergocalciferol (ERGOCALCIFEROL) 50,000 unit Cap, Take one capsule weekly for 12 weeks then reduce to one capsule monthly, Disp: 12 capsule, Rfl: 1    esomeprazole (NEXIUM) 40 MG capsule, Take 1 capsule (40 mg total) by mouth before breakfast., Disp: 90 capsule, Rfl: 1    fenofibrate (TRICOR) 48 MG tablet, Take 1 tablet (48 mg total) by mouth once daily., Disp: 30 tablet, Rfl: 1    gabapentin (NEURONTIN) 600 MG tablet, Take 1 tablet (600 mg total) by mouth 3 (three) times daily., Disp: 90 tablet, Rfl: 1    HUMALOG KWIKPEN INSULIN 100 unit/mL pen, SMARTSI Unit(s) SUB-Q 3 Times Daily, Disp: 15 mL, Rfl: 1    HYDROcodone-acetaminophen (NORCO) 7.5-325 mg per tablet, Take 1 tablet by mouth every 12 (twelve) hours., Disp: 60 tablet, Rfl: 0    insulin (BASAGLAR KWIKPEN " "U-100 INSULIN) glargine 100 units/mL SubQ pen, Inject 40 Units into the skin every evening., Disp: , Rfl:     isosorbide mononitrate (IMDUR) 30 MG 24 hr tablet, Take 2 tablets (60 mg total) by mouth once daily., Disp: 60 tablet, Rfl: 1    metoprolol succinate (TOPROL-XL) 100 MG 24 hr tablet, Take 1 tablet (100 mg total) by mouth once daily., Disp: 30 tablet, Rfl: 1    metoprolol succinate (TOPROL-XL) 50 MG 24 hr tablet, Take 1 tablet (50 mg total) by mouth once daily., Disp: 30 tablet, Rfl: 1    nitroGLYCERIN (NITROSTAT) 0.3 MG SL tablet, Place 1 tablet (0.3 mg total) under the tongue every 5 (five) minutes as needed for Chest pain. Up to 3 times., Disp: 30 tablet, Rfl: 0    OZEMPIC 1 mg/dose (4 mg/3 mL), Inject 1 mg into the skin every 7 days., Disp: , Rfl:     tiZANidine (ZANAFLEX) 4 MG tablet, Take 1 tablet (4 mg total) by mouth 3 (three) times daily., Disp: 270 tablet, Rfl: 1    BD CANDIDA 2ND GEN PEN NEEDLE 32 gauge x 5/32" Ndle, USE 1 ONCE DAILY, Disp: 100 each, Rfl: 11    JARDIANCE 25 mg tablet, Take 1 tablet (25 mg total) by mouth every morning., Disp: 90 tablet, Rfl: 1    mupirocin (BACTROBAN) 2 % ointment, Apply topically 3 (three) times daily., Disp: 15 g, Rfl: 0     Past Medical History:   Diagnosis Date    Allergy     Anxiety     Asthma     Chronic pain     Complex regional pain syndrome type I of right upper extremity     COPD (chronic obstructive pulmonary disease)     Depression     Diabetes mellitus, type 2     Diabetic peripheral neuropathy     Gastritis     GERD (gastroesophageal reflux disease)     Hyperlipidemia     Lumbar radiculopathy     Lumbosacral spondylosis     Non-pressure chronic ulcer of other part of right foot limited to breakdown of skin     Obesity     Obstructive sleep apnea     Osteoarthritis, multiple sites     Polyneuropathy     Tobacco dependence         Family History   Problem Relation Age of Onset    Diabetes Mother     Hypertension Mother     Cancer Mother     Arthritis " Mother     Epilepsy Mother     Diabetes Father     Heart disease Father     Hyperlipidemia Father     Hypertension Father     Arthritis Father     COPD Father     Rheum arthritis Sister     Arthritis Sister     Stroke Maternal Grandmother     Stroke Maternal Grandfather     Cancer Paternal Grandmother     Heart disease Paternal Grandfather     Stroke Maternal Uncle     Cancer Paternal Aunt         Past Surgical History:   Procedure Laterality Date    ANGIOGRAM, CORONARY, WITH LEFT HEART CATHETERIZATION N/A 08/24/2021    Procedure: Left heart cath w/ coronary angiograms;  Surgeon: Zaheer Real MD;  Location: [facility] CATH LAB;  Service: Cardiology;  Laterality: N/A;    CARPAL TUNNEL RELEASE      right    INJECTION OF ANESTHETIC AGENT AROUND MEDIAL BRANCH NERVES INNERVATING LUMBAR FACET JOINT Bilateral 04/15/2021    Procedure: BLOCK, NERVE, FACET JOINT, LUMBAR, MEDIAL BRANCH;  Surgeon: Sandra Johnson MD;  Location: [facility];  Service: Pain Management;  Laterality: Bilateral;  Bilateral L3-S1 MBB    INJECTION OF ANESTHETIC AGENT AROUND MEDIAL BRANCH NERVES INNERVATING LUMBAR FACET JOINT Bilateral 12/23/2021    Procedure: Block-nerve-medial branch-lumbar, bilateral L4 through S1;  Surgeon: Sandra Johnson MD;  Location: [facility];  Service: Pain Management;  Laterality: Bilateral;  pt will bring vaccine verification card- notified in office    INJECTION OF FACET JOINT Bilateral 06/01/2020    Bilateral L3-S1 MBB - Dr Johnson    LEFT HEART CATHETERIZATION N/A 07/21/2021    Procedure: Left heart cath with possible intervention;  Surgeon: Brock Burkett DO;  Location: [facility] CATH LAB;  Service: Cardiology;  Laterality: N/A;    RBKA Right 03/16/2022    thumb surgery      right    TONSILLECTOMY  1990        Review of Systems   Constitutional:  Negative for activity change, fatigue, fever and unexpected weight change.   HENT:  Negative for ear pain, hearing loss, postnasal drip, rhinorrhea, sinus  "pressure/congestion and trouble swallowing.    Eyes:  Negative for discharge and visual disturbance.   Respiratory:  Negative for cough, chest tightness, shortness of breath and wheezing.    Cardiovascular:  Positive for palpitations. Negative for chest pain.   Gastrointestinal:  Positive for constipation. Negative for abdominal pain, blood in stool, diarrhea, nausea and vomiting.   Endocrine: Negative for polydipsia and polyuria.   Genitourinary:  Negative for difficulty urinating, dysuria, hematuria and urgency.   Musculoskeletal:  Positive for arthralgias and joint swelling. Negative for neck pain.   Neurological:  Negative for weakness and headaches.   Psychiatric/Behavioral:  Negative for confusion and dysphoric mood.         /80 (BP Location: Left arm, Patient Position: Sitting, BP Method: Large (Automatic))   Temp 99.3 °F (37.4 °C) (Oral)   Resp 18   Ht 6' (1.829 m)   Wt (!) 173.7 kg (383 lb)   SpO2 98%   BMI 51.94 kg/m²      Physical Exam  HENT:      Head: Normocephalic and atraumatic.   Cardiovascular:      Rate and Rhythm: Normal rate and regular rhythm.   Pulmonary:      Effort: Pulmonary effort is normal.      Breath sounds: Normal breath sounds.   Neurological:      Mental Status: He is alert and oriented to person, place, and time.   Psychiatric:         Mood and Affect: Mood normal.         Behavior: Behavior normal.          Assessment and Plan      ICD-10-CM ICD-9-CM   1. Type 2 diabetes mellitus without complication, without long-term current use of insulin  E11.9 250.00   2. Morbid obesity  E66.01 278.01   3. Cigarette nicotine dependence without complication  F17.210 305.1        1. Type 2 diabetes mellitus without complication, without long-term current use of insulin  Not controlled at last visit. Will recheck lab.   -     JARDIANCE 25 mg tablet; Take 1 tablet (25 mg total) by mouth every morning.  Dispense: 90 tablet; Refill: 1  -     BD CANDIDA 2ND GEN PEN NEEDLE 32 gauge x 5/32" " Ndle; USE 1 ONCE DAILY  Dispense: 100 each; Refill: 11  -     Ambulatory referral/consult to Optometry; Future; Expected date: 10/12/2023  -     Hemoglobin A1C; Future; Expected date: 10/05/2023    2. Morbid obesity  Diet and educational handouts given.    3. Cigarette nicotine dependence without complication  Patient is not ready to quit. Educational handouts given.     Other orders  -     mupirocin (BACTROBAN) 2 % ointment; Apply topically 3 (three) times daily.  Dispense: 15 g; Refill: 0        Follow up in about 6 months (around 4/5/2024).

## 2023-10-06 LAB
EST. AVERAGE GLUCOSE BLD GHB EST-MCNC: 150 MG/DL
HBA1C MFR BLD HPLC: 7.1 % (ref 4.5–6.6)

## 2023-10-10 NOTE — PROGRESS NOTES
Subjective:         Patient ID: Kennedy West is a 42 y.o. male.    Chief Complaint: Low-back Pain and Knee Pain        Pain  This is a chronic problem. The current episode started more than 1 year ago. The problem occurs daily. The problem has been unchanged. Associated symptoms include arthralgias and neck pain. Pertinent negatives include no anorexia, chest pain, chills, coughing, diaphoresis, fever, sore throat, vertigo or vomiting.     Review of Systems   Constitutional:  Negative for activity change, appetite change, chills, diaphoresis and fever.   HENT:  Negative for drooling, ear discharge, ear pain, facial swelling, nosebleeds, sore throat, trouble swallowing, voice change and goiter.    Eyes:  Negative for photophobia, pain, discharge, redness and visual disturbance.   Respiratory:  Negative for apnea, cough, choking, chest tightness, shortness of breath, wheezing and stridor.    Cardiovascular:  Negative for chest pain, palpitations and leg swelling.   Gastrointestinal:  Negative for abdominal distention, anorexia, diarrhea, rectal pain, vomiting and fecal incontinence.   Endocrine: Negative for cold intolerance, heat intolerance, polydipsia, polyphagia and polyuria.   Genitourinary:  Negative for bladder incontinence, dysuria, flank pain and frequency.   Musculoskeletal:  Positive for arthralgias, back pain, gait problem, leg pain, neck pain and neck stiffness.   Integumentary:  Negative for color change and pallor.   Neurological:  Negative for dizziness, vertigo, seizures, syncope, facial asymmetry, speech difficulty, light-headedness, memory loss and coordination difficulties.   Hematological:  Negative for adenopathy. Does not bruise/bleed easily.   Psychiatric/Behavioral:  Negative for agitation, behavioral problems, confusion, decreased concentration, dysphoric mood, hallucinations, self-injury and suicidal ideas. The patient is not nervous/anxious and is not hyperactive.             Past Medical History:   Diagnosis Date    Allergy     Anxiety     Asthma     Chronic pain     Complex regional pain syndrome type I of right upper extremity     COPD (chronic obstructive pulmonary disease)     Depression     Diabetes mellitus, type 2     Diabetic peripheral neuropathy     Gastritis     GERD (gastroesophageal reflux disease)     Hyperlipidemia     Lumbar radiculopathy     Lumbosacral spondylosis     Non-pressure chronic ulcer of other part of right foot limited to breakdown of skin     Obesity     Obstructive sleep apnea     Osteoarthritis, multiple sites     Polyneuropathy     Tobacco dependence      Past Surgical History:   Procedure Laterality Date    ANGIOGRAM, CORONARY, WITH LEFT HEART CATHETERIZATION N/A 08/24/2021    Procedure: Left heart cath w/ coronary angiograms;  Surgeon: Zaheer Real MD;  Location: [facility] CATH LAB;  Service: Cardiology;  Laterality: N/A;    CARPAL TUNNEL RELEASE      right    INJECTION OF ANESTHETIC AGENT AROUND MEDIAL BRANCH NERVES INNERVATING LUMBAR FACET JOINT Bilateral 04/15/2021    Procedure: BLOCK, NERVE, FACET JOINT, LUMBAR, MEDIAL BRANCH;  Surgeon: Sandra Johnson MD;  Location: [facility] PAIN MGMT;  Service: Pain Management;  Laterality: Bilateral;  Bilateral L3-S1 MBB    INJECTION OF ANESTHETIC AGENT AROUND MEDIAL BRANCH NERVES INNERVATING LUMBAR FACET JOINT Bilateral 12/23/2021    Procedure: Block-nerve-medial branch-lumbar, bilateral L4 through S1;  Surgeon: Sandra Johnson MD;  Location: [facility] PAIN MGMT;  Service: Pain Management;  Laterality: Bilateral;  pt will bring vaccine verification card- notified in office    INJECTION OF FACET JOINT Bilateral 06/01/2020    Bilateral L3-S1 MBB - Dr Johnson    LEFT HEART CATHETERIZATION N/A 07/21/2021    Procedure: Left heart cath with possible intervention;  Surgeon: Brock Burkett DO;  Location: [facility] CATH LAB;  Service: Cardiology;  Laterality: N/A;    RBKA Right 03/16/2022    thumb surgery       right    TONSILLECTOMY  1990     Social History     Socioeconomic History    Marital status:     Number of children: 0   Occupational History    Occupation: [employer]   Tobacco Use    Smoking status: Every Day     Current packs/day: 0.25     Average packs/day: 0.3 packs/day for 22.9 years (5.7 ttl pk-yrs)     Types: Cigarettes     Start date: 11/9/2000     Passive exposure: Current    Smokeless tobacco: Never   Substance and Sexual Activity    Alcohol use: Not Currently     Alcohol/week: 0.0 standard drinks of alcohol    Drug use: Never    Sexual activity: Not Currently     Partners: Female   Social History Narrative    Lives with his parents. Unable to wear prosthesis due to pain. Still has not heard from Disability after one year and 2 months.      Family History   Problem Relation Age of Onset    Diabetes Mother     Hypertension Mother     Cancer Mother     Arthritis Mother     Epilepsy Mother     Diabetes Father     Heart disease Father     Hyperlipidemia Father     Hypertension Father     Arthritis Father     COPD Father     Rheum arthritis Sister     Arthritis Sister     Stroke Maternal Grandmother     Stroke Maternal Grandfather     Cancer Paternal Grandmother     Heart disease Paternal Grandfather     Stroke Maternal Uncle     Cancer Paternal Aunt      Review of patient's allergies indicates:   Allergen Reactions    Banana Anaphylaxis    Biaxin [clarithromycin] Other (See Comments)     Passes out      Erythromycin Anaphylaxis, Hives, Rash, Shortness Of Breath and Other (See Comments)    Tetracyclines Anaphylaxis    Zithromax tri-shelby [azithromycin] Anaphylaxis    Pineapple Other (See Comments)     Gets extremely sick    Fig Blisters and Hives    Isoniazid     Tetracycline (bulk)     Naldecon dx Rash        Objective:  Vitals:    10/12/23 0903   BP: 124/76   Pulse: 97   Resp: 18   Weight: (!) 173.7 kg (383 lb)   Height: 6' (1.829 m)   PainSc:   6         Physical Exam  Vitals and nursing note  reviewed. Exam conducted with a chaperone present.   Constitutional:       General: He is awake. He is not in acute distress.     Appearance: Normal appearance. He is obese. He is not ill-appearing or diaphoretic.   HENT:      Head: Normocephalic and atraumatic.      Nose: Nose normal.      Mouth/Throat:      Mouth: Mucous membranes are moist.      Pharynx: Oropharynx is clear.   Eyes:      Conjunctiva/sclera: Conjunctivae normal.      Pupils: Pupils are equal, round, and reactive to light.   Cardiovascular:      Rate and Rhythm: Normal rate.   Pulmonary:      Effort: Pulmonary effort is normal. No respiratory distress.   Abdominal:      Palpations: Abdomen is soft.      Tenderness: There is no guarding.   Musculoskeletal:         General: Normal range of motion.      Cervical back: Normal range of motion and neck supple. No rigidity.      Thoracic back: Tenderness present.      Lumbar back: Tenderness present.   Skin:     General: Skin is warm and dry.      Coloration: Skin is not jaundiced or pale.   Neurological:      General: No focal deficit present.      Mental Status: He is alert and oriented to person, place, and time. Mental status is at baseline.      Cranial Nerves: No cranial nerve deficit (II-XII).      Gait: Gait abnormal.   Psychiatric:         Mood and Affect: Mood normal.         Behavior: Behavior normal. Behavior is cooperative.         Thought Content: Thought content normal.           X-Ray Chest 1 View  Narrative: EXAMINATION:  XR CHEST 1 VIEW    CLINICAL HISTORY:  Chest pain, unspecified    TECHNIQUE:  Single frontal view of the chest was performed.    COMPARISON:  08/13/2021    FINDINGS:  Heart size normal. Lungs clear. No pneumothorax or pleural effusion.  Impression: No acute findings.    Electronically signed by: Cale Whitfield  Date:    08/15/2023  Time:    12:39         Office Visit on 10/05/2023   Component Date Value Ref Range Status    Hemoglobin A1C 10/05/2023 7.1 (H)  4.5 - 6.6 % Final     Estimated Average Glucose 10/05/2023 150  mg/dL Final   Admission on 08/15/2023, Discharged on 08/15/2023   Component Date Value Ref Range Status    Sodium 08/15/2023 138  136 - 145 mmol/L Final    Potassium 08/15/2023 3.8  3.5 - 5.1 mmol/L Final    Chloride 08/15/2023 106  98 - 107 mmol/L Final    CO2 08/15/2023 25  21 - 32 mmol/L Final    Anion Gap 08/15/2023 11  7 - 16 mmol/L Final    Glucose 08/15/2023 273 (H)  74 - 106 mg/dL Final    BUN 08/15/2023 9  7 - 18 mg/dL Final    Creatinine 08/15/2023 0.91  0.70 - 1.30 mg/dL Final    BUN/Creatinine Ratio 08/15/2023 10  6 - 20 Final    Calcium 08/15/2023 9.2  8.5 - 10.1 mg/dL Final    eGFR 08/15/2023 109  >=60 mL/min/1.73m2 Final    Troponin I High Sensitivity 08/15/2023 4.4  <=60.4 pg/mL Final    PT 08/15/2023 13.5  11.7 - 14.7 seconds Final    INR 08/15/2023 1.04  <=3.30 Final    PTT 08/15/2023 28.5  25.2 - 37.3 seconds Final    Magnesium 08/15/2023 2.4 (H)  1.7 - 2.3 mg/dL Final    WBC 08/15/2023 12.54 (H)  4.50 - 11.00 K/uL Final    RBC 08/15/2023 5.59  4.60 - 6.20 M/uL Final    Hemoglobin 08/15/2023 15.9  13.5 - 18.0 g/dL Final    Hematocrit 08/15/2023 47.1  40.0 - 54.0 % Final    MCV 08/15/2023 84.3  80.0 - 96.0 fL Final    MCH 08/15/2023 28.4  27.0 - 31.0 pg Final    MCHC 08/15/2023 33.8  32.0 - 36.0 g/dL Final    RDW 08/15/2023 12.8  11.5 - 14.5 % Final    Platelet Count 08/15/2023 272  150 - 400 K/uL Final    MPV 08/15/2023 9.7  9.4 - 12.4 fL Final    Neutrophils % 08/15/2023 71.7 (H)  53.0 - 65.0 % Final    Lymphocytes % 08/15/2023 22.3 (L)  27.0 - 41.0 % Final    Monocytes % 08/15/2023 4.4  2.0 - 6.0 % Final    Eosinophils % 08/15/2023 0.6 (L)  1.0 - 4.0 % Final    Basophils % 08/15/2023 0.6  0.0 - 1.0 % Final    Immature Granulocytes % 08/15/2023 0.4  0.0 - 0.4 % Final    nRBC, Auto 08/15/2023 0.0  <=0.0 % Final    Neutrophils, Abs 08/15/2023 8.99 (H)  1.80 - 7.70 K/uL Final    Lymphocytes, Absolute 08/15/2023 2.80  1.00 - 4.80 K/uL Final    Monocytes,  Absolute 08/15/2023 0.55  0.00 - 0.80 K/uL Final    Eosinophils, Absolute 08/15/2023 0.08  0.00 - 0.50 K/uL Final    Basophils, Absolute 08/15/2023 0.07  0.00 - 0.20 K/uL Final    Immature Granulocytes, Absolute 08/15/2023 0.05 (H)  0.00 - 0.04 K/uL Final    nRBC, Absolute 08/15/2023 0.00  <=0.00 x10e3/uL Final    Diff Type 08/15/2023 Auto   Final    D-Dimer 08/15/2023 0.27  0.00 - 0.47 µg/mL Final    Troponin I High Sensitivity 08/15/2023 4.6  <=60.4 pg/mL Final   Office Visit on 08/15/2023   Component Date Value Ref Range Status    POC Amphetamines 08/15/2023 Negative  Negative, Inconclusive Final    POC Barbiturates 08/15/2023 Negative  Negative, Inconclusive Final    POC Benzodiazepines 08/15/2023 Negative  Negative, Inconclusive Final    POC Cocaine 08/15/2023 Negative  Negative, Inconclusive Final    POC THC 08/15/2023 Negative  Negative, Inconclusive Final    POC Methadone 08/15/2023 Negative  Negative, Inconclusive Final    POC Methamphetamine 08/15/2023 Negative  Negative, Inconclusive Final    POC Opiates 08/15/2023 Presumptive Positive (A)  Negative, Inconclusive Final    POC Oxycodone 08/15/2023 Negative  Negative, Inconclusive Final    POC Phencyclidine 08/15/2023 Negative  Negative, Inconclusive Final    POC Methylenedioxymethamphetamine * 08/15/2023 Negative  Negative, Inconclusive Final    POC Tricyclic Antidepressants 08/15/2023 Negative  Negative, Inconclusive Final    POC Buprenorphine 08/15/2023 Negative   Final     Acceptable 08/15/2023 Yes   Final    POC Temperature (Urine) 08/15/2023 94   Final   Office Visit on 07/05/2023   Component Date Value Ref Range Status    Glucose, Fasting 07/05/2023 170 (H)  74 - 106 mg/dL Final    Triglycerides 07/05/2023 186 (H)  35 - 150 mg/dL Final    Cholesterol 07/05/2023 115  0 - 200 mg/dL Final    HDL Cholesterol 07/05/2023 41  40 - 60 mg/dL Final    Cholesterol/HDL Ratio (Risk Factor) 07/05/2023 2.8   Final    Non-HDL 07/05/2023 74  mg/dL  Final    LDL Calculated 07/05/2023 37  mg/dL Final    VLDL 07/05/2023 37  mg/dL Final   Office Visit on 05/24/2023   Component Date Value Ref Range Status    Creatinine, Urine 05/24/2023 65  39 - 259 mg/dL Final    Microalbumin 05/24/2023 5.3 (H)  0.0 - 2.8 mg/dL Final    Microalbumin/Creatinine Ratio 05/24/2023 81.5 (H)  0.0 - 30.0 mg/g Final    Hemoglobin A1C 05/24/2023 7.3 (H)  4.5 - 6.6 % Final    Estimated Average Glucose 05/24/2023 157  mg/dL Final         No orders of the defined types were placed in this encounter.        Requested Prescriptions     Signed Prescriptions Disp Refills    HYDROcodone-acetaminophen (NORCO) 7.5-325 mg per tablet 60 tablet 0     Sig: Take 1 tablet by mouth every 12 (twelve) hours.    HYDROcodone-acetaminophen (NORCO) 7.5-325 mg per tablet 60 tablet 0     Sig: Take 1 tablet by mouth every 12 (twelve) hours.       Assessment:     1. Lumbosacral spondylosis without myelopathy MRI [facility] 2019    2. Complex regional pain syndrome type 1 of right upper extremity    3. Phantom pain following amputation of lower limb           A's of Opioid Risk Assessment  Activity:Patient can perform ADL.   Analgesia:Patients pain is partially controlled by current medication. Patient has tried OTC medications such as Tylenol and Ibuprofen with out relief.   Adverse Effects: Patient denies constipation or sedation.  Aberrant Behavior:  reviewed with no aberrant drug seeking/taking behavior.  Overdose reversal drug naloxone discussed    Drug screen reviewed      Plan:    Narcan December 2022.    Please see notes in epic regarding refill dates pharmacy made a mistake      Pharmacy closed on weekends     Using wheelchair for mobility, waiting for right lower extremity prosthetic device    He follows diabetic educator    Follows Neurology [facility] right hand arm    Chronic right lower extremity phantom limb pain after right below-the-knee amputation    No new complaints today doing well current  medication    Continue exercise program as directed    Patient taken to the emergency room via wheelchair    Follow-up 2 months    Dr. Johnson April 2024    Bring original prescription medication bottles/container/box with labels to each visit

## 2023-10-12 ENCOUNTER — OFFICE VISIT (OUTPATIENT)
Dept: PAIN MEDICINE | Facility: CLINIC | Age: 43
End: 2023-10-12
Payer: COMMERCIAL

## 2023-10-12 VITALS
BODY MASS INDEX: 42.66 KG/M2 | RESPIRATION RATE: 18 BRPM | HEIGHT: 72 IN | WEIGHT: 315 LBS | DIASTOLIC BLOOD PRESSURE: 76 MMHG | SYSTOLIC BLOOD PRESSURE: 124 MMHG | HEART RATE: 97 BPM

## 2023-10-12 DIAGNOSIS — M47.817 LUMBOSACRAL SPONDYLOSIS WITHOUT MYELOPATHY: Primary | Chronic | ICD-10-CM

## 2023-10-12 DIAGNOSIS — G54.6 PHANTOM PAIN FOLLOWING AMPUTATION OF LOWER LIMB: Chronic | ICD-10-CM

## 2023-10-12 DIAGNOSIS — G90.511 COMPLEX REGIONAL PAIN SYNDROME TYPE 1 OF RIGHT UPPER EXTREMITY: Chronic | ICD-10-CM

## 2023-10-12 PROCEDURE — 3060F POS MICROALBUMINURIA REV: CPT | Mod: CPTII,,, | Performed by: PHYSICIAN ASSISTANT

## 2023-10-12 PROCEDURE — 99214 OFFICE O/P EST MOD 30 MIN: CPT | Mod: S$PBB,,, | Performed by: PHYSICIAN ASSISTANT

## 2023-10-12 PROCEDURE — 3066F NEPHROPATHY DOC TX: CPT | Mod: CPTII,,, | Performed by: PHYSICIAN ASSISTANT

## 2023-10-12 PROCEDURE — 3008F PR BODY MASS INDEX (BMI) DOCUMENTED: ICD-10-PCS | Mod: CPTII,,, | Performed by: PHYSICIAN ASSISTANT

## 2023-10-12 PROCEDURE — 3051F HG A1C>EQUAL 7.0%<8.0%: CPT | Mod: CPTII,,, | Performed by: PHYSICIAN ASSISTANT

## 2023-10-12 PROCEDURE — 3074F SYST BP LT 130 MM HG: CPT | Mod: CPTII,,, | Performed by: PHYSICIAN ASSISTANT

## 2023-10-12 PROCEDURE — 3008F BODY MASS INDEX DOCD: CPT | Mod: CPTII,,, | Performed by: PHYSICIAN ASSISTANT

## 2023-10-12 PROCEDURE — 3078F PR MOST RECENT DIASTOLIC BLOOD PRESSURE < 80 MM HG: ICD-10-PCS | Mod: CPTII,,, | Performed by: PHYSICIAN ASSISTANT

## 2023-10-12 PROCEDURE — 3078F DIAST BP <80 MM HG: CPT | Mod: CPTII,,, | Performed by: PHYSICIAN ASSISTANT

## 2023-10-12 PROCEDURE — 3066F PR DOCUMENTATION OF TREATMENT FOR NEPHROPATHY: ICD-10-PCS | Mod: CPTII,,, | Performed by: PHYSICIAN ASSISTANT

## 2023-10-12 PROCEDURE — 1159F PR MEDICATION LIST DOCUMENTED IN MEDICAL RECORD: ICD-10-PCS | Mod: CPTII,,, | Performed by: PHYSICIAN ASSISTANT

## 2023-10-12 PROCEDURE — 3051F PR MOST RECENT HEMOGLOBIN A1C LEVEL 7.0 - < 8.0%: ICD-10-PCS | Mod: CPTII,,, | Performed by: PHYSICIAN ASSISTANT

## 2023-10-12 PROCEDURE — 99215 OFFICE O/P EST HI 40 MIN: CPT | Mod: PBBFAC | Performed by: PHYSICIAN ASSISTANT

## 2023-10-12 PROCEDURE — 3060F PR POS MICROALBUMINURIA RESULT DOCUMENTED/REVIEW: ICD-10-PCS | Mod: CPTII,,, | Performed by: PHYSICIAN ASSISTANT

## 2023-10-12 PROCEDURE — 99214 PR OFFICE/OUTPT VISIT, EST, LEVL IV, 30-39 MIN: ICD-10-PCS | Mod: S$PBB,,, | Performed by: PHYSICIAN ASSISTANT

## 2023-10-12 PROCEDURE — 3074F PR MOST RECENT SYSTOLIC BLOOD PRESSURE < 130 MM HG: ICD-10-PCS | Mod: CPTII,,, | Performed by: PHYSICIAN ASSISTANT

## 2023-10-12 PROCEDURE — 1159F MED LIST DOCD IN RCRD: CPT | Mod: CPTII,,, | Performed by: PHYSICIAN ASSISTANT

## 2023-10-12 RX ORDER — HYDROCODONE BITARTRATE AND ACETAMINOPHEN 7.5; 325 MG/1; MG/1
1 TABLET ORAL EVERY 12 HOURS
Qty: 60 TABLET | Refills: 0 | Status: SHIPPED | OUTPATIENT
Start: 2023-12-02 | End: 2023-10-12

## 2023-10-12 RX ORDER — HYDROCODONE BITARTRATE AND ACETAMINOPHEN 7.5; 325 MG/1; MG/1
1 TABLET ORAL EVERY 12 HOURS
Qty: 60 TABLET | Refills: 0 | Status: SHIPPED | OUTPATIENT
Start: 2023-12-02 | End: 2023-11-28 | Stop reason: SDUPTHER

## 2023-10-12 RX ORDER — HYDROCODONE BITARTRATE AND ACETAMINOPHEN 7.5; 325 MG/1; MG/1
1 TABLET ORAL EVERY 12 HOURS
Qty: 60 TABLET | Refills: 0 | Status: SHIPPED | OUTPATIENT
Start: 2023-11-02 | End: 2024-02-27

## 2023-10-12 RX ORDER — HYDROCODONE BITARTRATE AND ACETAMINOPHEN 7.5; 325 MG/1; MG/1
1 TABLET ORAL EVERY 12 HOURS
Qty: 60 TABLET | Refills: 0 | Status: SHIPPED | OUTPATIENT
Start: 2023-11-02 | End: 2023-10-12

## 2023-10-17 ENCOUNTER — PATIENT MESSAGE (OUTPATIENT)
Dept: PAIN MEDICINE | Facility: CLINIC | Age: 43
End: 2023-10-17
Payer: COMMERCIAL

## 2023-10-17 ENCOUNTER — HOSPITAL ENCOUNTER (OUTPATIENT)
Dept: RADIOLOGY | Facility: HOSPITAL | Age: 43
Discharge: HOME | End: 2023-10-17
Attending: NURSE PRACTITIONER
Payer: COMMERCIAL

## 2023-10-17 ENCOUNTER — OFFICE VISIT (OUTPATIENT)
Dept: CARDIOLOGY | Facility: CLINIC | Age: 43
End: 2023-10-17
Payer: COMMERCIAL

## 2023-10-17 VITALS
OXYGEN SATURATION: 97 % | HEIGHT: 72 IN | DIASTOLIC BLOOD PRESSURE: 72 MMHG | HEART RATE: 97 BPM | BODY MASS INDEX: 42.66 KG/M2 | WEIGHT: 315 LBS | SYSTOLIC BLOOD PRESSURE: 110 MMHG

## 2023-10-17 DIAGNOSIS — R00.2 PALPITATIONS: ICD-10-CM

## 2023-10-17 DIAGNOSIS — R60.0 LEG EDEMA, LEFT: ICD-10-CM

## 2023-10-17 DIAGNOSIS — L81.9 DISCOLORATION OF SKIN OF LOWER LEG: ICD-10-CM

## 2023-10-17 DIAGNOSIS — I10 HYPERTENSION, ESSENTIAL: Chronic | ICD-10-CM

## 2023-10-17 DIAGNOSIS — E78.2 MIXED HYPERLIPIDEMIA: Chronic | ICD-10-CM

## 2023-10-17 DIAGNOSIS — M79.605 LEFT LEG PAIN: ICD-10-CM

## 2023-10-17 DIAGNOSIS — I25.10 CORONARY ARTERY DISEASE INVOLVING NATIVE HEART, UNSPECIFIED VESSEL OR LESION TYPE, UNSPECIFIED WHETHER ANGINA PRESENT: Chronic | ICD-10-CM

## 2023-10-17 DIAGNOSIS — R60.0 EDEMA OF LEFT LOWER EXTREMITY: ICD-10-CM

## 2023-10-17 DIAGNOSIS — M79.605 LEFT LEG PAIN: Primary | ICD-10-CM

## 2023-10-17 PROBLEM — R07.9 CHEST PAIN, UNSPECIFIED: Status: RESOLVED | Noted: 2021-07-19 | Resolved: 2023-10-17

## 2023-10-17 PROCEDURE — 3008F BODY MASS INDEX DOCD: CPT | Mod: CPTII,,, | Performed by: NURSE PRACTITIONER

## 2023-10-17 PROCEDURE — 1159F PR MEDICATION LIST DOCUMENTED IN MEDICAL RECORD: ICD-10-PCS | Mod: CPTII,,, | Performed by: NURSE PRACTITIONER

## 2023-10-17 PROCEDURE — 3066F PR DOCUMENTATION OF TREATMENT FOR NEPHROPATHY: ICD-10-PCS | Mod: CPTII,,, | Performed by: NURSE PRACTITIONER

## 2023-10-17 PROCEDURE — 3051F HG A1C>EQUAL 7.0%<8.0%: CPT | Mod: CPTII,,, | Performed by: NURSE PRACTITIONER

## 2023-10-17 PROCEDURE — 3060F POS MICROALBUMINURIA REV: CPT | Mod: CPTII,,, | Performed by: NURSE PRACTITIONER

## 2023-10-17 PROCEDURE — 3060F PR POS MICROALBUMINURIA RESULT DOCUMENTED/REVIEW: ICD-10-PCS | Mod: CPTII,,, | Performed by: NURSE PRACTITIONER

## 2023-10-17 PROCEDURE — 3078F PR MOST RECENT DIASTOLIC BLOOD PRESSURE < 80 MM HG: ICD-10-PCS | Mod: CPTII,,, | Performed by: NURSE PRACTITIONER

## 2023-10-17 PROCEDURE — 1160F PR REVIEW ALL MEDS BY PRESCRIBER/CLIN PHARMACIST DOCUMENTED: ICD-10-PCS | Mod: CPTII,,, | Performed by: NURSE PRACTITIONER

## 2023-10-17 PROCEDURE — 93971 EXTREMITY STUDY: CPT | Mod: 26,LT,, | Performed by: RADIOLOGY

## 2023-10-17 PROCEDURE — 3074F SYST BP LT 130 MM HG: CPT | Mod: CPTII,,, | Performed by: NURSE PRACTITIONER

## 2023-10-17 PROCEDURE — 1160F RVW MEDS BY RX/DR IN RCRD: CPT | Mod: CPTII,,, | Performed by: NURSE PRACTITIONER

## 2023-10-17 PROCEDURE — 93971 US LOWER EXTREMITY VEINS LEFT: ICD-10-PCS | Mod: 26,LT,, | Performed by: RADIOLOGY

## 2023-10-17 PROCEDURE — 99215 OFFICE O/P EST HI 40 MIN: CPT | Mod: PBBFAC,25 | Performed by: NURSE PRACTITIONER

## 2023-10-17 PROCEDURE — 99215 OFFICE O/P EST HI 40 MIN: CPT | Mod: S$PBB,,, | Performed by: NURSE PRACTITIONER

## 2023-10-17 PROCEDURE — 3051F PR MOST RECENT HEMOGLOBIN A1C LEVEL 7.0 - < 8.0%: ICD-10-PCS | Mod: CPTII,,, | Performed by: NURSE PRACTITIONER

## 2023-10-17 PROCEDURE — 99215 PR OFFICE/OUTPT VISIT, EST, LEVL V, 40-54 MIN: ICD-10-PCS | Mod: S$PBB,,, | Performed by: NURSE PRACTITIONER

## 2023-10-17 PROCEDURE — 3066F NEPHROPATHY DOC TX: CPT | Mod: CPTII,,, | Performed by: NURSE PRACTITIONER

## 2023-10-17 PROCEDURE — 3078F DIAST BP <80 MM HG: CPT | Mod: CPTII,,, | Performed by: NURSE PRACTITIONER

## 2023-10-17 PROCEDURE — 1159F MED LIST DOCD IN RCRD: CPT | Mod: CPTII,,, | Performed by: NURSE PRACTITIONER

## 2023-10-17 PROCEDURE — 93971 EXTREMITY STUDY: CPT | Mod: TC,LT

## 2023-10-17 PROCEDURE — 3074F PR MOST RECENT SYSTOLIC BLOOD PRESSURE < 130 MM HG: ICD-10-PCS | Mod: CPTII,,, | Performed by: NURSE PRACTITIONER

## 2023-10-17 PROCEDURE — 3008F PR BODY MASS INDEX (BMI) DOCUMENTED: ICD-10-PCS | Mod: CPTII,,, | Performed by: NURSE PRACTITIONER

## 2023-10-17 RX ORDER — ATORVASTATIN CALCIUM 80 MG/1
80 TABLET, FILM COATED ORAL NIGHTLY
Qty: 90 TABLET | Refills: 5 | Status: SHIPPED | OUTPATIENT
Start: 2023-10-17 | End: 2024-04-02 | Stop reason: SDUPTHER

## 2023-10-17 RX ORDER — METOPROLOL SUCCINATE 50 MG/1
50 TABLET, EXTENDED RELEASE ORAL DAILY
Qty: 30 TABLET | Refills: 5 | Status: SHIPPED | OUTPATIENT
Start: 2023-10-17 | End: 2024-04-02 | Stop reason: SDUPTHER

## 2023-10-17 RX ORDER — ISOSORBIDE MONONITRATE 30 MG/1
60 TABLET, EXTENDED RELEASE ORAL DAILY
Qty: 60 TABLET | Refills: 5 | Status: SHIPPED | OUTPATIENT
Start: 2023-10-17 | End: 2024-04-02 | Stop reason: SDUPTHER

## 2023-10-17 RX ORDER — FENOFIBRATE 48 MG/1
48 TABLET, FILM COATED ORAL DAILY
Qty: 30 TABLET | Refills: 5 | Status: SHIPPED | OUTPATIENT
Start: 2023-10-17 | End: 2024-04-02 | Stop reason: SDUPTHER

## 2023-10-17 RX ORDER — NITROGLYCERIN 0.3 MG/1
0.3 TABLET SUBLINGUAL EVERY 5 MIN PRN
Qty: 30 TABLET | Refills: 2 | Status: SHIPPED | OUTPATIENT
Start: 2023-10-17

## 2023-10-17 RX ORDER — METOPROLOL SUCCINATE 100 MG/1
100 TABLET, EXTENDED RELEASE ORAL DAILY
Qty: 30 TABLET | Refills: 5 | Status: SHIPPED | OUTPATIENT
Start: 2023-10-17 | End: 2024-04-02 | Stop reason: SDUPTHER

## 2023-10-17 NOTE — ASSESSMENT & PLAN NOTE
Lipid panel results from 7/5/2023 reviewed    Trig 186  HDL 41  LDL 37  Lipitor 80 mg po daily  Tricor 48 mg po daily  Lipids followed by PCP

## 2023-10-17 NOTE — PROGRESS NOTES
"PCP: Karlee Liu MD    Referring Provider:     Subjective:   Kennedy West is a 42 y.o. male with hx of single vessel CAD with a 60% mid RCA stenosis (FFR 0.98 8/24/2021- Dr. Real), DM, COPD, RAKESH (intolerant of CPAP), HTN and HLD,  who presents for concerns regarding LLE pain and edema for 2 months; improved with elevation and worsened when dependent. No orthopnea or PND. NO chest pain.     3/30/2023 presents for follow up to discuss Zio results and assess affect of Imdur and increased metoprolol dose on his chest pain. He states that he has had only 2 episodes of cp since starting the med change. He still feels his heart racing when he "dose much of anything."    2/20/2023 routine follow up. He states that he has had intermittent episodes of chest pain in the last month occurring every 3-4 days. The episodes occur with the exertion of transferring from WC (RBKA) and with sitting. Each episodes is preceded by palpitations described as a heart racing and pounding sensation. He has used sublingual ntg on 2 occasions requiring 2 sublingual ntg each time. He denies orthopnea or pnd. He does have lower ext edema even above the stump on the right. He also has dizziness with standing and feels week but he states that he drinks a gallon of water per day. He has RAKESH and can not tolerate CPAP.         Fhx:  Family History   Problem Relation Age of Onset    Diabetes Mother     Hypertension Mother     Cancer Mother     Arthritis Mother     Epilepsy Mother     Diabetes Father     Heart disease Father     Hyperlipidemia Father     Hypertension Father     Arthritis Father     COPD Father     Rheum arthritis Sister     Arthritis Sister     Stroke Maternal Grandmother     Stroke Maternal Grandfather     Cancer Paternal Grandmother     Heart disease Paternal Grandfather     Stroke Maternal Uncle     Cancer Paternal Aunt      Shx:   Social History     Socioeconomic History    Marital status:     Number of " children: 0   Occupational History    Occupation: [employer]   Tobacco Use    Smoking status: Every Day     Current packs/day: 0.25     Average packs/day: 0.3 packs/day for 22.9 years (5.7 ttl pk-yrs)     Types: Cigarettes     Start date: 2000     Passive exposure: Current    Smokeless tobacco: Never   Substance and Sexual Activity    Alcohol use: Not Currently     Alcohol/week: 0.0 standard drinks of alcohol    Drug use: Never    Sexual activity: Not Currently     Partners: Female   Social History Narrative    Lives with his parents. Unable to wear prosthesis due to pain. Still has not heard from Disability after one year and 2 months.        EK/15/2023 RSR with HR 93 bpm; poor R wave progression   2023 RSR with sinu arrhythmia; HR 89 bpm; poor r wave progression  2022 sinus tachycardia ;  bpm; T wvae inversion no longer evident in the inferior leads compared to EKG done 2021      Zio  monitor worn 2023-3/6/2023  Basic rhythm is sinus, avg HR 89 bpm  Rare PAC's  Rare PVC's   No pateint triggered events.      Results for orders placed during the hospital encounter of 03/10/23    Echo    Interpretation Summary  · The left ventricle is normal in size with normal systolic function.  · The estimated ejection fraction is 55%.  · Normal left ventricular diastolic function.  · Normal right ventricular size.       ECHO Results for orders placed during the hospital encounter of 21    Echo    Interpretation Summary  · The left ventricle is normal in size with concentric remodeling and normal systolic function.  · The estimated ejection fraction is 60%.  · Normal left ventricular diastolic function.  · Mild right ventricular enlargement.  · Mild right atrial enlargement.  · Normal central venous pressure (3 mmHg).  · Trivial pericardial effusion.    [facility] Results for orders placed during the hospital encounter of 21    Cardiac catheterization    Conclusion  · The Mid RCA  lesion was 60% stenosed. The diagnostic FFR was measured at 0.98.  · The pre-procedure left ventricular end diastolic pressure was 11.  · The estimated blood loss was none.  · There was single vessel coronary artery disease.    The procedure log was documented by Documenter: RT Maria Teresa and verified by Zaheer Real MD.    Date: 8/24/2021  Time: 4:34 PM        Lab Results   Component Value Date     08/15/2023    K 3.8 08/15/2023     08/15/2023    CO2 25 08/15/2023    BUN 9 08/15/2023    CREATININE 0.91 08/15/2023    CALCIUM 9.2 08/15/2023    ANIONGAP 11 08/15/2023    EGFRNONAA 89 02/22/2022       Lab Results   Component Value Date    CHOL 115 07/05/2023    CHOL 120 06/22/2022    CHOL 158 07/19/2021     Lab Results   Component Value Date    HDL 41 07/05/2023    HDL 38 (L) 06/22/2022    HDL 25 (L) 07/19/2021     Lab Results   Component Value Date    LDLCALC 37 07/05/2023    LDLCALC 41 06/22/2022    LDLCALC  07/19/2021      Comment:      Unable to calculate due to one of the following values:  Cholesterol <5  HDL Cholesterol <5  Triglycerides <10 or >400     Lab Results   Component Value Date    TRIG 186 (H) 07/05/2023    TRIG 204 (H) 06/22/2022    TRIG 432 (H) 07/19/2021     Lab Results   Component Value Date    CHOLHDL 2.8 07/05/2023    CHOLHDL 3.2 06/22/2022    CHOLHDL 6.3 07/19/2021       Lab Results   Component Value Date    WBC 12.54 (H) 08/15/2023    HGB 15.9 08/15/2023    HCT 47.1 08/15/2023    MCV 84.3 08/15/2023     08/15/2023           Current Outpatient Medications:     ACCU-CHEK GUIDE TEST STRIPS Strp, USE STRIP TO CHECK GLUCOSE TWICE DAILY, Disp: , Rfl:     ACCU-CHEK SOFTCLIX LANCETS Misc, USE TO CHECK GLUCOSE TWICE DAILY, Disp: , Rfl:     albuterol (PROAIR HFA) 90 mcg/actuation inhaler, Inhale 2 puffs into the lungs every 4 (four) hours as needed for Wheezing. Rescue, Disp: 18 g, Rfl: 5    amitriptyline (ELAVIL) 75 MG tablet, Take one tablet at bedtime, Disp: 30  "tablet, Rfl: 1    aspirin (ECOTRIN) 81 MG EC tablet, Take 1 tablet (81 mg total) by mouth once daily., Disp: 30 tablet, Rfl: 2    atorvastatin (LIPITOR) 80 MG tablet, Take 1 tablet (80 mg total) by mouth every evening., Disp: 90 tablet, Rfl: 5    BD CANDIDA 2ND GEN PEN NEEDLE 32 gauge x 5/32" Ndle, USE 1 ONCE DAILY, Disp: 100 each, Rfl: 11    ergocalciferol (ERGOCALCIFEROL) 50,000 unit Cap, Take one capsule weekly for 12 weeks then reduce to one capsule monthly, Disp: 12 capsule, Rfl: 1    esomeprazole (NEXIUM) 40 MG capsule, Take 1 capsule (40 mg total) by mouth before breakfast., Disp: 90 capsule, Rfl: 1    fenofibrate (TRICOR) 48 MG tablet, Take 1 tablet (48 mg total) by mouth once daily., Disp: 30 tablet, Rfl: 5    gabapentin (NEURONTIN) 600 MG tablet, Take 1 tablet (600 mg total) by mouth 3 (three) times daily., Disp: 90 tablet, Rfl: 1    HUMALOG KWIKPEN INSULIN 100 unit/mL pen, SMARTSI Unit(s) SUB-Q 3 Times Daily, Disp: 15 mL, Rfl: 1    [START ON 2023] HYDROcodone-acetaminophen (NORCO) 7.5-325 mg per tablet, Take 1 tablet by mouth every 12 (twelve) hours., Disp: 60 tablet, Rfl: 0    [START ON 2023] HYDROcodone-acetaminophen (NORCO) 7.5-325 mg per tablet, Take 1 tablet by mouth every 12 (twelve) hours., Disp: 60 tablet, Rfl: 0    insulin (BASAGLAR KWIKPEN U-100 INSULIN) glargine 100 units/mL SubQ pen, Inject 40 Units into the skin every evening., Disp: , Rfl:     isosorbide mononitrate (IMDUR) 30 MG 24 hr tablet, Take 2 tablets (60 mg total) by mouth once daily., Disp: 60 tablet, Rfl: 5    JARDIANCE 25 mg tablet, Take 1 tablet (25 mg total) by mouth every morning., Disp: 90 tablet, Rfl: 1    metoprolol succinate (TOPROL-XL) 100 MG 24 hr tablet, Take 1 tablet (100 mg total) by mouth once daily., Disp: 30 tablet, Rfl: 5    metoprolol succinate (TOPROL-XL) 50 MG 24 hr tablet, Take 1 tablet (50 mg total) by mouth once daily., Disp: 30 tablet, Rfl: 5    mupirocin (BACTROBAN) 2 % ointment, Apply topically 3 " (three) times daily., Disp: 15 g, Rfl: 0    nitroGLYCERIN (NITROSTAT) 0.3 MG SL tablet, Place 1 tablet (0.3 mg total) under the tongue every 5 (five) minutes as needed for Chest pain. Up to 3 times., Disp: 30 tablet, Rfl: 2    OZEMPIC 1 mg/dose (4 mg/3 mL), Inject 1 mg into the skin every 7 days., Disp: , Rfl:     tiZANidine (ZANAFLEX) 4 MG tablet, Take 1 tablet (4 mg total) by mouth 3 (three) times daily., Disp: 270 tablet, Rfl: 1  Meds reviewed but not reconciled. He did not bring his meds today.   Review of Systems   Respiratory:  Negative for shortness of breath.    Cardiovascular:  Positive for palpitations and leg swelling. Negative for chest pain, orthopnea, claudication and PND.   Neurological:  Negative for loss of consciousness.           Objective:   /72 (BP Location: Left arm, Patient Position: Sitting)   Pulse 97   Ht 6' (1.829 m)   Wt (!) 172.8 kg (381 lb)   SpO2 97%   BMI 51.67 kg/m²     Physical Exam  Vitals and nursing note reviewed.   Constitutional:       Appearance: Normal appearance. He is obese.      Comments: In a wheelchair   HENT:      Head: Normocephalic and atraumatic.   Neck:      Vascular: No carotid bruit.   Cardiovascular:      Rate and Rhythm: Normal rate and regular rhythm.      Pulses: Normal pulses.      Heart sounds: Normal heart sounds.   Pulmonary:      Effort: Pulmonary effort is normal.      Breath sounds: Normal breath sounds.   Abdominal:      Palpations: Abdomen is soft.   Musculoskeletal:      Cervical back: Neck supple.      Right lower leg: No edema.      Left lower leg: No edema.      Comments: Right BKA   Skin:     General: Skin is warm and dry.   Neurological:      Mental Status: He is alert.           Assessment:     1. Left leg pain  US Lower Extremity Veins Left      2. Palpitations  metoprolol succinate (TOPROL-XL) 50 MG 24 hr tablet    metoprolol succinate (TOPROL-XL) 100 MG 24 hr tablet      3. Mixed hyperlipidemia  fenofibrate (TRICOR) 48 MG tablet     atorvastatin (LIPITOR) 80 MG tablet      4. Coronary artery disease involving native heart, unspecified vessel or lesion type, unspecified whether angina present  isosorbide mononitrate (IMDUR) 30 MG 24 hr tablet    nitroGLYCERIN (NITROSTAT) 0.3 MG SL tablet      5. Discoloration of skin of lower leg  US Lower Extremity Veins Left      6. Leg edema, left  US Lower Extremity Veins Left      7. Hypertension, essential        8. Edema of left lower extremity              Plan:   Coronary artery disease involving native heart  [facility] Dr. Real 8/24/2021  Single vessel CAD  Mid RCA lesion of 60% stenosis. The diagnostic FFR ws 0.98  Asymptomatic  Continue ASA/Lipitor/metoprolol/imdur    Hyperlipidemia  Lipid panel results from 7/5/2023 reviewed    Trig 186  HDL 41  LDL 37  Lipitor 80 mg po daily  Tricor 48 mg po daily  Lipids followed by PCP    Hypertension, essential  110/72 mmHg    Edema of left lower extremity  Edema and pain to his LLE x 2 months; worse when dependant and improved with elevation but not resolved.   Venous doppler US to r/o DVT  Elevate when sitting  Calf pump exercises  Compression sock      RTC 6 months

## 2023-10-17 NOTE — ASSESSMENT & PLAN NOTE
[facility] Dr. Real 8/24/2021  Single vessel CAD  Mid RCA lesion of 60% stenosis. The diagnostic FFR ws 0.98  Asymptomatic  Continue ASA/Lipitor/metoprolol/imdur

## 2023-10-17 NOTE — ASSESSMENT & PLAN NOTE
Edema and pain to his LLE x 2 months; worse when dependant and improved with elevation but not resolved.   Venous doppler US to r/o DVT  Elevate when sitting  Calf pump exercises  Compression sock

## 2023-10-18 ENCOUNTER — PATIENT MESSAGE (OUTPATIENT)
Dept: PAIN MEDICINE | Facility: CLINIC | Age: 43
End: 2023-10-18
Payer: COMMERCIAL

## 2023-10-18 DIAGNOSIS — G54.6 PHANTOM PAIN FOLLOWING AMPUTATION OF LOWER LIMB: ICD-10-CM

## 2023-10-18 DIAGNOSIS — E11.42 DIABETIC PERIPHERAL NEUROPATHY: ICD-10-CM

## 2023-10-18 DIAGNOSIS — G90.511 COMPLEX REGIONAL PAIN SYNDROME TYPE 1 OF RIGHT UPPER EXTREMITY: Chronic | ICD-10-CM

## 2023-10-18 DIAGNOSIS — M47.817 LUMBOSACRAL SPONDYLOSIS WITHOUT MYELOPATHY: Chronic | ICD-10-CM

## 2023-10-18 RX ORDER — AMITRIPTYLINE HYDROCHLORIDE 75 MG/1
TABLET ORAL
Qty: 30 TABLET | Refills: 1 | Status: SHIPPED | OUTPATIENT
Start: 2023-10-18 | End: 2023-11-28 | Stop reason: SDUPTHER

## 2023-10-18 RX ORDER — GABAPENTIN 600 MG/1
600 TABLET ORAL 3 TIMES DAILY
Qty: 90 TABLET | Refills: 1 | Status: SHIPPED | OUTPATIENT
Start: 2023-10-18 | End: 2023-11-28 | Stop reason: SDUPTHER

## 2023-10-18 NOTE — TELEPHONE ENCOUNTER
----- Message from Sanjeev Soriano LPN sent at 10/17/2023 12:59 PM CDT -----  Please call and verify if [facility] has his prescriptions for elavil, norco and Neurontin. The system say they were sent but the patient thinks they have no been called in.

## 2023-11-28 NOTE — PROGRESS NOTES
Subjective:         Patient ID: Kennedy West is a 43 y.o. male.    Chief Complaint: Low-back Pain, Leg Pain, and Hip Pain        Pain  This is a chronic problem. The current episode started more than 1 year ago. The problem occurs daily. The problem has been waxing and waning. Associated symptoms include arthralgias and neck pain. Pertinent negatives include no anorexia, chest pain, chills, coughing, diaphoresis, fever, sore throat, vertigo or vomiting.     Review of Systems   Constitutional:  Negative for activity change, appetite change, chills, diaphoresis and fever.   HENT:  Negative for drooling, ear discharge, ear pain, facial swelling, nosebleeds, sore throat, trouble swallowing, voice change and goiter.    Eyes:  Negative for photophobia, pain, discharge, redness and visual disturbance.   Respiratory:  Negative for apnea, cough, choking, chest tightness, shortness of breath, wheezing and stridor.    Cardiovascular:  Negative for chest pain, palpitations and leg swelling.   Gastrointestinal:  Negative for abdominal distention, anorexia, diarrhea, rectal pain, vomiting and fecal incontinence.   Endocrine: Negative for cold intolerance, heat intolerance, polydipsia, polyphagia and polyuria.   Genitourinary:  Negative for bladder incontinence, dysuria, flank pain and frequency.   Musculoskeletal:  Positive for arthralgias, back pain, gait problem, leg pain, neck pain and neck stiffness.   Integumentary:  Negative for color change and pallor.   Neurological:  Negative for dizziness, vertigo, seizures, syncope, facial asymmetry, speech difficulty, light-headedness, memory loss and coordination difficulties.   Hematological:  Negative for adenopathy. Does not bruise/bleed easily.   Psychiatric/Behavioral:  Negative for agitation, behavioral problems, confusion, decreased concentration, dysphoric mood, hallucinations, self-injury and suicidal ideas. The patient is not nervous/anxious and is not  hyperactive.            Past Medical History:   Diagnosis Date    Allergy     Anxiety     Asthma     Chronic pain     Complex regional pain syndrome type I of right upper extremity     COPD (chronic obstructive pulmonary disease)     Depression     Diabetes mellitus, type 2     Diabetic peripheral neuropathy     Gastritis     GERD (gastroesophageal reflux disease)     Hyperlipidemia     Lumbar radiculopathy     Lumbosacral spondylosis     Non-pressure chronic ulcer of other part of right foot limited to breakdown of skin     Obesity     Obstructive sleep apnea     Osteoarthritis, multiple sites     Polyneuropathy     Tobacco dependence      Past Surgical History:   Procedure Laterality Date    ANGIOGRAM, CORONARY, WITH LEFT HEART CATHETERIZATION N/A 08/24/2021    Procedure: Left heart cath w/ coronary angiograms;  Surgeon: Zaheer Real MD;  Location: [facility] CATH LAB;  Service: Cardiology;  Laterality: N/A;    CARPAL TUNNEL RELEASE      right    INJECTION OF ANESTHETIC AGENT AROUND MEDIAL BRANCH NERVES INNERVATING LUMBAR FACET JOINT Bilateral 04/15/2021    Procedure: BLOCK, NERVE, FACET JOINT, LUMBAR, MEDIAL BRANCH;  Surgeon: Sandra Johnson MD;  Location: [facility] PAIN MGMT;  Service: Pain Management;  Laterality: Bilateral;  Bilateral L3-S1 MBB    INJECTION OF ANESTHETIC AGENT AROUND MEDIAL BRANCH NERVES INNERVATING LUMBAR FACET JOINT Bilateral 12/23/2021    Procedure: Block-nerve-medial branch-lumbar, bilateral L4 through S1;  Surgeon: Sandra Johnson MD;  Location: [facility]IN [facility];  Service: Pain Management;  Laterality: Bilateral;  pt will bring vaccine verification card- notified in office    INJECTION OF FACET JOINT Bilateral 06/01/2020    Bilateral L3-S1 MBB - Dr Johnson    LEFT HEART CATHETERIZATION N/A 07/21/2021    Procedure: Left heart cath with possible intervention;  Surgeon: Brock Burkett DO;  Location: [facility] CATH LAB;  Service: Cardiology;  Laterality: N/A;    RBKA Right 03/16/2022     thumb surgery      right    TONSILLECTOMY  1990     Social History     Socioeconomic History    Marital status:     Number of children: 0   Occupational History    Occupation: [employer]   Tobacco Use    Smoking status: Every Day     Current packs/day: 0.25     Average packs/day: 0.3 packs/day for 23.1 years (5.8 ttl pk-yrs)     Types: Cigarettes     Start date: 11/9/2000     Passive exposure: Current    Smokeless tobacco: Never   Substance and Sexual Activity    Alcohol use: Not Currently     Alcohol/week: 0.0 standard drinks of alcohol    Drug use: Never    Sexual activity: Not Currently     Partners: Female   Social History Narrative    Lives with his parents. Unable to wear prosthesis due to pain. Still has not heard from Disability after one year and 2 months.      Family History   Problem Relation Age of Onset    Diabetes Mother     Hypertension Mother     Cancer Mother     Arthritis Mother     Epilepsy Mother     Diabetes Father     Heart disease Father     Hyperlipidemia Father     Hypertension Father     Arthritis Father     COPD Father     Rheum arthritis Sister     Arthritis Sister     Stroke Maternal Grandmother     Stroke Maternal Grandfather     Cancer Paternal Grandmother     Heart disease Paternal Grandfather     Stroke Maternal Uncle     Cancer Paternal Aunt      Review of patient's allergies indicates:   Allergen Reactions    Banana Anaphylaxis    Biaxin [clarithromycin] Other (See Comments)     Passes out      Erythromycin Anaphylaxis, Hives, Rash, Shortness Of Breath and Other (See Comments)    Tetracyclines Anaphylaxis    Zithromax tri-shelby [azithromycin] Anaphylaxis    Pineapple Other (See Comments)     Gets extremely sick    Fig Blisters and Hives    Isoniazid     Tetracycline (bulk)     Naldecon dx Rash        Objective:  Vitals:    11/29/23 1257   BP: 123/89   Pulse: 92   Resp: 20   Weight: (!) 172.8 kg (381 lb)   Height: 6' (1.829 m)   PainSc:   8         Physical  Exam  Vitals and nursing note reviewed. Exam conducted with a chaperone present.   Constitutional:       General: He is awake. He is not in acute distress.     Appearance: Normal appearance. He is obese. He is not ill-appearing or diaphoretic.   HENT:      Head: Normocephalic and atraumatic.      Nose: Nose normal.      Mouth/Throat:      Mouth: Mucous membranes are moist.      Pharynx: Oropharynx is clear.   Eyes:      Conjunctiva/sclera: Conjunctivae normal.      Pupils: Pupils are equal, round, and reactive to light.   Cardiovascular:      Rate and Rhythm: Normal rate.   Pulmonary:      Effort: Pulmonary effort is normal. No respiratory distress.   Abdominal:      Palpations: Abdomen is soft.      Tenderness: There is no guarding.   Musculoskeletal:         General: Normal range of motion.      Cervical back: Normal range of motion and neck supple. No rigidity.      Thoracic back: Tenderness present.      Lumbar back: Tenderness present.   Skin:     General: Skin is warm and dry.      Coloration: Skin is not jaundiced or pale.   Neurological:      General: No focal deficit present.      Mental Status: He is alert and oriented to person, place, and time. Mental status is at baseline.      Cranial Nerves: No cranial nerve deficit (II-XII).      Gait: Gait abnormal.   Psychiatric:         Mood and Affect: Mood normal.         Behavior: Behavior normal. Behavior is cooperative.         Thought Content: Thought content normal.           US Lower Extremity Veins Left  Narrative: EXAMINATION:  US LOWER EXTREMITY VEINS LEFT    CLINICAL HISTORY:  Pain in left leg    TECHNIQUE:  Duplex and color flow Doppler and dynamic compression was performed of the left lower extremity veins was performed.    COMPARISON:  22 February 2022    FINDINGS:  No evidence of echogenic, non-compressible thrombus seen in the visualized veins of the extremities.  Color Doppler venous waveform pattern is within normal limits.  Impression: No  evidence of deep venous thrombosis.    Ultrasound images stored and captured.    Electronically signed by: Ken Simpson  Date:    10/17/2023  Time:    10:47         Office Visit on 10/05/2023   Component Date Value Ref Range Status    Hemoglobin A1C 10/05/2023 7.1 (H)  4.5 - 6.6 % Final    Estimated Average Glucose 10/05/2023 150  mg/dL Final   Admission on 08/15/2023, Discharged on 08/15/2023   Component Date Value Ref Range Status    Sodium 08/15/2023 138  136 - 145 mmol/L Final    Potassium 08/15/2023 3.8  3.5 - 5.1 mmol/L Final    Chloride 08/15/2023 106  98 - 107 mmol/L Final    CO2 08/15/2023 25  21 - 32 mmol/L Final    Anion Gap 08/15/2023 11  7 - 16 mmol/L Final    Glucose 08/15/2023 273 (H)  74 - 106 mg/dL Final    BUN 08/15/2023 9  7 - 18 mg/dL Final    Creatinine 08/15/2023 0.91  0.70 - 1.30 mg/dL Final    BUN/Creatinine Ratio 08/15/2023 10  6 - 20 Final    Calcium 08/15/2023 9.2  8.5 - 10.1 mg/dL Final    eGFR 08/15/2023 109  >=60 mL/min/1.73m2 Final    Troponin I High Sensitivity 08/15/2023 4.4  <=60.4 pg/mL Final    PT 08/15/2023 13.5  11.7 - 14.7 seconds Final    INR 08/15/2023 1.04  <=3.30 Final    PTT 08/15/2023 28.5  25.2 - 37.3 seconds Final    Magnesium 08/15/2023 2.4 (H)  1.7 - 2.3 mg/dL Final    WBC 08/15/2023 12.54 (H)  4.50 - 11.00 K/uL Final    RBC 08/15/2023 5.59  4.60 - 6.20 M/uL Final    Hemoglobin 08/15/2023 15.9  13.5 - 18.0 g/dL Final    Hematocrit 08/15/2023 47.1  40.0 - 54.0 % Final    MCV 08/15/2023 84.3  80.0 - 96.0 fL Final    MCH 08/15/2023 28.4  27.0 - 31.0 pg Final    MCHC 08/15/2023 33.8  32.0 - 36.0 g/dL Final    RDW 08/15/2023 12.8  11.5 - 14.5 % Final    Platelet Count 08/15/2023 272  150 - 400 K/uL Final    MPV 08/15/2023 9.7  9.4 - 12.4 fL Final    Neutrophils % 08/15/2023 71.7 (H)  53.0 - 65.0 % Final    Lymphocytes % 08/15/2023 22.3 (L)  27.0 - 41.0 % Final    Monocytes % 08/15/2023 4.4  2.0 - 6.0 % Final    Eosinophils % 08/15/2023 0.6 (L)  1.0 - 4.0 % Final     Basophils % 08/15/2023 0.6  0.0 - 1.0 % Final    Immature Granulocytes % 08/15/2023 0.4  0.0 - 0.4 % Final    nRBC, Auto 08/15/2023 0.0  <=0.0 % Final    Neutrophils, Abs 08/15/2023 8.99 (H)  1.80 - 7.70 K/uL Final    Lymphocytes, Absolute 08/15/2023 2.80  1.00 - 4.80 K/uL Final    Monocytes, Absolute 08/15/2023 0.55  0.00 - 0.80 K/uL Final    Eosinophils, Absolute 08/15/2023 0.08  0.00 - 0.50 K/uL Final    Basophils, Absolute 08/15/2023 0.07  0.00 - 0.20 K/uL Final    Immature Granulocytes, Absolute 08/15/2023 0.05 (H)  0.00 - 0.04 K/uL Final    nRBC, Absolute 08/15/2023 0.00  <=0.00 x10e3/uL Final    Diff Type 08/15/2023 Auto   Final    D-Dimer 08/15/2023 0.27  0.00 - 0.47 µg/mL Final    Troponin I High Sensitivity 08/15/2023 4.6  <=60.4 pg/mL Final   Office Visit on 08/15/2023   Component Date Value Ref Range Status    POC Amphetamines 08/15/2023 Negative  Negative, Inconclusive Final    POC Barbiturates 08/15/2023 Negative  Negative, Inconclusive Final    POC Benzodiazepines 08/15/2023 Negative  Negative, Inconclusive Final    POC Cocaine 08/15/2023 Negative  Negative, Inconclusive Final    POC THC 08/15/2023 Negative  Negative, Inconclusive Final    POC Methadone 08/15/2023 Negative  Negative, Inconclusive Final    POC Methamphetamine 08/15/2023 Negative  Negative, Inconclusive Final    POC Opiates 08/15/2023 Presumptive Positive (A)  Negative, Inconclusive Final    POC Oxycodone 08/15/2023 Negative  Negative, Inconclusive Final    POC Phencyclidine 08/15/2023 Negative  Negative, Inconclusive Final    POC Methylenedioxymethamphetamine * 08/15/2023 Negative  Negative, Inconclusive Final    POC Tricyclic Antidepressants 08/15/2023 Negative  Negative, Inconclusive Final    POC Buprenorphine 08/15/2023 Negative   Final     Acceptable 08/15/2023 Yes   Final    POC Temperature (Urine) 08/15/2023 94   Final   Office Visit on 07/05/2023   Component Date Value Ref Range Status    Glucose, Fasting  07/05/2023 170 (H)  74 - 106 mg/dL Final    Triglycerides 07/05/2023 186 (H)  35 - 150 mg/dL Final    Cholesterol 07/05/2023 115  0 - 200 mg/dL Final    HDL Cholesterol 07/05/2023 41  40 - 60 mg/dL Final    Cholesterol/HDL Ratio (Risk Factor) 07/05/2023 2.8   Final    Non-HDL 07/05/2023 74  mg/dL Final    LDL Calculated 07/05/2023 37  mg/dL Final    VLDL 07/05/2023 37  mg/dL Final         Orders Placed This Encounter   Procedures    POCT Urine Drug Screen Presump     Interpretive Information:     Negative:  No drug detected at the cut off level.   Positive:  This result represents presumptive positive for the   tested drug, other substances may yield a positive response other   than the analyte of interest. This result should be utilized for   diagnostic purpose only. Confirmation testing will be performed upon physician request only.            Requested Prescriptions     Signed Prescriptions Disp Refills    gabapentin (NEURONTIN) 600 MG tablet 90 tablet 2     Sig: Take 1 tablet (600 mg total) by mouth 3 (three) times daily.    HYDROcodone-acetaminophen (NORCO) 7.5-325 mg per tablet 60 tablet 0     Sig: Take 1 tablet by mouth every 12 (twelve) hours.    HYDROcodone-acetaminophen (NORCO) 7.5-325 mg per tablet 60 tablet 0     Sig: Take 1 tablet by mouth every 12 (twelve) hours.    HYDROcodone-acetaminophen (NORCO) 7.5-325 mg per tablet 60 tablet 0     Sig: Take 1 tablet by mouth every 12 (twelve) hours.    amitriptyline (ELAVIL) 75 MG tablet 30 tablet 2     Sig: Take one tablet at bedtime       Assessment:     1. 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 Diabetic peripheral neuropathy    3. Complex regional pain syndrome type 1 of right upper extremity    4. Lumbosacral spondylosis without myelopathy MRI [facility] 2019    5. Encounter for long-term (current) use of other medications           A's of Opioid Risk Assessment  Activity:Patient can perform ADL.   Analgesia:Patients pain is partially controlled by  current medication. Patient has tried OTC medications such as Tylenol and Ibuprofen with out relief.   Adverse Effects: Patient denies constipation or sedation.  Aberrant Behavior:  reviewed with no aberrant drug seeking/taking behavior.  Overdose reversal drug naloxone discussed    Drug screen reviewed      Plan:    Narcan December 2022.    Please see notes in epic regarding refill dates pharmacy made a mistake      Pharmacy closed on weekends     Using motorized scooter for mobility, right below-the-knee amputation    He follows diabetic educator    Follows Neurology [facility] right hand arm    Chronic right lower extremity phantom limb pain after right below-the-knee amputation    He states current medication continues help his chronic discomfort he would like to continue with conservative management    Continue exercise program as directed    Follow-up 2 months    Dr. Johnson April 2024    Bring original prescription medication bottles/container/box with labels to each visit

## 2023-11-29 ENCOUNTER — OFFICE VISIT (OUTPATIENT)
Dept: PAIN MEDICINE | Facility: CLINIC | Age: 43
End: 2023-11-29
Payer: COMMERCIAL

## 2023-11-29 VITALS
HEART RATE: 92 BPM | SYSTOLIC BLOOD PRESSURE: 123 MMHG | WEIGHT: 315 LBS | RESPIRATION RATE: 20 BRPM | DIASTOLIC BLOOD PRESSURE: 89 MMHG | BODY MASS INDEX: 42.66 KG/M2 | HEIGHT: 72 IN

## 2023-11-29 DIAGNOSIS — E11.42 DIABETIC PERIPHERAL NEUROPATHY: ICD-10-CM

## 2023-11-29 DIAGNOSIS — G90.511 COMPLEX REGIONAL PAIN SYNDROME TYPE 1 OF RIGHT UPPER EXTREMITY: Chronic | ICD-10-CM

## 2023-11-29 DIAGNOSIS — G54.6 PHANTOM PAIN FOLLOWING AMPUTATION OF LOWER LIMB: Primary | ICD-10-CM

## 2023-11-29 DIAGNOSIS — Z79.899 ENCOUNTER FOR LONG-TERM (CURRENT) USE OF OTHER MEDICATIONS: ICD-10-CM

## 2023-11-29 DIAGNOSIS — M47.817 LUMBOSACRAL SPONDYLOSIS WITHOUT MYELOPATHY: Chronic | ICD-10-CM

## 2023-11-29 PROCEDURE — 3060F POS MICROALBUMINURIA REV: CPT | Mod: CPTII,,, | Performed by: PHYSICIAN ASSISTANT

## 2023-11-29 PROCEDURE — 3079F PR MOST RECENT DIASTOLIC BLOOD PRESSURE 80-89 MM HG: ICD-10-PCS | Mod: CPTII,,, | Performed by: PHYSICIAN ASSISTANT

## 2023-11-29 PROCEDURE — 99215 OFFICE O/P EST HI 40 MIN: CPT | Mod: PBBFAC | Performed by: PHYSICIAN ASSISTANT

## 2023-11-29 PROCEDURE — 99999PBSHW POCT URINE DRUG SCREEN PRESUMP: ICD-10-PCS | Mod: PBBFAC,,,

## 2023-11-29 PROCEDURE — 1159F MED LIST DOCD IN RCRD: CPT | Mod: CPTII,,, | Performed by: PHYSICIAN ASSISTANT

## 2023-11-29 PROCEDURE — 3066F NEPHROPATHY DOC TX: CPT | Mod: CPTII,,, | Performed by: PHYSICIAN ASSISTANT

## 2023-11-29 PROCEDURE — 99214 OFFICE O/P EST MOD 30 MIN: CPT | Mod: S$PBB,,, | Performed by: PHYSICIAN ASSISTANT

## 2023-11-29 PROCEDURE — 3079F DIAST BP 80-89 MM HG: CPT | Mod: CPTII,,, | Performed by: PHYSICIAN ASSISTANT

## 2023-11-29 PROCEDURE — 3074F PR MOST RECENT SYSTOLIC BLOOD PRESSURE < 130 MM HG: ICD-10-PCS | Mod: CPTII,,, | Performed by: PHYSICIAN ASSISTANT

## 2023-11-29 PROCEDURE — 3060F PR POS MICROALBUMINURIA RESULT DOCUMENTED/REVIEW: ICD-10-PCS | Mod: CPTII,,, | Performed by: PHYSICIAN ASSISTANT

## 2023-11-29 PROCEDURE — 1159F PR MEDICATION LIST DOCUMENTED IN MEDICAL RECORD: ICD-10-PCS | Mod: CPTII,,, | Performed by: PHYSICIAN ASSISTANT

## 2023-11-29 PROCEDURE — 3008F BODY MASS INDEX DOCD: CPT | Mod: CPTII,,, | Performed by: PHYSICIAN ASSISTANT

## 2023-11-29 PROCEDURE — 80305 DRUG TEST PRSMV DIR OPT OBS: CPT | Mod: PBBFAC | Performed by: PHYSICIAN ASSISTANT

## 2023-11-29 PROCEDURE — 3008F PR BODY MASS INDEX (BMI) DOCUMENTED: ICD-10-PCS | Mod: CPTII,,, | Performed by: PHYSICIAN ASSISTANT

## 2023-11-29 PROCEDURE — 3051F HG A1C>EQUAL 7.0%<8.0%: CPT | Mod: CPTII,,, | Performed by: PHYSICIAN ASSISTANT

## 2023-11-29 PROCEDURE — 99999PBSHW POCT URINE DRUG SCREEN PRESUMP: Mod: PBBFAC,,,

## 2023-11-29 PROCEDURE — 3066F PR DOCUMENTATION OF TREATMENT FOR NEPHROPATHY: ICD-10-PCS | Mod: CPTII,,, | Performed by: PHYSICIAN ASSISTANT

## 2023-11-29 PROCEDURE — 3051F PR MOST RECENT HEMOGLOBIN A1C LEVEL 7.0 - < 8.0%: ICD-10-PCS | Mod: CPTII,,, | Performed by: PHYSICIAN ASSISTANT

## 2023-11-29 PROCEDURE — 99214 PR OFFICE/OUTPT VISIT, EST, LEVL IV, 30-39 MIN: ICD-10-PCS | Mod: S$PBB,,, | Performed by: PHYSICIAN ASSISTANT

## 2023-11-29 PROCEDURE — 3074F SYST BP LT 130 MM HG: CPT | Mod: CPTII,,, | Performed by: PHYSICIAN ASSISTANT

## 2023-11-29 RX ORDER — HYDROCODONE BITARTRATE AND ACETAMINOPHEN 7.5; 325 MG/1; MG/1
1 TABLET ORAL EVERY 12 HOURS
Qty: 60 TABLET | Refills: 0 | Status: CANCELLED | OUTPATIENT
Start: 2023-11-29

## 2023-11-29 RX ORDER — AMITRIPTYLINE HYDROCHLORIDE 75 MG/1
TABLET ORAL
Qty: 30 TABLET | Refills: 2 | Status: SHIPPED | OUTPATIENT
Start: 2023-11-29 | End: 2024-02-29 | Stop reason: SDUPTHER

## 2023-11-29 RX ORDER — GABAPENTIN 600 MG/1
600 TABLET ORAL 3 TIMES DAILY
Qty: 90 TABLET | Refills: 2 | Status: SHIPPED | OUTPATIENT
Start: 2023-11-29 | End: 2024-02-29 | Stop reason: SDUPTHER

## 2023-11-29 RX ORDER — HYDROCODONE BITARTRATE AND ACETAMINOPHEN 7.5; 325 MG/1; MG/1
1 TABLET ORAL EVERY 12 HOURS
Qty: 60 TABLET | Refills: 0 | Status: SHIPPED | OUTPATIENT
Start: 2024-01-01 | End: 2024-02-27

## 2023-11-29 RX ORDER — HYDROCODONE BITARTRATE AND ACETAMINOPHEN 7.5; 325 MG/1; MG/1
1 TABLET ORAL EVERY 12 HOURS
Qty: 60 TABLET | Refills: 0 | Status: SHIPPED | OUTPATIENT
Start: 2023-12-02 | End: 2024-02-27

## 2023-11-29 RX ORDER — HYDROCODONE BITARTRATE AND ACETAMINOPHEN 7.5; 325 MG/1; MG/1
1 TABLET ORAL EVERY 12 HOURS
Qty: 60 TABLET | Refills: 0 | Status: SHIPPED | OUTPATIENT
Start: 2024-01-31 | End: 2024-02-27

## 2023-11-29 RX ORDER — AMITRIPTYLINE HYDROCHLORIDE 75 MG/1
TABLET ORAL
Qty: 30 TABLET | Refills: 2 | Status: SHIPPED | OUTPATIENT
Start: 2023-11-29 | End: 2023-11-29 | Stop reason: SDUPTHER

## 2024-02-20 ENCOUNTER — PATIENT MESSAGE (OUTPATIENT)
Dept: ADMINISTRATIVE | Facility: HOSPITAL | Age: 44
End: 2024-02-20

## 2024-02-21 ENCOUNTER — PATIENT OUTREACH (OUTPATIENT)
Dept: ADMINISTRATIVE | Facility: HOSPITAL | Age: 44
End: 2024-02-21

## 2024-02-21 NOTE — PROGRESS NOTES
Population Health Chart Review & Patient Outreach Details    Campaign Outreach    Updates Requested / Reviewed:  [x]  Care Team Updated    Health Maintenance Topics Addressed and Outreach Outcomes / Actions Taken:  Diabetic Eye Exam [x] Campaign Outreach. Contacted [facility]. Patient scheduled for 03/14/24. Notified patient via portal.

## 2024-02-27 NOTE — PROGRESS NOTES
Subjective:         Patient ID: Kennedy West is a 43 y.o. male.    Chief Complaint: Back Pain (Patient is having low back and bilateral knee pain.)        Pain  This is a chronic problem. The current episode started more than 1 year ago. The problem occurs daily. The problem has been unchanged. Associated symptoms include arthralgias and neck pain. Pertinent negatives include no anorexia, chest pain, chills, coughing, diaphoresis, fever, sore throat, vertigo or vomiting.     Review of Systems   Constitutional:  Negative for activity change, appetite change, chills, diaphoresis and fever.   HENT:  Negative for drooling, ear discharge, ear pain, facial swelling, nosebleeds, sore throat, trouble swallowing, voice change and goiter.    Eyes:  Negative for photophobia, pain, discharge, redness and visual disturbance.   Respiratory:  Negative for apnea, cough, choking, chest tightness, shortness of breath, wheezing and stridor.    Cardiovascular:  Negative for chest pain, palpitations and leg swelling.   Gastrointestinal:  Negative for abdominal distention, anorexia, diarrhea, rectal pain, vomiting and fecal incontinence.   Endocrine: Negative for cold intolerance, heat intolerance, polydipsia, polyphagia and polyuria.   Genitourinary:  Negative for bladder incontinence, dysuria, flank pain and frequency.   Musculoskeletal:  Positive for arthralgias, back pain, gait problem, leg pain, neck pain and neck stiffness.   Integumentary:  Negative for color change and pallor.   Neurological:  Negative for dizziness, vertigo, seizures, syncope, facial asymmetry, speech difficulty, light-headedness, memory loss and coordination difficulties.   Hematological:  Negative for adenopathy. Does not bruise/bleed easily.   Psychiatric/Behavioral:  Negative for agitation, behavioral problems, confusion, decreased concentration, dysphoric mood, hallucinations, self-injury and suicidal ideas. The patient is not nervous/anxious  Progress Notes by Marie Wheeler CMA at 10/29/2019 11:00 AM     Author: Marie Wheeler CMA Service: Psychiatry Author Type: Certified Medical Assistant    Filed: 10/30/2019 11:18 AM Encounter Date: 10/29/2019 Status: Signed    : Marie Wheeler CMA (Certified Medical Assistant)       Pt is here for psychiatric follow up and medication management.  Here with  staff.              MN :          and is not hyperactive.            Past Medical History:   Diagnosis Date    Allergy     Anxiety     Asthma     Chronic pain     Complex regional pain syndrome type I of right upper extremity     COPD (chronic obstructive pulmonary disease)     Depression     Diabetes mellitus, type 2     Diabetic peripheral neuropathy     Gastritis     GERD (gastroesophageal reflux disease)     Hyperlipidemia     Lumbar radiculopathy     Lumbosacral spondylosis     Non-pressure chronic ulcer of other part of right foot limited to breakdown of skin     Obesity     Obstructive sleep apnea     Osteoarthritis, multiple sites     Polyneuropathy     Tobacco dependence      Past Surgical History:   Procedure Laterality Date    ANGIOGRAM, CORONARY, WITH LEFT HEART CATHETERIZATION N/A 08/24/2021    Procedure: Left heart cath w/ coronary angiograms;  Surgeon: Zaheer Real MD;  Location: [facility] CATH LAB;  Service: Cardiology;  Laterality: N/A;    CARPAL TUNNEL RELEASE      right    INJECTION OF ANESTHETIC AGENT AROUND MEDIAL BRANCH NERVES INNERVATING LUMBAR FACET JOINT Bilateral 04/15/2021    Procedure: BLOCK, NERVE, FACET JOINT, LUMBAR, MEDIAL BRANCH;  Surgeon: Sandra Johnson MD;  Location: [facility] PAIN [facility];  Service: Pain Management;  Laterality: Bilateral;  Bilateral L3-S1 MBB    INJECTION OF ANESTHETIC AGENT AROUND MEDIAL BRANCH NERVES INNERVATING LUMBAR FACET JOINT Bilateral 12/23/2021    Procedure: Block-nerve-medial branch-lumbar, bilateral L4 through S1;  Surgeon: Sandra Johnson MD;  Location: [facility] PAIN [facility];  Service: Pain Management;  Laterality: Bilateral;  pt will bring vaccine verification card- notified in office    INJECTION OF FACET JOINT Bilateral 06/01/2020    Bilateral L3-S1 MBB - Dr Johnson    LEFT HEART CATHETERIZATION N/A 07/21/2021    Procedure: Left heart cath with possible intervention;  Surgeon: Brock Burkett DO;  Location: [facility] CATH LAB;  Service: Cardiology;  Laterality: N/A;    RBKA Right  03/16/2022    thumb surgery      right    TONSILLECTOMY  1990     Social History     Socioeconomic History    Marital status:     Number of children: 0   Occupational History    Occupation: [employer]   Tobacco Use    Smoking status: Every Day     Current packs/day: 0.25     Average packs/day: 0.3 packs/day for 23.3 years (5.8 ttl pk-yrs)     Types: Cigarettes     Start date: 11/9/2000     Passive exposure: Current    Smokeless tobacco: Never   Substance and Sexual Activity    Alcohol use: Not Currently     Alcohol/week: 0.0 standard drinks of alcohol    Drug use: Never    Sexual activity: Not Currently     Partners: Female   Social History Narrative    Lives with his parents. Unable to wear prosthesis due to pain. Still has not heard from Disability after one year and 2 months.      Family History   Problem Relation Age of Onset    Diabetes Mother     Hypertension Mother     Cancer Mother     Arthritis Mother     Epilepsy Mother     Diabetes Father     Heart disease Father     Hyperlipidemia Father     Hypertension Father     Arthritis Father     COPD Father     Rheum arthritis Sister     Arthritis Sister     Stroke Maternal Grandmother     Stroke Maternal Grandfather     Cancer Paternal Grandmother     Heart disease Paternal Grandfather     Stroke Maternal Uncle     Cancer Paternal Aunt      Review of patient's allergies indicates:   Allergen Reactions    Banana Anaphylaxis    Biaxin [clarithromycin] Other (See Comments)     Passes out      Erythromycin Anaphylaxis, Hives, Rash, Shortness Of Breath and Other (See Comments)    Tetracyclines Anaphylaxis    Zithromax tri-shelby [azithromycin] Anaphylaxis    Pineapple Other (See Comments)     Gets extremely sick    Fig Blisters and Hives    Isoniazid     Tetracycline (bulk)     Naldecon dx Rash        Objective:  Vitals:    02/29/24 1223 02/29/24 1226   BP: 113/85    Pulse: 96    Resp: 18    Weight: (!) 172.8 kg (381 lb)    Height: 6' (1.829 m)    PainSc:    7   7   PainLoc: Back          Physical Exam  Vitals and nursing note reviewed. Exam conducted with a chaperone present.   Constitutional:       General: He is awake. He is not in acute distress.     Appearance: Normal appearance. He is obese. He is not ill-appearing or diaphoretic.   HENT:      Head: Normocephalic and atraumatic.      Nose: Nose normal.      Mouth/Throat:      Mouth: Mucous membranes are moist.      Pharynx: Oropharynx is clear.   Eyes:      Conjunctiva/sclera: Conjunctivae normal.      Pupils: Pupils are equal, round, and reactive to light.   Cardiovascular:      Rate and Rhythm: Normal rate.   Pulmonary:      Effort: Pulmonary effort is normal. No respiratory distress.   Abdominal:      Palpations: Abdomen is soft.      Tenderness: There is no guarding.   Musculoskeletal:         General: Normal range of motion.      Cervical back: Normal range of motion and neck supple. No rigidity.      Thoracic back: Tenderness present.      Lumbar back: Tenderness present.   Skin:     General: Skin is warm and dry.      Coloration: Skin is not jaundiced or pale.   Neurological:      General: No focal deficit present.      Mental Status: He is alert and oriented to person, place, and time. Mental status is at baseline.      Cranial Nerves: No cranial nerve deficit (II-XII).      Gait: Gait abnormal.   Psychiatric:         Mood and Affect: Mood normal.         Behavior: Behavior normal. Behavior is cooperative.         Thought Content: Thought content normal.           US Lower Extremity Veins Left  Narrative: EXAMINATION:  US LOWER EXTREMITY VEINS LEFT    CLINICAL HISTORY:  Pain in left leg    TECHNIQUE:  Duplex and color flow Doppler and dynamic compression was performed of the left lower extremity veins was performed.    COMPARISON:  22 February 2022    FINDINGS:  No evidence of echogenic, non-compressible thrombus seen in the visualized veins of the extremities.  Color Doppler venous waveform pattern is  within normal limits.  Impression: No evidence of deep venous thrombosis.    Ultrasound images stored and captured.    Electronically signed by: Ken Simpson  Date:    10/17/2023  Time:    10:47         Office Visit on 11/29/2023   Component Date Value Ref Range Status    POC Amphetamines 11/29/2023 Negative  Negative, Inconclusive Final    POC Barbiturates 11/29/2023 Negative  Negative, Inconclusive Final    POC Benzodiazepines 11/29/2023 Negative  Negative, Inconclusive Final    POC Cocaine 11/29/2023 Negative  Negative, Inconclusive Final    POC THC 11/29/2023 Negative  Negative, Inconclusive Final    POC Methadone 11/29/2023 Negative  Negative, Inconclusive Final    POC Methamphetamine 11/29/2023 Negative  Negative, Inconclusive Final    POC Opiates 11/29/2023 Presumptive Positive (A)  Negative, Inconclusive Final    POC Oxycodone 11/29/2023 Negative  Negative, Inconclusive Final    POC Phencyclidine 11/29/2023 Negative  Negative, Inconclusive Final    POC Methylenedioxymethamphetamine * 11/29/2023 Negative  Negative, Inconclusive Final    POC Tricyclic Antidepressants 11/29/2023 Negative  Negative, Inconclusive Final    POC Buprenorphine 11/29/2023 Negative   Final     Acceptable 11/29/2023 Yes   Final    POC Temperature (Urine) 11/29/2023 94   Final   Office Visit on 10/05/2023   Component Date Value Ref Range Status    Hemoglobin A1C 10/05/2023 7.1 (H)  4.5 - 6.6 % Final    Estimated Average Glucose 10/05/2023 150  mg/dL Final         Orders Placed This Encounter   Procedures    POCT Urine Drug Screen Presump     Interpretive Information:     Negative:  No drug detected at the cut off level.   Positive:  This result represents presumptive positive for the   tested drug, other substances may yield a positive response other   than the analyte of interest. This result should be utilized for   diagnostic purpose only. Confirmation testing will be performed upon physician request only.             Requested Prescriptions     Signed Prescriptions Disp Refills    HYDROcodone-acetaminophen (NORCO) 7.5-325 mg per tablet 60 tablet 0     Sig: Take 1 tablet by mouth every 12 (twelve) hours.    HYDROcodone-acetaminophen (NORCO) 7.5-325 mg per tablet 60 tablet 0     Sig: Take 1 tablet by mouth every 12 (twelve) hours.    diclofenac sodium (VOLTAREN ARTHRITIS PAIN) 1 % Gel 10 each 2     Sig: Apply 2 g topically 2 (two) times daily. Apply 2 grams BID to knees, elbows, hips joints bilaterally as needed    amitriptyline (ELAVIL) 75 MG tablet 30 tablet 2     Sig: Take one tablet at bedtime    gabapentin (NEURONTIN) 600 MG tablet 90 tablet 2     Sig: Take 1 tablet (600 mg total) by mouth 3 (three) times daily.    HYDROcodone-acetaminophen (NORCO) 7.5-325 mg per tablet 60 tablet 0     Sig: Take 1 tablet by mouth every 12 (twelve) hours.       Assessment:     1. Complex regional pain syndrome type 1 of right upper extremity    2. 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 Lumbosacral spondylosis without myelopathy MRI [facility] 2019    4. Encounter for long-term (current) use of other medications    5. Chronic pain of both knees    6. Diabetic peripheral neuropathy           A's of Opioid Risk Assessment  Activity:Patient can perform ADL.   Analgesia:Patients pain is partially controlled by current medication. Patient has tried OTC medications such as Tylenol and Ibuprofen with out relief.   Adverse Effects: Patient denies constipation or sedation.  Aberrant Behavior:  reviewed with no aberrant drug seeking/taking behavior.  Overdose reversal drug naloxone discussed    Drug screen reviewed      Plan:    Narcan December 2022.      Pharmacy closed on weekends     Using motorized scooter for mobility, right below-the-knee amputation    Follows diabetic educator    Follows Neurology [facility] right hand arm    Chronic right lower extremity phantom limb pain after right below-the-knee amputation    Complaining of  bilateral knee pain left greater than right worse with standing walking better with short periods resting    After discussing options she would like to continue conservative management current medication does help with his discomfort    Continue current medication    Continue exercise program as directed    Follow-up 3 months    Dr. Johnson April 2024    Bring original prescription medication bottles/container/box with labels to each visit

## 2024-02-29 ENCOUNTER — OFFICE VISIT (OUTPATIENT)
Dept: PAIN MEDICINE | Facility: CLINIC | Age: 44
End: 2024-02-29
Payer: COMMERCIAL

## 2024-02-29 VITALS
BODY MASS INDEX: 42.66 KG/M2 | HEART RATE: 96 BPM | DIASTOLIC BLOOD PRESSURE: 85 MMHG | HEIGHT: 72 IN | SYSTOLIC BLOOD PRESSURE: 113 MMHG | RESPIRATION RATE: 18 BRPM | WEIGHT: 315 LBS

## 2024-02-29 DIAGNOSIS — G90.511 COMPLEX REGIONAL PAIN SYNDROME TYPE 1 OF RIGHT UPPER EXTREMITY: Primary | Chronic | ICD-10-CM

## 2024-02-29 DIAGNOSIS — M25.561 CHRONIC PAIN OF BOTH KNEES: ICD-10-CM

## 2024-02-29 DIAGNOSIS — Z79.899 ENCOUNTER FOR LONG-TERM (CURRENT) USE OF OTHER MEDICATIONS: ICD-10-CM

## 2024-02-29 DIAGNOSIS — M47.817 LUMBOSACRAL SPONDYLOSIS WITHOUT MYELOPATHY: Chronic | ICD-10-CM

## 2024-02-29 DIAGNOSIS — E11.42 DIABETIC PERIPHERAL NEUROPATHY: ICD-10-CM

## 2024-02-29 DIAGNOSIS — M25.562 CHRONIC PAIN OF BOTH KNEES: ICD-10-CM

## 2024-02-29 DIAGNOSIS — G89.29 CHRONIC PAIN OF BOTH KNEES: ICD-10-CM

## 2024-02-29 DIAGNOSIS — G54.6 PHANTOM PAIN FOLLOWING AMPUTATION OF LOWER LIMB: ICD-10-CM

## 2024-02-29 PROCEDURE — 3079F DIAST BP 80-89 MM HG: CPT | Mod: CPTII,,, | Performed by: PHYSICIAN ASSISTANT

## 2024-02-29 PROCEDURE — 99214 OFFICE O/P EST MOD 30 MIN: CPT | Mod: S$PBB,,, | Performed by: PHYSICIAN ASSISTANT

## 2024-02-29 PROCEDURE — 1159F MED LIST DOCD IN RCRD: CPT | Mod: CPTII,,, | Performed by: PHYSICIAN ASSISTANT

## 2024-02-29 PROCEDURE — 3008F BODY MASS INDEX DOCD: CPT | Mod: CPTII,,, | Performed by: PHYSICIAN ASSISTANT

## 2024-02-29 PROCEDURE — 99999PBSHW POCT URINE DRUG SCREEN PRESUMP: Mod: PBBFAC,,,

## 2024-02-29 PROCEDURE — 3074F SYST BP LT 130 MM HG: CPT | Mod: CPTII,,, | Performed by: PHYSICIAN ASSISTANT

## 2024-02-29 PROCEDURE — 99215 OFFICE O/P EST HI 40 MIN: CPT | Mod: PBBFAC | Performed by: PHYSICIAN ASSISTANT

## 2024-02-29 PROCEDURE — 80305 DRUG TEST PRSMV DIR OPT OBS: CPT | Mod: PBBFAC | Performed by: PHYSICIAN ASSISTANT

## 2024-02-29 RX ORDER — HYDROCODONE BITARTRATE AND ACETAMINOPHEN 7.5; 325 MG/1; MG/1
1 TABLET ORAL EVERY 12 HOURS
Qty: 60 TABLET | Refills: 0 | Status: SHIPPED | OUTPATIENT
Start: 2024-03-02

## 2024-02-29 RX ORDER — HYDROCODONE BITARTRATE AND ACETAMINOPHEN 7.5; 325 MG/1; MG/1
1 TABLET ORAL EVERY 12 HOURS
Qty: 60 TABLET | Refills: 0 | Status: SHIPPED | OUTPATIENT
Start: 2024-04-02

## 2024-02-29 RX ORDER — DICLOFENAC SODIUM 10 MG/G
2 GEL TOPICAL 2 TIMES DAILY
Qty: 10 EACH | Refills: 2 | Status: SHIPPED | OUTPATIENT
Start: 2024-02-29 | End: 2024-05-29 | Stop reason: SDUPTHER

## 2024-02-29 RX ORDER — AMITRIPTYLINE HYDROCHLORIDE 75 MG/1
TABLET ORAL
Qty: 30 TABLET | Refills: 2 | Status: SHIPPED | OUTPATIENT
Start: 2024-02-29 | End: 2024-05-29 | Stop reason: SDUPTHER

## 2024-02-29 RX ORDER — GABAPENTIN 600 MG/1
600 TABLET ORAL 3 TIMES DAILY
Qty: 90 TABLET | Refills: 2 | Status: SHIPPED | OUTPATIENT
Start: 2024-02-29 | End: 2024-05-28 | Stop reason: SDUPTHER

## 2024-02-29 RX ORDER — HYDROCODONE BITARTRATE AND ACETAMINOPHEN 7.5; 325 MG/1; MG/1
1 TABLET ORAL EVERY 12 HOURS
Qty: 60 TABLET | Refills: 0 | Status: SHIPPED | OUTPATIENT
Start: 2024-05-02

## 2024-03-14 LAB
LEFT EYE DM RETINOPATHY: POSITIVE
RIGHT EYE DM RETINOPATHY: POSITIVE

## 2024-03-15 ENCOUNTER — PATIENT OUTREACH (OUTPATIENT)
Dept: ADMINISTRATIVE | Facility: HOSPITAL | Age: 44
End: 2024-03-15

## 2024-03-15 NOTE — LETTER
AUTHORIZATION FOR RELEASE OF   CONFIDENTIAL INFORMATION    Dear [facility],    We are seeing Kennedy West, date of birth 1980, in the clinic at [facility]. Karlee Liu MD is the patient's PCP. Kennedy West has an outstanding lab/procedure at the time we reviewed his chart. In order to help keep his health information updated, he has authorized us to request the following medical record(s):        (  )  MAMMOGRAM                                      (  )  COLONOSCOPY      (  )  PAP SMEAR                                          (  )  OUTSIDE LAB RESULTS     (  )  DEXA SCAN                                          ( x )  EYE EXAM            (  )  FOOT EXAM                                          (  )  ENTIRE RECORD     (  )  OUTSIDE IMMUNIZATIONS                 (  )  _______________         Please fax records to Shiraz Fishman LPN Care Coordinator at [phone].      If you have any questions, please contact Shriaz at [phone].           Patient Name: Kennedy West  : 1980  Patient Phone #: [phone]

## 2024-03-15 NOTE — PROGRESS NOTES
Population Health Chart Review & Patient Outreach Details    Campaign Outreach    Health Maintenance Topics Addressed and Outreach Outcomes / Actions Taken:  Diabetic Eye Exam [x] MORENA sent to [facility].

## 2024-04-02 ENCOUNTER — OFFICE VISIT (OUTPATIENT)
Dept: CARDIOLOGY | Facility: CLINIC | Age: 44
End: 2024-04-02
Payer: COMMERCIAL

## 2024-04-02 VITALS
HEART RATE: 86 BPM | SYSTOLIC BLOOD PRESSURE: 108 MMHG | HEIGHT: 72 IN | BODY MASS INDEX: 42.66 KG/M2 | DIASTOLIC BLOOD PRESSURE: 70 MMHG | OXYGEN SATURATION: 96 % | WEIGHT: 315 LBS

## 2024-04-02 DIAGNOSIS — E78.2 MIXED HYPERLIPIDEMIA: Chronic | ICD-10-CM

## 2024-04-02 DIAGNOSIS — I25.10 CORONARY ARTERY DISEASE, UNSPECIFIED VESSEL OR LESION TYPE, UNSPECIFIED WHETHER ANGINA PRESENT, UNSPECIFIED WHETHER NATIVE OR TRANSPLANTED HEART: Primary | ICD-10-CM

## 2024-04-02 DIAGNOSIS — R00.2 PALPITATIONS: ICD-10-CM

## 2024-04-02 DIAGNOSIS — I10 HYPERTENSION, ESSENTIAL: Chronic | ICD-10-CM

## 2024-04-02 DIAGNOSIS — I25.10 CORONARY ARTERY DISEASE INVOLVING NATIVE HEART, UNSPECIFIED VESSEL OR LESION TYPE, UNSPECIFIED WHETHER ANGINA PRESENT: Chronic | ICD-10-CM

## 2024-04-02 PROCEDURE — 93005 ELECTROCARDIOGRAM TRACING: CPT | Mod: PBBFAC | Performed by: INTERNAL MEDICINE

## 2024-04-02 PROCEDURE — 99214 OFFICE O/P EST MOD 30 MIN: CPT | Mod: S$PBB,,, | Performed by: NURSE PRACTITIONER

## 2024-04-02 PROCEDURE — 3078F DIAST BP <80 MM HG: CPT | Mod: CPTII,,, | Performed by: NURSE PRACTITIONER

## 2024-04-02 PROCEDURE — 99215 OFFICE O/P EST HI 40 MIN: CPT | Mod: PBBFAC | Performed by: NURSE PRACTITIONER

## 2024-04-02 PROCEDURE — 1160F RVW MEDS BY RX/DR IN RCRD: CPT | Mod: CPTII,,, | Performed by: NURSE PRACTITIONER

## 2024-04-02 PROCEDURE — 3008F BODY MASS INDEX DOCD: CPT | Mod: CPTII,,, | Performed by: NURSE PRACTITIONER

## 2024-04-02 PROCEDURE — 1159F MED LIST DOCD IN RCRD: CPT | Mod: CPTII,,, | Performed by: NURSE PRACTITIONER

## 2024-04-02 PROCEDURE — 93010 ELECTROCARDIOGRAM REPORT: CPT | Mod: S$PBB,,, | Performed by: INTERNAL MEDICINE

## 2024-04-02 PROCEDURE — 3074F SYST BP LT 130 MM HG: CPT | Mod: CPTII,,, | Performed by: NURSE PRACTITIONER

## 2024-04-02 RX ORDER — METOPROLOL SUCCINATE 100 MG/1
100 TABLET, EXTENDED RELEASE ORAL DAILY
Qty: 90 TABLET | Refills: 3 | Status: SHIPPED | OUTPATIENT
Start: 2024-04-02

## 2024-04-02 RX ORDER — FENOFIBRATE 48 MG/1
48 TABLET, FILM COATED ORAL DAILY
Qty: 90 TABLET | Refills: 3 | Status: SHIPPED | OUTPATIENT
Start: 2024-04-02

## 2024-04-02 RX ORDER — ATORVASTATIN CALCIUM 80 MG/1
80 TABLET, FILM COATED ORAL NIGHTLY
Qty: 90 TABLET | Refills: 3 | Status: SHIPPED | OUTPATIENT
Start: 2024-04-02

## 2024-04-02 RX ORDER — METOPROLOL SUCCINATE 50 MG/1
50 TABLET, EXTENDED RELEASE ORAL DAILY
Qty: 90 TABLET | Refills: 3 | Status: SHIPPED | OUTPATIENT
Start: 2024-04-02

## 2024-04-02 RX ORDER — ISOSORBIDE MONONITRATE 30 MG/1
60 TABLET, EXTENDED RELEASE ORAL DAILY
Qty: 90 TABLET | Refills: 3 | Status: SHIPPED | OUTPATIENT
Start: 2024-04-02

## 2024-04-02 NOTE — PROGRESS NOTES
"PCP: Karlee Liu MD    Referring Provider:     Subjective:   Kennedy West is a 43 y.o. male with hx of single vessel CAD with a 60% mid RCA stenosis (FFR 0.98 8/24/2021- Dr. Real), DM, COPD, RAKESH (intolerant of CPAP), HTN and HLD,  who presents for routine follow up. He denies complaints of chest pain, pressure, heaviness, tightness or SOB. He does have lower ext edema that is chronic and improves with elevation.      10/17/2023 presents for concerns regarding LLE pain and edema for 2 months; improved with elevation and worsened when dependent. No orthopnea or PND. NO chest pain.     3/30/2023 presents for follow up to discuss Zio results and assess affect of Imdur and increased metoprolol dose on his chest pain. He states that he has had only 2 episodes of cp since starting the med change. He still feels his heart racing when he "dose much of anything."    2/20/2023 routine follow up. He states that he has had intermittent episodes of chest pain in the last month occurring every 3-4 days. The episodes occur with the exertion of transferring from WC (RBKA) and with sitting. Each episodes is preceded by palpitations described as a heart racing and pounding sensation. He has used sublingual ntg on 2 occasions requiring 2 sublingual ntg each time. He denies orthopnea or pnd. He does have lower ext edema even above the stump on the right. He also has dizziness with standing and feels week but he states that he drinks a gallon of water per day. He has RAKESH and can not tolerate CPAP.         Fhx:  Family History   Problem Relation Age of Onset    Diabetes Mother     Hypertension Mother     Cancer Mother     Arthritis Mother     Epilepsy Mother     Diabetes Father     Heart disease Father     Hyperlipidemia Father     Hypertension Father     Arthritis Father     COPD Father     Rheum arthritis Sister     Arthritis Sister     Stroke Maternal Grandmother     Stroke Maternal Grandfather     Cancer Paternal " Grandmother     Heart disease Paternal Grandfather     Stroke Maternal Uncle     Cancer Paternal Aunt      Shx:   Social History     Socioeconomic History    Marital status:     Number of children: 0   Occupational History    Occupation: [employer]   Tobacco Use    Smoking status: Former     Current packs/day: 0.00     Average packs/day: 0.3 packs/day for 22.7 years (5.7 ttl pk-yrs)     Types: Cigarettes     Start date: 2000     Quit date: 2023     Years since quittin.6     Passive exposure: Current    Smokeless tobacco: Never   Substance and Sexual Activity    Alcohol use: Not Currently     Alcohol/week: 0.0 standard drinks of alcohol    Drug use: Never    Sexual activity: Not Currently     Partners: Female   Social History Narrative    Lives with his parents. Unable to wear prosthesis due to pain. Still has not heard from Disability after one year and 2 months.      Social Determinants of Health     Financial Resource Strain: High Risk (3/30/2024)    Overall Financial Resource Strain (CARDIA)     Difficulty of Paying Living Expenses: Very hard   Food Insecurity: Food Insecurity Present (3/30/2024)    Hunger Vital Sign     Worried About Running Out of Food in the Last Year: Often true     Ran Out of Food in the Last Year: Often true   Transportation Needs: Unmet Transportation Needs (3/30/2024)    PRAPARE - Transportation     Lack of Transportation (Medical): Yes     Lack of Transportation (Non-Medical): Yes   Physical Activity: Inactive (3/30/2024)    Exercise Vital Sign     Days of Exercise per Week: 0 days     Minutes of Exercise per Session: 0 min   Stress: No Stress Concern Present (3/30/2024)    [employer] - Occupational Stress Questionnaire     Feeling of Stress : Not at all   Social Connections: Unknown (3/30/2024)    Social Connection and Isolation Panel [NHANES]     Frequency of Communication with Friends and Family: More than three times a week      Frequency of Social Gatherings with Friends and Family: Patient declined     Active Member of Clubs or Organizations: No     Attends Club or Organization Meetings: Never     Marital Status:    Housing Stability: Low Risk  (3/30/2024)    Housing Stability Vital Sign     Unable to Pay for Housing in the Last Year: No     Number of Places Lived in the Last Year: 1     Unstable Housing in the Last Year: No       EK2024 RSR with HR 89 bpm; poor R wave progression; no significant change when compared with EKG 8/15/2023  8/15/2023 RSR with HR 93 bpm; poor R wave progression   2023 RSR with sinu arrhythmia; HR 89 bpm; poor r wave progression  2022 sinus tachycardia ;  bpm; T wvae inversion no longer evident in the inferior leads compared to EKG done 2021      Zio  monitor worn 2023-3/6/2023  Basic rhythm is sinus, avg HR 89 bpm  Rare PAC's  Rare PVC's   No pateint triggered events.      Results for orders placed during the hospital encounter of 03/10/23    Echo    Interpretation Summary  · The left ventricle is normal in size with normal systolic function.  · The estimated ejection fraction is 55%.  · Normal left ventricular diastolic function.  · Normal right ventricular size.       ECHO Results for orders placed during the hospital encounter of 21    Echo    Interpretation Summary  · The left ventricle is normal in size with concentric remodeling and normal systolic function.  · The estimated ejection fraction is 60%.  · Normal left ventricular diastolic function.  · Mild right ventricular enlargement.  · Mild right atrial enlargement.  · Normal central venous pressure (3 mmHg).  · Trivial pericardial effusion.    [facility] Results for orders placed during the hospital encounter of 21    Cardiac catheterization    Conclusion  · The Mid RCA lesion was 60% stenosed. The diagnostic FFR was measured at 0.98.  · The pre-procedure left ventricular end diastolic pressure was  11.  · The estimated blood loss was none.  · There was single vessel coronary artery disease.    The procedure log was documented by Documenter: RT Maria Teresa and verified by Zaheer Real MD.    Date: 8/24/2021  Time: 4:34 PM        Lab Results   Component Value Date     08/15/2023    K 3.8 08/15/2023     08/15/2023    CO2 25 08/15/2023    BUN 9 08/15/2023    CREATININE 0.91 08/15/2023    CALCIUM 9.2 08/15/2023    ANIONGAP 11 08/15/2023    EGFRNONAA 89 02/22/2022       Lab Results   Component Value Date    CHOL 115 07/05/2023    CHOL 120 06/22/2022    CHOL 158 07/19/2021     Lab Results   Component Value Date    HDL 41 07/05/2023    HDL 38 (L) 06/22/2022    HDL 25 (L) 07/19/2021     Lab Results   Component Value Date    LDLCALC 37 07/05/2023    LDLCALC 41 06/22/2022    LDLCALC  07/19/2021      Comment:      Unable to calculate due to one of the following values:  Cholesterol <5  HDL Cholesterol <5  Triglycerides <10 or >400     Lab Results   Component Value Date    TRIG 186 (H) 07/05/2023    TRIG 204 (H) 06/22/2022    TRIG 432 (H) 07/19/2021     Lab Results   Component Value Date    CHOLHDL 2.8 07/05/2023    CHOLHDL 3.2 06/22/2022    CHOLHDL 6.3 07/19/2021       Lab Results   Component Value Date    WBC 12.54 (H) 08/15/2023    HGB 15.9 08/15/2023    HCT 47.1 08/15/2023    MCV 84.3 08/15/2023     08/15/2023           Current Outpatient Medications:     ACCU-CHEK GUIDE TEST STRIPS Strp, USE STRIP TO CHECK GLUCOSE TWICE DAILY, Disp: , Rfl:     ACCU-CHEK SOFTCLIX LANCETS Misc, USE TO CHECK GLUCOSE TWICE DAILY, Disp: , Rfl:     albuterol (PROAIR HFA) 90 mcg/actuation inhaler, Inhale 2 puffs into the lungs every 4 (four) hours as needed for Wheezing. Rescue, Disp: 18 g, Rfl: 5    amitriptyline (ELAVIL) 75 MG tablet, Take one tablet at bedtime, Disp: 30 tablet, Rfl: 2    aspirin (ECOTRIN) 81 MG EC tablet, Take 1 tablet (81 mg total) by mouth once daily., Disp: 30 tablet, Rfl: 2     "atorvastatin (LIPITOR) 80 MG tablet, Take 1 tablet (80 mg total) by mouth every evening., Disp: 90 tablet, Rfl: 3    BD CANDIDA 2ND GEN PEN NEEDLE 32 gauge x " Ndle, USE 1 ONCE DAILY, Disp: 100 each, Rfl: 11    diclofenac sodium (VOLTAREN ARTHRITIS PAIN) 1 % Gel, Apply 2 g topically 2 (two) times daily. Apply 2 grams BID to knees, elbows, hips joints bilaterally as needed, Disp: 10 each, Rfl: 2    ergocalciferol (ERGOCALCIFEROL) 50,000 unit Cap, Take one capsule weekly for 12 weeks then reduce to one capsule monthly, Disp: 12 capsule, Rfl: 1    esomeprazole (NEXIUM) 40 MG capsule, Take 1 capsule (40 mg total) by mouth before breakfast., Disp: 90 capsule, Rfl: 1    fenofibrate (TRICOR) 48 MG tablet, Take 1 tablet (48 mg total) by mouth once daily., Disp: 90 tablet, Rfl: 3    gabapentin (NEURONTIN) 600 MG tablet, Take 1 tablet (600 mg total) by mouth 3 (three) times daily., Disp: 90 tablet, Rfl: 2    HUMALOG KWIKPEN INSULIN 100 unit/mL pen, SMARTSI Unit(s) SUB-Q 3 Times Daily, Disp: 15 mL, Rfl: 1    HYDROcodone-acetaminophen (NORCO) 7.5-325 mg per tablet, Take 1 tablet by mouth every 12 (twelve) hours., Disp: 60 tablet, Rfl: 0    HYDROcodone-acetaminophen (NORCO) 7.5-325 mg per tablet, Take 1 tablet by mouth every 12 (twelve) hours., Disp: 60 tablet, Rfl: 0    [START ON 2024] HYDROcodone-acetaminophen (NORCO) 7.5-325 mg per tablet, Take 1 tablet by mouth every 12 (twelve) hours., Disp: 60 tablet, Rfl: 0    insulin (BASAGLAR KWIKPEN U-100 INSULIN) glargine 100 units/mL SubQ pen, Inject 40 Units into the skin every evening., Disp: , Rfl:     isosorbide mononitrate (IMDUR) 30 MG 24 hr tablet, Take 2 tablets (60 mg total) by mouth once daily., Disp: 90 tablet, Rfl: 3    JARDIANCE 25 mg tablet, Take 1 tablet (25 mg total) by mouth every morning., Disp: 90 tablet, Rfl: 1    metoprolol succinate (TOPROL-XL) 100 MG 24 hr tablet, Take 1 tablet (100 mg total) by mouth once daily., Disp: 90 tablet, Rfl: 3    metoprolol " succinate (TOPROL-XL) 50 MG 24 hr tablet, Take 1 tablet (50 mg total) by mouth once daily., Disp: 90 tablet, Rfl: 3    mupirocin (BACTROBAN) 2 % ointment, Apply topically 3 (three) times daily., Disp: 15 g, Rfl: 0    nitroGLYCERIN (NITROSTAT) 0.3 MG SL tablet, Place 1 tablet (0.3 mg total) under the tongue every 5 (five) minutes as needed for Chest pain. Up to 3 times., Disp: 30 tablet, Rfl: 2    OZEMPIC 1 mg/dose (4 mg/3 mL), Inject 1 mg into the skin every 7 days., Disp: , Rfl:     tiZANidine (ZANAFLEX) 4 MG tablet, Take 1 tablet (4 mg total) by mouth 3 (three) times daily., Disp: 270 tablet, Rfl: 1  Meds reviewed but not reconciled. He did not bring his meds today.     Review of Systems   Respiratory:  Negative for shortness of breath.    Cardiovascular:  Positive for palpitations and leg swelling. Negative for chest pain, orthopnea, claudication and PND.   Neurological:  Negative for loss of consciousness.           Objective:   /70 (BP Location: Left arm, Patient Position: Sitting)   Pulse 86   Ht 6' (1.829 m)   Wt (!) 161 kg (355 lb)   SpO2 96%   BMI 48.15 kg/m²     Physical Exam  Vitals and nursing note reviewed.   Constitutional:       Appearance: Normal appearance. He is obese.      Comments: In a wheelchair   HENT:      Head: Normocephalic and atraumatic.   Neck:      Vascular: No carotid bruit.   Cardiovascular:      Rate and Rhythm: Normal rate and regular rhythm.      Pulses: Normal pulses.      Heart sounds: Normal heart sounds.   Pulmonary:      Effort: Pulmonary effort is normal.      Breath sounds: Normal breath sounds.   Abdominal:      Palpations: Abdomen is soft.   Musculoskeletal:      Cervical back: Neck supple.      Right lower leg: No edema.      Left lower leg: No edema.      Comments: Right BKA   Skin:     General: Skin is warm and dry.   Neurological:      Mental Status: He is alert.           Assessment:     1. Coronary artery disease, unspecified vessel or lesion type,  unspecified whether angina present, unspecified whether native or transplanted heart  EKG 12-lead    EKG 12-lead      2. Coronary artery disease involving native heart, unspecified vessel or lesion type, unspecified whether angina present  isosorbide mononitrate (IMDUR) 30 MG 24 hr tablet      3. Mixed hyperlipidemia  atorvastatin (LIPITOR) 80 MG tablet    fenofibrate (TRICOR) 48 MG tablet      4. Palpitations  metoprolol succinate (TOPROL-XL) 100 MG 24 hr tablet    metoprolol succinate (TOPROL-XL) 50 MG 24 hr tablet      5. Hypertension, essential              Plan:   Hypertension, essential  108/70 mmHg    Hyperlipidemia  Lab Results   Component Value Date    CHOL 115 07/05/2023    CHOL 120 06/22/2022    CHOL 158 07/19/2021     Lab Results   Component Value Date    HDL 41 07/05/2023    HDL 38 (L) 06/22/2022    HDL 25 (L) 07/19/2021     Lab Results   Component Value Date    LDLCALC 37 07/05/2023    LDLCALC 41 06/22/2022    LDLCALC  07/19/2021      Comment:      Unable to calculate due to one of the following values:  Cholesterol <5  HDL Cholesterol <5  Triglycerides <10 or >400     Lab Results   Component Value Date    TRIG 186 (H) 07/05/2023    TRIG 204 (H) 06/22/2022    TRIG 432 (H) 07/19/2021       Lab Results   Component Value Date    CHOLHDL 2.8 07/05/2023    CHOLHDL 3.2 06/22/2022    CHOLHDL 6.3 07/19/2021     Lipitor 80 mg po daily  Tricor 48 mg po daily  Lipids followed by PCP    Coronary artery disease involving native heart  Asymptomatic  Continue ASA/Lipitor      RTC 6 months

## 2024-04-02 NOTE — ASSESSMENT & PLAN NOTE
Lab Results   Component Value Date    CHOL 115 07/05/2023    CHOL 120 06/22/2022    CHOL 158 07/19/2021     Lab Results   Component Value Date    HDL 41 07/05/2023    HDL 38 (L) 06/22/2022    HDL 25 (L) 07/19/2021     Lab Results   Component Value Date    LDLCALC 37 07/05/2023    LDLCALC 41 06/22/2022    LDLCALC  07/19/2021      Comment:      Unable to calculate due to one of the following values:  Cholesterol <5  HDL Cholesterol <5  Triglycerides <10 or >400     Lab Results   Component Value Date    TRIG 186 (H) 07/05/2023    TRIG 204 (H) 06/22/2022    TRIG 432 (H) 07/19/2021       Lab Results   Component Value Date    CHOLHDL 2.8 07/05/2023    CHOLHDL 3.2 06/22/2022    CHOLHDL 6.3 07/19/2021     Lipitor 80 mg po daily  Tricor 48 mg po daily  Lipids followed by PCP

## 2024-04-03 LAB
OHS QRS DURATION: 104 MS
OHS QTC CALCULATION: 464 MS

## 2024-05-02 ENCOUNTER — OFFICE VISIT (OUTPATIENT)
Dept: FAMILY MEDICINE | Facility: CLINIC | Age: 44
End: 2024-05-02
Payer: COMMERCIAL

## 2024-05-02 VITALS
OXYGEN SATURATION: 96 % | BODY MASS INDEX: 45.1 KG/M2 | WEIGHT: 315 LBS | HEART RATE: 85 BPM | HEIGHT: 70 IN | TEMPERATURE: 99 F | RESPIRATION RATE: 18 BRPM | DIASTOLIC BLOOD PRESSURE: 85 MMHG | SYSTOLIC BLOOD PRESSURE: 130 MMHG

## 2024-05-02 DIAGNOSIS — K21.9 GASTROESOPHAGEAL REFLUX DISEASE, UNSPECIFIED WHETHER ESOPHAGITIS PRESENT: ICD-10-CM

## 2024-05-02 DIAGNOSIS — G90.511 COMPLEX REGIONAL PAIN SYNDROME TYPE 1 OF RIGHT UPPER EXTREMITY: Chronic | ICD-10-CM

## 2024-05-02 DIAGNOSIS — G54.6 PHANTOM PAIN FOLLOWING AMPUTATION OF LOWER LIMB: Chronic | ICD-10-CM

## 2024-05-02 DIAGNOSIS — Z89.511 HX OF RIGHT BKA: ICD-10-CM

## 2024-05-02 DIAGNOSIS — Z79.4 TYPE 2 DIABETES MELLITUS WITH OTHER SPECIFIED COMPLICATION, WITH LONG-TERM CURRENT USE OF INSULIN: ICD-10-CM

## 2024-05-02 DIAGNOSIS — I10 HYPERTENSION, ESSENTIAL: Primary | ICD-10-CM

## 2024-05-02 DIAGNOSIS — E11.69 TYPE 2 DIABETES MELLITUS WITH OTHER SPECIFIED COMPLICATION, WITH LONG-TERM CURRENT USE OF INSULIN: ICD-10-CM

## 2024-05-02 DIAGNOSIS — E55.9 VITAMIN D DEFICIENCY: ICD-10-CM

## 2024-05-02 DIAGNOSIS — J44.9 CHRONIC OBSTRUCTIVE PULMONARY DISEASE, UNSPECIFIED COPD TYPE: ICD-10-CM

## 2024-05-02 LAB
ALBUMIN SERPL BCP-MCNC: 3.7 G/DL (ref 3.5–5)
ALBUMIN/GLOB SERPL: 1.1 {RATIO}
ALP SERPL-CCNC: 160 U/L (ref 45–115)
ALT SERPL W P-5'-P-CCNC: 24 U/L (ref 16–61)
ANION GAP SERPL CALCULATED.3IONS-SCNC: 14 MMOL/L (ref 7–16)
AST SERPL W P-5'-P-CCNC: 23 U/L (ref 15–37)
BASOPHILS # BLD AUTO: 0.06 K/UL (ref 0–0.2)
BASOPHILS NFR BLD AUTO: 0.5 % (ref 0–1)
BILIRUB SERPL-MCNC: 0.6 MG/DL (ref ?–1.2)
BUN SERPL-MCNC: 7 MG/DL (ref 7–18)
BUN/CREAT SERPL: 12 (ref 6–20)
CALCIUM SERPL-MCNC: 9.1 MG/DL (ref 8.5–10.1)
CHLORIDE SERPL-SCNC: 102 MMOL/L (ref 98–107)
CHOLEST SERPL-MCNC: 131 MG/DL (ref 0–200)
CHOLEST/HDLC SERPL: 3.5 {RATIO}
CO2 SERPL-SCNC: 25 MMOL/L (ref 21–32)
CREAT SERPL-MCNC: 0.6 MG/DL (ref 0.7–1.3)
CREAT UR-MCNC: 44 MG/DL (ref 39–259)
DIFFERENTIAL METHOD BLD: ABNORMAL
EGFR (NO RACE VARIABLE) (RUSH/TITUS): 124 ML/MIN/1.73M2
EOSINOPHIL # BLD AUTO: 0.14 K/UL (ref 0–0.5)
EOSINOPHIL NFR BLD AUTO: 1.3 % (ref 1–4)
ERYTHROCYTE [DISTWIDTH] IN BLOOD BY AUTOMATED COUNT: 13.2 % (ref 11.5–14.5)
EST. AVERAGE GLUCOSE BLD GHB EST-MCNC: 186 MG/DL
GLOBULIN SER-MCNC: 3.4 G/DL (ref 2–4)
GLUCOSE SERPL-MCNC: 185 MG/DL (ref 74–106)
HBA1C MFR BLD HPLC: 8.1 % (ref 4.5–6.6)
HCT VFR BLD AUTO: 45.4 % (ref 40–54)
HDLC SERPL-MCNC: 37 MG/DL (ref 40–60)
HGB BLD-MCNC: 14.6 G/DL (ref 13.5–18)
IMM GRANULOCYTES # BLD AUTO: 0.03 K/UL (ref 0–0.04)
IMM GRANULOCYTES NFR BLD: 0.3 % (ref 0–0.4)
LDLC SERPL CALC-MCNC: 45 MG/DL
LDLC/HDLC SERPL: 1.2 {RATIO}
LYMPHOCYTES # BLD AUTO: 3.11 K/UL (ref 1–4.8)
LYMPHOCYTES NFR BLD AUTO: 28.1 % (ref 27–41)
MCH RBC QN AUTO: 27.9 PG (ref 27–31)
MCHC RBC AUTO-ENTMCNC: 32.2 G/DL (ref 32–36)
MCV RBC AUTO: 86.6 FL (ref 80–96)
MICROALBUMIN UR-MCNC: 2.9 MG/DL (ref 0–2.8)
MICROALBUMIN/CREAT RATIO PNL UR: 65.9 MG/G (ref 0–30)
MONOCYTES # BLD AUTO: 0.46 K/UL (ref 0–0.8)
MONOCYTES NFR BLD AUTO: 4.2 % (ref 2–6)
MPC BLD CALC-MCNC: 10.2 FL (ref 9.4–12.4)
NEUTROPHILS # BLD AUTO: 7.25 K/UL (ref 1.8–7.7)
NEUTROPHILS NFR BLD AUTO: 65.6 % (ref 53–65)
NONHDLC SERPL-MCNC: 94 MG/DL
NRBC # BLD AUTO: 0 X10E3/UL
NRBC, AUTO (.00): 0 %
PLATELET # BLD AUTO: 276 K/UL (ref 150–400)
POTASSIUM SERPL-SCNC: 3.7 MMOL/L (ref 3.5–5.1)
PROT SERPL-MCNC: 7.1 G/DL (ref 6.4–8.2)
RBC # BLD AUTO: 5.24 M/UL (ref 4.6–6.2)
SODIUM SERPL-SCNC: 137 MMOL/L (ref 136–145)
TRIGL SERPL-MCNC: 243 MG/DL (ref 35–150)
VLDLC SERPL-MCNC: 49 MG/DL
WBC # BLD AUTO: 11.05 K/UL (ref 4.5–11)

## 2024-05-02 PROCEDURE — 3052F HG A1C>EQUAL 8.0%<EQUAL 9.0%: CPT | Mod: CPTII,,, | Performed by: FAMILY MEDICINE

## 2024-05-02 PROCEDURE — 1160F RVW MEDS BY RX/DR IN RCRD: CPT | Mod: CPTII,,, | Performed by: FAMILY MEDICINE

## 2024-05-02 PROCEDURE — 80061 LIPID PANEL: CPT | Mod: ,,, | Performed by: CLINICAL MEDICAL LABORATORY

## 2024-05-02 PROCEDURE — 99214 OFFICE O/P EST MOD 30 MIN: CPT | Mod: ,,, | Performed by: FAMILY MEDICINE

## 2024-05-02 PROCEDURE — 3060F POS MICROALBUMINURIA REV: CPT | Mod: CPTII,,, | Performed by: FAMILY MEDICINE

## 2024-05-02 PROCEDURE — 3075F SYST BP GE 130 - 139MM HG: CPT | Mod: CPTII,,, | Performed by: FAMILY MEDICINE

## 2024-05-02 PROCEDURE — 82570 ASSAY OF URINE CREATININE: CPT | Mod: ,,, | Performed by: CLINICAL MEDICAL LABORATORY

## 2024-05-02 PROCEDURE — 3008F BODY MASS INDEX DOCD: CPT | Mod: CPTII,,, | Performed by: FAMILY MEDICINE

## 2024-05-02 PROCEDURE — 3079F DIAST BP 80-89 MM HG: CPT | Mod: CPTII,,, | Performed by: FAMILY MEDICINE

## 2024-05-02 PROCEDURE — 1159F MED LIST DOCD IN RCRD: CPT | Mod: CPTII,,, | Performed by: FAMILY MEDICINE

## 2024-05-02 PROCEDURE — 82043 UR ALBUMIN QUANTITATIVE: CPT | Mod: ,,, | Performed by: CLINICAL MEDICAL LABORATORY

## 2024-05-02 PROCEDURE — 80053 COMPREHEN METABOLIC PANEL: CPT | Mod: ,,, | Performed by: CLINICAL MEDICAL LABORATORY

## 2024-05-02 PROCEDURE — 83036 HEMOGLOBIN GLYCOSYLATED A1C: CPT | Mod: ,,, | Performed by: CLINICAL MEDICAL LABORATORY

## 2024-05-02 PROCEDURE — 3066F NEPHROPATHY DOC TX: CPT | Mod: CPTII,,, | Performed by: FAMILY MEDICINE

## 2024-05-02 PROCEDURE — 85025 COMPLETE CBC W/AUTO DIFF WBC: CPT | Mod: ,,, | Performed by: CLINICAL MEDICAL LABORATORY

## 2024-05-02 RX ORDER — SEMAGLUTIDE 0.68 MG/ML
0.5 INJECTION, SOLUTION SUBCUTANEOUS
COMMUNITY
Start: 2024-04-27

## 2024-05-02 RX ORDER — INSULIN ASPART 100 [IU]/ML
14 INJECTION, SOLUTION INTRAVENOUS; SUBCUTANEOUS
COMMUNITY
Start: 2024-01-26

## 2024-05-02 NOTE — PROGRESS NOTES
"New Clinic Note    Kennedy West is a 43 y.o. male     CC:   Chief Complaint   Patient presents with    Annual Exam     Patient stated he is here for a 6 month general health evaluation for chronic problems, is not fasting for labs this afternoon, and needs refills sent into [email or web address] in [place]. Yon Garcia does his pain medication, JINA Perkins does heart and blood pressure medications, and JINA Elias at [facility] does his diabetic medications.     Diabetes    Gastroesophageal Reflux    Hyperlipidemia    Hypertension    Medication Refill    Sinus Problem     One week of allergy type sinus drainage. Stated he quit smoking back October 2024.         Subjective    History of Present Illness    Patient is for evaluation of chronic medical problems. Patient needs refills. Shane  is tolerating medications well without side effects.       Current Outpatient Medications:     ACCU-CHEK GUIDE TEST STRIPS Strp, USE STRIP TO CHECK GLUCOSE TWICE DAILY, Disp: , Rfl:     ACCU-CHEK SOFTCLIX LANCETS Misc, USE TO CHECK GLUCOSE TWICE DAILY, Disp: , Rfl:     amitriptyline (ELAVIL) 75 MG tablet, Take one tablet at bedtime, Disp: 30 tablet, Rfl: 2    aspirin (ECOTRIN) 81 MG EC tablet, Take 1 tablet (81 mg total) by mouth once daily., Disp: 30 tablet, Rfl: 2    atorvastatin (LIPITOR) 80 MG tablet, Take 1 tablet (80 mg total) by mouth every evening., Disp: 90 tablet, Rfl: 3    BD CANDIDA 2ND GEN PEN NEEDLE 32 gauge x 5/32" Ndle, USE 1 ONCE DAILY, Disp: 100 each, Rfl: 11    diclofenac sodium (VOLTAREN ARTHRITIS PAIN) 1 % Gel, Apply 2 g topically 2 (two) times daily. Apply 2 grams BID to knees, elbows, hips joints bilaterally as needed, Disp: 10 each, Rfl: 2    fenofibrate (TRICOR) 48 MG tablet, Take 1 tablet (48 mg total) by mouth once daily., Disp: 90 tablet, Rfl: 3    gabapentin (NEURONTIN) 600 MG tablet, Take 1 tablet (600 mg total) by mouth 3 (three) times daily., Disp: 90 tablet, Rfl: 2    HYDROcodone-acetaminophen " (NORCO) 7.5-325 mg per tablet, Take 1 tablet by mouth every 12 (twelve) hours., Disp: 60 tablet, Rfl: 0    HYDROcodone-acetaminophen (NORCO) 7.5-325 mg per tablet, Take 1 tablet by mouth every 12 (twelve) hours., Disp: 60 tablet, Rfl: 0    HYDROcodone-acetaminophen (NORCO) 7.5-325 mg per tablet, Take 1 tablet by mouth every 12 (twelve) hours., Disp: 60 tablet, Rfl: 0    insulin (BASAGLAR KWIKPEN U-100 INSULIN) glargine 100 units/mL SubQ pen, Inject 40 Units into the skin every evening., Disp: , Rfl:     insulin aspart U-100 (NOVOLOG) 100 unit/mL (3 mL) InPn pen, Inject 14 Units into the skin 3 (three) times daily with meals., Disp: , Rfl:     isosorbide mononitrate (IMDUR) 30 MG 24 hr tablet, Take 2 tablets (60 mg total) by mouth once daily., Disp: 90 tablet, Rfl: 3    JARDIANCE 25 mg tablet, Take 1 tablet (25 mg total) by mouth every morning., Disp: 90 tablet, Rfl: 1    metoprolol succinate (TOPROL-XL) 100 MG 24 hr tablet, Take 1 tablet (100 mg total) by mouth once daily., Disp: 90 tablet, Rfl: 3    metoprolol succinate (TOPROL-XL) 50 MG 24 hr tablet, Take 1 tablet (50 mg total) by mouth once daily., Disp: 90 tablet, Rfl: 3    nitroGLYCERIN (NITROSTAT) 0.3 MG SL tablet, Place 1 tablet (0.3 mg total) under the tongue every 5 (five) minutes as needed for Chest pain. Up to 3 times., Disp: 30 tablet, Rfl: 2    OZEMPIC 0.25 mg or 0.5 mg (2 mg/3 mL) pen injector, Inject 0.5 mg into the skin every 7 days., Disp: , Rfl:     OZEMPIC 1 mg/dose (4 mg/3 mL), Inject 1 mg into the skin every 7 days., Disp: , Rfl:     albuterol (PROAIR HFA) 90 mcg/actuation inhaler, Inhale 2 puffs into the lungs every 4 (four) hours as needed for Wheezing. Rescue, Disp: 18 g, Rfl: 5    ergocalciferol (ERGOCALCIFEROL) 50,000 unit Cap, Take one capsule weekly for 12 weeks then reduce to one capsule monthly, Disp: 12 capsule, Rfl: 1    esomeprazole (NEXIUM) 40 MG capsule, Take 1 capsule (40 mg total) by mouth before breakfast., Disp: 90 capsule,  Rfl: 1    HUMALOG KWIKPEN INSULIN 100 unit/mL pen, SMARTSI Unit(s) SUB-Q 3 Times Daily (Patient not taking: Reported on 2024), Disp: 15 mL, Rfl: 1    mupirocin (BACTROBAN) 2 % ointment, Apply topically 3 (three) times daily., Disp: 15 g, Rfl: 0    tiZANidine (ZANAFLEX) 4 MG tablet, Take 1 tablet (4 mg total) by mouth 3 (three) times daily., Disp: 270 tablet, Rfl: 1     Past Medical History:   Diagnosis Date    Allergy     Anxiety     Asthma     Chronic pain     Complex regional pain syndrome type I of right upper extremity     COPD (chronic obstructive pulmonary disease)     Depression     Diabetes mellitus, type 2     Diabetic peripheral neuropathy     Gastritis     GERD (gastroesophageal reflux disease)     Hyperlipidemia     Lumbar radiculopathy     Lumbosacral spondylosis     Non-pressure chronic ulcer of other part of right foot limited to breakdown of skin     Obesity     Obstructive sleep apnea     Osteoarthritis, multiple sites     Polyneuropathy     Tobacco dependence         Family History   Problem Relation Name Age of Onset    Diabetes Mother Ofelia Houseon     Hypertension Mother Ofelia West     Cancer Mother Ofelia Brett     Arthritis Mother Ofelia Houseon     Epilepsy Mother Ofelia West     Diabetes Father Darryl West     Heart disease Father Darryl West     Hyperlipidemia Father Darryl West     Hypertension Father Darryl West     Arthritis Father Darryl West     COPD Father Darryl Brett     Rheum arthritis Sister Katelyn Aguilera     Arthritis Sister Katelyn Aguilera     Stroke Maternal Grandmother Citlaly Rushing     Stroke Maternal Grandfather Junious Rushing     Cancer Paternal Grandmother Debora West     Heart disease Paternal Grandfather Misha Houseon     Stroke Maternal Uncle Bo Rushing     Cancer Paternal Aunt Taylor Liu         Past Surgical History:   Procedure Laterality Date    ANGIOGRAM, CORONARY, WITH LEFT  HEART CATHETERIZATION N/A 2021    Procedure: Left heart cath w/ coronary angiograms;  Surgeon: Zaheer Real MD;  Location: [facility] CATH LAB;  Service: Cardiology;  Laterality: N/A;    CARPAL TUNNEL RELEASE      right    INJECTION OF ANESTHETIC AGENT AROUND MEDIAL BRANCH NERVES INNERVATING LUMBAR FACET JOINT Bilateral 04/15/2021    Procedure: BLOCK, NERVE, FACET JOINT, LUMBAR, MEDIAL BRANCH;  Surgeon: Sandra Johnson MD;  Location: [facility];  Service: Pain Management;  Laterality: Bilateral;  Bilateral L3-S1 MBB    INJECTION OF ANESTHETIC AGENT AROUND MEDIAL BRANCH NERVES INNERVATING LUMBAR FACET JOINT Bilateral 2021    Procedure: Block-nerve-medial branch-lumbar, bilateral L4 through S1;  Surgeon: Sandra Johnson MD;  Location: [facility];  Service: Pain Management;  Laterality: Bilateral;  pt will bring vaccine verification card- notified in office    INJECTION OF FACET JOINT Bilateral 2020    Bilateral L3-S1 MBB - Dr Johnson    LEFT HEART CATHETERIZATION N/A 2021    Procedure: Left heart cath with possible intervention;  Surgeon: Brock Burkett DO;  Location: [facility] CATH LAB;  Service: Cardiology;  Laterality: N/A;    RBKA Right 2022    thumb surgery      right    TONSILLECTOMY          Social History     Socioeconomic History    Marital status:     Number of children: 0   Occupational History    Occupation: [employer]   Tobacco Use    Smoking status: Former     Current packs/day: 0.00     Average packs/day: 0.3 packs/day for 22.7 years (5.7 ttl pk-yrs)     Types: Cigarettes     Start date: 2000     Quit date: 2023     Years since quittin.7     Passive exposure: Current    Smokeless tobacco: Never   Substance and Sexual Activity    Alcohol use: Not Currently     Alcohol/week: 0.0 standard drinks of alcohol    Drug use: Never    Sexual activity: Not Currently     Partners: Female   Social History Narrative    Lives with his  "parents. Unable to wear prosthesis due to pain. Finally heard from Disability after one year and 8 months and approved for August 2023.      Social Determinants of Health     Financial Resource Strain: High Risk (3/30/2024)    Overall Financial Resource Strain (CARDIA)     Difficulty of Paying Living Expenses: Very hard   Food Insecurity: Food Insecurity Present (3/30/2024)    Hunger Vital Sign     Worried About Running Out of Food in the Last Year: Often true     Ran Out of Food in the Last Year: Often true   Transportation Needs: Unmet Transportation Needs (3/30/2024)    PRAPARE - Transportation     Lack of Transportation (Medical): Yes     Lack of Transportation (Non-Medical): Yes   Physical Activity: Inactive (3/30/2024)    Exercise Vital Sign     Days of Exercise per Week: 0 days     Minutes of Exercise per Session: 0 min   Stress: No Stress Concern Present (3/30/2024)    [employer] [place] [employer] - Occupational Stress Questionnaire     Feeling of Stress : Not at all   Housing Stability: Low Risk  (3/30/2024)    Housing Stability Vital Sign     Unable to Pay for Housing in the Last Year: No     Number of Places Lived in the Last Year: 1     Unstable Housing in the Last Year: No        Review of Systems   Constitutional:  Negative for fatigue and fever.   HENT:  Negative for ear pain, postnasal drip, rhinorrhea and sinus pressure/congestion.    Respiratory:  Negative for cough and shortness of breath.    Cardiovascular:  Negative for chest pain.   Gastrointestinal:  Negative for abdominal pain, diarrhea, nausea and vomiting.   Genitourinary:  Negative for dysuria.   Neurological:  Negative for headaches.        /85 (BP Location: Left arm, Patient Position: Sitting, BP Method: Large (Automatic))   Pulse 85   Temp 98.6 °F (37 °C) (Oral)   Resp 18   Ht 5' 10" (1.778 m)   Wt (!) 161 kg (355 lb)   SpO2 96%   BMI 50.94 kg/m²      Physical Exam  HENT:      Head: Normocephalic and atraumatic. "   Cardiovascular:      Rate and Rhythm: Normal rate and regular rhythm.   Pulmonary:      Effort: Pulmonary effort is normal.      Breath sounds: Normal breath sounds.   Musculoskeletal:      Right Lower Extremity: Right leg is amputated below knee.   Neurological:      Mental Status: He is alert and oriented to person, place, and time.      Gait: Gait abnormal.      Comments: Patient is in a scooter.   Psychiatric:         Mood and Affect: Mood normal.         Behavior: Behavior normal.          Assessment and Plan      ICD-10-CM ICD-9-CM   1. Hypertension, essential  I10 401.9   2. Chronic obstructive pulmonary disease, unspecified COPD type  J44.9 496   3. Vitamin D deficiency  E55.9 268.9   4. Gastroesophageal reflux disease, unspecified whether esophagitis present  K21.9 530.81   5. Complex regional pain syndrome type 1 of right upper extremity  G90.511 337.21   6. Type 2 diabetes mellitus with other specified complication, with long-term current use of insulin  E11.69 250.80    Z79.4 V58.67   7. Hx of right BKA  Z89.511 V49.75   8. Phantom pain following amputation of lower limb  G54.6 353.6        1. Hypertension, essential  The current medical regimen is effective;  continue present plan and medications.  -     Lipid Panel; Future; Expected date: 05/02/2024  -     Comprehensive Metabolic Panel; Future; Expected date: 05/02/2024  -     CBC Auto Differential; Future; Expected date: 05/02/2024    2. Chronic obstructive pulmonary disease, unspecified COPD type  The current medical regimen is effective;  continue present plan and medications.  -     albuterol (PROAIR HFA) 90 mcg/actuation inhaler; Inhale 2 puffs into the lungs every 4 (four) hours as needed for Wheezing. Rescue  Dispense: 18 g; Refill: 5    3. Vitamin D deficiency  The current medical regimen is effective;  continue present plan and medications.  -     ergocalciferol (ERGOCALCIFEROL) 50,000 unit Cap; Take one capsule weekly for 12 weeks then  reduce to one capsule monthly  Dispense: 12 capsule; Refill: 1    4. Gastroesophageal reflux disease, unspecified whether esophagitis present  The current medical regimen is effective;  continue present plan and medications.  -     esomeprazole (NEXIUM) 40 MG capsule; Take 1 capsule (40 mg total) by mouth before breakfast.  Dispense: 90 capsule; Refill: 1    5. Complex regional pain syndrome type 1 of right upper extremity  Stable. Followed by pain treatment  -     tiZANidine (ZANAFLEX) 4 MG tablet; Take 1 tablet (4 mg total) by mouth 3 (three) times daily.  Dispense: 270 tablet; Refill: 1    6. Type 2 diabetes mellitus with other specified complication, with long-term current use of insulin  The current medical regimen is effective;  continue present plan and medications.  -     Microalbumin/Creatinine Ratio, Urine  -     Hemoglobin A1C; Future; Expected date: 05/02/2024    7. Hx of right BKA  Stable  Overview:  Patient can not tolerate prosthetic due to pain in stump.       8. 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 Followed by pain treatment.   Overview:  Patient can not tolerate prosthetic due to pain in stump      Other orders  -     mupirocin (BACTROBAN) 2 % ointment; Apply topically 3 (three) times daily.  Dispense: 15 g; Refill: 0         Patient Instructions   Take Medications as directed  Monitor blood pressure outside of clinic  Eat a heart healthy diet and get plenty of cardiovascular exercise.       Follow up in about 3 months (around 8/2/2024).     This information was created by a scribe under my supervision and in my presence. I have reviewed and agree with the scribe's documentation.

## 2024-05-03 RX ORDER — ALBUTEROL SULFATE 90 UG/1
2 AEROSOL, METERED RESPIRATORY (INHALATION) EVERY 4 HOURS PRN
Qty: 18 G | Refills: 5 | Status: SHIPPED | OUTPATIENT
Start: 2024-05-03

## 2024-05-03 RX ORDER — MUPIROCIN 20 MG/G
OINTMENT TOPICAL 3 TIMES DAILY
Qty: 15 G | Refills: 0 | Status: SHIPPED | OUTPATIENT
Start: 2024-05-03

## 2024-05-03 RX ORDER — TIZANIDINE 4 MG/1
4 TABLET ORAL 3 TIMES DAILY
Qty: 270 TABLET | Refills: 1 | Status: SHIPPED | OUTPATIENT
Start: 2024-05-03

## 2024-05-03 RX ORDER — ERGOCALCIFEROL 1.25 MG/1
CAPSULE ORAL
Qty: 12 CAPSULE | Refills: 1 | Status: SHIPPED | OUTPATIENT
Start: 2024-05-03

## 2024-05-03 RX ORDER — ESOMEPRAZOLE MAGNESIUM 40 MG/1
40 CAPSULE, DELAYED RELEASE ORAL
Qty: 90 CAPSULE | Refills: 1 | Status: SHIPPED | OUTPATIENT
Start: 2024-05-03

## 2024-05-28 NOTE — PROGRESS NOTES
She Disclaimer: This note has been generated using voice-recognition software. There may be typographical errors that have been missed during proof-reading        Patient ID: Kennedy West is a 43 y.o. male.      Chief Complaint: Leg Pain and Low-back Pain      43-year-old male returns for re-evaluation and medication refill.  Patient has multiple medical problems including diabetes, phantom limb pain of the right lower extremity, diabetic neuropathy and obesity.  He reports difficulty sleeping with current Elavil dosage.  He was diagnosed with sleep apnea but was unable to wear the facemask.  He returns today for medication refill.  He declines nerve block injections or physical therapy.                  Pain Assessment  Pain Assessment: 0-10  Pain Score:   7  Pain Location: Back  Pain Orientation: Lower (phatom leg pain rt)  Pain Descriptors: Constant, Burning, Sharp, Throbbing  Pain Frequency: Continuous  Pain Onset: Awakened from sleep  Clinical Progression: Gradually worsening  Pain Intervention(s): Medication (See eMAR), Rest, Heat applied      A's of Opioid Risk Assessment  Activity:Patient has difficulty  performing ADL.   Analgesia:Patients pain is partially controlled by current medication.   Adverse Effects: Patient denies constipation or sedation.  Aberrant Behavior:  reviewed with no aberrant drug seeking/taking behavior.      Patient denies any suicidal or homicidal ideations    Physical Therapy/Home Exercise: no      US Lower Extremity Veins Left  Narrative: EXAMINATION:  US LOWER EXTREMITY VEINS LEFT    CLINICAL HISTORY:  Pain in left leg    TECHNIQUE:  Duplex and color flow Doppler and dynamic compression was performed of the left lower extremity veins was performed.    COMPARISON:  22 February 2022    FINDINGS:  No evidence of echogenic, non-compressible thrombus seen in the visualized veins of the extremities.  Color Doppler venous waveform pattern is within normal  limits.  Impression: No evidence of deep venous thrombosis.    Ultrasound images stored and captured.    Electronically signed by: Ken Simpson  Date:    10/17/2023  Time:    10:47      Review of Systems   Constitutional: Negative.    HENT: Negative.     Eyes: Negative.    Respiratory: Negative.     Cardiovascular: Negative.    Gastrointestinal: Negative.    Endocrine: Negative.    Genitourinary: Negative.    Musculoskeletal:  Positive for arthralgias, gait problem and leg pain (RLE).   Integumentary:  Negative.   Allergic/Immunologic: Negative.    Neurological:  Positive for numbness (BLE).   Hematological: Negative.    Psychiatric/Behavioral: Negative.               Past Medical History:   Diagnosis Date    Allergy     Anxiety     Asthma     Chronic pain     Complex regional pain syndrome type I of right upper extremity     COPD (chronic obstructive pulmonary disease)     Depression     Diabetes mellitus, type 2     Diabetic peripheral neuropathy     Gastritis     GERD (gastroesophageal reflux disease)     Hyperlipidemia     Lumbar radiculopathy     Lumbosacral spondylosis     Non-pressure chronic ulcer of other part of right foot limited to breakdown of skin     Obesity     Obstructive sleep apnea     Osteoarthritis, multiple sites     Polyneuropathy     Tobacco dependence      Past Surgical History:   Procedure Laterality Date    ANGIOGRAM, CORONARY, WITH LEFT HEART CATHETERIZATION N/A 08/24/2021    Procedure: Left heart cath w/ coronary angiograms;  Surgeon: Zaheer Real MD;  Location: [facility] CATH LAB;  Service: Cardiology;  Laterality: N/A;    CARPAL TUNNEL RELEASE      right    INJECTION OF ANESTHETIC AGENT AROUND MEDIAL BRANCH NERVES INNERVATING LUMBAR FACET JOINT Bilateral 04/15/2021    Procedure: BLOCK, NERVE, FACET JOINT, LUMBAR, MEDIAL BRANCH;  Surgeon: Sandra Johnson MD;  Location: [facility];  Service: Pain Management;  Laterality: Bilateral;  Bilateral L3-S1 MBB    INJECTION OF  ANESTHETIC AGENT AROUND MEDIAL BRANCH NERVES INNERVATING LUMBAR FACET JOINT Bilateral 2021    Procedure: Block-nerve-medial branch-lumbar, bilateral L4 through S1;  Surgeon: Sandra Johnson MD;  Location: [facility]IN [facility];  Service: Pain Management;  Laterality: Bilateral;  pt will bring vaccine verification card- notified in office    INJECTION OF FACET JOINT Bilateral 2020    Bilateral L3-S1 MBB - Dr Johnson    LEFT HEART CATHETERIZATION N/A 2021    Procedure: Left heart cath with possible intervention;  Surgeon: Brock Burkett DO;  Location: [facility] CATH LAB;  Service: Cardiology;  Laterality: N/A;    RBKA Right 2022    thumb surgery      right    TONSILLECTOMY       Social History     Socioeconomic History    Marital status:     Number of children: 0   Occupational History    Occupation: [employer]   Tobacco Use    Smoking status: Former     Current packs/day: 0.00     Average packs/day: 0.3 packs/day for 22.7 years (5.7 ttl pk-yrs)     Types: Cigarettes     Start date: 2000     Quit date: 2023     Years since quittin.8     Passive exposure: Current    Smokeless tobacco: Never   Substance and Sexual Activity    Alcohol use: Not Currently     Alcohol/week: 0.0 standard drinks of alcohol    Drug use: Never    Sexual activity: Not Currently     Partners: Female   Social History Narrative    Lives with his parents. Unable to wear prosthesis due to pain. Finally heard from Disability after one year and 8 months and approved for 2023.      Social Determinants of Health     Financial Resource Strain: High Risk (3/30/2024)    Overall Financial Resource Strain (CARDIA)     Difficulty of Paying Living Expenses: Very hard   Food Insecurity: Food Insecurity Present (3/30/2024)    Hunger Vital Sign     Worried About Running Out of Food in the Last Year: Often true     Ran Out of Food in the Last Year: Often true   Transportation Needs: Unmet Transportation  Needs (3/30/2024)    PRAPARE - Transportation     Lack of Transportation (Medical): Yes     Lack of Transportation (Non-Medical): Yes   Physical Activity: Inactive (3/30/2024)    Exercise Vital Sign     Days of Exercise per Week: 0 days     Minutes of Exercise per Session: 0 min   Stress: No Stress Concern Present (3/30/2024)    [employer] [place] [employer] - Occupational Stress Questionnaire     Feeling of Stress : Not at all   Housing Stability: Low Risk  (3/30/2024)    Housing Stability Vital Sign     Unable to Pay for Housing in the Last Year: No     Number of Places Lived in the Last Year: 1     Unstable Housing in the Last Year: No     Family History   Problem Relation Name Age of Onset    Diabetes Mother Ofeliarea West     Hypertension Mother Ofeliarea West     Cancer Mother Ofeliarea West     Arthritis Mother Ofelia West     Epilepsy Mother Ofelia West     Diabetes Father Darryl West     Heart disease Father Darryl West     Hyperlipidemia Father Darryl West     Hypertension Father Darryl West     Arthritis Father Darryl West     COPD Father Darryl West     Rheum arthritis Sister Katelyn Aguilera     Arthritis Sister Katelyn Aguilera     Stroke Maternal Grandmother Citlaly Rushing     Stroke Maternal Grandfather Junbrenda Rushing     Cancer Paternal Grandmother Debora West     Heart disease Paternal Grandfather Misha West     Stroke Maternal Uncle Bo Rushing     Cancer Paternal Aunt Taylor Liu      Review of patient's allergies indicates:   Allergen Reactions    Banana Anaphylaxis    Biaxin [clarithromycin] Other (See Comments)     Passes out      Erythromycin Anaphylaxis, Hives, Rash, Shortness Of Breath and Other (See Comments)    Tetracyclines Anaphylaxis    Zithromax tri-shelby [azithromycin] Anaphylaxis    Pineapple Other (See Comments)     Gets extremely sick    Fig Blisters and Hives    Isoniazid     Tetracycline (bulk)      Naldecon dx Rash     has a current medication list which includes the following prescription(s): accu-chek guide test strips, accu-chek softclix lancets, albuterol, atorvastatin, bd angel 2nd gen pen needle, ergocalciferol, esomeprazole, fenofibrate, humalog kwikpen insulin, hydrocodone-acetaminophen, hydrocodone-acetaminophen, hydrocodone-acetaminophen, insulin aspart u-100, isosorbide mononitrate, jardiance, metoprolol succinate, metoprolol succinate, mupirocin, nitroglycerin, ozempic, ozempic, tizanidine, amitriptyline, aspirin, diclofenac sodium, gabapentin, [START ON 6/1/2024] hydrocodone-acetaminophen, [START ON 7/1/2024] hydrocodone-acetaminophen, [START ON 7/31/2024] hydrocodone-acetaminophen, and insulin glargine u-100 (lantus).      Objective:  Vitals:    05/29/24 1300   BP: 126/82   Pulse: 89   Resp: 20          Physical Exam  Vitals and nursing note reviewed.   Constitutional:       General: He is not in acute distress.     Appearance: Normal appearance. He is obese. He is not ill-appearing, toxic-appearing or diaphoretic.   HENT:      Head: Normocephalic and atraumatic.      Nose: Nose normal.      Mouth/Throat:      Mouth: Mucous membranes are moist.   Eyes:      Extraocular Movements: Extraocular movements intact.      Pupils: Pupils are equal, round, and reactive to light.   Cardiovascular:      Rate and Rhythm: Normal rate and regular rhythm.      Heart sounds: Normal heart sounds.   Pulmonary:      Effort: Pulmonary effort is normal. No respiratory distress.      Breath sounds: Normal breath sounds. No stridor. No wheezing or rhonchi.   Abdominal:      General: Bowel sounds are normal.      Palpations: Abdomen is soft.   Musculoskeletal:         General: No swelling or deformity. Normal range of motion.      Cervical back: Normal and normal range of motion. No spasms or tenderness. No pain with movement. Normal range of motion.      Thoracic back: Normal.      Lumbar back: No spasms, tenderness  or bony tenderness. Normal range of motion. Negative right straight leg raise test and negative left straight leg raise test. No scoliosis.      Right lower leg: Tenderness present. No edema.      Left lower leg: No edema.      Right Lower Extremity: Right leg is amputated below knee.   Skin:     General: Skin is warm.   Neurological:      General: No focal deficit present.      Mental Status: He is alert and oriented to person, place, and time. Mental status is at baseline.      Cranial Nerves: No cranial nerve deficit.      Sensory: Sensation is intact. No sensory deficit.      Motor: No weakness.      Coordination: Coordination normal.      Gait: Gait abnormal.      Deep Tendon Reflexes: Reflexes are normal and symmetric.      Comments: Uses a wheelchair to assist with mobility   Psychiatric:         Mood and Affect: Mood normal.         Behavior: Behavior normal.           Assessment:      1. Complex regional pain syndrome type 1 of right upper extremity    2. Lumbosacral spondylosis without myelopathy MRI [facility] 2019    3. 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 Diabetic peripheral neuropathy    5. Encounter for long-term (current) use of other medications    6. Chronic pain of both knees            Plan:  1. reviewed  2.Addiction, Dependency, Tolerance, Opioid abuse-misuse, Death, Diversion Discussed. Overdose reversal drug Naloxone discussed. Patient is prescribed opiates for chronic nonmalignant pain pathology.  Patient is receiving opiates which require greater than a 72 hour supply of therapy.  Patient was educated on potential dependency associated with long-term opioid use as well as decreasing efficacy with prolonged use.  Patient was advised of risks, benefits and side effects and how to utilize each medication.  Patient was also informed that any deviation from therapy protocol will  lead to discontinuation of opiates.  It is reasonable to prescribe opioid analgesics for patient based  on positive response to opioid medications, lack of side effects and  limited aberrant behavior.    3.Refill/Continue medications for pain control and function       Requested Prescriptions     Signed Prescriptions Disp Refills    HYDROcodone-acetaminophen (NORCO) 7.5-325 mg per tablet 60 tablet 0     Sig: Take 1 tablet by mouth every 12 (twelve) hours as needed for Pain.    HYDROcodone-acetaminophen (NORCO) 7.5-325 mg per tablet 60 tablet 0     Sig: Take 1 tablet by mouth every 12 (twelve) hours as needed for Pain.    HYDROcodone-acetaminophen (NORCO) 7.5-325 mg per tablet 60 tablet 0     Sig: Take 1 tablet by mouth every 12 (twelve) hours as needed for Pain.    gabapentin (NEURONTIN) 600 MG tablet 90 tablet 2     Sig: Take 1 tablet (600 mg total) by mouth 3 (three) times daily.    amitriptyline (ELAVIL) 75 MG tablet 30 tablet 2     Sig: Take one tablet at bedtime    diclofenac sodium (VOLTAREN ARTHRITIS PAIN) 1 % Gel 10 each 2     Sig: Apply 2 g topically 2 (two) times daily. Apply 2 grams BID to knees, elbows, hips joints bilaterally as needed     4.Urine drug screen point of care obtained and consistent with prescribed medications and medication refill date.  Drug screen is to monitor compliance with prescribed opiates and to ensure that there was no misuse/abuse of other non-prescribed opioids or illicit drugs.  From this information I will determine if patient is suitable for opioid therapy      Orders Placed This Encounter   Procedures    POCT Urine Drug Screen ([facility])     Interpretive Information:     Negative:  No drug detected at the cut off level.   Positive:  This result represents presumptive positive for the   tested drug, other substances may yield a positive response other   than the analyte of interest. This result should be utilized for   diagnostic purpose only. Confirmation testing will be performed upon physician request only.         5.Patient defers nerve block injections, physical  therapy or surgical consultation  6.Follow with TONYA Orosco in 2 months for re-evaluation and medication refill       report:  Reviewed and consistent with medication use as prescribed.      The total time spent for evaluation and management on 05/30/2024 including reviewing separately obtained history, performing a medically appropriate exam and evaluation, documenting clinical information in the health record, independently interpreting results and communicating them to the patient/family/caregiver, and ordering medications/tests/procedures was between 15-29 minutes.    The above plan and management options were discussed at length with patient. Patient is in agreement with the above and verbalized understanding. It will be communicated with the referring physician via electronic record, fax, or mail.

## 2024-05-29 ENCOUNTER — OFFICE VISIT (OUTPATIENT)
Dept: PAIN MEDICINE | Facility: CLINIC | Age: 44
End: 2024-05-29
Payer: COMMERCIAL

## 2024-05-29 VITALS
HEIGHT: 72 IN | HEART RATE: 89 BPM | SYSTOLIC BLOOD PRESSURE: 126 MMHG | RESPIRATION RATE: 20 BRPM | WEIGHT: 315 LBS | BODY MASS INDEX: 42.66 KG/M2 | DIASTOLIC BLOOD PRESSURE: 82 MMHG

## 2024-05-29 DIAGNOSIS — M47.817 LUMBOSACRAL SPONDYLOSIS WITHOUT MYELOPATHY: Chronic | ICD-10-CM

## 2024-05-29 DIAGNOSIS — E11.42 DIABETIC PERIPHERAL NEUROPATHY: Chronic | ICD-10-CM

## 2024-05-29 DIAGNOSIS — G90.511 COMPLEX REGIONAL PAIN SYNDROME TYPE 1 OF RIGHT UPPER EXTREMITY: Primary | Chronic | ICD-10-CM

## 2024-05-29 DIAGNOSIS — M25.561 CHRONIC PAIN OF BOTH KNEES: ICD-10-CM

## 2024-05-29 DIAGNOSIS — G89.29 CHRONIC PAIN OF BOTH KNEES: ICD-10-CM

## 2024-05-29 DIAGNOSIS — G54.6 PHANTOM PAIN FOLLOWING AMPUTATION OF LOWER LIMB: Chronic | ICD-10-CM

## 2024-05-29 DIAGNOSIS — Z79.899 ENCOUNTER FOR LONG-TERM (CURRENT) USE OF OTHER MEDICATIONS: ICD-10-CM

## 2024-05-29 DIAGNOSIS — M25.562 CHRONIC PAIN OF BOTH KNEES: ICD-10-CM

## 2024-05-29 PROCEDURE — 3079F DIAST BP 80-89 MM HG: CPT | Mod: CPTII,,, | Performed by: PAIN MEDICINE

## 2024-05-29 PROCEDURE — 99999PBSHW POCT URINE DRUG SCREEN PRESUMP: Mod: PBBFAC,,,

## 2024-05-29 PROCEDURE — 99214 OFFICE O/P EST MOD 30 MIN: CPT | Mod: S$PBB,,, | Performed by: PAIN MEDICINE

## 2024-05-29 PROCEDURE — 3008F BODY MASS INDEX DOCD: CPT | Mod: CPTII,,, | Performed by: PAIN MEDICINE

## 2024-05-29 PROCEDURE — 80305 DRUG TEST PRSMV DIR OPT OBS: CPT | Mod: PBBFAC | Performed by: PAIN MEDICINE

## 2024-05-29 PROCEDURE — 3052F HG A1C>EQUAL 8.0%<EQUAL 9.0%: CPT | Mod: CPTII,,, | Performed by: PAIN MEDICINE

## 2024-05-29 PROCEDURE — 3074F SYST BP LT 130 MM HG: CPT | Mod: CPTII,,, | Performed by: PAIN MEDICINE

## 2024-05-29 PROCEDURE — 3066F NEPHROPATHY DOC TX: CPT | Mod: CPTII,,, | Performed by: PAIN MEDICINE

## 2024-05-29 PROCEDURE — 99215 OFFICE O/P EST HI 40 MIN: CPT | Mod: PBBFAC | Performed by: PAIN MEDICINE

## 2024-05-29 PROCEDURE — 3060F POS MICROALBUMINURIA REV: CPT | Mod: CPTII,,, | Performed by: PAIN MEDICINE

## 2024-05-29 PROCEDURE — 1159F MED LIST DOCD IN RCRD: CPT | Mod: CPTII,,, | Performed by: PAIN MEDICINE

## 2024-05-29 RX ORDER — GABAPENTIN 600 MG/1
600 TABLET ORAL 3 TIMES DAILY
Qty: 90 TABLET | Refills: 2 | Status: SHIPPED | OUTPATIENT
Start: 2024-05-29

## 2024-05-29 RX ORDER — DICLOFENAC SODIUM 10 MG/G
2 GEL TOPICAL 2 TIMES DAILY
Qty: 10 EACH | Refills: 2 | Status: SHIPPED | OUTPATIENT
Start: 2024-05-29

## 2024-05-29 RX ORDER — HYDROCODONE BITARTRATE AND ACETAMINOPHEN 7.5; 325 MG/1; MG/1
1 TABLET ORAL EVERY 12 HOURS PRN
Qty: 60 TABLET | Refills: 0 | Status: SHIPPED | OUTPATIENT
Start: 2024-07-31 | End: 2024-06-03 | Stop reason: SDUPTHER

## 2024-05-29 RX ORDER — HYDROCODONE BITARTRATE AND ACETAMINOPHEN 7.5; 325 MG/1; MG/1
1 TABLET ORAL EVERY 12 HOURS PRN
Qty: 60 TABLET | Refills: 0 | Status: SHIPPED | OUTPATIENT
Start: 2024-07-01 | End: 2024-06-03 | Stop reason: RX

## 2024-05-29 RX ORDER — AMITRIPTYLINE HYDROCHLORIDE 75 MG/1
TABLET ORAL
Qty: 30 TABLET | Refills: 2 | Status: SHIPPED | OUTPATIENT
Start: 2024-05-29

## 2024-05-29 RX ORDER — HYDROCODONE BITARTRATE AND ACETAMINOPHEN 7.5; 325 MG/1; MG/1
1 TABLET ORAL EVERY 12 HOURS PRN
Qty: 60 TABLET | Refills: 0 | Status: SHIPPED | OUTPATIENT
Start: 2024-06-01 | End: 2024-06-03 | Stop reason: RX

## 2024-06-03 RX ORDER — HYDROCODONE BITARTRATE AND ACETAMINOPHEN 7.5; 325 MG/1; MG/1
1 TABLET ORAL EVERY 12 HOURS PRN
Qty: 60 TABLET | Refills: 0 | Status: SHIPPED | OUTPATIENT
Start: 2024-07-01 | End: 2024-07-31

## 2024-06-03 RX ORDER — HYDROCODONE BITARTRATE AND ACETAMINOPHEN 7.5; 325 MG/1; MG/1
1 TABLET ORAL EVERY 12 HOURS PRN
Qty: 60 TABLET | Refills: 0 | Status: SHIPPED | OUTPATIENT
Start: 2024-06-03 | End: 2024-07-03

## 2024-06-03 RX ORDER — HYDROCODONE BITARTRATE AND ACETAMINOPHEN 7.5; 325 MG/1; MG/1
1 TABLET ORAL EVERY 12 HOURS PRN
Qty: 60 TABLET | Refills: 0 | Status: SHIPPED | OUTPATIENT
Start: 2024-07-31 | End: 2024-08-30

## 2024-06-05 ENCOUNTER — PATIENT MESSAGE (OUTPATIENT)
Dept: PAIN MEDICINE | Facility: CLINIC | Age: 44
End: 2024-06-05
Payer: COMMERCIAL

## 2024-07-06 ENCOUNTER — PATIENT MESSAGE (OUTPATIENT)
Dept: PAIN MEDICINE | Facility: CLINIC | Age: 44
End: 2024-07-06
Payer: COMMERCIAL

## 2024-07-08 RX ORDER — HYDROCODONE BITARTRATE AND ACETAMINOPHEN 7.5; 325 MG/1; MG/1
1 TABLET ORAL EVERY 12 HOURS PRN
Qty: 60 TABLET | Refills: 0 | Status: SHIPPED | OUTPATIENT
Start: 2024-08-07 | End: 2024-09-06

## 2024-07-08 RX ORDER — HYDROCODONE BITARTRATE AND ACETAMINOPHEN 7.5; 325 MG/1; MG/1
1 TABLET ORAL EVERY 12 HOURS PRN
Qty: 60 TABLET | Refills: 0 | Status: SHIPPED | OUTPATIENT
Start: 2024-07-08 | End: 2024-08-07

## 2024-07-09 ENCOUNTER — TELEPHONE (OUTPATIENT)
Dept: PAIN MEDICINE | Facility: CLINIC | Age: 44
End: 2024-07-09
Payer: COMMERCIAL

## 2024-07-09 NOTE — TELEPHONE ENCOUNTER
----- Message from Sandra Johnson MD sent at 7/8/2024  3:13 PM CDT -----  Regarding: RE: Medication refills  2 Norco prescriptions sent to [facility] pharmacy today  ----- Message -----  From: Nabil Hart LPN  Sent: 7/8/2024   8:30 AM CDT  To: Sandra Johnson MD  Subject: Medication refills                               Patient is requesting his remaining pain medication refills be sent to [facility] in [place]  Thank you.    I have called patients primary contact number and left voicemail instructing that his medication has been sent to [facility] in [place]. 07/09/2024@08:36 am-WILMA

## 2024-08-19 NOTE — PROGRESS NOTES
Subjective:         Patient ID: Kennedy West is a 43 y.o. male.    Chief Complaint: Back Pain and Leg Pain (Bilateral)        Pain  This is a chronic problem. The current episode started more than 1 year ago. The problem occurs daily. The problem has been waxing and waning. Associated symptoms include arthralgias and neck pain. Pertinent negatives include no anorexia, chest pain, chills, coughing, diaphoresis, fever, sore throat, vertigo or vomiting.     Review of Systems   Constitutional:  Negative for activity change, appetite change, chills, diaphoresis and fever.   HENT:  Negative for drooling, ear discharge, ear pain, facial swelling, nosebleeds, sore throat, trouble swallowing, voice change and goiter.    Eyes:  Negative for photophobia, pain, discharge, redness and visual disturbance.   Respiratory:  Negative for apnea, cough, choking, chest tightness, shortness of breath, wheezing and stridor.    Cardiovascular:  Negative for chest pain, palpitations and leg swelling.   Gastrointestinal:  Negative for abdominal distention, anorexia, diarrhea, rectal pain, vomiting and fecal incontinence.   Endocrine: Negative for cold intolerance, heat intolerance, polydipsia, polyphagia and polyuria.   Genitourinary:  Negative for bladder incontinence, dysuria, flank pain and frequency.   Musculoskeletal:  Positive for arthralgias, back pain, gait problem, leg pain, neck pain and neck stiffness.   Integumentary:  Negative for color change and pallor.   Neurological:  Negative for dizziness, vertigo, seizures, syncope, facial asymmetry, speech difficulty, light-headedness, memory loss and coordination difficulties.   Hematological:  Negative for adenopathy. Does not bruise/bleed easily.   Psychiatric/Behavioral:  Negative for agitation, behavioral problems, confusion, decreased concentration, dysphoric mood, hallucinations, self-injury and suicidal ideas. The patient is not nervous/anxious and is not  hyperactive.            Past Medical History:   Diagnosis Date    Allergy     Anxiety     Asthma     Chronic pain     Complex regional pain syndrome type I of right upper extremity     COPD (chronic obstructive pulmonary disease)     Depression     Diabetes mellitus, type 2     Diabetic peripheral neuropathy     Gastritis     GERD (gastroesophageal reflux disease)     Hyperlipidemia     Lumbar radiculopathy     Lumbosacral spondylosis     Non-pressure chronic ulcer of other part of right foot limited to breakdown of skin     Obesity     Obstructive sleep apnea     Osteoarthritis, multiple sites     Polyneuropathy     Tobacco dependence      Past Surgical History:   Procedure Laterality Date    ANGIOGRAM, CORONARY, WITH LEFT HEART CATHETERIZATION N/A 08/24/2021    Procedure: Left heart cath w/ coronary angiograms;  Surgeon: Zaheer Real MD;  Location: [facility] CATH LAB;  Service: Cardiology;  Laterality: N/A;    CARPAL TUNNEL RELEASE      right    INJECTION OF ANESTHETIC AGENT AROUND MEDIAL BRANCH NERVES INNERVATING LUMBAR FACET JOINT Bilateral 04/15/2021    Procedure: BLOCK, NERVE, FACET JOINT, LUMBAR, MEDIAL BRANCH;  Surgeon: Sandra Johnson MD;  Location: [facility] PAIN MGMT;  Service: Pain Management;  Laterality: Bilateral;  Bilateral L3-S1 MBB    INJECTION OF ANESTHETIC AGENT AROUND MEDIAL BRANCH NERVES INNERVATING LUMBAR FACET JOINT Bilateral 12/23/2021    Procedure: Block-nerve-medial branch-lumbar, bilateral L4 through S1;  Surgeon: Sandra Johnson MD;  Location: [facility] PAIN MGMT;  Service: Pain Management;  Laterality: Bilateral;  pt will bring vaccine verification card- notified in office    INJECTION OF FACET JOINT Bilateral 06/01/2020    Bilateral L3-S1 MBB - Dr Johnson    LEFT HEART CATHETERIZATION N/A 07/21/2021    Procedure: Left heart cath with possible intervention;  Surgeon: Brock Burkett DO;  Location: [facility] CATH LAB;  Service: Cardiology;  Laterality: N/A;    RBKA Right 03/16/2022     thumb surgery      right    TONSILLECTOMY       Social History     Socioeconomic History    Marital status:     Number of children: 0   Occupational History    Occupation: [employer]   Tobacco Use    Smoking status: Former     Current packs/day: 0.00     Average packs/day: 0.3 packs/day for 22.7 years (5.7 ttl pk-yrs)     Types: Cigarettes     Start date: 2000     Quit date: 2023     Years since quittin.0     Passive exposure: Current    Smokeless tobacco: Never   Substance and Sexual Activity    Alcohol use: Not Currently     Alcohol/week: 0.0 standard drinks of alcohol    Drug use: Never    Sexual activity: Not Currently     Partners: Female   Social History Narrative    Lives with his parents. Unable to wear prosthesis due to pain. Finally heard from Disability after one year and 8 months and approved for 2023.      Social Determinants of Health     Financial Resource Strain: High Risk (3/30/2024)    Overall Financial Resource Strain (CARDIA)     Difficulty of Paying Living Expenses: Very hard   Food Insecurity: Food Insecurity Present (3/30/2024)    Hunger Vital Sign     Worried About Running Out of Food in the Last Year: Often true     Ran Out of Food in the Last Year: Often true   Transportation Needs: Unmet Transportation Needs (3/30/2024)    PRAPARE - Transportation     Lack of Transportation (Medical): Yes     Lack of Transportation (Non-Medical): Yes   Physical Activity: Inactive (3/30/2024)    Exercise Vital Sign     Days of Exercise per Week: 0 days     Minutes of Exercise per Session: 0 min   Stress: No Stress Concern Present (3/30/2024)    [employer] [place] [employer] - Occupational Stress Questionnaire     Feeling of Stress : Not at all   Housing Stability: Low Risk  (3/30/2024)    Housing Stability Vital Sign     Unable to Pay for Housing in the Last Year: No     Number of Places Lived in the Last Year: 1     Unstable Housing in the Last Year: No      Family History   Problem Relation Name Age of Onset    Diabetes Mother Ofelia West     Hypertension Mother Ofelia West     Cancer Mother Ofelia West     Arthritis Mother Ofelia West     Epilepsy Mother Ofelia West     Diabetes Father Darryl West     Heart disease Father Darryl West     Hyperlipidemia Father Darryl West     Hypertension Father Darryl West     Arthritis Father Darryl West     COPD Father Darryl West     Rheum arthritis Sister Katelyn Aguilera     Arthritis Sister Katelyn Aguilera     Stroke Maternal Grandmother Citlaly Rushing     Stroke Maternal Grandfather Junbrenda Rushing     Cancer Paternal Grandmother Debora West     Heart disease Paternal Grandfather Misha West     Stroke Maternal Uncle Bo Rushing     Cancer Paternal Aunt Taylor Liu      Review of patient's allergies indicates:   Allergen Reactions    Banana Anaphylaxis    Biaxin [clarithromycin] Other (See Comments)     Passes out      Erythromycin Anaphylaxis, Hives, Rash, Shortness Of Breath and Other (See Comments)    Tetracyclines Anaphylaxis    Zithromax tri-shelby [azithromycin] Anaphylaxis    Pineapple Other (See Comments)     Gets extremely sick    Fig Blisters and Hives    Isoniazid     Tetracycline (bulk)     Naldecon dx Rash        Objective:  Vitals:    08/29/24 0741   BP: 133/84   Pulse: 92   Resp: 20   Weight: (!) 172.8 kg (381 lb)   Height: 6' (1.829 m)   PainSc:   7           Physical Exam  Vitals and nursing note reviewed. Exam conducted with a chaperone present.   Constitutional:       General: He is awake. He is not in acute distress.     Appearance: Normal appearance. He is obese. He is not ill-appearing or diaphoretic.   HENT:      Head: Normocephalic and atraumatic.      Nose: Nose normal.      Mouth/Throat:      Mouth: Mucous membranes are moist.      Pharynx: Oropharynx is clear.   Eyes:      Conjunctiva/sclera: Conjunctivae normal.       Pupils: Pupils are equal, round, and reactive to light.   Cardiovascular:      Rate and Rhythm: Normal rate.   Pulmonary:      Effort: Pulmonary effort is normal. No respiratory distress.   Abdominal:      Palpations: Abdomen is soft.      Tenderness: There is no guarding.   Musculoskeletal:         General: Normal range of motion.      Cervical back: Normal range of motion and neck supple. No rigidity.      Thoracic back: Tenderness present.      Lumbar back: Tenderness present.   Skin:     General: Skin is warm and dry.      Coloration: Skin is not jaundiced or pale.   Neurological:      General: No focal deficit present.      Mental Status: He is alert and oriented to person, place, and time. Mental status is at baseline.      Cranial Nerves: No cranial nerve deficit (II-XII).      Gait: Gait abnormal.   Psychiatric:         Mood and Affect: Mood normal.         Behavior: Behavior normal. Behavior is cooperative.         Thought Content: Thought content normal.           US Lower Extremity Veins Left  Narrative: EXAMINATION:  US LOWER EXTREMITY VEINS LEFT    CLINICAL HISTORY:  Pain in left leg    TECHNIQUE:  Duplex and color flow Doppler and dynamic compression was performed of the left lower extremity veins was performed.    COMPARISON:  22 February 2022    FINDINGS:  No evidence of echogenic, non-compressible thrombus seen in the visualized veins of the extremities.  Color Doppler venous waveform pattern is within normal limits.  Impression: No evidence of deep venous thrombosis.    Ultrasound images stored and captured.    Electronically signed by: Ken Simpson  Date:    10/17/2023  Time:    10:47         Office Visit on 05/29/2024   Component Date Value Ref Range Status    POC Amphetamines 05/29/2024 Negative  Negative, Inconclusive Final    POC Barbiturates 05/29/2024 Negative  Negative, Inconclusive Final    POC Benzodiazepines 05/29/2024 Negative  Negative, Inconclusive Final    POC Cocaine 05/29/2024  Negative  Negative, Inconclusive Final    POC THC 05/29/2024 Negative  Negative, Inconclusive Final    POC Methadone 05/29/2024 Negative  Negative, Inconclusive Final    POC Methamphetamine 05/29/2024 Negative  Negative, Inconclusive Final    POC Opiates 05/29/2024 Presumptive Positive (A)  Negative, Inconclusive Final    POC Oxycodone 05/29/2024 Negative  Negative, Inconclusive Final    POC Phencyclidine 05/29/2024 Negative  Negative, Inconclusive Final    POC Methylenedioxymethamphetamine * 05/29/2024 Negative  Negative, Inconclusive Final    POC Tricyclic Antidepressants 05/29/2024 Negative  Negative, Inconclusive Final    POC Buprenorphine 05/29/2024 Negative   Final     Acceptable 05/29/2024 Yes   Final    POC Temperature (Urine) 05/29/2024 92   Final   Office Visit on 05/02/2024   Component Date Value Ref Range Status    Creatinine, Urine 05/02/2024 44  39 - 259 mg/dL Final    Microalbumin 05/02/2024 2.9 (H)  0.0 - 2.8 mg/dL Final    Microalbumin/Creatinine Ratio 05/02/2024 65.9 (H)  0.0 - 30.0 mg/g Final    Triglycerides 05/02/2024 243 (H)  35 - 150 mg/dL Final    Cholesterol 05/02/2024 131  0 - 200 mg/dL Final    HDL Cholesterol 05/02/2024 37 (L)  40 - 60 mg/dL Final    Cholesterol/HDL Ratio (Risk Factor) 05/02/2024 3.5   Final    Non-HDL 05/02/2024 94  mg/dL Final    LDL Calculated 05/02/2024 45  mg/dL Final    LDL/HDL 05/02/2024 1.2   Final    VLDL 05/02/2024 49  mg/dL Final    Sodium 05/02/2024 137  136 - 145 mmol/L Final    Potassium 05/02/2024 3.7  3.5 - 5.1 mmol/L Final    Chloride 05/02/2024 102  98 - 107 mmol/L Final    CO2 05/02/2024 25  21 - 32 mmol/L Final    Anion Gap 05/02/2024 14  7 - 16 mmol/L Final    Glucose 05/02/2024 185 (H)  74 - 106 mg/dL Final    BUN 05/02/2024 7  7 - 18 mg/dL Final    Creatinine 05/02/2024 0.60 (L)  0.70 - 1.30 mg/dL Final    BUN/Creatinine Ratio 05/02/2024 12  6 - 20 Final    Calcium 05/02/2024 9.1  8.5 - 10.1 mg/dL Final    Total Protein 05/02/2024 7.1   6.4 - 8.2 g/dL Final    Albumin 05/02/2024 3.7  3.5 - 5.0 g/dL Final    Globulin 05/02/2024 3.4  2.0 - 4.0 g/dL Final    A/G Ratio 05/02/2024 1.1   Final    Bilirubin, Total 05/02/2024 0.6  >0.0 - 1.2 mg/dL Final    Alk Phos 05/02/2024 160 (H)  45 - 115 U/L Final    ALT 05/02/2024 24  16 - 61 U/L Final    AST 05/02/2024 23  15 - 37 U/L Final    eGFR 05/02/2024 124  >=60 mL/min/1.73m2 Final    Hemoglobin A1C 05/02/2024 8.1 (H)  4.5 - 6.6 % Final    Estimated Average Glucose 05/02/2024 186  mg/dL Final    WBC 05/02/2024 11.05 (H)  4.50 - 11.00 K/uL Final    RBC 05/02/2024 5.24  4.60 - 6.20 M/uL Final    Hemoglobin 05/02/2024 14.6  13.5 - 18.0 g/dL Final    Hematocrit 05/02/2024 45.4  40.0 - 54.0 % Final    MCV 05/02/2024 86.6  80.0 - 96.0 fL Final    MCH 05/02/2024 27.9  27.0 - 31.0 pg Final    MCHC 05/02/2024 32.2  32.0 - 36.0 g/dL Final    RDW 05/02/2024 13.2  11.5 - 14.5 % Final    Platelet Count 05/02/2024 276  150 - 400 K/uL Final    MPV 05/02/2024 10.2  9.4 - 12.4 fL Final    Neutrophils % 05/02/2024 65.6 (H)  53.0 - 65.0 % Final    Lymphocytes % 05/02/2024 28.1  27.0 - 41.0 % Final    Monocytes % 05/02/2024 4.2  2.0 - 6.0 % Final    Eosinophils % 05/02/2024 1.3  1.0 - 4.0 % Final    Basophils % 05/02/2024 0.5  0.0 - 1.0 % Final    Immature Granulocytes % 05/02/2024 0.3  0.0 - 0.4 % Final    nRBC, Auto 05/02/2024 0.0  <=0.0 % Final    Neutrophils, Abs 05/02/2024 7.25  1.80 - 7.70 K/uL Final    Lymphocytes, Absolute 05/02/2024 3.11  1.00 - 4.80 K/uL Final    Monocytes, Absolute 05/02/2024 0.46  0.00 - 0.80 K/uL Final    Eosinophils, Absolute 05/02/2024 0.14  0.00 - 0.50 K/uL Final    Basophils, Absolute 05/02/2024 0.06  0.00 - 0.20 K/uL Final    Immature Granulocytes, Absolute 05/02/2024 0.03  0.00 - 0.04 K/uL Final    nRBC, Absolute 05/02/2024 0.00  <=0.00 x10e3/uL Final    Diff Type 05/02/2024 Auto   Final   Office Visit on 04/02/2024   Component Date Value Ref Range Status    QRS Duration 04/02/2024 104  ms  Final    [facility] QTC Calculation 04/02/2024 464  ms Final         Orders Placed This Encounter   Procedures    Ambulatory referral[facility]consult to [facility]     Standing Status:   Future     Standing Expiration Date:   9/29/2025     Referral Priority:   Routine     Referral Type:   Consultation     Referral Reason:   Specialty Services Required     Referred to Provider:   David Jennings DO     Number of Visits Requested:   1    POCT Urine Drug Screen Presump     Interpretive Information:     Negative:  No drug detected at the cut off level.   Positive:  This result represents presumptive positive for the   tested drug, other substances may yield a positive response other   than the analyte of interest. This result should be utilized for   diagnostic purpose only. Confirmation testing will be performed upon physician request only.            Requested Prescriptions     Signed Prescriptions Disp Refills    amitriptyline (ELAVIL) 75 MG tablet 30 tablet 2     Sig: Take one tablet at bedtime    gabapentin (NEURONTIN) 600 MG tablet 90 tablet 2     Sig: Take 1 tablet (600 mg total) by mouth 3 (three) times daily.    HYDROcodone-acetaminophen (NORCO) 7.5-325 mg per tablet 60 tablet 0     Sig: Take 1 tablet by mouth every 12 (twelve) hours.    HYDROcodone-acetaminophen (NORCO) 7.5-325 mg per tablet 60 tablet 0     Sig: Take 1 tablet by mouth every 12 (twelve) hours.    HYDROcodone-acetaminophen (NORCO) 7.5-325 mg per tablet 60 tablet 0     Sig: Take 1 tablet by mouth every 12 (twelve) hours.       Assessment:     1. Lumbosacral spondylosis without myelopathy MRI [facility] 2019    2. Complex regional pain syndrome type 1 of right upper extremity    3. 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 Diabetic peripheral neuropathy    5. Leg pain, left    6. Encounter for long-term (current) use of other medications             A's of Opioid Risk Assessment  Activity:Patient can perform ADL.   Analgesia:Patients pain is  partially controlled by current medication. Patient has tried OTC medications such as Tylenol and Ibuprofen with out relief.   Adverse Effects: Patient denies constipation or sedation.  Aberrant Behavior:  reviewed with no aberrant drug seeking/taking behavior.  Overdose reversal drug naloxone discussed    Drug screen reviewed      Plan:    Narcan December 2022.    Using motorized scooter for mobility, right below-the-knee amputation    Follows diabetic educator    Follows Neurology [facility] right hand arm    Chronic right lower extremity phantom limb pain after right below-the-knee amputation    Today complaining mostly of left lower extremity pains severe edema requesting options     He states current medications helping control his chronic back and joint pain     Referral [facility] chronic left lower extremity swelling and pain    Continue current medication    Continue exercise program as directed    Follow-up 3 months    Dr. Johnson May 2025    Bring original prescription medication bottles/container/box with labels to each visit

## 2024-08-29 ENCOUNTER — OFFICE VISIT (OUTPATIENT)
Dept: PAIN MEDICINE | Facility: CLINIC | Age: 44
End: 2024-08-29
Payer: MEDICARE

## 2024-08-29 VITALS
HEIGHT: 72 IN | SYSTOLIC BLOOD PRESSURE: 133 MMHG | HEART RATE: 92 BPM | DIASTOLIC BLOOD PRESSURE: 84 MMHG | RESPIRATION RATE: 20 BRPM | BODY MASS INDEX: 42.66 KG/M2 | WEIGHT: 315 LBS

## 2024-08-29 DIAGNOSIS — M79.605 LEG PAIN, LEFT: Chronic | ICD-10-CM

## 2024-08-29 DIAGNOSIS — Z79.899 ENCOUNTER FOR LONG-TERM (CURRENT) USE OF OTHER MEDICATIONS: ICD-10-CM

## 2024-08-29 DIAGNOSIS — E11.42 DIABETIC PERIPHERAL NEUROPATHY: Chronic | ICD-10-CM

## 2024-08-29 DIAGNOSIS — G54.6 PHANTOM PAIN FOLLOWING AMPUTATION OF LOWER LIMB: Chronic | ICD-10-CM

## 2024-08-29 DIAGNOSIS — M47.817 LUMBOSACRAL SPONDYLOSIS WITHOUT MYELOPATHY: Primary | Chronic | ICD-10-CM

## 2024-08-29 DIAGNOSIS — G90.511 COMPLEX REGIONAL PAIN SYNDROME TYPE 1 OF RIGHT UPPER EXTREMITY: Chronic | ICD-10-CM

## 2024-08-29 PROCEDURE — 99999PBSHW POCT URINE DRUG SCREEN PRESUMP: Mod: PBBFAC,,,

## 2024-08-29 PROCEDURE — 99215 OFFICE O/P EST HI 40 MIN: CPT | Mod: PBBFAC | Performed by: PHYSICIAN ASSISTANT

## 2024-08-29 PROCEDURE — 80305 DRUG TEST PRSMV DIR OPT OBS: CPT | Mod: PBBFAC | Performed by: PHYSICIAN ASSISTANT

## 2024-08-29 PROCEDURE — 99999 PR PBB SHADOW E&M-EST. PATIENT-LVL V: CPT | Mod: PBBFAC,,, | Performed by: PHYSICIAN ASSISTANT

## 2024-08-29 RX ORDER — HYDROCODONE BITARTRATE AND ACETAMINOPHEN 7.5; 325 MG/1; MG/1
1 TABLET ORAL EVERY 12 HOURS
Qty: 60 TABLET | Refills: 0 | Status: SHIPPED | OUTPATIENT
Start: 2024-10-07

## 2024-08-29 RX ORDER — HYDROCODONE BITARTRATE AND ACETAMINOPHEN 7.5; 325 MG/1; MG/1
1 TABLET ORAL EVERY 12 HOURS
Qty: 60 TABLET | Refills: 0 | Status: SHIPPED | OUTPATIENT
Start: 2024-11-06

## 2024-08-29 RX ORDER — GABAPENTIN 600 MG/1
600 TABLET ORAL 3 TIMES DAILY
Qty: 90 TABLET | Refills: 2 | Status: SHIPPED | OUTPATIENT
Start: 2024-08-29

## 2024-08-29 RX ORDER — AMITRIPTYLINE HYDROCHLORIDE 75 MG/1
TABLET ORAL
Qty: 30 TABLET | Refills: 2 | Status: SHIPPED | OUTPATIENT
Start: 2024-08-29

## 2024-08-29 RX ORDER — HYDROCODONE BITARTRATE AND ACETAMINOPHEN 7.5; 325 MG/1; MG/1
1 TABLET ORAL EVERY 12 HOURS
Qty: 60 TABLET | Refills: 0 | Status: SHIPPED | OUTPATIENT
Start: 2024-09-07

## 2024-09-09 DIAGNOSIS — Z71.89 COMPLEX CARE COORDINATION: ICD-10-CM

## 2024-09-12 RX ORDER — INSULIN DEGLUDEC 200 U/ML
56 INJECTION, SOLUTION SUBCUTANEOUS NIGHTLY
COMMUNITY
Start: 2024-08-21

## 2024-09-12 RX ORDER — INSULIN ASPART 100 [IU]/ML
18 INJECTION, SOLUTION INTRAVENOUS; SUBCUTANEOUS
COMMUNITY
Start: 2024-08-21

## 2024-09-13 ENCOUNTER — OFFICE VISIT (OUTPATIENT)
Dept: FAMILY MEDICINE | Facility: CLINIC | Age: 44
End: 2024-09-13
Payer: MEDICARE

## 2024-09-13 VITALS
OXYGEN SATURATION: 98 % | TEMPERATURE: 98 F | HEART RATE: 87 BPM | BODY MASS INDEX: 42.66 KG/M2 | SYSTOLIC BLOOD PRESSURE: 114 MMHG | HEIGHT: 72 IN | RESPIRATION RATE: 18 BRPM | DIASTOLIC BLOOD PRESSURE: 81 MMHG | WEIGHT: 315 LBS

## 2024-09-13 DIAGNOSIS — Z89.511 HX OF RIGHT BKA: Primary | Chronic | ICD-10-CM

## 2024-09-13 DIAGNOSIS — R26.81 UNSTEADY GAIT: ICD-10-CM

## 2024-09-13 DIAGNOSIS — I25.119 ATHSCL HEART DISEASE OF NATIVE COR ART W UNSP ANG PCTRS: ICD-10-CM

## 2024-09-13 DIAGNOSIS — E11.42 DIABETIC POLYNEUROPATHY ASSOCIATED WITH TYPE 2 DIABETES MELLITUS: Chronic | ICD-10-CM

## 2024-09-13 DIAGNOSIS — J44.9 CHRONIC OBSTRUCTIVE PULMONARY DISEASE, UNSPECIFIED COPD TYPE: ICD-10-CM

## 2024-09-13 DIAGNOSIS — K21.9 GASTROESOPHAGEAL REFLUX DISEASE, UNSPECIFIED WHETHER ESOPHAGITIS PRESENT: ICD-10-CM

## 2024-09-13 DIAGNOSIS — G90.511 COMPLEX REGIONAL PAIN SYNDROME TYPE 1 OF RIGHT UPPER EXTREMITY: Chronic | ICD-10-CM

## 2024-09-13 RX ORDER — ESOMEPRAZOLE MAGNESIUM 40 MG/1
40 CAPSULE, DELAYED RELEASE ORAL
Qty: 90 CAPSULE | Refills: 1 | Status: SHIPPED | OUTPATIENT
Start: 2024-09-13

## 2024-09-13 RX ORDER — TIZANIDINE 4 MG/1
4 TABLET ORAL 3 TIMES DAILY
Qty: 270 TABLET | Refills: 1 | Status: SHIPPED | OUTPATIENT
Start: 2024-09-13

## 2024-09-13 RX ORDER — ALBUTEROL SULFATE 90 UG/1
2 INHALANT RESPIRATORY (INHALATION) EVERY 4 HOURS PRN
Qty: 18 G | Refills: 5 | Status: SHIPPED | OUTPATIENT
Start: 2024-09-13

## 2024-09-13 NOTE — PROGRESS NOTES
"New Clinic Note    Kennedy West is a 43 y.o. male     CC:   Chief Complaint   Patient presents with    Diabetes     Needs new hospital bed. His last one was used and donated and is now broke. Needs new walker. His old one was also used and it also broke. Has to have both due to his (R) AKA to transfer to his wheelchair. Both lasted him for 2 years but both were donated/used and old.Has an appointment next week for injections in his eyes.     Follow-up     Patient is here for follow up for diabetes. On Treshiba now. Last A1C was 7.9 on 08/21/24.. Stated he sees Endocrine at [facility] and sees Dr Johnson for chronic pain. Last eye exam done at Dr Dawn.         Subjective    History of Present Illness  Patient is for evaluation of chronic medical problems. Patient needs refills. Shane  is tolerating medications well without side effects. Patient needs a new hospital bed and walker. Both of his are broken. He uses these due to his right BKA. He needs them to transfer to his wheelchair.     Current Outpatient Medications:     ACCU-CHEK GUIDE TEST STRIPS [facility], USE STRIP TO CHECK GLUCOSE TWICE DAILY, Disp: , Rfl:     ACCU-CHEK SOFTCLIX LANCETS [facility], USE TO CHECK GLUCOSE TWICE DAILY, Disp: , Rfl:     amitriptyline (ELAVIL) 75 MG tablet, Take one tablet at bedtime, Disp: 30 tablet, Rfl: 2    aspirin (ECOTRIN) 81 MG EC tablet, Take 1 tablet (81 mg total) by mouth once daily., Disp: 30 tablet, Rfl: 2    atorvastatin (LIPITOR) 80 MG tablet, Take 1 tablet (80 mg total) by mouth every evening., Disp: 90 tablet, Rfl: 3    BD CANDIDA 2ND GEN PEN NEEDLE 32 gauge x 5/32" Ndle, USE 1 ONCE DAILY, Disp: 100 each, Rfl: 11    diclofenac sodium (VOLTAREN ARTHRITIS PAIN) 1 % Gel, Apply 2 g topically 2 (two) times daily. Apply 2 grams BID to knees, elbows, hips joints bilaterally as needed, Disp: 10 each, Rfl: 2    ergocalciferol (ERGOCALCIFEROL) 50,000 unit Cap, Take one capsule weekly for 12 weeks then reduce to one capsule " monthly, Disp: 12 capsule, Rfl: 1    fenofibrate (TRICOR) 48 MG tablet, Take 1 tablet (48 mg total) by mouth once daily., Disp: 90 tablet, Rfl: 3    gabapentin (NEURONTIN) 600 MG tablet, Take 1 tablet (600 mg total) by mouth 3 (three) times daily., Disp: 90 tablet, Rfl: 2    [START ON 11/6/2024] HYDROcodone-acetaminophen (NORCO) 7.5-325 mg per tablet, Take 1 tablet by mouth every 12 (twelve) hours., Disp: 60 tablet, Rfl: 0    [START ON 10/7/2024] HYDROcodone-acetaminophen (NORCO) 7.5-325 mg per tablet, Take 1 tablet by mouth every 12 (twelve) hours., Disp: 60 tablet, Rfl: 0    HYDROcodone-acetaminophen (NORCO) 7.5-325 mg per tablet, Take 1 tablet by mouth every 12 (twelve) hours., Disp: 60 tablet, Rfl: 0    insulin aspart U-100 (NOVOLOG) 100 unit/mL (3 mL) InPn pen, Inject 18 Units into the skin 3 (three) times daily with meals., Disp: , Rfl:     insulin degludec (TRESIBA FLEXTOUCH U-200) 200 unit/mL (3 mL) insulin pen, Inject 56 Units into the skin every evening., Disp: , Rfl:     isosorbide mononitrate (IMDUR) 30 MG 24 hr tablet, Take 2 tablets (60 mg total) by mouth once daily., Disp: 90 tablet, Rfl: 3    JARDIANCE 25 mg tablet, Take 1 tablet (25 mg total) by mouth every morning., Disp: 90 tablet, Rfl: 1    metoprolol succinate (TOPROL-XL) 100 MG 24 hr tablet, Take 1 tablet (100 mg total) by mouth once daily., Disp: 90 tablet, Rfl: 3    metoprolol succinate (TOPROL-XL) 50 MG 24 hr tablet, Take 1 tablet (50 mg total) by mouth once daily., Disp: 90 tablet, Rfl: 3    mupirocin (BACTROBAN) 2 % ointment, Apply topically 3 (three) times daily., Disp: 15 g, Rfl: 0    nitroGLYCERIN (NITROSTAT) 0.3 MG SL tablet, Place 1 tablet (0.3 mg total) under the tongue every 5 (five) minutes as needed for Chest pain. Up to 3 times., Disp: 30 tablet, Rfl: 2    OZEMPIC 0.25 mg or 0.5 mg (2 mg/3 mL) pen injector, Inject 0.5 mg into the skin every 7 days., Disp: , Rfl:     albuterol (PROAIR HFA) 90 mcg/actuation inhaler, Inhale 2 puffs  into the lungs every 4 (four) hours as needed for Wheezing. Rescue, Disp: 18 g, Rfl: 5    esomeprazole (NEXIUM) 40 MG capsule, Take 1 capsule (40 mg total) by mouth before breakfast., Disp: 90 capsule, Rfl: 1    OZEMPIC 1 mg/dose (4 mg/3 mL), Inject 1 mg into the skin every 7 days. (Patient not taking: Reported on 9/13/2024), Disp: , Rfl:     tiZANidine (ZANAFLEX) 4 MG tablet, Take 1 tablet (4 mg total) by mouth 3 (three) times daily., Disp: 270 tablet, Rfl: 1     Past Medical History:   Diagnosis Date    Allergy     Anxiety     Asthma     Chronic pain     Complex regional pain syndrome type I of right upper extremity     COPD (chronic obstructive pulmonary disease)     Depression     Diabetes mellitus, type 2     Diabetic peripheral neuropathy     Gastritis     GERD (gastroesophageal reflux disease)     Hyperlipidemia     Lumbar radiculopathy     Lumbosacral spondylosis     Non-pressure chronic ulcer of other part of right foot limited to breakdown of skin     Obesity     Obstructive sleep apnea     Osteoarthritis, multiple sites     Polyneuropathy     Tobacco dependence         Family History   Problem Relation Name Age of Onset    Diabetes Mother Ofelia West     Hypertension Mother Ofelia West     Cancer Mother Ofelia West     Arthritis Mother Ofelia West     Epilepsy Mother Ofelia West     Diabetes Father Darryl West     Heart disease Father Darryl West     Hyperlipidemia Father Darryl West     Hypertension Father Darryl West     Arthritis Father Darryl West     COPD Father Darryl West     Rheum arthritis Sister Katelyn Aguilera     Arthritis Sister Katelyn Aguilera     Stroke Maternal Grandmother Citlaly Rushing     Stroke Maternal Grandfather Junbrenda Rushing     Cancer Paternal Grandmother Debora West     Heart disease Paternal Grandfather Misha Houseon     Stroke Maternal Uncle Bo Rushing     Cancer Paternal Aunt Taylor Liu          Past Surgical History:   Procedure Laterality Date    ANGIOGRAM, CORONARY, WITH LEFT HEART CATHETERIZATION N/A 08/24/2021    Procedure: Left heart cath w/ coronary angiograms;  Surgeon: Zaheer Real MD;  Location: [facility] CATH LAB;  Service: Cardiology;  Laterality: N/A;    CARPAL TUNNEL RELEASE      right    INJECTION OF ANESTHETIC AGENT AROUND MEDIAL BRANCH NERVES INNERVATING LUMBAR FACET JOINT Bilateral 04/15/2021    Procedure: BLOCK, NERVE, FACET JOINT, LUMBAR, MEDIAL BRANCH;  Surgeon: Sandra Johnson MD;  Location: [facility];  Service: Pain Management;  Laterality: Bilateral;  Bilateral L3-S1 MBB    INJECTION OF ANESTHETIC AGENT AROUND MEDIAL BRANCH NERVES INNERVATING LUMBAR FACET JOINT Bilateral 12/23/2021    Procedure: Block-nerve-medial branch-lumbar, bilateral L4 through S1;  Surgeon: Sandra Johnson MD;  Location: [facility];  Service: Pain Management;  Laterality: Bilateral;  pt will bring vaccine verification card- notified in office    INJECTION OF FACET JOINT Bilateral 06/01/2020    Bilateral L3-S1 MBB - Dr Johnson    LEFT HEART CATHETERIZATION N/A 07/21/2021    Procedure: Left heart cath with possible intervention;  Surgeon: Brock Burkett DO;  Location: [facility] CATH LAB;  Service: Cardiology;  Laterality: N/A;    RBKA Right 03/16/2022    thumb surgery      right    TONSILLECTOMY  1990        Review of Systems   Constitutional:  Negative for activity change, fatigue, fever and unexpected weight change.   HENT:  Negative for ear pain, hearing loss, postnasal drip, rhinorrhea, sinus pressure/congestion and trouble swallowing.    Eyes:  Negative for discharge and visual disturbance.   Respiratory:  Negative for cough, chest tightness, shortness of breath and wheezing.    Cardiovascular:  Positive for palpitations. Negative for chest pain.   Gastrointestinal:  Positive for constipation. Negative for abdominal pain, blood in stool, diarrhea, nausea and vomiting.   Endocrine:  Negative for polydipsia and polyuria.   Genitourinary:  Negative for difficulty urinating, dysuria, hematuria and urgency.   Musculoskeletal:  Positive for arthralgias and joint swelling. Negative for neck pain.   Neurological:  Negative for weakness and headaches.   Psychiatric/Behavioral:  Negative for confusion and dysphoric mood.         /81 (BP Location: Left arm, Patient Position: Sitting, BP Method: Large (Automatic))   Pulse 87   Temp 98 °F (36.7 °C) (Oral)   Resp 18   Ht 6' (1.829 m)   Wt (!) 166 kg (366 lb)   SpO2 98%   BMI 49.64 kg/m²      Physical Exam  HENT:      Head: Normocephalic and atraumatic.   Cardiovascular:      Rate and Rhythm: Normal rate and regular rhythm.   Pulmonary:      Effort: Pulmonary effort is normal.      Breath sounds: Normal breath sounds.   Neurological:      Mental Status: He is alert and oriented to person, place, and time.      Gait: Gait abnormal.      Comments: Uses a wheelchair   Psychiatric:         Mood and Affect: Mood normal.         Behavior: Behavior normal.          Assessment and Plan      ICD-10-CM ICD-9-CM   1. Hx of right BKA  Z89.511 V49.75   2. Chronic obstructive pulmonary disease, unspecified COPD type  J44.9 496   3. Gastroesophageal reflux disease, unspecified whether esophagitis present  K21.9 530.81   4. Complex regional pain syndrome type 1 of right upper extremity  G90.511 337.21   5. Diabetic polyneuropathy associated with type 2 diabetes mellitus  E11.42 250.60     357.2   6. Unsteady gait  R26.81 781.2   7. 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 Hx of right BKA  Stable  Overview:  Patient can not tolerate prosthetic due to pain in stump.     Orders:  -     HOSPITAL BED FOR HOME USE  -     WALKER FOR HOME USE    2. Chronic obstructive pulmonary disease, unspecified COPD type  The current medical regimen is effective;  continue present plan and medications.  -     albuterol (PROAIR HFA)  90 mcg/actuation inhaler; Inhale 2 puffs into the lungs every 4 (four) hours as needed for Wheezing. Rescue  Dispense: 18 g; Refill: 5    3. Gastroesophageal reflux disease, unspecified whether esophagitis present  The current medical regimen is effective;  continue present plan and medications.  -     esomeprazole (NEXIUM) 40 MG capsule; Take 1 capsule (40 mg total) by mouth before breakfast.  Dispense: 90 capsule; Refill: 1    4. Complex regional pain syndrome type 1 of right upper extremity  The current medical regimen is effective;  continue present plan and medications.  -     tiZANidine (ZANAFLEX) 4 MG tablet; Take 1 tablet (4 mg total) by mouth 3 (three) times daily.  Dispense: 270 tablet; Refill: 1    5. Diabetic polyneuropathy associated with type 2 diabetes mellitus  Overview:  Stable. Followed by endocrinology at [facility]      6. Unsteady gait  Uses a wheelchair    7. 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 Continue current meds.   Overview:  [facility] Dr. Real 8/24/2021  Single vessel CAD  Mid RCA lesion of 60% stenosis. The diagnostic FFR ws 0.98           Follow up in about 3 months (around 12/13/2024).     Eye exam with Dr. Franklin Dawn in July.

## 2024-09-16 ENCOUNTER — PATIENT OUTREACH (OUTPATIENT)
Facility: HOSPITAL | Age: 44
End: 2024-09-16
Payer: MEDICARE

## 2024-09-16 DIAGNOSIS — E11.9 TYPE 2 DIABETES MELLITUS WITHOUT COMPLICATION, WITHOUT LONG-TERM CURRENT USE OF INSULIN: ICD-10-CM

## 2024-09-16 NOTE — LETTER
AUTHORIZATION FOR RELEASE OF   CONFIDENTIAL INFORMATION    Dear [facility],    We are seeing Kennedy West, date of birth 1980, in the clinic at [facility]. Karlee Liu MD is the patient's PCP. Kennedy West has an outstanding lab/procedure at the time we reviewed his chart. In order to help keep his health information updated, he has authorized us to request the following medical record(s):        (  )  MAMMOGRAM                                      (  )  COLONOSCOPY      (  )  PAP SMEAR                                          (  )  OUTSIDE LAB RESULTS     (  )  DEXA SCAN                                          ( x )  EYE EXAM            (  )  FOOT EXAM                                          (  )  ENTIRE RECORD     (  )  OUTSIDE IMMUNIZATIONS                 (  )  _______________         Please fax records to Shiraz Fishman LPN Care Coordinator at [phone].      If you have any questions, please contact Shiraz at [phone].           Patient Name: Kennedy West  : 1980  Patient Phone #: [phone]                Kennedy West  MRN: [id number]  : 1980  Age: 43 y.o.  Sex: male         Patient/Legal Guardian Signature  This signature was collected at 2024           _______________________________   Printed Name/Relationship to Patient      Consent for Examination and Treatment: I hereby authorize the providers and employees of [facility] ([facility]) to provide medical treatment/services which includes, but is not limited to, performing and administering tests and diagnostic procedures that are deemed necessary, including, but not limited to, imaging examinations, blood tests and other laboratory procedures as may be required by the hospital, clinic, or may be ordered by my physician(s) or persons working under the general and/or special instructions of my physician(s).      I understand  and agree that this consent covers all authorized persons, including but not limited to physicians, residents, nurse practitioners, physicians' assistants, specialists, consultants, student nurses, and independently contracted physicians, who are called upon by the physician in charge, to carry out the diagnostic procedures and medical or surgical treatment.     I hereby authorize [facility] to retain or dispose of any specimens or tissue, should there be such remaining from any test or procedure.     I hereby authorize and give consent for [facility] providers and employees to take photographs, images or videotapes of such diagnostic, surgical or treatment procedures of Patient as may be required by [facility] or as may be ordered by a physician. I further acknowledge and agree that [facility] may use cameras or other devices for patient monitoring.     I am aware that the practice of medicine is not an exact science, and I acknowledge that no guarantees have been made to me as to the outcome of any tests, procedures or treatment.     Authorization for Release of Information: I understand that my insurance company and/or their agents may need information necessary to make determinations about payment/reimbursement. I hereby provide authorization to release to all insurance companies, their successors, assignees, other parties with whom they may have contracted, or others acting on their behalf, that are involved with payment for any hospital and/or clinic charges incurred by the patient, any information that they request and deem necessary for payment/reimbursement, and/or quality review.  I further authorize the release of my health information to physicians or other health care practitioners on staff who are involved in my health care now and in the future, and to other health care providers, entities, or institutions for the purpose of my continued care and treatment, including referrals.     REGISTRATION  AUTHORIZATION  Form No. 10138 (Rev. 3/25/2024)    Page 1 of 3                       Medicare Patient's Certification and Authorization to Release Information and Payment Request:  I certify that the information given by me in applying for payment under Title XVIII of the Social Security Act is correct. I authorize any crisostomo of medical or other information about me to release to the Social SecurityAdministration, or its intermediaries or carriers, any information needed for this or a related Medicare claim. I request that payment of authorized benefits be made on my behalf.     Assignment of Insurance Benefits:   I hereby authorize any and all insurance companies, health plans, defined   benefit plans, health insurers or any entity that is or may be responsible for payment of my medical expenses to pay all hospital and medical benefits now due, and to become due and payable to me under any hospital benefits, sick benefits, injury benefits or any other benefit for services rendered to me, including Major Medical Benefits, direct to [facility] and all independently contracted physicians. I assign any and all rights that I may have against any and all insurance companies, health plans, defined benefit plans, health insurers or any entity that is or may be responsible for payment of my medical expenses, including, but not limited to any right to appeal a denial of a claim, any right to bring any action, lawsuit, administrative proceeding, or other cause of action on my behalf. I specifically assign my right to pursue litigation against any and all insurance companies, health plans, defined benefit plans, health insurers or any entity that is or may be responsible for payment of my medical expenses based upon a refusal to pay charges.            E. Valuables: It is understood and agreed that [facility] is not liable for the damage to or loss of any money, jewelry,   documents, dentures, eye glasses, hearing aids, prosthetics,  or other property of value.     F. Computer Equipment: I understand and agree that should I choose to use computer equipment owned by [facility] or if I choose to access the Internet via [facility]s network, I do so at my own risk. [facility] is not responsible for any damage to my computer equipment or to any damages of any type that might arise from my loss of equipment or data.     G. Acceptance of Financial Responsibility:  I agree that in consideration of the services and   supplies that have been   or will be furnished to the patient, I am hereby obligated to pay all charges made for or on the account of the patient according to the standard rates (in effect at the time the services and supplies are delivered) established by [facility], including its Patient Financial Assistance Policy to the extent it is applicable. I understand that I am responsible for all charges, or portions thereof, not covered by insurance or other sources. Patient refunds will be distributed only after balances at all [facility] facilities are paid.     H. Communication Authorization:  I hereby authorize [facility] and its representatives, along with any billing service   or  who may work on their behalf, to contact me on   my cell phone and/or home phone using pre- recorded messages, artificial voice messages, automatic telephone dialing devices or other computer assisted technology, or by electronic      mail, text messaging, or by any other form of electronic communication. This includes, but is not limited to, appointment reminders, yearly physical exam reminders, preventive care reminders, patient campaigns, welcome calls, and calls about account balances on my account or any account on which I am listed as a guarantor. I understand I have the right to opt out of these communications at any time.      Relationship  Between  Facility and  Provider:      I understand that some, but not all, providers furnishing services to the  patient are not employees or agents of [facility]. The patient is under the care and supervision of his/her attending physician, and it is the responsibility of the facility and its nursing staff to carry out the instructions of such physicians. It is the responsibility of the patient's physician/designee to obtain the patient's informed consent, when required, for medical or surgical treatment, special diagnostic or therapeutic procedures, or hospital services rendered for the patient under the special instructions of the physician/designee.           REGISTRATION AUTHORIZATION  Form No. 18526 (Rev. 3/25/2024)    Page 2 of 3                       Immunizations: [facility] shares immunization information with state sponsored health departments to help you and your doctor keep track of your immunization records. By signing, you consent to have this information shared with the health department in your state:                                [place] - LINKS ([place] Immunization Network for Kids Statewide)                                [place] - MIIX ([place] Immunization Information eXchange)                                [place] - ImmPRINT (Immunization Patient Registry with Integrated Technology)     TERM: This authorization is valid for this and subsequent care/treatment I receive at [facility] and will remain valid unless/until revoked in writing by me.     [facility]: As used in this document, [facility] means all [facility] owned and managed facilities, including, but not limited to, all health centers, surgery centers, clinics, urgent care centers, and hospitals.         [facility] complies with applicable Federal civil rights laws and does not discriminate on the basis of race, color, national origin, age, disability, or sex.  ATENCIÓN: si jessiela sameer, tiene a alarcon disposición servicios gratuitos de asistencia lingüística. Mely gray [phone].  [facility] Ý: N?u b?n nói Ti?ng Vi?t, có  các d?ch v? h? tr? ngôn ng? mi?n phí dành cho b?n. G?i s? [phone].        REGISTRATION AUTHORIZATION  Form No. 66550 (Rev. 3/25/2024)   Page 3 of 3

## 2024-09-16 NOTE — PROGRESS NOTES
Population Health Chart Review & Patient Outreach Details    Health Maintenance Topics Addressed and Outreach Outcomes / Actions Taken:  Diabetic Eye Exam [x] MORENA sent to [facility].

## 2024-09-16 NOTE — TELEPHONE ENCOUNTER
Please see the attached refill request.   This document is complete and the patient is ready for discharge.

## 2024-10-02 ENCOUNTER — OFFICE VISIT (OUTPATIENT)
Dept: CARDIOLOGY | Facility: CLINIC | Age: 44
End: 2024-10-02
Payer: MEDICARE

## 2024-10-02 VITALS
DIASTOLIC BLOOD PRESSURE: 82 MMHG | BODY MASS INDEX: 49.64 KG/M2 | OXYGEN SATURATION: 98 % | HEIGHT: 72 IN | SYSTOLIC BLOOD PRESSURE: 106 MMHG | HEART RATE: 92 BPM

## 2024-10-02 DIAGNOSIS — J44.9 CHRONIC OBSTRUCTIVE PULMONARY DISEASE, UNSPECIFIED COPD TYPE: Chronic | ICD-10-CM

## 2024-10-02 DIAGNOSIS — Z79.4 TYPE 2 DIABETES MELLITUS WITH OTHER SPECIFIED COMPLICATION, WITH LONG-TERM CURRENT USE OF INSULIN: Chronic | ICD-10-CM

## 2024-10-02 DIAGNOSIS — E66.01 MORBID OBESITY: Chronic | ICD-10-CM

## 2024-10-02 DIAGNOSIS — I10 HYPERTENSION, ESSENTIAL: Chronic | ICD-10-CM

## 2024-10-02 DIAGNOSIS — G47.33 OBSTRUCTIVE SLEEP APNEA SYNDROME: Chronic | ICD-10-CM

## 2024-10-02 DIAGNOSIS — I25.10 CORONARY ARTERY DISEASE, UNSPECIFIED VESSEL OR LESION TYPE, UNSPECIFIED WHETHER ANGINA PRESENT, UNSPECIFIED WHETHER NATIVE OR TRANSPLANTED HEART: Primary | Chronic | ICD-10-CM

## 2024-10-02 DIAGNOSIS — E11.42 DIABETIC POLYNEUROPATHY ASSOCIATED WITH TYPE 2 DIABETES MELLITUS: Chronic | ICD-10-CM

## 2024-10-02 DIAGNOSIS — E78.2 MIXED HYPERLIPIDEMIA: Chronic | ICD-10-CM

## 2024-10-02 DIAGNOSIS — F17.210 CIGARETTE NICOTINE DEPENDENCE WITHOUT COMPLICATION: Chronic | ICD-10-CM

## 2024-10-02 DIAGNOSIS — E11.69 TYPE 2 DIABETES MELLITUS WITH OTHER SPECIFIED COMPLICATION, WITH LONG-TERM CURRENT USE OF INSULIN: Chronic | ICD-10-CM

## 2024-10-02 DIAGNOSIS — K21.9 GASTROESOPHAGEAL REFLUX DISEASE WITHOUT ESOPHAGITIS: Chronic | ICD-10-CM

## 2024-10-02 PROCEDURE — 93005 ELECTROCARDIOGRAM TRACING: CPT | Mod: PBBFAC | Performed by: INTERNAL MEDICINE

## 2024-10-02 PROCEDURE — 99999 PR PBB SHADOW E&M-EST. PATIENT-LVL IV: CPT | Mod: PBBFAC,,, | Performed by: INTERNAL MEDICINE

## 2024-10-02 PROCEDURE — 93010 ELECTROCARDIOGRAM REPORT: CPT | Mod: S$PBB,,, | Performed by: INTERNAL MEDICINE

## 2024-10-02 PROCEDURE — 99214 OFFICE O/P EST MOD 30 MIN: CPT | Mod: PBBFAC,25 | Performed by: INTERNAL MEDICINE

## 2024-10-02 RX ORDER — SEMAGLUTIDE 1 MG/.5ML
1 INJECTION, SOLUTION SUBCUTANEOUS
Qty: 4 ML | Refills: 0 | Status: SHIPPED | OUTPATIENT
Start: 2024-10-02

## 2024-10-03 ENCOUNTER — PATIENT MESSAGE (OUTPATIENT)
Dept: FAMILY MEDICINE | Facility: CLINIC | Age: 44
End: 2024-10-03
Payer: MEDICARE

## 2024-10-03 LAB
OHS QRS DURATION: 98 MS
OHS QTC CALCULATION: 467 MS

## 2024-10-04 NOTE — PROGRESS NOTES
"PCP: Karlee Liu MD    Referring Provider:     Subjective:   Kennedy West is a 43 y.o. male with hx of single vessel CAD with a 60% mid RCA stenosis (FFR 0.98 8/24/2021- Dr. Real), DM, COPD, RAKESH (intolerant of CPAP), HTN and HLD,  who presents for 6 month follow up.     4/2/24--[facility]--Kennedy West is a 43 y.o. male with hx of single vessel CAD with a 60% mid RCA stenosis (FFR 0.98 8/24/2021- Dr. Real), DM, COPD, RAKESH (intolerant of CPAP), HTN and HLD,  who presents for routine follow up. He denies complaints of chest pain, pressure, heaviness, tightness or SOB. He does have lower ext edema that is chronic and improves with elevation.      10/17/2023 presents for concerns regarding LLE pain and edema for 2 months; improved with elevation and worsened when dependent. No orthopnea or PND. NO chest pain.     3/30/2023 presents for follow up to discuss Zio results and assess affect of Imdur and increased metoprolol dose on his chest pain. He states that he has had only 2 episodes of cp since starting the med change. He still feels his heart racing when he "dose much of anything."    2/20/2023 routine follow up. He states that he has had intermittent episodes of chest pain in the last month occurring every 3-4 days. The episodes occur with the exertion of transferring from  (RBKA) and with sitting. Each episodes is preceded by palpitations described as a heart racing and pounding sensation. He has used sublingual ntg on 2 occasions requiring 2 sublingual ntg each time. He denies orthopnea or pnd. He does have lower ext edema even above the stump on the right. He also has dizziness with standing and feels week but he states that he drinks a gallon of water per day. He has RAKESH and can not tolerate CPAP.         Fhx:  Family History   Problem Relation Name Age of Onset    Diabetes Mother Ofelia West     Hypertension Mother Ofelia West     Cancer Mother Ofelia West     " Arthritis Mother Ofelia West     Epilepsy Mother Ofelia West     Diabetes Father Darryl West     Heart disease Father Darryl West     Hyperlipidemia Father Darryl West     Hypertension Father Darryl West     Arthritis Father Darryl West     COPD Father Darryl West     Rheum arthritis Sister Katelyn Aguilera     Arthritis Sister Katelyn Aguilera     Stroke Maternal Grandmother Citlaly Rushing     Stroke Maternal Grandfather Junious Rushing     Cancer Paternal Grandmother Debora West     Heart disease Paternal Grandfather Misha West     Stroke Maternal Uncle Bo Rushing     Cancer Paternal Aunt Taylor Liu      Shx:   Social History     Socioeconomic History    Marital status:     Number of children: 0   Occupational History    Occupation: [employer]   Tobacco Use    Smoking status: Former     Current packs/day: 0.00     Average packs/day: 0.3 packs/day for 22.7 years (5.7 ttl pk-yrs)     Types: Cigarettes     Start date: 2000     Quit date: 2023     Years since quittin.1     Passive exposure: Current    Smokeless tobacco: Never   Substance and Sexual Activity    Alcohol use: Not Currently     Alcohol/week: 0.0 standard drinks of alcohol    Drug use: Never    Sexual activity: Not Currently     Partners: Female   Social History Narrative    Lives with his parents. Unable to wear prosthesis due to pain. Finally heard from Disability after one year and 8 months and approved for 2023.      Social Drivers of Health     Financial Resource Strain: High Risk (3/30/2024)    Overall Financial Resource Strain (CARDIA)     Difficulty of Paying Living Expenses: Very hard   Food Insecurity: Food Insecurity Present (3/30/2024)    Hunger Vital Sign     Worried About Running Out of Food in the Last Year: Often true     Ran Out of Food in the Last Year: Often true   Transportation Needs: Unmet Transportation Needs (3/30/2024)     PRAPARE - Transportation     Lack of Transportation (Medical): Yes     Lack of Transportation (Non-Medical): Yes   Physical Activity: Inactive (3/30/2024)    Exercise Vital Sign     Days of Exercise per Week: 0 days     Minutes of Exercise per Session: 0 min   Stress: No Stress Concern Present (3/30/2024)    [facility] - Occupational Stress Questionnaire     Feeling of Stress : Not at all   Housing Stability: Low Risk  (3/30/2024)    Housing Stability Vital Sign     Unable to Pay for Housing in the Last Year: No     Number of Places Lived in the Last Year: 1     Unstable Housing in the Last Year: No       EKG:   10/2/24--NSR, nonspecific T wave abn, prolonged QT, 89 bpm   4/2/2024 RSR with HR 89 bpm; poor R wave progression; no significant change when compared with EKG 8/15/2023  8/15/2023 RSR with HR 93 bpm; poor R wave progression   2/20/2023 RSR with sinu arrhythmia; HR 89 bpm; poor r wave progression  8/16/2022 sinus tachycardia ;  bpm; T wvae inversion no longer evident in the inferior leads compared to EKG done 8/24/2021      Zio  monitor worn 2/20/2023-3/6/2023  Basic rhythm is sinus, avg HR 89 bpm  Rare PAC's  Rare PVC's   No pateint triggered events.      Results for orders placed during the hospital encounter of 03/10/23    Echo    Interpretation Summary  · The left ventricle is normal in size with normal systolic function.  · The estimated ejection fraction is 55%.  · Normal left ventricular diastolic function.  · Normal right ventricular size.       ECHO Results for orders placed during the hospital encounter of 07/19/21    Echo    Interpretation Summary  · The left ventricle is normal in size with concentric remodeling and normal systolic function.  · The estimated ejection fraction is 60%.  · Normal left ventricular diastolic function.  · Mild right ventricular enlargement.  · Mild right atrial enlargement.  · Normal central venous pressure (3 mmHg).  · Trivial  pericardial effusion.    [facility] Results for orders placed during the hospital encounter of 08/24/21    Cardiac catheterization    Conclusion  · The Mid RCA lesion was 60% stenosed. The diagnostic FFR was measured at 0.98.  · The pre-procedure left ventricular end diastolic pressure was 11.  · The estimated blood loss was none.  · There was single vessel coronary artery disease.    The procedure log was documented by Documenter: Herman Colindres RT and verified by Zaheer Real MD.    Date: 8/24/2021  Time: 4:34 PM    7/21/21--  The left ventricular systolic function was normal.  The left ventricular end diastolic pressure was normal.  The pre-procedure left ventricular end diastolic pressure was 6.  The ejection fraction was calculated to be 65%.  The Mid RCA lesion was 60% stenosed.  The estimated blood loss was none.  There was single vessel coronary artery disease.     The procedure log was documented by Documenter: RT Elaina and verified by Brock Burkett DO.     Date: 7/21/2021  Time: 1:18 PM     Optimize medical management  Risk factor modification       Lab Results   Component Value Date     05/02/2024    K 3.7 05/02/2024     05/02/2024    CO2 25 05/02/2024    BUN 7 05/02/2024    CREATININE 0.60 (L) 05/02/2024    CALCIUM 9.1 05/02/2024    ANIONGAP 14 05/02/2024    EGFRNONAA 89 02/22/2022       Lab Results   Component Value Date    CHOL 131 05/02/2024    CHOL 115 07/05/2023    CHOL 120 06/22/2022     Lab Results   Component Value Date    HDL 37 (L) 05/02/2024    HDL 41 07/05/2023    HDL 38 (L) 06/22/2022     Lab Results   Component Value Date    LDLCALC 45 05/02/2024    LDLCALC 37 07/05/2023    LDLCALC 41 06/22/2022     Lab Results   Component Value Date    TRIG 243 (H) 05/02/2024    TRIG 186 (H) 07/05/2023    TRIG 204 (H) 06/22/2022     Lab Results   Component Value Date    CHOLHDL 3.5 05/02/2024    CHOLHDL 2.8 07/05/2023    CHOLHDL 3.2 06/22/2022       Lab Results   Component Value  "Date    WBC 11.05 (H) 05/02/2024    HGB 14.6 05/02/2024    HCT 45.4 05/02/2024    MCV 86.6 05/02/2024     05/02/2024           Current Outpatient Medications:     ACCU-CHEK GUIDE TEST STRIPS Strp, USE STRIP TO CHECK GLUCOSE TWICE DAILY, Disp: , Rfl:     ACCU-CHEK SOFTCLIX LANCETS Misc, USE TO CHECK GLUCOSE TWICE DAILY, Disp: , Rfl:     albuterol (PROAIR HFA) 90 mcg/actuation inhaler, Inhale 2 puffs into the lungs every 4 (four) hours as needed for Wheezing. Rescue, Disp: 18 g, Rfl: 5    amitriptyline (ELAVIL) 75 MG tablet, Take one tablet at bedtime, Disp: 30 tablet, Rfl: 2    aspirin (ECOTRIN) 81 MG EC tablet, Take 1 tablet (81 mg total) by mouth once daily., Disp: 30 tablet, Rfl: 2    atorvastatin (LIPITOR) 80 MG tablet, Take 1 tablet (80 mg total) by mouth every evening., Disp: 90 tablet, Rfl: 3    BD CANDIDA 2ND GEN PEN NEEDLE 32 gauge x 5/32" Ndle, USE 1 ONCE DAILY, Disp: 100 each, Rfl: 11    diclofenac sodium (VOLTAREN ARTHRITIS PAIN) 1 % Gel, Apply 2 g topically 2 (two) times daily. Apply 2 grams BID to knees, elbows, hips joints bilaterally as needed, Disp: 10 each, Rfl: 2    empagliflozin (JARDIANCE) 25 mg tablet, Take 1 tablet (25 mg total) by mouth once daily., Disp: 90 tablet, Rfl: 1    ergocalciferol (ERGOCALCIFEROL) 50,000 unit Cap, Take one capsule weekly for 12 weeks then reduce to one capsule monthly, Disp: 12 capsule, Rfl: 1    esomeprazole (NEXIUM) 40 MG capsule, Take 1 capsule (40 mg total) by mouth before breakfast., Disp: 90 capsule, Rfl: 1    fenofibrate (TRICOR) 48 MG tablet, Take 1 tablet (48 mg total) by mouth once daily., Disp: 90 tablet, Rfl: 3    gabapentin (NEURONTIN) 600 MG tablet, Take 1 tablet (600 mg total) by mouth 3 (three) times daily., Disp: 90 tablet, Rfl: 2    [START ON 11/6/2024] HYDROcodone-acetaminophen (NORCO) 7.5-325 mg per tablet, Take 1 tablet by mouth every 12 (twelve) hours., Disp: 60 tablet, Rfl: 0    HYDROcodone-acetaminophen (NORCO) 7.5-325 mg per tablet, Take " 1 tablet by mouth every 12 (twelve) hours., Disp: 60 tablet, Rfl: 0    HYDROcodone-acetaminophen (NORCO) 7.5-325 mg per tablet, Take 1 tablet by mouth every 12 (twelve) hours., Disp: 60 tablet, Rfl: 0    insulin aspart U-100 (NOVOLOG) 100 unit/mL (3 mL) InPn pen, Inject 18 Units into the skin 3 (three) times daily with meals., Disp: , Rfl:     insulin degludec (TRESIBA FLEXTOUCH U-200) 200 unit/mL (3 mL) insulin pen, Inject 56 Units into the skin every evening., Disp: , Rfl:     isosorbide mononitrate (IMDUR) 30 MG 24 hr tablet, Take 2 tablets (60 mg total) by mouth once daily., Disp: 90 tablet, Rfl: 3    metoprolol succinate (TOPROL-XL) 100 MG 24 hr tablet, Take 1 tablet (100 mg total) by mouth once daily., Disp: 90 tablet, Rfl: 3    metoprolol succinate (TOPROL-XL) 50 MG 24 hr tablet, Take 1 tablet (50 mg total) by mouth once daily., Disp: 90 tablet, Rfl: 3    mupirocin (BACTROBAN) 2 % ointment, Apply topically 3 (three) times daily., Disp: 15 g, Rfl: 0    nitroGLYCERIN (NITROSTAT) 0.3 MG SL tablet, Place 1 tablet (0.3 mg total) under the tongue every 5 (five) minutes as needed for Chest pain. Up to 3 times., Disp: 30 tablet, Rfl: 2    semaglutide, weight loss, (WEGOVY) 1 mg/0.5 mL PnIj, Inject 1 mg into the skin every 7 days., Disp: 4 mL, Rfl: 0    tiZANidine (ZANAFLEX) 4 MG tablet, Take 1 tablet (4 mg total) by mouth 3 (three) times daily., Disp: 270 tablet, Rfl: 1  He did not bring his meds today.     Review of Systems   Respiratory:  Negative for shortness of breath.    Cardiovascular:  Positive for chest pain, palpitations and leg swelling.   Neurological:  Negative for loss of consciousness.           Objective:   /82 (BP Location: Left arm, Patient Position: Sitting)   Pulse 92   Ht 6' (1.829 m)   SpO2 98%   BMI 49.64 kg/m²     Physical Exam  Vitals reviewed.   Constitutional:       General: He is not in acute distress.     Appearance: Normal appearance. He is obese.      Comments: In a wheelchair    HENT:      Head: Normocephalic and atraumatic.   Neck:      Vascular: No carotid bruit or JVD.   Cardiovascular:      Rate and Rhythm: Normal rate and regular rhythm.      Pulses: Normal pulses.           Radial pulses are 2+ on the right side and 2+ on the left side.      Heart sounds: Normal heart sounds. No murmur heard.  Pulmonary:      Effort: Pulmonary effort is normal.      Breath sounds: Normal breath sounds.   Musculoskeletal:      Right lower leg: No edema.      Left lower leg: No edema.      Comments: Right BKA   Skin:     General: Skin is warm and dry.   Neurological:      Mental Status: He is alert.           Assessment:     1. Coronary artery disease, unspecified vessel or lesion type, unspecified whether angina present, unspecified whether native or transplanted heart  EKG 12-lead    EKG 12-lead      2. Morbid obesity        3. Chronic obstructive pulmonary disease, unspecified COPD type        4. Hypertension, essential        5. Cigarette nicotine dependence without complication        6. Obstructive sleep apnea syndrome        7. Mixed hyperlipidemia        8. Type 2 diabetes mellitus with other specified complication, with long-term current use of insulin        9. Diabetic polyneuropathy associated with type 2 diabetes mellitus        10. Gastroesophageal reflux disease without esophagitis              Plan:   Continue current medications  Okay for tooth extraction  F/u in 6 months.

## 2024-10-21 DIAGNOSIS — R00.2 PALPITATIONS: ICD-10-CM

## 2024-10-21 DIAGNOSIS — E78.2 MIXED HYPERLIPIDEMIA: Chronic | ICD-10-CM

## 2024-10-21 DIAGNOSIS — I25.10 CORONARY ARTERY DISEASE INVOLVING NATIVE HEART, UNSPECIFIED VESSEL OR LESION TYPE, UNSPECIFIED WHETHER ANGINA PRESENT: Chronic | ICD-10-CM

## 2024-10-21 RX ORDER — ATORVASTATIN CALCIUM 80 MG/1
80 TABLET, FILM COATED ORAL NIGHTLY
Qty: 90 TABLET | Refills: 3 | Status: SHIPPED | OUTPATIENT
Start: 2024-10-21

## 2024-10-21 RX ORDER — ISOSORBIDE MONONITRATE 30 MG/1
60 TABLET, EXTENDED RELEASE ORAL DAILY
Qty: 90 TABLET | Refills: 3 | Status: SHIPPED | OUTPATIENT
Start: 2024-10-21

## 2024-10-21 RX ORDER — FENOFIBRATE 48 MG/1
48 TABLET, FILM COATED ORAL DAILY
Qty: 90 TABLET | Refills: 3 | Status: SHIPPED | OUTPATIENT
Start: 2024-10-21

## 2024-10-21 RX ORDER — NITROGLYCERIN 0.3 MG/1
0.3 TABLET SUBLINGUAL EVERY 5 MIN PRN
Qty: 30 TABLET | Refills: 2 | Status: SHIPPED | OUTPATIENT
Start: 2024-10-21

## 2024-10-21 RX ORDER — METOPROLOL SUCCINATE 100 MG/1
100 TABLET, EXTENDED RELEASE ORAL DAILY
Qty: 90 TABLET | Refills: 3 | Status: SHIPPED | OUTPATIENT
Start: 2024-10-21

## 2024-10-21 RX ORDER — METOPROLOL SUCCINATE 50 MG/1
50 TABLET, EXTENDED RELEASE ORAL DAILY
Qty: 90 TABLET | Refills: 3 | Status: SHIPPED | OUTPATIENT
Start: 2024-10-21

## 2024-11-12 NOTE — PROGRESS NOTES
Subjective:         Patient ID: Kennedy West is a 44 y.o. male.    Chief Complaint: Leg Pain, Knee Pain, and Low-back Pain        Pain  This is a chronic problem. The current episode started more than 1 year ago. The problem occurs daily. The problem has been unchanged. Associated symptoms include arthralgias and neck pain. Pertinent negatives include no anorexia, chest pain, chills, coughing, diaphoresis, fever, sore throat, vertigo or vomiting.     Review of Systems   Constitutional:  Negative for activity change, appetite change, chills, diaphoresis and fever.   HENT:  Negative for drooling, ear discharge, ear pain, facial swelling, nosebleeds, sore throat, trouble swallowing, voice change and goiter.    Eyes:  Negative for photophobia, pain, discharge, redness and visual disturbance.   Respiratory:  Negative for apnea, cough, choking, chest tightness, shortness of breath, wheezing and stridor.    Cardiovascular:  Negative for chest pain, palpitations and leg swelling.   Gastrointestinal:  Negative for abdominal distention, anorexia, diarrhea, rectal pain, vomiting and fecal incontinence.   Endocrine: Negative for cold intolerance, heat intolerance, polydipsia, polyphagia and polyuria.   Genitourinary:  Negative for bladder incontinence, dysuria, flank pain and frequency.   Musculoskeletal:  Positive for arthralgias, back pain, gait problem, leg pain, neck pain and neck stiffness.   Integumentary:  Negative for color change and pallor.   Neurological:  Negative for dizziness, vertigo, seizures, syncope, facial asymmetry, speech difficulty, light-headedness, memory loss and coordination difficulties.   Hematological:  Negative for adenopathy. Does not bruise/bleed easily.   Psychiatric/Behavioral:  Negative for agitation, behavioral problems, confusion, decreased concentration, dysphoric mood, hallucinations, self-injury and suicidal ideas. The patient is not nervous/anxious and is not hyperactive.             Past Medical History:   Diagnosis Date    Allergy     Anxiety     Asthma     Chronic pain     Complex regional pain syndrome type I of right upper extremity     COPD (chronic obstructive pulmonary disease)     Depression     Diabetes mellitus, type 2     Diabetic peripheral neuropathy     Gastritis     GERD (gastroesophageal reflux disease)     Hyperlipidemia     Lumbar radiculopathy     Lumbosacral spondylosis     Non-pressure chronic ulcer of other part of right foot limited to breakdown of skin     Obesity     Obstructive sleep apnea     Osteoarthritis, multiple sites     Polyneuropathy     Tobacco dependence      Past Surgical History:   Procedure Laterality Date    ANGIOGRAM, CORONARY, WITH LEFT HEART CATHETERIZATION N/A 08/24/2021    Procedure: Left heart cath w/ coronary angiograms;  Surgeon: Zaheer Real MD;  Location: [facility] CATH LAB;  Service: Cardiology;  Laterality: N/A;    CARPAL TUNNEL RELEASE      right    INJECTION OF ANESTHETIC AGENT AROUND MEDIAL BRANCH NERVES INNERVATING LUMBAR FACET JOINT Bilateral 04/15/2021    Procedure: BLOCK, NERVE, FACET JOINT, LUMBAR, MEDIAL BRANCH;  Surgeon: Sandra Johnson MD;  Location: [facility] PAIN MGMT;  Service: Pain Management;  Laterality: Bilateral;  Bilateral L3-S1 MBB    INJECTION OF ANESTHETIC AGENT AROUND MEDIAL BRANCH NERVES INNERVATING LUMBAR FACET JOINT Bilateral 12/23/2021    Procedure: Block-nerve-medial branch-lumbar, bilateral L4 through S1;  Surgeon: Sandra Johnson MD;  Location: [facility]IN [facility];  Service: Pain Management;  Laterality: Bilateral;  pt will bring vaccine verification card- notified in office    INJECTION OF FACET JOINT Bilateral 06/01/2020    Bilateral L3-S1 MBB - Dr Johnson    LEFT HEART CATHETERIZATION N/A 07/21/2021    Procedure: Left heart cath with possible intervention;  Surgeon: Brock Burkett DO;  Location: [facility] CATH LAB;  Service: Cardiology;  Laterality: N/A;    RBKA Right 03/16/2022    thumb  surgery      right    TONSILLECTOMY       Social History     Socioeconomic History    Marital status:     Number of children: 0   Occupational History    Occupation: [employer]   Tobacco Use    Smoking status: Former     Current packs/day: 0.00     Average packs/day: 0.3 packs/day for 22.7 years (5.7 ttl pk-yrs)     Types: Cigarettes     Start date: 2000     Quit date: 2023     Years since quittin.3     Passive exposure: Current    Smokeless tobacco: Never   Substance and Sexual Activity    Alcohol use: Not Currently     Alcohol/week: 0.0 standard drinks of alcohol    Drug use: Never    Sexual activity: Not Currently     Partners: Female   Social History Narrative    Lives with his parents. Unable to wear prosthesis due to pain. Finally heard from Disability after one year and 8 months and approved for 2023.      Social Drivers of Health     Financial Resource Strain: High Risk (3/30/2024)    Overall Financial Resource Strain (CARDIA)     Difficulty of Paying Living Expenses: Very hard   Food Insecurity: Food Insecurity Present (3/30/2024)    Hunger Vital Sign     Worried About Running Out of Food in the Last Year: Often true     Ran Out of Food in the Last Year: Often true   Transportation Needs: Unmet Transportation Needs (3/30/2024)    PRAPARE - Transportation     Lack of Transportation (Medical): Yes     Lack of Transportation (Non-Medical): Yes   Physical Activity: Inactive (3/30/2024)    Exercise Vital Sign     Days of Exercise per Week: 0 days     Minutes of Exercise per Session: 0 min   Stress: No Stress Concern Present (3/30/2024)    [employer] [place] [employer] - Occupational Stress Questionnaire     Feeling of Stress : Not at all   Housing Stability: Low Risk  (3/30/2024)    Housing Stability Vital Sign     Unable to Pay for Housing in the Last Year: No     Number of Places Lived in the Last Year: 1     Unstable Housing in the Last Year: No     Family  History   Problem Relation Name Age of Onset    Diabetes Mother Ofelia West     Hypertension Mother Ofelia West     Cancer Mother Ofelia West     Arthritis Mother Ofelia West     Epilepsy Mother Ofelia West     Diabetes Father Darryl West     Heart disease Father Darryl West     Hyperlipidemia Father Darryl West     Hypertension Father Darryl West     Arthritis Father Darryl West     COPD Father Darryl West     Rheum arthritis Sister Katelyn Aguilera     Arthritis Sister Katelyn Aguilera     Stroke Maternal Grandmother Citlaly Rushing     Stroke Maternal Grandfather Junbrenda Rushing     Cancer Paternal Grandmother Debora West     Heart disease Paternal Grandfather Misha West     Stroke Maternal Uncle Bo Rushing     Cancer Paternal Aunt Taylor Liu      Review of patient's allergies indicates:   Allergen Reactions    Banana Anaphylaxis    Biaxin [clarithromycin] Other (See Comments)     Passes out      Erythromycin Anaphylaxis, Hives, Rash, Shortness Of Breath and Other (See Comments)    Tetracyclines Anaphylaxis    Zithromax tri-shelby [azithromycin] Anaphylaxis    Pineapple Other (See Comments)     Gets extremely sick    Fig Blisters and Hives    Isoniazid     Tetracycline (bulk)     Naldecon dx Rash        Objective:  Vitals:    11/25/24 0821   BP: (!) 140/87   Pulse: 96   Resp: 20   Weight: (!) 166 kg (366 lb)   Height: 6' (1.829 m)   PainSc:   6             Physical Exam  Vitals and nursing note reviewed. Exam conducted with a chaperone present.   Constitutional:       General: He is awake. He is not in acute distress.     Appearance: Normal appearance. He is obese. He is not ill-appearing or diaphoretic.   HENT:      Head: Normocephalic and atraumatic.      Nose: Nose normal.      Mouth/Throat:      Mouth: Mucous membranes are moist.      Pharynx: Oropharynx is clear.   Eyes:      Conjunctiva/sclera: Conjunctivae normal.      Pupils:  Pupils are equal, round, and reactive to light.   Cardiovascular:      Rate and Rhythm: Normal rate.   Pulmonary:      Effort: Pulmonary effort is normal. No respiratory distress.   Abdominal:      Palpations: Abdomen is soft.      Tenderness: There is no guarding.   Musculoskeletal:         General: Normal range of motion.      Cervical back: Normal range of motion and neck supple. No rigidity.      Thoracic back: Tenderness present.      Lumbar back: Tenderness present.   Skin:     General: Skin is warm and dry.      Coloration: Skin is not jaundiced or pale.   Neurological:      General: No focal deficit present.      Mental Status: He is alert and oriented to person, place, and time. Mental status is at baseline.      Cranial Nerves: No cranial nerve deficit (II-XII).      Gait: Gait abnormal.   Psychiatric:         Mood and Affect: Mood normal.         Behavior: Behavior normal. Behavior is cooperative.         Thought Content: Thought content normal.           US Lower Extremity Veins Left  Narrative: EXAMINATION:  US LOWER EXTREMITY VEINS LEFT    CLINICAL HISTORY:  Pain in left leg    TECHNIQUE:  Duplex and color flow Doppler and dynamic compression was performed of the left lower extremity veins was performed.    COMPARISON:  22 February 2022    FINDINGS:  No evidence of echogenic, non-compressible thrombus seen in the visualized veins of the extremities.  Color Doppler venous waveform pattern is within normal limits.  Impression: No evidence of deep venous thrombosis.    Ultrasound images stored and captured.    Electronically signed by: Ken Simpson  Date:    10/17/2023  Time:    10:47         Office Visit on 10/02/2024   Component Date Value Ref Range Status    QRS Duration 10/02/2024 98  ms Final    [facility] QTC Calculation 10/02/2024 467  ms Final   Office Visit on 08/29/2024   Component Date Value Ref Range Status    POC Amphetamines 08/29/2024 Negative  Negative, Inconclusive Final    POC Barbiturates  08/29/2024 Negative  Negative, Inconclusive Final    POC Benzodiazepines 08/29/2024 Negative  Negative, Inconclusive Final    POC Cocaine 08/29/2024 Negative  Negative, Inconclusive Final    POC THC 08/29/2024 Negative  Negative, Inconclusive Final    POC Methadone 08/29/2024 Negative  Negative, Inconclusive Final    POC Methamphetamine 08/29/2024 Negative  Negative, Inconclusive Final    POC Opiates 08/29/2024 Presumptive Positive (A)  Negative, Inconclusive Final    POC Oxycodone 08/29/2024 Negative  Negative, Inconclusive Final    POC Phencyclidine 08/29/2024 Negative  Negative, Inconclusive Final    POC Methylenedioxymethamphetamine * 08/29/2024 Negative  Negative, Inconclusive Final    POC Tricyclic Antidepressants 08/29/2024 Negative  Negative, Inconclusive Final    POC Buprenorphine 08/29/2024 Negative   Final     Acceptable 08/29/2024 Yes   Final    POC Temperature (Urine) 08/29/2024 92   Final   Office Visit on 05/29/2024   Component Date Value Ref Range Status    POC Amphetamines 05/29/2024 Negative  Negative, Inconclusive Final    POC Barbiturates 05/29/2024 Negative  Negative, Inconclusive Final    POC Benzodiazepines 05/29/2024 Negative  Negative, Inconclusive Final    POC Cocaine 05/29/2024 Negative  Negative, Inconclusive Final    POC THC 05/29/2024 Negative  Negative, Inconclusive Final    POC Methadone 05/29/2024 Negative  Negative, Inconclusive Final    POC Methamphetamine 05/29/2024 Negative  Negative, Inconclusive Final    POC Opiates 05/29/2024 Presumptive Positive (A)  Negative, Inconclusive Final    POC Oxycodone 05/29/2024 Negative  Negative, Inconclusive Final    POC Phencyclidine 05/29/2024 Negative  Negative, Inconclusive Final    POC Methylenedioxymethamphetamine * 05/29/2024 Negative  Negative, Inconclusive Final    POC Tricyclic Antidepressants 05/29/2024 Negative  Negative, Inconclusive Final    POC Buprenorphine 05/29/2024 Negative   Final     Acceptable  05/29/2024 Yes   Final    POC Temperature (Urine) 05/29/2024 92   Final         Orders Placed This Encounter   Procedures    POCT Urine Drug Screen Presump     Interpretive Information:     Negative:  No drug detected at the cut off level.   Positive:  This result represents presumptive positive for the   tested drug, other substances may yield a positive response other   than the analyte of interest. This result should be utilized for   diagnostic purpose only. Confirmation testing will be performed upon physician request only.            Requested Prescriptions     Signed Prescriptions Disp Refills    amitriptyline (ELAVIL) 75 MG tablet 30 tablet 2     Sig: Take one tablet at bedtime    gabapentin (NEURONTIN) 600 MG tablet 90 tablet 2     Sig: Take 1 tablet (600 mg total) by mouth 3 (three) times daily.    HYDROcodone-acetaminophen (NORCO) 7.5-325 mg per tablet 60 tablet 0     Sig: Take 1 tablet by mouth every 12 (twelve) hours.    HYDROcodone-acetaminophen (NORCO) 7.5-325 mg per tablet 60 tablet 0     Sig: Take 1 tablet by mouth every 12 (twelve) hours.    HYDROcodone-acetaminophen (NORCO) 7.5-325 mg per tablet 60 tablet 0     Sig: Take 1 tablet by mouth every 12 (twelve) hours.       Assessment:     1. Diabetic peripheral neuropathy    2. Complex regional pain syndrome type 1 of right upper extremity    3. 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 Lumbosacral spondylosis without myelopathy MRI [facility] 2019    5. Encounter for long-term (current) use of medications               A's of Opioid Risk Assessment  Activity:Patient can perform ADL.   Analgesia:Patients pain is partially controlled by current medication. Patient has tried OTC medications such as Tylenol and Ibuprofen with out relief.   Adverse Effects: Patient denies constipation or sedation.  Aberrant Behavior:  reviewed with no aberrant drug seeking/taking behavior.  Overdose reversal drug naloxone discussed    Drug screen  reviewed      Plan:    Narcan December 2022.    Using motorized scooter for mobility, right below-the-knee amputation    Follows diabetic educator    Follows Neurology [facility] right hand arm    Chronic right lower extremity phantom limb pain after right below-the-knee amputation        No new complaints     He states current medications helping control his discomfort     He would like to continue with current treatment plan      Continue current medication    Continue exercise program as directed    Follow-up 3 months    Dr. Johnson May 2025    Bring original prescription medication bottles/container/box with labels to each visit

## 2024-11-21 DIAGNOSIS — E11.9 TYPE 2 DIABETES MELLITUS WITHOUT COMPLICATION, WITHOUT LONG-TERM CURRENT USE OF INSULIN: ICD-10-CM

## 2024-11-21 RX ORDER — PEN NEEDLE, DIABETIC 30 GX3/16"
NEEDLE, DISPOSABLE MISCELLANEOUS
Qty: 200 EACH | Refills: 0 | Status: SHIPPED | OUTPATIENT
Start: 2024-11-21

## 2024-11-25 ENCOUNTER — OFFICE VISIT (OUTPATIENT)
Dept: PAIN MEDICINE | Facility: CLINIC | Age: 44
End: 2024-11-25
Payer: MEDICARE

## 2024-11-25 VITALS
SYSTOLIC BLOOD PRESSURE: 140 MMHG | DIASTOLIC BLOOD PRESSURE: 87 MMHG | BODY MASS INDEX: 42.66 KG/M2 | WEIGHT: 315 LBS | RESPIRATION RATE: 20 BRPM | HEART RATE: 96 BPM | HEIGHT: 72 IN

## 2024-11-25 DIAGNOSIS — Z79.899 ENCOUNTER FOR LONG-TERM (CURRENT) USE OF MEDICATIONS: ICD-10-CM

## 2024-11-25 DIAGNOSIS — M47.817 LUMBOSACRAL SPONDYLOSIS WITHOUT MYELOPATHY: Chronic | ICD-10-CM

## 2024-11-25 DIAGNOSIS — G54.6 PHANTOM PAIN FOLLOWING AMPUTATION OF LOWER LIMB: Chronic | ICD-10-CM

## 2024-11-25 DIAGNOSIS — G90.511 COMPLEX REGIONAL PAIN SYNDROME TYPE 1 OF RIGHT UPPER EXTREMITY: Chronic | ICD-10-CM

## 2024-11-25 DIAGNOSIS — E11.42 DIABETIC PERIPHERAL NEUROPATHY: Primary | Chronic | ICD-10-CM

## 2024-11-25 PROCEDURE — 1159F MED LIST DOCD IN RCRD: CPT | Mod: CPTII,,, | Performed by: PHYSICIAN ASSISTANT

## 2024-11-25 PROCEDURE — 3060F POS MICROALBUMINURIA REV: CPT | Mod: CPTII,,, | Performed by: PHYSICIAN ASSISTANT

## 2024-11-25 PROCEDURE — 3066F NEPHROPATHY DOC TX: CPT | Mod: CPTII,,, | Performed by: PHYSICIAN ASSISTANT

## 2024-11-25 PROCEDURE — 99215 OFFICE O/P EST HI 40 MIN: CPT | Mod: PBBFAC | Performed by: PHYSICIAN ASSISTANT

## 2024-11-25 PROCEDURE — 3077F SYST BP >= 140 MM HG: CPT | Mod: CPTII,,, | Performed by: PHYSICIAN ASSISTANT

## 2024-11-25 PROCEDURE — 3051F HG A1C>EQUAL 7.0%<8.0%: CPT | Mod: CPTII,,, | Performed by: PHYSICIAN ASSISTANT

## 2024-11-25 PROCEDURE — 99214 OFFICE O/P EST MOD 30 MIN: CPT | Mod: S$PBB,,, | Performed by: PHYSICIAN ASSISTANT

## 2024-11-25 PROCEDURE — 3008F BODY MASS INDEX DOCD: CPT | Mod: CPTII,,, | Performed by: PHYSICIAN ASSISTANT

## 2024-11-25 PROCEDURE — 80305 DRUG TEST PRSMV DIR OPT OBS: CPT | Mod: PBBFAC | Performed by: PHYSICIAN ASSISTANT

## 2024-11-25 PROCEDURE — 99999 PR PBB SHADOW E&M-EST. PATIENT-LVL V: CPT | Mod: PBBFAC,,, | Performed by: PHYSICIAN ASSISTANT

## 2024-11-25 PROCEDURE — 3079F DIAST BP 80-89 MM HG: CPT | Mod: CPTII,,, | Performed by: PHYSICIAN ASSISTANT

## 2024-11-25 PROCEDURE — 99999PBSHW POCT URINE DRUG SCREEN PRESUMP: Mod: PBBFAC,,,

## 2024-11-25 RX ORDER — HYDROCODONE BITARTRATE AND ACETAMINOPHEN 7.5; 325 MG/1; MG/1
1 TABLET ORAL EVERY 12 HOURS
Qty: 60 TABLET | Refills: 0 | Status: SHIPPED | OUTPATIENT
Start: 2024-12-07

## 2024-11-25 RX ORDER — HYDROCODONE BITARTRATE AND ACETAMINOPHEN 7.5; 325 MG/1; MG/1
1 TABLET ORAL EVERY 12 HOURS
Qty: 60 TABLET | Refills: 0 | Status: SHIPPED | OUTPATIENT
Start: 2025-01-06

## 2024-11-25 RX ORDER — AMITRIPTYLINE HYDROCHLORIDE 75 MG/1
TABLET ORAL
Qty: 30 TABLET | Refills: 2 | Status: SHIPPED | OUTPATIENT
Start: 2024-11-25

## 2024-11-25 RX ORDER — HYDROCODONE BITARTRATE AND ACETAMINOPHEN 7.5; 325 MG/1; MG/1
1 TABLET ORAL EVERY 12 HOURS
Qty: 60 TABLET | Refills: 0 | Status: SHIPPED | OUTPATIENT
Start: 2025-02-05

## 2024-11-25 RX ORDER — GABAPENTIN 600 MG/1
600 TABLET ORAL 3 TIMES DAILY
Qty: 90 TABLET | Refills: 2 | Status: SHIPPED | OUTPATIENT
Start: 2024-11-25

## 2024-12-09 ENCOUNTER — PATIENT OUTREACH (OUTPATIENT)
Facility: HOSPITAL | Age: 44
End: 2024-12-09
Payer: MEDICARE

## 2024-12-13 DIAGNOSIS — R00.2 PALPITATIONS: ICD-10-CM

## 2024-12-13 DIAGNOSIS — E11.9 TYPE 2 DIABETES MELLITUS WITHOUT COMPLICATION, WITHOUT LONG-TERM CURRENT USE OF INSULIN: ICD-10-CM

## 2024-12-13 DIAGNOSIS — J44.9 CHRONIC OBSTRUCTIVE PULMONARY DISEASE, UNSPECIFIED COPD TYPE: ICD-10-CM

## 2024-12-13 DIAGNOSIS — E11.42 DIABETIC PERIPHERAL NEUROPATHY: Chronic | ICD-10-CM

## 2024-12-13 DIAGNOSIS — E78.2 MIXED HYPERLIPIDEMIA: Chronic | ICD-10-CM

## 2024-12-13 DIAGNOSIS — G54.6 PHANTOM PAIN FOLLOWING AMPUTATION OF LOWER LIMB: Chronic | ICD-10-CM

## 2024-12-13 DIAGNOSIS — G90.511 COMPLEX REGIONAL PAIN SYNDROME TYPE 1 OF RIGHT UPPER EXTREMITY: Chronic | ICD-10-CM

## 2024-12-13 DIAGNOSIS — K21.9 GASTROESOPHAGEAL REFLUX DISEASE, UNSPECIFIED WHETHER ESOPHAGITIS PRESENT: ICD-10-CM

## 2024-12-13 RX ORDER — ATORVASTATIN CALCIUM 80 MG/1
80 TABLET, FILM COATED ORAL NIGHTLY
Qty: 90 TABLET | Refills: 1 | Status: SHIPPED | OUTPATIENT
Start: 2024-12-13

## 2024-12-13 RX ORDER — ALBUTEROL SULFATE 90 UG/1
2 INHALANT RESPIRATORY (INHALATION) EVERY 4 HOURS PRN
Qty: 18 G | Refills: 5 | Status: SHIPPED | OUTPATIENT
Start: 2024-12-13

## 2024-12-13 RX ORDER — ESOMEPRAZOLE MAGNESIUM 40 MG/1
40 CAPSULE, DELAYED RELEASE ORAL
Qty: 90 CAPSULE | Refills: 1 | Status: SHIPPED | OUTPATIENT
Start: 2024-12-13

## 2024-12-13 RX ORDER — TIZANIDINE 4 MG/1
4 TABLET ORAL 3 TIMES DAILY
Qty: 270 TABLET | Refills: 1 | Status: SHIPPED | OUTPATIENT
Start: 2024-12-13

## 2024-12-13 NOTE — TELEPHONE ENCOUNTER
Patient had an AWV visit today. The AWV nurse had a family emergency and had to leave. Patient is having no problems he just needs a refill on meds sent to [facility] until they can get him rescheduled for his AWV yearly visit.

## 2024-12-15 ENCOUNTER — PATIENT MESSAGE (OUTPATIENT)
Dept: FAMILY MEDICINE | Facility: CLINIC | Age: 44
End: 2024-12-15
Payer: MEDICARE

## 2024-12-27 DIAGNOSIS — E11.9 TYPE 2 DIABETES MELLITUS WITHOUT COMPLICATION, WITHOUT LONG-TERM CURRENT USE OF INSULIN: ICD-10-CM

## 2024-12-27 RX ORDER — PEN NEEDLE, DIABETIC 32GX 5/32"
NEEDLE, DISPOSABLE MISCELLANEOUS
Qty: 100 EACH | Refills: 0 | Status: SHIPPED | OUTPATIENT
Start: 2024-12-27

## 2025-01-17 DIAGNOSIS — E11.9 TYPE 2 DIABETES MELLITUS WITHOUT COMPLICATION, WITHOUT LONG-TERM CURRENT USE OF INSULIN: ICD-10-CM

## 2025-01-17 RX ORDER — PEN NEEDLE, DIABETIC 32GX 5/32"
NEEDLE, DISPOSABLE MISCELLANEOUS
Qty: 200 EACH | Refills: 5 | Status: SHIPPED | OUTPATIENT
Start: 2025-01-17

## 2025-02-09 DIAGNOSIS — E11.9 TYPE 2 DIABETES MELLITUS WITHOUT COMPLICATION, WITHOUT LONG-TERM CURRENT USE OF INSULIN: ICD-10-CM

## 2025-02-12 RX ORDER — EMPAGLIFLOZIN 25 MG/1
25 TABLET, FILM COATED ORAL
Qty: 90 TABLET | Refills: 3 | Status: SHIPPED | OUTPATIENT
Start: 2025-02-12

## 2025-02-19 ENCOUNTER — PATIENT MESSAGE (OUTPATIENT)
Dept: FAMILY MEDICINE | Facility: CLINIC | Age: 45
End: 2025-02-19
Payer: MEDICARE

## 2025-02-19 NOTE — PROGRESS NOTES
Subjective:         Patient ID: Kennedy West is a 44 y.o. male.    Chief Complaint: Low-back Pain (Patient is having low back pain and right leg pain.)        Pain  This is a chronic problem. The current episode started more than 1 year ago. The problem occurs daily. The problem has been waxing and waning. Associated symptoms include arthralgias and neck pain. Pertinent negatives include no anorexia, chest pain, chills, coughing, diaphoresis, fever, sore throat, vertigo or vomiting.     Review of Systems   Constitutional:  Negative for activity change, appetite change, chills, diaphoresis and fever.   HENT:  Negative for drooling, ear discharge, ear pain, facial swelling, nosebleeds, sore throat, trouble swallowing, voice change and goiter.    Eyes:  Negative for photophobia, pain, discharge, redness and visual disturbance.   Respiratory:  Negative for apnea, cough, choking, chest tightness, shortness of breath, wheezing and stridor.    Cardiovascular:  Negative for chest pain, palpitations and leg swelling.   Gastrointestinal:  Negative for abdominal distention, anorexia, diarrhea, rectal pain, vomiting and fecal incontinence.   Endocrine: Negative for cold intolerance, heat intolerance, polydipsia, polyphagia and polyuria.   Genitourinary:  Negative for bladder incontinence, dysuria, flank pain and frequency.   Musculoskeletal:  Positive for arthralgias, back pain, gait problem, leg pain, neck pain and neck stiffness.   Integumentary:  Negative for color change and pallor.   Neurological:  Negative for dizziness, vertigo, seizures, syncope, facial asymmetry, speech difficulty, light-headedness, memory loss and coordination difficulties.   Hematological:  Negative for adenopathy. Does not bruise/bleed easily.   Psychiatric/Behavioral:  Negative for agitation, behavioral problems, confusion, decreased concentration, dysphoric mood, hallucinations, self-injury and suicidal ideas. The patient is not  nervous/anxious and is not hyperactive.            Past Medical History:   Diagnosis Date    Allergy     Anxiety     Asthma     Chronic pain     Complex regional pain syndrome type I of right upper extremity     COPD (chronic obstructive pulmonary disease)     Depression     Diabetes mellitus, type 2     Diabetic eye exam 03/14/2024    Dr. Franklin Dawn - [facility]    Diabetic peripheral neuropathy     DM (diabetes mellitus), type 2 with ophthalmic complications 03/14/2024    Gastritis     GERD (gastroesophageal reflux disease)     Hyperlipidemia     Lumbar radiculopathy     Lumbosacral spondylosis     Non-pressure chronic ulcer of other part of right foot limited to breakdown of skin     Obesity     Obstructive sleep apnea     Osteoarthritis, multiple sites     Polyneuropathy     Severe nonproliferative diabetic retinopathy of both eyes with macular edema 03/14/2024    See March 2024 Eye Exam - Dr. Dawn    Tobacco dependence      Past Surgical History:   Procedure Laterality Date    ANGIOGRAM, CORONARY, WITH LEFT HEART CATHETERIZATION N/A 08/24/2021    Procedure: Left heart cath w/ coronary angiograms;  Surgeon: Zaheer Real MD;  Location: [facility] CATH LAB;  Service: Cardiology;  Laterality: N/A;    CARPAL TUNNEL RELEASE      right    INJECTION OF ANESTHETIC AGENT AROUND MEDIAL BRANCH NERVES INNERVATING LUMBAR FACET JOINT Bilateral 04/15/2021    Procedure: BLOCK, NERVE, FACET JOINT, LUMBAR, MEDIAL BRANCH;  Surgeon: Sandra Johnson MD;  Location: [facility];  Service: Pain Management;  Laterality: Bilateral;  Bilateral L3-S1 MBB    INJECTION OF ANESTHETIC AGENT AROUND MEDIAL BRANCH NERVES INNERVATING LUMBAR FACET JOINT Bilateral 12/23/2021    Procedure: Block-nerve-medial branch-lumbar, bilateral L4 through S1;  Surgeon: Sandra Johnson MD;  Location: [facility];  Service: Pain Management;  Laterality: Bilateral;  pt will bring vaccine verification card- notified in office     INJECTION OF FACET JOINT Bilateral 2020    Bilateral L3-S1 RAGINI Johnson    LEFT HEART CATHETERIZATION N/A 2021    Procedure: Left heart cath with possible intervention;  Surgeon: Brock Burkett DO;  Location: [facility] CATH LAB;  Service: Cardiology;  Laterality: N/A;    RBKA Right 2022    thumb surgery      right    TONSILLECTOMY       Social History     Socioeconomic History    Marital status:     Number of children: 0   Occupational History    Occupation: [employer]   Tobacco Use    Smoking status: Former     Current packs/day: 0.00     Average packs/day: 0.3 packs/day for 22.7 years (5.7 ttl pk-yrs)     Types: Cigarettes     Start date: 2000     Quit date: 2023     Years since quittin.5     Passive exposure: Current    Smokeless tobacco: Never   Substance and Sexual Activity    Alcohol use: Not Currently     Alcohol/week: 0.0 standard drinks of alcohol    Drug use: Never    Sexual activity: Not Currently     Partners: Female   Social History Narrative    Lives with his parents. Unable to wear prosthesis due to pain. Finally heard from Disability after one year and 8 months and approved for 2023.      Social Drivers of Health     Financial Resource Strain: High Risk (3/30/2024)    Overall Financial Resource Strain (CARDIA)     Difficulty of Paying Living Expenses: Very hard   Food Insecurity: Food Insecurity Present (3/30/2024)    Hunger Vital Sign     Worried About Running Out of Food in the Last Year: Often true     Ran Out of Food in the Last Year: Often true   Transportation Needs: Unmet Transportation Needs (3/30/2024)    PRAPARE - Transportation     Lack of Transportation (Medical): Yes     Lack of Transportation (Non-Medical): Yes   Physical Activity: Inactive (3/30/2024)    Exercise Vital Sign     Days of Exercise per Week: 0 days     Minutes of Exercise per Session: 0 min   Stress: No Stress Concern Present (3/30/2024)    [employer] [place]  [employer] - Occupational Stress Questionnaire     Feeling of Stress : Not at all   Housing Stability: Low Risk  (3/30/2024)    Housing Stability Vital Sign     Unable to Pay for Housing in the Last Year: No     Number of Places Lived in the Last Year: 1     Unstable Housing in the Last Year: No     Family History   Problem Relation Name Age of Onset    Diabetes Mother Ofelia West     Hypertension Mother Ofelia West     Cancer Mother Ofelia West     Arthritis Mother Ofelia West     Epilepsy Mother Ofelia West     Diabetes Father Darryl West     Heart disease Father Darryl West     Hyperlipidemia Father Darryl West     Hypertension Father Darryl West     Arthritis Father Darryl West     COPD Father Darryl West     Rheum arthritis Sister Katelyn Aguilera     Arthritis Sister Katelyn Aguilera     Stroke Maternal Grandmother Citlaly Rushing     Stroke Maternal Grandfather Junbrenda Rushing     Cancer Paternal Grandmother Debora West     Heart disease Paternal Grandfather Misha West     Stroke Maternal Uncle Bo Rushing     Cancer Paternal Aunt Taylor Liu      Review of patient's allergies indicates:   Allergen Reactions    Banana Anaphylaxis    Biaxin [clarithromycin] Other (See Comments)     Passes out      Erythromycin Anaphylaxis, Hives, Rash, Shortness Of Breath and Other (See Comments)    Tetracyclines Anaphylaxis    Zithromax tri-shelby [azithromycin] Anaphylaxis    Pineapple Other (See Comments)     Gets extremely sick    Fig Blisters and Hives    Isoniazid     Tetracycline (bulk)     Naldecon dx Rash        Objective:  Vitals:    02/27/25 0757 02/27/25 0759   BP: 107/69    Pulse: 89    Resp: 18    Weight: (!) 166 kg (366 lb)    Height: 6' (1.829 m)    PainSc:   6   6   PainLoc: Back                Physical Exam  Vitals and nursing note reviewed. Exam conducted with a chaperone present.   Constitutional:       General: He is  awake. He is not in acute distress.     Appearance: Normal appearance. He is obese. He is not ill-appearing or diaphoretic.   HENT:      Head: Normocephalic and atraumatic.      Nose: Nose normal.      Mouth/Throat:      Mouth: Mucous membranes are moist.      Pharynx: Oropharynx is clear.   Eyes:      Conjunctiva/sclera: Conjunctivae normal.      Pupils: Pupils are equal, round, and reactive to light.   Cardiovascular:      Rate and Rhythm: Normal rate.   Pulmonary:      Effort: Pulmonary effort is normal. No respiratory distress.   Abdominal:      Palpations: Abdomen is soft.      Tenderness: There is no guarding.   Musculoskeletal:         General: Normal range of motion.      Cervical back: Normal range of motion and neck supple. No rigidity.      Thoracic back: Tenderness present.      Lumbar back: Tenderness present.   Skin:     General: Skin is warm and dry.      Coloration: Skin is not jaundiced or pale.   Neurological:      General: No focal deficit present.      Mental Status: He is alert and oriented to person, place, and time. Mental status is at baseline.      Cranial Nerves: No cranial nerve deficit (II-XII).      Gait: Gait abnormal.   Psychiatric:         Mood and Affect: Mood normal.         Behavior: Behavior normal. Behavior is cooperative.         Thought Content: Thought content normal.           US Lower Extremity Veins Left  Narrative: EXAMINATION:  US LOWER EXTREMITY VEINS LEFT    CLINICAL HISTORY:  Pain in left leg    TECHNIQUE:  Duplex and color flow Doppler and dynamic compression was performed of the left lower extremity veins was performed.    COMPARISON:  22 February 2022    FINDINGS:  No evidence of echogenic, non-compressible thrombus seen in the visualized veins of the extremities.  Color Doppler venous waveform pattern is within normal limits.  Impression: No evidence of deep venous thrombosis.    Ultrasound images stored and captured.    Electronically signed by: Ken  Tatiana  Date:    10/17/2023  Time:    10:47         Patient Outreach on 12/09/2024   Component Date Value Ref Range Status    Left Eye DM Retinopathy 03/14/2024 Positive   Final    Right Eye DM Retinopathy 03/14/2024 Positive   Final   Office Visit on 11/25/2024   Component Date Value Ref Range Status    POC Amphetamines 11/25/2024 Negative  Negative, Inconclusive Final    POC Barbiturates 11/25/2024 Negative  Negative, Inconclusive Final    POC Benzodiazepines 11/25/2024 Negative  Negative, Inconclusive Final    POC Cocaine 11/25/2024 Negative  Negative, Inconclusive Final    POC THC 11/25/2024 Negative  Negative, Inconclusive Final    POC Methadone 11/25/2024 Negative  Negative, Inconclusive Final    POC Methamphetamine 11/25/2024 Negative  Negative, Inconclusive Final    POC Opiates 11/25/2024 Presumptive Positive (A)  Negative, Inconclusive Final    POC Oxycodone 11/25/2024 Negative  Negative, Inconclusive Final    POC Phencyclidine 11/25/2024 Negative  Negative, Inconclusive Final    POC Methylenedioxymethamphetamine * 11/25/2024 Negative  Negative, Inconclusive Final    POC Tricyclic Antidepressants 11/25/2024 Negative  Negative, Inconclusive Final    POC Buprenorphine 11/25/2024 Negative   Final     Acceptable 11/25/2024 Yes   Final    POC Temperature (Urine) 11/25/2024 92   Final   Office Visit on 10/02/2024   Component Date Value Ref Range Status    QRS Duration 10/02/2024 98  ms Final    [facility] QTC Calculation 10/02/2024 467  ms Final   Office Visit on 08/29/2024   Component Date Value Ref Range Status    POC Amphetamines 08/29/2024 Negative  Negative, Inconclusive Final    POC Barbiturates 08/29/2024 Negative  Negative, Inconclusive Final    POC Benzodiazepines 08/29/2024 Negative  Negative, Inconclusive Final    POC Cocaine 08/29/2024 Negative  Negative, Inconclusive Final    POC THC 08/29/2024 Negative  Negative, Inconclusive Final    POC Methadone 08/29/2024 Negative  Negative,  Inconclusive Final    POC Methamphetamine 08/29/2024 Negative  Negative, Inconclusive Final    POC Opiates 08/29/2024 Presumptive Positive (A)  Negative, Inconclusive Final    POC Oxycodone 08/29/2024 Negative  Negative, Inconclusive Final    POC Phencyclidine 08/29/2024 Negative  Negative, Inconclusive Final    POC Methylenedioxymethamphetamine * 08/29/2024 Negative  Negative, Inconclusive Final    POC Tricyclic Antidepressants 08/29/2024 Negative  Negative, Inconclusive Final    POC Buprenorphine 08/29/2024 Negative   Final     Acceptable 08/29/2024 Yes   Final    POC Temperature (Urine) 08/29/2024 92   Final         Orders Placed This Encounter   Procedures    POCT Urine Drug Screen Presump     Interpretive Information:     Negative:  No drug detected at the cut off level.   Positive:  This result represents presumptive positive for the   tested drug, other substances may yield a positive response other   than the analyte of interest. This result should be utilized for   diagnostic purpose only. Confirmation testing will be performed upon physician request only.            Requested Prescriptions     Signed Prescriptions Disp Refills    amitriptyline (ELAVIL) 75 MG tablet 30 tablet 2     Sig: Take one tablet at bedtime    gabapentin (NEURONTIN) 600 MG tablet 90 tablet 2     Sig: Take 1 tablet (600 mg total) by mouth 3 (three) times daily.    HYDROcodone-acetaminophen (NORCO) 7.5-325 mg per tablet 60 tablet 0     Sig: Take 1 tablet by mouth every 12 (twelve) hours.    HYDROcodone-acetaminophen (NORCO) 7.5-325 mg per tablet 60 tablet 0     Sig: Take 1 tablet by mouth every 12 (twelve) hours.    HYDROcodone-acetaminophen (NORCO) 7.5-325 mg per tablet 60 tablet 0     Sig: Take 1 tablet by mouth every 12 (twelve) hours.       Assessment:     1. Lumbosacral spondylosis without myelopathy MRI [facility] 2019    2. Complex regional pain syndrome type 1 of right upper extremity    3. Phantom pain following  amputation of lower limb    4. Diabetic peripheral neuropathy    5. Encounter for long-term (current) use of medications                 A's of Opioid Risk Assessment  Activity:Patient can perform ADL.   Analgesia:Patients pain is partially controlled by current medication. Patient has tried OTC medications such as Tylenol and Ibuprofen with out relief.   Adverse Effects: Patient denies constipation or sedation.  Aberrant Behavior:  reviewed with no aberrant drug seeking/taking behavior.  Overdose reversal drug naloxone discussed    Drug screen reviewed      Plan:    Narcan December 2022.    Using motorized scooter for mobility, right below-the-knee amputation    Follows diabetic educator    Follows Neurology [facility] right hand arm    Chronic right lower extremity phantom limb pain after right below-the-knee amputation      He states he is doing well current medication does help control his chronic discomfort    He would like to continue with current treatment plan    Continue current medication    Continue exercise program as directed    Follow-up 3 months    Dr. Johnson May 2025    Bring original prescription medication bottles/container/box with labels to each visit

## 2025-02-26 ENCOUNTER — PATIENT MESSAGE (OUTPATIENT)
Dept: CARDIOLOGY | Facility: CLINIC | Age: 45
End: 2025-02-26
Payer: MEDICARE

## 2025-02-27 ENCOUNTER — OFFICE VISIT (OUTPATIENT)
Dept: PAIN MEDICINE | Facility: CLINIC | Age: 45
End: 2025-02-27
Payer: MEDICARE

## 2025-02-27 ENCOUNTER — TELEPHONE (OUTPATIENT)
Dept: CARDIOLOGY | Facility: CLINIC | Age: 45
End: 2025-02-27
Payer: MEDICARE

## 2025-02-27 VITALS
HEART RATE: 89 BPM | HEIGHT: 72 IN | RESPIRATION RATE: 18 BRPM | SYSTOLIC BLOOD PRESSURE: 107 MMHG | DIASTOLIC BLOOD PRESSURE: 69 MMHG | BODY MASS INDEX: 42.66 KG/M2 | WEIGHT: 315 LBS

## 2025-02-27 DIAGNOSIS — G54.6 PHANTOM PAIN FOLLOWING AMPUTATION OF LOWER LIMB: Chronic | ICD-10-CM

## 2025-02-27 DIAGNOSIS — Z79.899 ENCOUNTER FOR LONG-TERM (CURRENT) USE OF MEDICATIONS: ICD-10-CM

## 2025-02-27 DIAGNOSIS — G90.511 COMPLEX REGIONAL PAIN SYNDROME TYPE 1 OF RIGHT UPPER EXTREMITY: Chronic | ICD-10-CM

## 2025-02-27 DIAGNOSIS — E11.42 DIABETIC PERIPHERAL NEUROPATHY: Chronic | ICD-10-CM

## 2025-02-27 DIAGNOSIS — M47.817 LUMBOSACRAL SPONDYLOSIS WITHOUT MYELOPATHY: Primary | Chronic | ICD-10-CM

## 2025-02-27 PROCEDURE — 80305 DRUG TEST PRSMV DIR OPT OBS: CPT | Mod: PBBFAC | Performed by: PHYSICIAN ASSISTANT

## 2025-02-27 PROCEDURE — 99999 PR PBB SHADOW E&M-EST. PATIENT-LVL V: CPT | Mod: PBBFAC,,, | Performed by: PHYSICIAN ASSISTANT

## 2025-02-27 PROCEDURE — 99999PBSHW POCT URINE DRUG SCREEN PRESUMP: Mod: PBBFAC,,,

## 2025-02-27 PROCEDURE — 99215 OFFICE O/P EST HI 40 MIN: CPT | Mod: PBBFAC | Performed by: PHYSICIAN ASSISTANT

## 2025-02-27 RX ORDER — AMITRIPTYLINE HYDROCHLORIDE 75 MG/1
TABLET ORAL
Qty: 30 TABLET | Refills: 2 | Status: SHIPPED | OUTPATIENT
Start: 2025-02-27

## 2025-02-27 RX ORDER — HYDROCODONE BITARTRATE AND ACETAMINOPHEN 7.5; 325 MG/1; MG/1
1 TABLET ORAL EVERY 12 HOURS
Qty: 60 TABLET | Refills: 0 | Status: SHIPPED | OUTPATIENT
Start: 2025-03-08

## 2025-02-27 RX ORDER — GABAPENTIN 600 MG/1
600 TABLET ORAL 3 TIMES DAILY
Qty: 90 TABLET | Refills: 2 | Status: SHIPPED | OUTPATIENT
Start: 2025-02-27

## 2025-02-27 RX ORDER — HYDROCODONE BITARTRATE AND ACETAMINOPHEN 7.5; 325 MG/1; MG/1
1 TABLET ORAL EVERY 12 HOURS
Qty: 60 TABLET | Refills: 0 | Status: SHIPPED | OUTPATIENT
Start: 2025-05-07

## 2025-02-27 RX ORDER — HYDROCODONE BITARTRATE AND ACETAMINOPHEN 7.5; 325 MG/1; MG/1
1 TABLET ORAL EVERY 12 HOURS
Qty: 60 TABLET | Refills: 0 | Status: SHIPPED | OUTPATIENT
Start: 2025-04-07

## 2025-02-27 NOTE — TELEPHONE ENCOUNTER
Patient left message on my chart about edema to ble and feeling like he drowning when lay down , encouraged patient to go to the nearest emergency room or go see his primary care physician , patient is requesting to be seen by Dr Raygoza but schedule is full  I told patient that I could set an appointment with one of our Nps but patient refused  told patient I would speak with Dr Raygoza , who suggested that patient go to emergency room , placed call to advise patient about request no answer left voicemail message

## 2025-04-09 ENCOUNTER — OFFICE VISIT (OUTPATIENT)
Dept: CARDIOLOGY | Facility: CLINIC | Age: 45
End: 2025-04-09
Payer: MEDICARE

## 2025-04-09 ENCOUNTER — HOSPITAL ENCOUNTER (OUTPATIENT)
Dept: RADIOLOGY | Facility: HOSPITAL | Age: 45
Discharge: HOME | End: 2025-04-09
Attending: NURSE PRACTITIONER
Payer: MEDICARE

## 2025-04-09 VITALS
HEIGHT: 72 IN | OXYGEN SATURATION: 97 % | DIASTOLIC BLOOD PRESSURE: 78 MMHG | BODY MASS INDEX: 49.64 KG/M2 | SYSTOLIC BLOOD PRESSURE: 112 MMHG | HEART RATE: 86 BPM

## 2025-04-09 DIAGNOSIS — E78.5 HYPERLIPIDEMIA, UNSPECIFIED HYPERLIPIDEMIA TYPE: Chronic | ICD-10-CM

## 2025-04-09 DIAGNOSIS — R06.01 ORTHOPNEA: ICD-10-CM

## 2025-04-09 DIAGNOSIS — I25.10 CORONARY ARTERY DISEASE, UNSPECIFIED VESSEL OR LESION TYPE, UNSPECIFIED WHETHER ANGINA PRESENT, UNSPECIFIED WHETHER NATIVE OR TRANSPLANTED HEART: Primary | ICD-10-CM

## 2025-04-09 DIAGNOSIS — R00.2 PALPITATIONS: ICD-10-CM

## 2025-04-09 DIAGNOSIS — G47.33 OSA (OBSTRUCTIVE SLEEP APNEA): ICD-10-CM

## 2025-04-09 DIAGNOSIS — R06.00 PND (PAROXYSMAL NOCTURNAL DYSPNEA): ICD-10-CM

## 2025-04-09 DIAGNOSIS — R07.9 CHEST PAIN, UNSPECIFIED TYPE: ICD-10-CM

## 2025-04-09 DIAGNOSIS — Z79.899 HIGH RISK MEDICATION USE: ICD-10-CM

## 2025-04-09 DIAGNOSIS — G47.30 SLEEP APNEA, UNSPECIFIED TYPE: Chronic | ICD-10-CM

## 2025-04-09 DIAGNOSIS — R94.31 ABNORMAL ELECTROCARDIOGRAM (ECG) (EKG): ICD-10-CM

## 2025-04-09 DIAGNOSIS — Z78.9 CPAP VENTILATION TREATMENT NOT TOLERATED: ICD-10-CM

## 2025-04-09 DIAGNOSIS — I10 HYPERTENSION, ESSENTIAL: Chronic | ICD-10-CM

## 2025-04-09 PROCEDURE — 99999 PR PBB SHADOW E&M-EST. PATIENT-LVL V: CPT | Mod: PBBFAC,,, | Performed by: NURSE PRACTITIONER

## 2025-04-09 PROCEDURE — 99215 OFFICE O/P EST HI 40 MIN: CPT | Mod: S$PBB,,, | Performed by: NURSE PRACTITIONER

## 2025-04-09 PROCEDURE — 3008F BODY MASS INDEX DOCD: CPT | Mod: CPTII,,, | Performed by: NURSE PRACTITIONER

## 2025-04-09 PROCEDURE — 71046 X-RAY EXAM CHEST 2 VIEWS: CPT | Mod: 26,,, | Performed by: RADIOLOGY

## 2025-04-09 PROCEDURE — 93010 ELECTROCARDIOGRAM REPORT: CPT | Mod: S$PBB,,, | Performed by: INTERNAL MEDICINE

## 2025-04-09 PROCEDURE — 3074F SYST BP LT 130 MM HG: CPT | Mod: CPTII,,, | Performed by: NURSE PRACTITIONER

## 2025-04-09 PROCEDURE — 3078F DIAST BP <80 MM HG: CPT | Mod: CPTII,,, | Performed by: NURSE PRACTITIONER

## 2025-04-09 PROCEDURE — 1159F MED LIST DOCD IN RCRD: CPT | Mod: CPTII,,, | Performed by: NURSE PRACTITIONER

## 2025-04-09 PROCEDURE — 1160F RVW MEDS BY RX/DR IN RCRD: CPT | Mod: CPTII,,, | Performed by: NURSE PRACTITIONER

## 2025-04-09 PROCEDURE — 99215 OFFICE O/P EST HI 40 MIN: CPT | Mod: PBBFAC,25 | Performed by: NURSE PRACTITIONER

## 2025-04-09 PROCEDURE — 93005 ELECTROCARDIOGRAM TRACING: CPT | Mod: PBBFAC | Performed by: INTERNAL MEDICINE

## 2025-04-09 PROCEDURE — 71046 X-RAY EXAM CHEST 2 VIEWS: CPT | Mod: TC

## 2025-04-09 NOTE — ASSESSMENT & PLAN NOTE
"Patient describes intermittent episodes of left sided "squeezing chest pain" occurring with exertion and resolves with rest in 4-6 minutes. He has used sublingual ntg once since this started approx 4 months ago. SOB is associated with the discomfort.     Schedule Lexiscan/echo  "

## 2025-04-09 NOTE — ASSESSMENT & PLAN NOTE
Lab Results   Component Value Date    CHOL 131 05/02/2024    CHOL 115 07/05/2023    CHOL 120 06/22/2022     Lab Results   Component Value Date    HDL 37 (L) 05/02/2024    HDL 41 07/05/2023    HDL 38 (L) 06/22/2022     Lab Results   Component Value Date    LDLCALC 45 05/02/2024    LDLCALC 37 07/05/2023    LDLCALC 41 06/22/2022     Lab Results   Component Value Date    TRIG 243 (H) 05/02/2024    TRIG 186 (H) 07/05/2023    TRIG 204 (H) 06/22/2022       Lab Results   Component Value Date    CHOLHDL 3.5 05/02/2024    CHOLHDL 2.8 07/05/2023    CHOLHDL 3.2 06/22/2022     Lipitor 80 mg po daily  Tricor 48 mg po daily  Lipids followed by PCP
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
## 2025-04-09 NOTE — ASSESSMENT & PLAN NOTE
[facility] Dr. Real 8/24/2021  Single vessel CAD  Mid RCA lesion of 60% stenosis. The diagnostic FFR ws 0.98    Continue ASA/Lipitor

## 2025-04-09 NOTE — ASSESSMENT & PLAN NOTE
"Palpitations when he is straining to exert and occasionally with rest. He describes the sensation as a "racing/pounding" sensation. The palpitations resolve in 5/10 min with rest.     TSH, free T4  CBC, CMP, serum mag  "
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
## 2025-04-10 LAB
OHS QRS DURATION: 100 MS
OHS QTC CALCULATION: 469 MS

## 2025-04-11 ENCOUNTER — RESULTS FOLLOW-UP (OUTPATIENT)
Dept: CARDIOLOGY | Facility: CLINIC | Age: 45
End: 2025-04-11

## 2025-05-01 ENCOUNTER — TELEPHONE (OUTPATIENT)
Dept: CARDIOLOGY | Facility: HOSPITAL | Age: 45
End: 2025-05-01
Payer: MEDICARE

## 2025-05-14 DIAGNOSIS — E11.42 DIABETIC PERIPHERAL NEUROPATHY: Chronic | ICD-10-CM

## 2025-05-14 DIAGNOSIS — G54.6 PHANTOM PAIN FOLLOWING AMPUTATION OF LOWER LIMB: Chronic | ICD-10-CM

## 2025-05-14 DIAGNOSIS — G90.511 COMPLEX REGIONAL PAIN SYNDROME TYPE 1 OF RIGHT UPPER EXTREMITY: Chronic | ICD-10-CM

## 2025-05-14 RX ORDER — AMITRIPTYLINE HYDROCHLORIDE 75 MG/1
75 TABLET ORAL NIGHTLY
Qty: 90 TABLET | Refills: 3 | OUTPATIENT
Start: 2025-05-14

## 2025-05-27 DIAGNOSIS — I25.10 CORONARY ARTERY DISEASE INVOLVING NATIVE HEART, UNSPECIFIED VESSEL OR LESION TYPE, UNSPECIFIED WHETHER ANGINA PRESENT: Chronic | ICD-10-CM

## 2025-05-27 RX ORDER — ISOSORBIDE MONONITRATE 30 MG/1
60 TABLET, EXTENDED RELEASE ORAL DAILY
Qty: 90 TABLET | Refills: 3 | Status: SHIPPED | OUTPATIENT
Start: 2025-05-27

## 2025-06-03 ENCOUNTER — OFFICE VISIT (OUTPATIENT)
Dept: PAIN MEDICINE | Facility: CLINIC | Age: 45
End: 2025-06-03
Payer: MEDICARE

## 2025-06-03 VITALS
WEIGHT: 315 LBS | DIASTOLIC BLOOD PRESSURE: 76 MMHG | SYSTOLIC BLOOD PRESSURE: 126 MMHG | RESPIRATION RATE: 20 BRPM | BODY MASS INDEX: 42.66 KG/M2 | HEIGHT: 72 IN | HEART RATE: 81 BPM

## 2025-06-03 DIAGNOSIS — G54.6 PHANTOM PAIN FOLLOWING AMPUTATION OF LOWER LIMB: Chronic | ICD-10-CM

## 2025-06-03 DIAGNOSIS — M47.817 LUMBOSACRAL SPONDYLOSIS WITHOUT MYELOPATHY: Primary | Chronic | ICD-10-CM

## 2025-06-03 DIAGNOSIS — E11.42 DIABETIC PERIPHERAL NEUROPATHY: Chronic | ICD-10-CM

## 2025-06-03 DIAGNOSIS — Z79.899 ENCOUNTER FOR LONG-TERM (CURRENT) USE OF MEDICATIONS: ICD-10-CM

## 2025-06-03 DIAGNOSIS — G90.511 COMPLEX REGIONAL PAIN SYNDROME TYPE 1 OF RIGHT UPPER EXTREMITY: Chronic | ICD-10-CM

## 2025-06-03 PROCEDURE — 99214 OFFICE O/P EST MOD 30 MIN: CPT | Mod: S$PBB,,, | Performed by: PHYSICIAN ASSISTANT

## 2025-06-03 PROCEDURE — 3078F DIAST BP <80 MM HG: CPT | Mod: CPTII,,, | Performed by: PHYSICIAN ASSISTANT

## 2025-06-03 PROCEDURE — 3008F BODY MASS INDEX DOCD: CPT | Mod: CPTII,,, | Performed by: PHYSICIAN ASSISTANT

## 2025-06-03 PROCEDURE — 99215 OFFICE O/P EST HI 40 MIN: CPT | Mod: PBBFAC | Performed by: PHYSICIAN ASSISTANT

## 2025-06-03 PROCEDURE — 3074F SYST BP LT 130 MM HG: CPT | Mod: CPTII,,, | Performed by: PHYSICIAN ASSISTANT

## 2025-06-03 PROCEDURE — 80305 DRUG TEST PRSMV DIR OPT OBS: CPT | Mod: PBBFAC | Performed by: PHYSICIAN ASSISTANT

## 2025-06-03 PROCEDURE — 99999PBSHW POCT URINE DRUG SCREEN PRESUMP: Mod: PBBFAC,,,

## 2025-06-03 PROCEDURE — 99999 PR PBB SHADOW E&M-EST. PATIENT-LVL V: CPT | Mod: PBBFAC,,, | Performed by: PHYSICIAN ASSISTANT

## 2025-06-03 PROCEDURE — 1159F MED LIST DOCD IN RCRD: CPT | Mod: CPTII,,, | Performed by: PHYSICIAN ASSISTANT

## 2025-06-03 RX ORDER — GABAPENTIN 600 MG/1
600 TABLET ORAL 3 TIMES DAILY
Qty: 90 TABLET | Refills: 2 | Status: SHIPPED | OUTPATIENT
Start: 2025-06-03

## 2025-06-03 RX ORDER — AMITRIPTYLINE HYDROCHLORIDE 100 MG/1
TABLET ORAL
Qty: 90 TABLET | Refills: 0 | Status: SHIPPED | OUTPATIENT
Start: 2025-06-03

## 2025-06-03 RX ORDER — HYDROCODONE BITARTRATE AND ACETAMINOPHEN 7.5; 325 MG/1; MG/1
1 TABLET ORAL EVERY 12 HOURS
Qty: 60 TABLET | Refills: 0 | Status: SHIPPED | OUTPATIENT
Start: 2025-07-08

## 2025-06-03 RX ORDER — HYDROCODONE BITARTRATE AND ACETAMINOPHEN 7.5; 325 MG/1; MG/1
1 TABLET ORAL EVERY 12 HOURS
Qty: 60 TABLET | Refills: 0 | Status: SHIPPED | OUTPATIENT
Start: 2025-06-08

## 2025-06-03 RX ORDER — HYDROCODONE BITARTRATE AND ACETAMINOPHEN 7.5; 325 MG/1; MG/1
1 TABLET ORAL EVERY 12 HOURS
Qty: 60 TABLET | Refills: 0 | Status: SHIPPED | OUTPATIENT
Start: 2025-08-07

## 2025-06-19 ENCOUNTER — PATIENT MESSAGE (OUTPATIENT)
Dept: CARDIOLOGY | Facility: CLINIC | Age: 45
End: 2025-06-19
Payer: MEDICARE

## 2025-06-25 DIAGNOSIS — G90.511 COMPLEX REGIONAL PAIN SYNDROME TYPE 1 OF RIGHT UPPER EXTREMITY: Chronic | ICD-10-CM

## 2025-06-25 DIAGNOSIS — K21.9 GASTROESOPHAGEAL REFLUX DISEASE, UNSPECIFIED WHETHER ESOPHAGITIS PRESENT: ICD-10-CM

## 2025-06-25 RX ORDER — ESOMEPRAZOLE MAGNESIUM 40 MG/1
40 CAPSULE, DELAYED RELEASE ORAL
Qty: 90 CAPSULE | Refills: 3 | OUTPATIENT
Start: 2025-06-25

## 2025-06-25 RX ORDER — TIZANIDINE 4 MG/1
4 TABLET ORAL 3 TIMES DAILY
Qty: 270 TABLET | Refills: 3 | OUTPATIENT
Start: 2025-06-25

## 2025-07-08 ENCOUNTER — TELEPHONE (OUTPATIENT)
Dept: CARDIOLOGY | Facility: HOSPITAL | Age: 45
End: 2025-07-08
Payer: MEDICARE

## 2025-07-24 ENCOUNTER — OFFICE VISIT (OUTPATIENT)
Dept: FAMILY MEDICINE | Facility: CLINIC | Age: 45
End: 2025-07-24
Payer: MEDICARE

## 2025-07-24 ENCOUNTER — TELEPHONE (OUTPATIENT)
Dept: CARDIOLOGY | Facility: HOSPITAL | Age: 45
End: 2025-07-24
Payer: MEDICARE

## 2025-07-24 VITALS
HEART RATE: 86 BPM | SYSTOLIC BLOOD PRESSURE: 130 MMHG | DIASTOLIC BLOOD PRESSURE: 84 MMHG | BODY MASS INDEX: 42.66 KG/M2 | RESPIRATION RATE: 18 BRPM | HEIGHT: 72 IN | WEIGHT: 315 LBS | TEMPERATURE: 98 F | OXYGEN SATURATION: 96 %

## 2025-07-24 DIAGNOSIS — G89.29 CHRONIC PAIN OF BOTH KNEES: ICD-10-CM

## 2025-07-24 DIAGNOSIS — M25.561 CHRONIC PAIN OF BOTH KNEES: ICD-10-CM

## 2025-07-24 DIAGNOSIS — E11.3413 TYPE 2 DIABETES MELLITUS WITH BOTH EYES AFFECTED BY SEVERE NONPROLIFERATIVE RETINOPATHY AND MACULAR EDEMA, WITH LONG-TERM CURRENT USE OF INSULIN: ICD-10-CM

## 2025-07-24 DIAGNOSIS — Z89.511 HX OF RIGHT BKA: Chronic | ICD-10-CM

## 2025-07-24 DIAGNOSIS — K21.9 GASTROESOPHAGEAL REFLUX DISEASE, UNSPECIFIED WHETHER ESOPHAGITIS PRESENT: ICD-10-CM

## 2025-07-24 DIAGNOSIS — G90.511 COMPLEX REGIONAL PAIN SYNDROME TYPE 1 OF RIGHT UPPER EXTREMITY: Chronic | ICD-10-CM

## 2025-07-24 DIAGNOSIS — E55.9 VITAMIN D DEFICIENCY: ICD-10-CM

## 2025-07-24 DIAGNOSIS — Z79.4 TYPE 2 DIABETES MELLITUS WITH BOTH EYES AFFECTED BY SEVERE NONPROLIFERATIVE RETINOPATHY AND MACULAR EDEMA, WITH LONG-TERM CURRENT USE OF INSULIN: ICD-10-CM

## 2025-07-24 DIAGNOSIS — M25.562 CHRONIC PAIN OF BOTH KNEES: ICD-10-CM

## 2025-07-24 DIAGNOSIS — J44.9 CHRONIC OBSTRUCTIVE PULMONARY DISEASE, UNSPECIFIED COPD TYPE: ICD-10-CM

## 2025-07-24 DIAGNOSIS — L03.115 CELLULITIS OF RIGHT LOWER EXTREMITY: Primary | ICD-10-CM

## 2025-07-24 LAB
ALBUMIN SERPL BCP-MCNC: 3.3 G/DL (ref 3.5–5)
ALBUMIN/GLOB SERPL: 0.9 {RATIO}
ALP SERPL-CCNC: 150 U/L (ref 40–150)
ALT SERPL W P-5'-P-CCNC: 29 U/L
ANION GAP SERPL CALCULATED.3IONS-SCNC: 13 MMOL/L (ref 7–16)
AST SERPL W P-5'-P-CCNC: 71 U/L (ref 11–45)
BASOPHILS # BLD AUTO: 0.03 K/UL (ref 0–0.2)
BASOPHILS NFR BLD AUTO: 0.3 % (ref 0–1)
BILIRUB SERPL-MCNC: 0.4 MG/DL
BUN SERPL-MCNC: 13 MG/DL (ref 9–21)
BUN/CREAT SERPL: 17 (ref 6–20)
CALCIUM SERPL-MCNC: 9 MG/DL (ref 8.4–10.2)
CHLORIDE SERPL-SCNC: 102 MMOL/L (ref 98–107)
CHOLEST SERPL-MCNC: 117 MG/DL
CHOLEST/HDLC SERPL: 3.3 {RATIO}
CO2 SERPL-SCNC: 27 MMOL/L (ref 22–29)
CREAT SERPL-MCNC: 0.77 MG/DL (ref 0.72–1.25)
CREAT UR-MCNC: 75 MG/DL (ref 23–375)
DIFFERENTIAL METHOD BLD: ABNORMAL
EGFR (NO RACE VARIABLE) (RUSH/TITUS): 114 ML/MIN/1.73M2
EOSINOPHIL # BLD AUTO: 0.11 K/UL (ref 0–0.5)
EOSINOPHIL NFR BLD AUTO: 0.9 % (ref 1–4)
ERYTHROCYTE [DISTWIDTH] IN BLOOD BY AUTOMATED COUNT: 13.8 % (ref 11.5–14.5)
EST. AVERAGE GLUCOSE BLD GHB EST-MCNC: 180 MG/DL
GLOBULIN SER-MCNC: 3.8 G/DL (ref 2–4)
GLUCOSE SERPL-MCNC: 173 MG/DL (ref 74–100)
HBA1C MFR BLD HPLC: 7.9 %
HCT VFR BLD AUTO: 45.1 % (ref 40–54)
HDLC SERPL-MCNC: 35 MG/DL (ref 35–60)
HGB BLD-MCNC: 13.9 G/DL (ref 13.5–18)
IMM GRANULOCYTES # BLD AUTO: 0.04 K/UL (ref 0–0.04)
IMM GRANULOCYTES NFR BLD: 0.3 % (ref 0–0.4)
LDLC SERPL CALC-MCNC: 37 MG/DL
LDLC/HDLC SERPL: 1.1 {RATIO}
LYMPHOCYTES # BLD AUTO: 2.86 K/UL (ref 1–4.8)
LYMPHOCYTES NFR BLD AUTO: 24.5 % (ref 27–41)
MCH RBC QN AUTO: 27.1 PG (ref 27–31)
MCHC RBC AUTO-ENTMCNC: 30.8 G/DL (ref 32–36)
MCV RBC AUTO: 88.1 FL (ref 80–96)
MICROALBUMIN UR-MCNC: 3.3 MG/DL
MICROALBUMIN/CREAT RATIO PNL UR: 44 MG/G (ref 0–30)
MONOCYTES # BLD AUTO: 0.42 K/UL (ref 0–0.8)
MONOCYTES NFR BLD AUTO: 3.6 % (ref 2–6)
MPC BLD CALC-MCNC: 9.9 FL (ref 9.4–12.4)
NEUTROPHILS # BLD AUTO: 8.2 K/UL (ref 1.8–7.7)
NEUTROPHILS NFR BLD AUTO: 70.4 % (ref 53–65)
NONHDLC SERPL-MCNC: 82 MG/DL
NRBC # BLD AUTO: 0 X10E3/UL
NRBC, AUTO (.00): 0 %
PLATELET # BLD AUTO: 287 K/UL (ref 150–400)
POTASSIUM SERPL-SCNC: 3.8 MMOL/L (ref 3.5–5.1)
PROT SERPL-MCNC: 7.1 G/DL (ref 6.4–8.3)
RBC # BLD AUTO: 5.12 M/UL (ref 4.6–6.2)
SODIUM SERPL-SCNC: 138 MMOL/L (ref 136–145)
TRIGL SERPL-MCNC: 223 MG/DL (ref 34–140)
VLDLC SERPL-MCNC: 45 MG/DL
WBC # BLD AUTO: 11.66 K/UL (ref 4.5–11)

## 2025-07-24 PROCEDURE — 80061 LIPID PANEL: CPT | Mod: ,,, | Performed by: CLINICAL MEDICAL LABORATORY

## 2025-07-24 PROCEDURE — 82570 ASSAY OF URINE CREATININE: CPT | Mod: ,,, | Performed by: CLINICAL MEDICAL LABORATORY

## 2025-07-24 PROCEDURE — 80053 COMPREHEN METABOLIC PANEL: CPT | Mod: ,,, | Performed by: CLINICAL MEDICAL LABORATORY

## 2025-07-24 PROCEDURE — 85025 COMPLETE CBC W/AUTO DIFF WBC: CPT | Mod: ,,, | Performed by: CLINICAL MEDICAL LABORATORY

## 2025-07-24 PROCEDURE — 83036 HEMOGLOBIN GLYCOSYLATED A1C: CPT | Mod: ,,, | Performed by: CLINICAL MEDICAL LABORATORY

## 2025-07-24 PROCEDURE — 82043 UR ALBUMIN QUANTITATIVE: CPT | Mod: ,,, | Performed by: CLINICAL MEDICAL LABORATORY

## 2025-07-24 RX ORDER — CLINDAMYCIN HYDROCHLORIDE 300 MG/1
300 CAPSULE ORAL EVERY 6 HOURS
Qty: 40 CAPSULE | Refills: 0 | Status: SHIPPED | OUTPATIENT
Start: 2025-07-24

## 2025-07-24 RX ORDER — TIZANIDINE 4 MG/1
4 TABLET ORAL 3 TIMES DAILY
Qty: 270 TABLET | Refills: 1 | Status: SHIPPED | OUTPATIENT
Start: 2025-07-24

## 2025-07-24 RX ORDER — ESOMEPRAZOLE MAGNESIUM 40 MG/1
40 CAPSULE, DELAYED RELEASE ORAL
Qty: 90 CAPSULE | Refills: 1 | Status: SHIPPED | OUTPATIENT
Start: 2025-07-24

## 2025-07-24 RX ORDER — ERGOCALCIFEROL 1.25 MG/1
CAPSULE ORAL
Qty: 12 CAPSULE | Refills: 1 | Status: SHIPPED | OUTPATIENT
Start: 2025-07-24

## 2025-07-24 RX ORDER — MUPIROCIN 20 MG/G
OINTMENT TOPICAL 3 TIMES DAILY
Qty: 15 G | Refills: 0 | Status: SHIPPED | OUTPATIENT
Start: 2025-07-24

## 2025-07-24 RX ORDER — DICLOFENAC SODIUM 10 MG/G
2 GEL TOPICAL 2 TIMES DAILY
Qty: 10 EACH | Refills: 2 | Status: SHIPPED | OUTPATIENT
Start: 2025-07-24

## 2025-07-24 NOTE — PROGRESS NOTES
"New Clinic Note    Kennedy West is a 44 y.o. male     CC:   Chief Complaint   Patient presents with    Follow-up     Diabetic follow up.     Wound Check     Right BKA. States he bumped his stump and it has lately been swelling and leaking clear liquid.     Health Maintenance     Hepatitis C Screening Never done  TETANUS VACCINE Never done  COVID-19 Vaccine(4 - 2024-25 season) due on 09/01/2024  Aspirin/Antiplatelet Therapy due on 10/05/2024  Diabetic Eye Exam due on 03/14/2025  Diabetes Urine Screening due on 05/02/2025  Foot Exam due on 05/29/2025  Lipid Panel due on 08/21/2025          Subjective    History of Present Illness HPI   Patient is for evaluation of chronic medical problems. Patient needs refills. Shane  is tolerating medications well without side effects.   Patient reports that he "bumped" his right stump over a month ago. It has been red since then. He says it has started leaking clear fluid.   Current Medications[1]     Past Medical History:   Diagnosis Date    Allergy     Anxiety     Asthma     Chronic pain     Complex regional pain syndrome type I of right upper extremity     COPD (chronic obstructive pulmonary disease)     Depression     Diabetes mellitus, type 2     Diabetic eye exam 03/14/2024    Dr. Franklin Dawn - [facility]    Diabetic peripheral neuropathy     DM (diabetes mellitus), type 2 with ophthalmic complications 03/14/2024    Gastritis     GERD (gastroesophageal reflux disease)     Hyperlipidemia     Lumbar radiculopathy     Lumbosacral spondylosis     Non-pressure chronic ulcer of other part of right foot limited to breakdown of skin     Obesity     Obstructive sleep apnea     Osteoarthritis, multiple sites     Polyneuropathy     Severe nonproliferative diabetic retinopathy of both eyes with macular edema 03/14/2024    See March 2024 Eye Exam - Dr. Dawn    Tobacco dependence         Family History   Problem Relation Name Age of Onset    Diabetes Mother " Ofelia West     Hypertension Mother Ofelia West     Cancer Mother Ofelia West     Arthritis Mother Ofelia West     Epilepsy Mother Ofelia West     Diabetes Father Darryl West     Heart disease Father Darryl West     Hyperlipidemia Father Darryl West     Hypertension Father Darryl West     Arthritis Father Darryl West     COPD Father Darryl West     Rheum arthritis Sister Katelyn Aguilera     Arthritis Sister Katelyn Aguilera     Stroke Maternal Grandmother Citlaly Rushing     Stroke Maternal Grandfather Junious Rushing     Cancer Paternal Grandmother Debora West     Heart disease Paternal Grandfather Misha West     Stroke Maternal Uncle Bo Rushing     Cancer Paternal Aunt Taylor Liu         Past Surgical History:   Procedure Laterality Date    ANGIOGRAM, CORONARY, WITH LEFT HEART CATHETERIZATION N/A 08/24/2021    Procedure: Left heart cath w/ coronary angiograms;  Surgeon: Zaheer Real MD;  Location: [facility] CATH LAB;  Service: Cardiology;  Laterality: N/A;    CARPAL TUNNEL RELEASE      right    INJECTION OF ANESTHETIC AGENT AROUND MEDIAL BRANCH NERVES INNERVATING LUMBAR FACET JOINT Bilateral 04/15/2021    Procedure: BLOCK, NERVE, FACET JOINT, LUMBAR, MEDIAL BRANCH;  Surgeon: Sandra Johnson MD;  Location: [facility];  Service: Pain Management;  Laterality: Bilateral;  Bilateral L3-S1 MBB    INJECTION OF ANESTHETIC AGENT AROUND MEDIAL BRANCH NERVES INNERVATING LUMBAR FACET JOINT Bilateral 12/23/2021    Procedure: Block-nerve-medial branch-lumbar, bilateral L4 through S1;  Surgeon: Sandra Johnson MD;  Location: [facility];  Service: Pain Management;  Laterality: Bilateral;  pt will bring vaccine verification card- notified in office    INJECTION OF FACET JOINT Bilateral 06/01/2020    Bilateral L3-S1 MBB - Dr Johnson    LEFT HEART CATHETERIZATION N/A 07/21/2021    Procedure: Left heart cath with possible  intervention;  Surgeon: Brock Burkett DO;  Location: [facility] CATH LAB;  Service: Cardiology;  Laterality: N/A;    RBKA Right 03/16/2022    thumb surgery      right    TONSILLECTOMY  1990        Review of Systems   Constitutional:  Negative for fatigue and fever.   HENT:  Negative for ear pain, postnasal drip, rhinorrhea and sinus pressure/congestion.    Respiratory:  Negative for cough and shortness of breath.    Cardiovascular:  Negative for chest pain.   Gastrointestinal:  Negative for abdominal pain, diarrhea, nausea and vomiting.   Genitourinary:  Negative for dysuria.   Neurological:  Negative for headaches.        /84 (BP Location: Left arm, Patient Position: Sitting)   Pulse 86   Temp 98.4 °F (36.9 °C) (Oral)   Resp 18   Ht 6' (1.829 m)   Wt (!) 171.9 kg (379 lb)   SpO2 96%   BMI 51.40 kg/m²      Physical Exam  HENT:      Head: Normocephalic and atraumatic.   Cardiovascular:      Rate and Rhythm: Normal rate and regular rhythm.   Pulmonary:      Effort: Pulmonary effort is normal.      Breath sounds: Normal breath sounds.   Musculoskeletal:      Right Lower Extremity: Right leg is amputated below knee.   Skin:     Comments: Erythema and warmth noted along the scar on right stump. Area has swelling.    Neurological:      Mental Status: He is alert and oriented to person, place, and time.   Psychiatric:         Mood and Affect: Mood normal.         Behavior: Behavior normal.          Assessment and Plan      ICD-10-CM ICD-9-CM   1. Cellulitis of right lower extremity  L03.115 682.6   2. Chronic pain of both knees  M25.561 719.46    M25.562 338.29    G89.29    3. Vitamin D deficiency  E55.9 268.9   4. Gastroesophageal reflux disease, unspecified whether esophagitis present  K21.9 530.81   5. Complex regional pain syndrome type 1 of right upper extremity  G90.511 337.21   6. Chronic obstructive pulmonary disease, unspecified COPD type  J44.9 496   7. Type 2 diabetes mellitus with both eyes  affected by severe nonproliferative retinopathy and macular edema, with long-term current use of insulin  E11.3413 250.50    Z79.4 362.06     362.07     V58.67   8. Hx of right BKA  Z89.511 V49.75        1. Cellulitis of right lower extremity  Refer to wound clinic. Start Clindamycin.   -     clindamycin (CLEOCIN) 300 MG capsule; Take 1 capsule (300 mg total) by mouth every 6 (six) hours.  Dispense: 40 capsule; Refill: 0  -     Ambulatory referral/consult to Wound Clinic; Future; Expected date: 07/31/2025    2. Chronic pain of both knees  -     diclofenac sodium (VOLTAREN ARTHRITIS PAIN) 1 % Gel; Apply 2 g topically 2 (two) times daily. Apply 2 grams BID to knees, elbows, hips joints bilaterally as needed  Dispense: 10 each; Refill: 2    3. Vitamin D deficiency  The current medical regimen is effective;  continue present plan and medications.  -     ergocalciferol (ERGOCALCIFEROL) 50,000 unit Cap; Take one capsule weekly for 12 weeks then reduce to one capsule monthly  Dispense: 12 capsule; Refill: 1    4. Gastroesophageal reflux disease, unspecified whether esophagitis present  The current medical regimen is effective;  continue present plan and medications.  -     esomeprazole (NEXIUM) 40 MG capsule; Take 1 capsule (40 mg total) by mouth before breakfast.  Dispense: 90 capsule; Refill: 1    5. Complex regional pain syndrome type 1 of right upper extremity  The current medical regimen is effective;  continue present plan and medications.  -     tiZANidine (ZANAFLEX) 4 MG tablet; Take 1 tablet (4 mg total) by mouth 3 (three) times daily.  Dispense: 270 tablet; Refill: 1    6. Chronic obstructive pulmonary disease, unspecified COPD type  The current medical regimen is effective;  continue present plan and medications.    7. Type 2 diabetes mellitus with both eyes affected by severe nonproliferative retinopathy and macular edema, with long-term current use of insulin  The current medical regimen is effective;   "continue present plan and medications.  -     Comprehensive Metabolic Panel; Future; Expected date: 07/24/2025  -     CBC Auto Differential; Future; Expected date: 07/24/2025  -     Lipid Panel; Future; Expected date: 07/24/2025  -     Hemoglobin A1C; Future; Expected date: 07/24/2025  -     Microalbumin/Creatinine Ratio, Urine    8. Hx of right BKA  Area is infected. Start Clindamycin. Will refer patient to wound care.   Overview:  Patient can not tolerate prosthetic due to pain in stump.       Other orders  -     mupirocin (BACTROBAN) 2 % ointment; Apply topically 3 (three) times daily.  Dispense: 15 g; Refill: 0         Follow up in about 3 months (around 10/24/2025), or if symptoms worsen or fail to improve.            [1]   Current Outpatient Medications:     albuterol (PROAIR HFA) 90 mcg/actuation inhaler, Inhale 2 puffs into the lungs every 4 (four) hours as needed for Wheezing. Rescue, Disp: 18 g, Rfl: 5    amitriptyline (ELAVIL) 100 MG tablet, Take one tablet at bedtime, Disp: 90 tablet, Rfl: 0    atorvastatin (LIPITOR) 80 MG tablet, Take 1 tablet (80 mg total) by mouth every evening., Disp: 90 tablet, Rfl: 1    DEXCOM G7 SENSOR Twila, , Disp: , Rfl:     DROPLET PEN NEEDLE 31 gauge x 1/4" Ndle, , Disp: , Rfl:     fenofibrate (TRICOR) 48 MG tablet, Take 1 tablet (48 mg total) by mouth once daily., Disp: 90 tablet, Rfl: 3    gabapentin (NEURONTIN) 600 MG tablet, Take 1 tablet (600 mg total) by mouth 3 (three) times daily., Disp: 90 tablet, Rfl: 2    [START ON 8/7/2025] HYDROcodone-acetaminophen (NORCO) 7.5-325 mg per tablet, Take 1 tablet by mouth every 12 (twelve) hours., Disp: 60 tablet, Rfl: 0    HYDROcodone-acetaminophen (NORCO) 7.5-325 mg per tablet, Take 1 tablet by mouth every 12 (twelve) hours., Disp: 60 tablet, Rfl: 0    HYDROcodone-acetaminophen (NORCO) 7.5-325 mg per tablet, Take 1 tablet by mouth every 12 (twelve) hours., Disp: 60 tablet, Rfl: 0    insulin aspart U-100 (NOVOLOG) 100 unit/mL (3 mL) " "InPn pen, Inject 18 Units into the skin 3 (three) times daily with meals., Disp: , Rfl:     insulin degludec (TRESIBA FLEXTOUCH U-200) 200 unit/mL (3 mL) insulin pen, Inject 56 Units into the skin every evening., Disp: , Rfl:     isosorbide mononitrate (IMDUR) 30 MG 24 hr tablet, Take 2 tablets (60 mg total) by mouth once daily., Disp: 90 tablet, Rfl: 3    JARDIANCE 25 mg tablet, TAKE 1 TABLET ONE TIME DAILY, Disp: 90 tablet, Rfl: 3    metoprolol succinate (TOPROL-XL) 100 MG 24 hr tablet, Take 1 tablet (100 mg total) by mouth once daily., Disp: 90 tablet, Rfl: 3    metoprolol succinate (TOPROL-XL) 50 MG 24 hr tablet, Take 1 tablet (50 mg total) by mouth once daily., Disp: 90 tablet, Rfl: 3    nitroGLYCERIN (NITROSTAT) 0.3 MG SL tablet, Place 1 tablet (0.3 mg total) under the tongue every 5 (five) minutes as needed for Chest pain. Up to 3 times., Disp: 30 tablet, Rfl: 2    pen needle, diabetic (DROPLET PEN NEEDLE) 32 gauge x 5/32" Ndle, USE AS DIRECTED TO INJECT INSULIN PEN FOR DIABETES, Disp: 200 each, Rfl: 5    pen needle, diabetic 31 gauge x 1/4" Ndle, Inject up to 3 times per day, Disp: , Rfl:     semaglutide (OZEMPIC) 1 mg/dose (4 mg/3 mL), Inject 1 mg into the skin every 7 days., Disp: , Rfl:     aspirin (ECOTRIN) 81 MG EC tablet, Take 1 tablet (81 mg total) by mouth once daily., Disp: 30 tablet, Rfl: 2    clindamycin (CLEOCIN) 300 MG capsule, Take 1 capsule (300 mg total) by mouth every 6 (six) hours., Disp: 40 capsule, Rfl: 0    diclofenac sodium (VOLTAREN ARTHRITIS PAIN) 1 % Gel, Apply 2 g topically 2 (two) times daily. Apply 2 grams BID to knees, elbows, hips joints bilaterally as needed, Disp: 10 each, Rfl: 2    ergocalciferol (ERGOCALCIFEROL) 50,000 unit Cap, Take one capsule weekly for 12 weeks then reduce to one capsule monthly, Disp: 12 capsule, Rfl: 1    esomeprazole (NEXIUM) 40 MG capsule, Take 1 capsule (40 mg total) by mouth before breakfast., Disp: 90 capsule, Rfl: 1    mupirocin (BACTROBAN) 2 % " ointment, Apply topically 3 (three) times daily., Disp: 15 g, Rfl: 0    tiZANidine (ZANAFLEX) 4 MG tablet, Take 1 tablet (4 mg total) by mouth 3 (three) times daily., Disp: 270 tablet, Rfl: 1

## 2025-07-25 DIAGNOSIS — E78.2 MIXED HYPERLIPIDEMIA: Chronic | ICD-10-CM

## 2025-07-25 RX ORDER — ATORVASTATIN CALCIUM 80 MG/1
80 TABLET, FILM COATED ORAL NIGHTLY
Qty: 90 TABLET | Refills: 1 | Status: SHIPPED | OUTPATIENT
Start: 2025-07-25

## 2025-07-29 ENCOUNTER — TELEPHONE (OUTPATIENT)
Dept: CARDIOLOGY | Facility: HOSPITAL | Age: 45
End: 2025-07-29
Payer: MEDICARE

## 2025-07-30 ENCOUNTER — HOSPITAL ENCOUNTER (OUTPATIENT)
Dept: CARDIOLOGY | Facility: HOSPITAL | Age: 45
Discharge: HOME | End: 2025-07-30
Attending: NURSE PRACTITIONER
Payer: MEDICARE

## 2025-07-30 ENCOUNTER — HOSPITAL ENCOUNTER (OUTPATIENT)
Dept: RADIOLOGY | Facility: HOSPITAL | Age: 45
Discharge: HOME | End: 2025-07-30
Attending: NURSE PRACTITIONER
Payer: MEDICARE

## 2025-07-30 DIAGNOSIS — I25.10 CORONARY ARTERY DISEASE, UNSPECIFIED VESSEL OR LESION TYPE, UNSPECIFIED WHETHER ANGINA PRESENT, UNSPECIFIED WHETHER NATIVE OR TRANSPLANTED HEART: ICD-10-CM

## 2025-07-30 DIAGNOSIS — R07.9 CHEST PAIN, UNSPECIFIED TYPE: ICD-10-CM

## 2025-07-30 DIAGNOSIS — R94.31 ABNORMAL ELECTROCARDIOGRAM (ECG) (EKG): ICD-10-CM

## 2025-07-30 DIAGNOSIS — R06.00 PND (PAROXYSMAL NOCTURNAL DYSPNEA): ICD-10-CM

## 2025-07-30 DIAGNOSIS — R06.01 ORTHOPNEA: ICD-10-CM

## 2025-07-30 LAB
CV STRESS BASE HR: 101 BPM
DIASTOLIC BLOOD PRESSURE: 66 MMHG
OHS CV CPX 85 PERCENT MAX PREDICTED HEART RATE MALE: 150
OHS CV CPX MAX PREDICTED HEART RATE: 177
OHS CV CPX PATIENT IS FEMALE: 0
OHS CV CPX PATIENT IS MALE: 1
OHS CV CPX PEAK DIASTOLIC BLOOD PRESSURE: 69 MMHG
OHS CV CPX PEAK HEAR RATE: 122 BPM
OHS CV CPX PEAK RATE PRESSURE PRODUCT: NORMAL
OHS CV CPX PEAK SYSTOLIC BLOOD PRESSURE: 107 MMHG
OHS CV CPX PERCENT MAX PREDICTED HEART RATE ACHIEVED: 69
OHS CV CPX RATE PRESSURE PRODUCT PRESENTING: NORMAL
STRESS ECHO POST EXERCISE DUR MIN: 2 MINUTES
SYSTOLIC BLOOD PRESSURE: 105 MMHG

## 2025-07-30 PROCEDURE — A9500 TC99M SESTAMIBI: HCPCS | Performed by: NURSE PRACTITIONER

## 2025-07-30 PROCEDURE — 63600175 PHARM REV CODE 636 W HCPCS: Performed by: NURSE PRACTITIONER

## 2025-07-30 PROCEDURE — 93306 TTE W/DOPPLER COMPLETE: CPT | Mod: 26,,, | Performed by: INTERNAL MEDICINE

## 2025-07-30 PROCEDURE — 78452 HT MUSCLE IMAGE SPECT MULT: CPT | Mod: TC

## 2025-07-30 PROCEDURE — 93017 CV STRESS TEST TRACING ONLY: CPT

## 2025-07-30 PROCEDURE — 93306 TTE W/DOPPLER COMPLETE: CPT

## 2025-07-30 PROCEDURE — 78452 HT MUSCLE IMAGE SPECT MULT: CPT | Mod: 26,,, | Performed by: INTERNAL MEDICINE

## 2025-07-30 PROCEDURE — 93018 CV STRESS TEST I&R ONLY: CPT | Mod: ,,, | Performed by: INTERNAL MEDICINE

## 2025-07-30 PROCEDURE — 93016 CV STRESS TEST SUPVJ ONLY: CPT | Mod: ,,, | Performed by: INTERNAL MEDICINE

## 2025-07-30 RX ORDER — TETRAKIS(2-METHOXYISOBUTYLISOCYANIDE)COPPER(I) TETRAFLUOROBORATE 1 MG/ML
33.5 INJECTION, POWDER, LYOPHILIZED, FOR SOLUTION INTRAVENOUS
Status: COMPLETED | OUTPATIENT
Start: 2025-07-30 | End: 2025-07-30

## 2025-07-30 RX ORDER — REGADENOSON 0.08 MG/ML
0.4 INJECTION, SOLUTION INTRAVENOUS ONCE
Status: COMPLETED | OUTPATIENT
Start: 2025-07-30 | End: 2025-07-30

## 2025-07-30 RX ORDER — TETRAKIS(2-METHOXYISOBUTYLISOCYANIDE)COPPER(I) TETRAFLUOROBORATE 1 MG/ML
10.7 INJECTION, POWDER, LYOPHILIZED, FOR SOLUTION INTRAVENOUS
Status: COMPLETED | OUTPATIENT
Start: 2025-07-30 | End: 2025-07-30

## 2025-07-30 RX ADMIN — REGADENOSON 0.4 MG: 0.08 INJECTION, SOLUTION INTRAVENOUS at 09:07

## 2025-07-30 RX ADMIN — KIT FOR THE PREPARATION OF TECHNETIUM TC99M SESTAMIBI 33.5 MILLICURIE: 1 INJECTION, POWDER, LYOPHILIZED, FOR SOLUTION PARENTERAL at 09:07

## 2025-07-30 RX ADMIN — KIT FOR THE PREPARATION OF TECHNETIUM TC99M SESTAMIBI 10.7 MILLICURIE: 1 INJECTION, POWDER, LYOPHILIZED, FOR SOLUTION PARENTERAL at 07:07

## 2025-07-31 ENCOUNTER — EXTERNAL CHRONIC CARE MANAGEMENT (OUTPATIENT)
Dept: FAMILY MEDICINE | Facility: CLINIC | Age: 45
End: 2025-07-31
Payer: MEDICARE

## 2025-07-31 LAB
AORTIC ROOT ANNULUS: 3.7 CM
AORTIC VALVE CUSP SEPERATION: 2.27 CM
AV INDEX (PROSTH): 0.66
AV MEAN GRADIENT: 2 MMHG
AV PEAK GRADIENT: 3 MMHG
AV VALVE AREA BY VELOCITY RATIO: 3.2 CM²
AV VALVE AREA: 2.7 CM²
AV VELOCITY RATIO: 0.78
CV ECHO LV RWT: 0.63 CM
DOP CALC AO PEAK VEL: 0.9 M/S
DOP CALC AO VTI: 18.2 CM
DOP CALC LVOT AREA: 4.2 CM2
DOP CALC LVOT DIAMETER: 2.3 CM
DOP CALC LVOT PEAK VEL: 0.7 M/S
DOP CALCLVOT PEAK VEL VTI: 12 CM
E WAVE DECELERATION TIME: 159 MSEC
E/A RATIO: 0.9
E/E' RATIO: 7 M/S
ECHO LV POSTERIOR WALL: 1.1 CM (ref 0.6–1.1)
FRACTIONAL SHORTENING: 14.3 % (ref 28–44)
INTERVENTRICULAR SEPTUM: 1.6 CM (ref 0.6–1.1)
LEFT ATRIUM SIZE: 3.9 CM
LEFT INTERNAL DIMENSION IN SYSTOLE: 3 CM (ref 2.1–4)
LEFT VENTRICLE DIASTOLIC VOLUME: 50 ML
LEFT VENTRICLE SYSTOLIC VOLUME: 36 ML
LEFT VENTRICULAR INTERNAL DIMENSION IN DIASTOLE: 3.5 CM (ref 3.5–6)
LEFT VENTRICULAR MASS: 163.2 G
LV LATERAL E/E' RATIO: 6.3 M/S
LV SEPTAL E/E' RATIO: 7.9 M/S
LVED V (TEICH): 49.54 ML
LVES V (TEICH): 35.65 ML
LVOT MG: 1.04 MMHG
LVOT MV: 0.48 CM/S
MV PEAK A VEL: 0.7 M/S
MV PEAK E VEL: 0.63 M/S
MV STENOSIS PRESSURE HALF TIME: 46.06 MS
MV VALVE AREA P 1/2 METHOD: 4.78 CM2
OHS CV RV/LV RATIO: 1 CM
PISA TR MAX VEL: 2.1 M/S
PV PEAK GRADIENT: 3 MMHG
PV PEAK VELOCITY: 0.81 M/S
RA VOL SYS: 68.85 ML
RIGHT ATRIAL AREA: 23.8 CM2
RIGHT ATRIUM VOLUME AREA LENGTH APICAL 4 CHAMBER: 65.04 ML
RIGHT VENTRICLE DIASTOLIC BASEL DIMENSION: 3.5 CM
TDI LATERAL: 0.1 M/S
TDI SEPTAL: 0.08 M/S
TDI: 0.09 M/S
TR MAX PG: 17 MMHG

## 2025-07-31 PROCEDURE — 99490 CHRNC CARE MGMT STAFF 1ST 20: CPT | Mod: ,,, | Performed by: FAMILY MEDICINE

## 2025-08-11 DIAGNOSIS — I25.10 CORONARY ARTERY DISEASE INVOLVING NATIVE HEART, UNSPECIFIED VESSEL OR LESION TYPE, UNSPECIFIED WHETHER ANGINA PRESENT: Chronic | ICD-10-CM

## 2025-08-11 DIAGNOSIS — R00.2 PALPITATIONS: ICD-10-CM

## 2025-08-12 RX ORDER — ISOSORBIDE MONONITRATE 30 MG/1
60 TABLET, EXTENDED RELEASE ORAL
Qty: 180 TABLET | Refills: 1 | Status: SHIPPED | OUTPATIENT
Start: 2025-08-12

## 2025-08-12 RX ORDER — METOPROLOL SUCCINATE 100 MG/1
100 TABLET, EXTENDED RELEASE ORAL
Qty: 90 TABLET | Refills: 1 | Status: SHIPPED | OUTPATIENT
Start: 2025-08-12

## 2025-08-12 RX ORDER — METOPROLOL SUCCINATE 50 MG/1
50 TABLET, EXTENDED RELEASE ORAL
Qty: 90 TABLET | Refills: 1 | Status: SHIPPED | OUTPATIENT
Start: 2025-08-12

## 2025-08-17 ENCOUNTER — PATIENT MESSAGE (OUTPATIENT)
Dept: CARDIOLOGY | Facility: CLINIC | Age: 45
End: 2025-08-17
Payer: MEDICARE

## 2025-08-20 ENCOUNTER — PATIENT MESSAGE (OUTPATIENT)
Dept: CARDIOLOGY | Facility: CLINIC | Age: 45
End: 2025-08-20
Payer: MEDICARE

## 2025-08-20 DIAGNOSIS — R94.39 ABNORMAL FINDING ON CARDIOVASCULAR STRESS TEST: Primary | ICD-10-CM

## 2025-08-20 DIAGNOSIS — Z01.818 OTHER SPECIFIED PRE-OPERATIVE EXAMINATION: ICD-10-CM

## 2025-08-20 DIAGNOSIS — R07.9 CHEST PAIN, UNSPECIFIED TYPE: ICD-10-CM

## 2025-08-20 DIAGNOSIS — R06.02 SHORTNESS OF BREATH: ICD-10-CM

## 2025-08-20 DIAGNOSIS — Z01.812 PRE-OPERATIVE LABORATORY EXAMINATION: Primary | ICD-10-CM

## 2025-08-20 DIAGNOSIS — R00.2 PALPITATIONS: ICD-10-CM

## 2025-08-20 RX ORDER — SODIUM CHLORIDE 0.9 % (FLUSH) 0.9 %
10 SYRINGE (ML) INJECTION
OUTPATIENT
Start: 2025-09-30

## 2025-08-29 ENCOUNTER — PATIENT MESSAGE (OUTPATIENT)
Facility: HOSPITAL | Age: 45
End: 2025-08-29
Payer: MEDICARE

## (undated) DEVICE — GLOVE SURGICAL PROTEXIS PI SIZE 6.5

## (undated) DEVICE — KIT MICROINTRODUCER 4FR MINI STICK II

## (undated) DEVICE — BAG-A-JET FLUID DISPENSER SYSTEM

## (undated) DEVICE — DEVICE RADIAL TR BAND LRG

## (undated) DEVICE — SET IV EXTENSION 42IN W/2 PORT CLEARLINK

## (undated) DEVICE — CATH DIAG OPTITORQUE 5FR TIGER 110CM

## (undated) DEVICE — SHEATH INTRODUCER 6FR 10CM X 0.038 PINNACLE

## (undated) DEVICE — CATH COR GUIDE 6FR JR4 100CM

## (undated) DEVICE — APPLICATOR CHLORAPREP LITE ORANGE 10.5ML STERILE

## (undated) DEVICE — SEAL ANGIO 6FR VIP - VASCULAR CLOSURE DEVICE BX/10

## (undated) DEVICE — STOPCOCK 3-WAY ROTATING

## (undated) DEVICE — TRAY NERVE BLOCK (KC) PMA

## (undated) DEVICE — GLOVE SURGICAL PROTEXIS PI SIZE 7.5

## (undated) DEVICE — DRESSING IV TEGADERM 10X12CM

## (undated) DEVICE — KIT IV START 849

## (undated) DEVICE — CDS ANGIOGRAPHY PACK

## (undated) DEVICE — KIT Y-ADAPTER W/INTRO NEEDLE

## (undated) DEVICE — SET IV SOL CONTIN-FLOW 10 DROP/ML (PRIMARY)

## (undated) DEVICE — SET IV PRIMARY ALARIS (PRIMARY)

## (undated) DEVICE — CLIPPER SURGICAL BLADE ASSEMBLY STERILE SNGL USE

## (undated) DEVICE — CATH IV JELCO 22GX1 IN

## (undated) DEVICE — GLIDESHEATH SLENDER INTRODUCER 6FR

## (undated) DEVICE — TUBING CAPNOLINE PLUS SMART 02 CONNECTOR(ORDER 65110)

## (undated) DEVICE — APPLICATOR CHLORAPREP HI-LITE TINTED ORANGE 26ML

## (undated) DEVICE — GUIDEWIRE ADX ANGIO J .035X145X3MM

## (undated) DEVICE — TOWEL OR STERILE BLUE 4/PK 20PK/CS

## (undated) DEVICE — CATH IMPULSE 6FR FL4

## (undated) DEVICE — NEEDLE SPINAL SPINOCAN 22GX4 3/4IN CS/50

## (undated) DEVICE — KIT ANGIO MANIFOLD CUSTOM RFH LEFT HEART KIT

## (undated) DEVICE — ISOVUE 370 100ML

## (undated) DEVICE — POSITIONER ULNAR NERVE

## (undated) DEVICE — GLOVE SURGICAL PROTEXIS PI SIZE 7

## (undated) DEVICE — DEVICE BLEEDBACK CONTROL VALVE COPILOT

## (undated) DEVICE — SENSOR PULSE OX ADULT

## (undated) DEVICE — GLOVE SURGICAL PROTEXIS PI SIZE 8.0

## (undated) DEVICE — DRAPE BRACHIAL ANGIOGRAPHY TIBURON

## (undated) DEVICE — TUBING PRESSURE MONITOR 12 IN

## (undated) DEVICE — GUIDEWIRE EXCHANGE J TIP .035X260CM

## (undated) DEVICE — GLOVE SURGICAL PROTEXIS PI SIZE 6

## (undated) DEVICE — CATH IMPULSE 6FR FR4

## (undated) DEVICE — GUIDEWIRE OMNIWIRE STRAIGHT TIP 185CM

## (undated) DEVICE — KIT INTRAOPERATIVE ULTRASOUND PROBE COVER 6INX96IN

## (undated) DEVICE — APPLICATOR BD CHLORAPREP FREPP CLEAR STERILE 1.5ML